# Patient Record
Sex: FEMALE | Race: WHITE | NOT HISPANIC OR LATINO | Employment: FULL TIME | URBAN - METROPOLITAN AREA
[De-identification: names, ages, dates, MRNs, and addresses within clinical notes are randomized per-mention and may not be internally consistent; named-entity substitution may affect disease eponyms.]

---

## 2017-03-27 ENCOUNTER — HOSPITAL ENCOUNTER (OUTPATIENT)
Dept: RADIOLOGY | Facility: HOSPITAL | Age: 47
Discharge: HOME | End: 2017-03-27
Attending: SURGERY
Payer: COMMERCIAL

## 2017-03-27 DIAGNOSIS — R16.0 LIVER MASS: ICD-10-CM

## 2017-03-27 PROCEDURE — 76700 US EXAM ABDOM COMPLETE: CPT | Mod: 26,,, | Performed by: RADIOLOGY

## 2017-03-27 PROCEDURE — 76700 US EXAM ABDOM COMPLETE: CPT | Mod: TC

## 2017-04-04 RX ORDER — METFORMIN HYDROCHLORIDE 500 MG/1
500 TABLET ORAL 2 TIMES DAILY WITH MEALS
Qty: 30 TABLET | Refills: 1 | OUTPATIENT
Start: 2017-04-04

## 2017-04-04 RX ORDER — SIMVASTATIN 40 MG/1
40 TABLET, FILM COATED ORAL NIGHTLY
Qty: 30 TABLET | Refills: 1 | OUTPATIENT
Start: 2017-04-04

## 2017-04-04 RX ORDER — LOSARTAN POTASSIUM 50 MG/1
50 TABLET ORAL 2 TIMES DAILY
Qty: 30 TABLET | Refills: 1 | OUTPATIENT
Start: 2017-04-04

## 2017-04-04 NOTE — TELEPHONE ENCOUNTER
She's never been seen here, she'll have to get her previous provider to fill until she sees Dr Walker.  I cannot fill medicatons

## 2017-04-04 NOTE — TELEPHONE ENCOUNTER
Spoke with patient and she states that her insurance will only allow her to see Dr. Walker. Will not cover any other provider in the office     Appointment rescheduled to 5-1-17    Please advise

## 2017-04-04 NOTE — TELEPHONE ENCOUNTER
Spoke with Paulette -with [employer], she confirmed patient can see Dr. Guajardo. Appointment has been reschedule with MD on 04/12/17. Patient was informed.     Reference # with MsKeyonna Caldwell with [employer] [id number]    Phone # [phone]

## 2017-04-04 NOTE — TELEPHONE ENCOUNTER
----- Message from Lacie Fontaine sent at 4/4/2017 11:20 AM CDT -----  Contact: Self  Refill : losartan (COZAAR) 50 MG tablet               metformin (GLUCOPHAGE) 500 MG tablet              simvastatin (ZOCOR) 40 MG tablet         Pharmacy : [address] [place] [address] [place] [phone]    #Pt ins does not let her see anyone else but  but pt will run out of meds by the time of her apt. Please call [phone]

## 2017-04-12 ENCOUNTER — TELEPHONE (OUTPATIENT)
Dept: FAMILY MEDICINE | Facility: CLINIC | Age: 47
End: 2017-04-12

## 2017-04-12 ENCOUNTER — OFFICE VISIT (OUTPATIENT)
Dept: FAMILY MEDICINE | Facility: CLINIC | Age: 47
End: 2017-04-12
Payer: COMMERCIAL

## 2017-04-12 ENCOUNTER — LAB VISIT (OUTPATIENT)
Dept: LAB | Facility: HOSPITAL | Age: 47
End: 2017-04-12
Attending: INTERNAL MEDICINE
Payer: COMMERCIAL

## 2017-04-12 ENCOUNTER — PATIENT MESSAGE (OUTPATIENT)
Dept: FAMILY MEDICINE | Facility: CLINIC | Age: 47
End: 2017-04-12

## 2017-04-12 VITALS
HEIGHT: 63 IN | OXYGEN SATURATION: 98 % | SYSTOLIC BLOOD PRESSURE: 152 MMHG | DIASTOLIC BLOOD PRESSURE: 86 MMHG | RESPIRATION RATE: 17 BRPM | HEART RATE: 80 BPM | WEIGHT: 291 LBS | TEMPERATURE: 98 F | BODY MASS INDEX: 51.56 KG/M2

## 2017-04-12 DIAGNOSIS — E11.9 TYPE 2 DIABETES MELLITUS WITHOUT COMPLICATION, WITHOUT LONG-TERM CURRENT USE OF INSULIN: Primary | ICD-10-CM

## 2017-04-12 DIAGNOSIS — I10 HYPERTENSION, ESSENTIAL: ICD-10-CM

## 2017-04-12 DIAGNOSIS — I10 HYPERTENSION, ESSENTIAL: Primary | ICD-10-CM

## 2017-04-12 DIAGNOSIS — E11.9 TYPE 2 DIABETES MELLITUS WITHOUT COMPLICATION, WITHOUT LONG-TERM CURRENT USE OF INSULIN: ICD-10-CM

## 2017-04-12 DIAGNOSIS — K75.81 NASH (NONALCOHOLIC STEATOHEPATITIS): ICD-10-CM

## 2017-04-12 DIAGNOSIS — Z12.39 SCREENING FOR BREAST CANCER: ICD-10-CM

## 2017-04-12 DIAGNOSIS — E66.01 MORBID OBESITY DUE TO EXCESS CALORIES: ICD-10-CM

## 2017-04-12 DIAGNOSIS — D13.4 HEPATIC ADENOMA: ICD-10-CM

## 2017-04-12 LAB
25(OH)D3+25(OH)D2 SERPL-MCNC: 27 NG/ML
ALBUMIN SERPL BCP-MCNC: 3.6 G/DL
ALP SERPL-CCNC: 100 U/L
ALT SERPL W/O P-5'-P-CCNC: 34 U/L
ANION GAP SERPL CALC-SCNC: 8 MMOL/L
AST SERPL-CCNC: 50 U/L
BASOPHILS # BLD AUTO: 0.03 K/UL
BASOPHILS NFR BLD: 0.3 %
BILIRUB SERPL-MCNC: 0.5 MG/DL
BUN SERPL-MCNC: 7 MG/DL
CALCIUM SERPL-MCNC: 10.3 MG/DL
CHLORIDE SERPL-SCNC: 103 MMOL/L
CHOLEST/HDLC SERPL: 3.7 {RATIO}
CO2 SERPL-SCNC: 29 MMOL/L
CREAT SERPL-MCNC: 0.7 MG/DL
DIFFERENTIAL METHOD: ABNORMAL
EOSINOPHIL # BLD AUTO: 0.3 K/UL
EOSINOPHIL NFR BLD: 2.7 %
ERYTHROCYTE [DISTWIDTH] IN BLOOD BY AUTOMATED COUNT: 14.3 %
EST. GFR  (AFRICAN AMERICAN): >60 ML/MIN/1.73 M^2
EST. GFR  (NON AFRICAN AMERICAN): >60 ML/MIN/1.73 M^2
GLUCOSE SERPL-MCNC: 110 MG/DL
HCT VFR BLD AUTO: 37.7 %
HDL/CHOLESTEROL RATIO: 26.9 %
HDLC SERPL-MCNC: 186 MG/DL
HDLC SERPL-MCNC: 50 MG/DL
HGB BLD-MCNC: 11.9 G/DL
LDLC SERPL CALC-MCNC: 111.2 MG/DL
LYMPHOCYTES # BLD AUTO: 2.7 K/UL
LYMPHOCYTES NFR BLD: 28.9 %
MCH RBC QN AUTO: 26.7 PG
MCHC RBC AUTO-ENTMCNC: 31.6 %
MCV RBC AUTO: 85 FL
MONOCYTES # BLD AUTO: 0.7 K/UL
MONOCYTES NFR BLD: 7.6 %
NEUTROPHILS # BLD AUTO: 5.7 K/UL
NEUTROPHILS NFR BLD: 60.5 %
NONHDLC SERPL-MCNC: 136 MG/DL
PLATELET # BLD AUTO: 234 K/UL
PLATELET BLD QL SMEAR: ABNORMAL
PMV BLD AUTO: 13.1 FL
POTASSIUM SERPL-SCNC: 4.6 MMOL/L
PROT SERPL-MCNC: 8.5 G/DL
RBC # BLD AUTO: 4.45 M/UL
SODIUM SERPL-SCNC: 140 MMOL/L
TRIGL SERPL-MCNC: 124 MG/DL
TSH SERPL DL<=0.005 MIU/L-ACNC: 1.26 UIU/ML
WBC # BLD AUTO: 9.49 K/UL

## 2017-04-12 PROCEDURE — 1160F RVW MEDS BY RX/DR IN RCRD: CPT | Mod: S$GLB,,, | Performed by: INTERNAL MEDICINE

## 2017-04-12 PROCEDURE — 3077F SYST BP >= 140 MM HG: CPT | Mod: S$GLB,,, | Performed by: INTERNAL MEDICINE

## 2017-04-12 PROCEDURE — 3046F HEMOGLOBIN A1C LEVEL >9.0%: CPT | Mod: S$GLB,,, | Performed by: INTERNAL MEDICINE

## 2017-04-12 PROCEDURE — 83036 HEMOGLOBIN GLYCOSYLATED A1C: CPT

## 2017-04-12 PROCEDURE — 80061 LIPID PANEL: CPT

## 2017-04-12 PROCEDURE — 80053 COMPREHEN METABOLIC PANEL: CPT

## 2017-04-12 PROCEDURE — 3079F DIAST BP 80-89 MM HG: CPT | Mod: S$GLB,,, | Performed by: INTERNAL MEDICINE

## 2017-04-12 PROCEDURE — 82306 VITAMIN D 25 HYDROXY: CPT

## 2017-04-12 PROCEDURE — 99999 PR PBB SHADOW E&M-EST. PATIENT-LVL IV: CPT | Mod: PBBFAC,,, | Performed by: INTERNAL MEDICINE

## 2017-04-12 PROCEDURE — 84443 ASSAY THYROID STIM HORMONE: CPT

## 2017-04-12 PROCEDURE — 99204 OFFICE O/P NEW MOD 45 MIN: CPT | Mod: S$GLB,,, | Performed by: INTERNAL MEDICINE

## 2017-04-12 PROCEDURE — 4010F ACE/ARB THERAPY RXD/TAKEN: CPT | Mod: S$GLB,,, | Performed by: INTERNAL MEDICINE

## 2017-04-12 PROCEDURE — 36415 COLL VENOUS BLD VENIPUNCTURE: CPT

## 2017-04-12 PROCEDURE — 85025 COMPLETE CBC W/AUTO DIFF WBC: CPT

## 2017-04-12 RX ORDER — METFORMIN HYDROCHLORIDE 500 MG/1
1000 TABLET ORAL 2 TIMES DAILY WITH MEALS
Qty: 120 TABLET | Refills: 3 | Status: SHIPPED | OUTPATIENT
Start: 2017-04-12 | End: 2017-08-03 | Stop reason: SDUPTHER

## 2017-04-12 RX ORDER — HYDROCHLOROTHIAZIDE 25 MG/1
25 TABLET ORAL DAILY
Qty: 30 TABLET | Refills: 5 | Status: SHIPPED | OUTPATIENT
Start: 2017-04-12 | End: 2017-10-06 | Stop reason: SDUPTHER

## 2017-04-12 RX ORDER — FLUTICASONE PROPIONATE 50 MCG
1 SPRAY, SUSPENSION (ML) NASAL DAILY
COMMUNITY
End: 2017-11-10 | Stop reason: SDUPTHER

## 2017-04-12 NOTE — PROGRESS NOTES
Subjective:       Patient ID: Micaela Hernandez is a 46 y.o. female.    Chief Complaint: Establish Care    HPI Comments: Est pcp    HPI: 47 y/o w/ HTN, DM, h/o liver adenoma (while taking OCP) ARORA and morbid obesity presents to establish pcp. Taking metformin 500mg twice daily for last year not check blood glucose regularly at home denies hypoglycemic symptoms. No Parathesia or tingling of feet. No history of CVA/CAD/PAD. Using ellipitical 2 x/week without exertional symptoms. She has recently started a meal prep program.     PSurghx; liver adenoma resection jan 2016 incisional hernia repair nov 2016, cholecystectomy, fibrioid ablation.    SocHx: non smoker no etoh, no illicits. Lives with 20 year old daughter works as     Review of Systems   Constitutional: Negative for activity change, appetite change, fatigue, fever and unexpected weight change.   HENT: Negative for ear pain, hearing loss, rhinorrhea, sore throat and trouble swallowing.    Eyes: Negative for discharge and visual disturbance.   Respiratory: Negative for chest tightness, shortness of breath and wheezing.    Cardiovascular: Negative for chest pain, palpitations and leg swelling.   Gastrointestinal: Negative for abdominal pain, blood in stool, constipation, diarrhea and vomiting.   Endocrine: Negative for cold intolerance, heat intolerance, polydipsia and polyuria.   Genitourinary: Negative for difficulty urinating, dysuria, hematuria and menstrual problem.   Musculoskeletal: Negative for arthralgias, joint swelling and neck stiffness.   Skin: Negative for rash.   Neurological: Negative for dizziness, syncope, weakness and headaches.   Psychiatric/Behavioral: Negative for confusion, dysphoric mood and suicidal ideas.       Objective:     Vitals:    04/12/17 0804 04/12/17 0827   BP: (!) 150/88 (!) 152/86   BP Location: Left arm    Patient Position: Sitting    BP Method: Manual    Pulse: 84 80   Resp: 17    Temp: 97.8 °F (36.6 °C)    TempSrc:  "Oral    SpO2: 98%    Weight: 132 kg (291 lb 0.1 oz)    Height: 5' 3" (1.6 m)           Physical Exam   Constitutional: She is oriented to person, place, and time. She appears well-developed and well-nourished.   HENT:   Head: Normocephalic and atraumatic.   Eyes: Conjunctivae are normal. Pupils are equal, round, and reactive to light.   Neck: Normal range of motion.   Cardiovascular: Normal rate and regular rhythm.  Exam reveals no gallop and no friction rub.    No murmur heard.  Trace pedal edema bilaterally   Pulmonary/Chest: Effort normal and breath sounds normal. She has no wheezes. She has no rales.   Abdominal: Soft. Bowel sounds are normal. There is no tenderness. There is no rebound and no guarding.   Musculoskeletal: Normal range of motion. She exhibits no edema or tenderness.   Neurological: She is alert and oriented to person, place, and time. No cranial nerve deficit.   Protective Sensation (w/ 10 gram monofilament):  Right: Intact  Left: Intact    Visual Inspection:  Normal -  Bilateral    Pedal Pulses:   Right: Present  Left: Present    Posterior tibialis:   Right:Present  Left: Present     Skin: Skin is warm and dry.   Psychiatric: She has a normal mood and affect.       Assessment:       1. Type 2 diabetes mellitus without complication, without long-term current use of insulin    2. Hypertension, essential    3. ARORA (nonalcoholic steatohepatitis)    4. Screening for breast cancer    5. Hepatic adenoma    6. Morbid obesity due to excess calories        Plan:    1. Increase metformin to 1000mg bid titrate weekly (start 1000mg qam and 500mg qpm x one week). To take with fiber supplement.  Needs dfe, optometry referral sent. Repeat a1c today check tsh for secondary causes of obesity    2. Above goal add hctz follow up in two weeks, stressed limiting salt intake, check renal function and electrolytes    3. On statin needs weight loss    4. Mammogram    5. Stable repeat imaging Sep 2017    6. The patient " is asked to make an attempt to improve diet and exercise patterns to aid in medical management of this problem.  Consider med fitness referral at follow up    2 week follow up for bp monitoring

## 2017-04-12 NOTE — TELEPHONE ENCOUNTER
----- Message from Ida Acharya sent at 4/12/2017  9:22 AM CDT -----  Contact: Lawanda VARELA   Instructions are not clear on Metformin .   [phone]

## 2017-04-13 LAB
ESTIMATED AVG GLUCOSE: 146 MG/DL
HBA1C MFR BLD HPLC: 6.7 %

## 2017-04-13 RX ORDER — SIMVASTATIN 40 MG/1
40 TABLET, FILM COATED ORAL NIGHTLY
Qty: 30 TABLET | Refills: 5 | Status: SHIPPED | OUTPATIENT
Start: 2017-04-13 | End: 2017-10-13 | Stop reason: SDUPTHER

## 2017-04-13 RX ORDER — LOSARTAN POTASSIUM 50 MG/1
50 TABLET ORAL 2 TIMES DAILY
Qty: 60 TABLET | Refills: 5 | Status: SHIPPED | OUTPATIENT
Start: 2017-04-13 | End: 2017-10-06 | Stop reason: SDUPTHER

## 2017-05-01 ENCOUNTER — HOSPITAL ENCOUNTER (OUTPATIENT)
Dept: RADIOLOGY | Facility: HOSPITAL | Age: 47
Discharge: HOME | End: 2017-05-01
Attending: INTERNAL MEDICINE
Payer: COMMERCIAL

## 2017-05-01 DIAGNOSIS — Z12.31 ENCOUNTER FOR SCREENING MAMMOGRAM FOR BREAST CANCER: ICD-10-CM

## 2017-05-01 DIAGNOSIS — Z12.39 SCREENING FOR BREAST CANCER: ICD-10-CM

## 2017-05-01 PROCEDURE — 77067 SCR MAMMO BI INCL CAD: CPT | Mod: 26,,, | Performed by: RADIOLOGY

## 2017-05-01 PROCEDURE — 77067 SCR MAMMO BI INCL CAD: CPT | Mod: TC

## 2017-05-16 ENCOUNTER — OFFICE VISIT (OUTPATIENT)
Dept: OPTOMETRY | Facility: CLINIC | Age: 47
End: 2017-05-16
Payer: COMMERCIAL

## 2017-05-16 DIAGNOSIS — H52.4 MYOPIA WITH PRESBYOPIA, BILATERAL: ICD-10-CM

## 2017-05-16 DIAGNOSIS — E11.9 DIABETES MELLITUS TYPE 2 WITHOUT RETINOPATHY: Primary | ICD-10-CM

## 2017-05-16 DIAGNOSIS — H52.13 MYOPIA WITH PRESBYOPIA, BILATERAL: ICD-10-CM

## 2017-05-16 PROCEDURE — 92015 DETERMINE REFRACTIVE STATE: CPT | Mod: S$GLB,,, | Performed by: OPTOMETRIST

## 2017-05-16 PROCEDURE — 92004 COMPRE OPH EXAM NEW PT 1/>: CPT | Mod: S$GLB,,, | Performed by: OPTOMETRIST

## 2017-05-16 PROCEDURE — 99999 PR PBB SHADOW E&M-EST. PATIENT-LVL II: CPT | Mod: PBBFAC,,, | Performed by: OPTOMETRIST

## 2017-05-16 NOTE — MR AVS SNAPSHOT
[facility]  [address] [facility]  [place] [address]  Phone: [phone]  Fax: [phone]                  Micaela Hernandez   2017 2:00 PM   Office Visit    Description:  Female : 1970   Provider:  Geovany Suarez OD   Department:  [facility]           Reason for Visit     Diabetic Eye Exam           Diagnoses this Visit        Comments    Diabetes mellitus type 2 without retinopathy    -  Primary     Myopia with presbyopia, bilateral                To Do List           Future Appointments        Provider Department Dept Phone    2017 8:00 AM Rojelio Guajardo MD [facility] [phone]      Goals (5 Years of Data)     None      Follow-Up and Disposition     Return in about 1 year (around 2018).      [facility]     [facility] On Call Nurse Care Line -  Assistance  Unless otherwise directed by your provider, please contact [facility], our nurse care line that is available for  assistance.     Registered nurses in the [facility] Center provide: appointment scheduling, clinical advisement, health education, and other advisory services.  Call: [phone] (toll free)               Medications           Message regarding Medications     Verify the changes and/or additions to your medication regime listed below are the same as discussed with your clinician today.  If any of these changes or additions are incorrect, please notify your healthcare provider.             Verify that the below list of medications is an accurate representation of the medications you are currently taking.  If none reported, the list may be blank. If incorrect, please contact your healthcare provider. Carry this list with you in case of emergency.           Current Medications     fluticasone (FLONASE) 50 mcg/actuation nasal spray 1 spray by Each Nare route once daily.    hydrochlorothiazide (HYDRODIURIL) 25 MG tablet Take 1 tablet (25 mg total) by mouth once  daily.    ibuprofen (ADVIL,MOTRIN) 200 MG tablet Take 200 mg by mouth every 6 (six) hours as needed for Pain.    loratadine (CLARITIN) 10 mg tablet Take 10 mg by mouth once daily.    losartan (COZAAR) 50 MG tablet Take 1 tablet (50 mg total) by mouth 2 (two) times daily.    metformin (GLUCOPHAGE) 500 MG tablet Take 2 tablets (1,000 mg total) by mouth 2 (two) times daily with meals. Week one: two tabs po qam one tab po qpm then two tabs po bid    simvastatin (ZOCOR) 40 MG tablet Take 1 tablet (40 mg total) by mouth every evening.           Clinical Reference Information           Allergies as of 5/16/2017     No Known Allergies      Immunizations Administered on Date of Encounter - 5/16/2017     None      Language Assistance Services     ATTENTION: Language assistance services are available, free of charge. Please call [phone].      ATENCIÓN: Si habla mary jo, tiene a lopez disposición servicios gratuitos de asistencia lingüística. Llame al [phone].     CHÚ Ý: N?u b?n nói Ti?ng Vi?t, có các d?ch v? h? tr? ngôn ng? mi?n phí dành cho b?n. G?i s? [phone].         [facility] complies with applicable Federal civil rights laws and does not discriminate on the basis of race, color, national origin, age, disability, or sex.

## 2017-05-16 NOTE — LETTER
May 16, 2017      Rojelio Guajardo MD  [address]  [place] [address]           [facility]  [address]  [place] [address]  Phone: [phone]  Fax: [phone]          Patient: Micaela Hernandez   MR Number: [id number]   YOB: 1970   Date of Visit: 5/16/2017       Dear Dr. Rojelio Guajardo:    Thank you for referring Micaela Hernandez to me for evaluation. Attached you will find relevant portions of my assessment and plan of care.    If you have questions, please do not hesitate to call me. I look forward to following Micaela Hernandez along with you.    Sincerely,    Geovany Suarez, OD    Enclosure  CC:  No Recipients    If you would like to receive this communication electronically, please contact [email or web address] or [phone] to request more information on [employer] access.    For providers and/or their staff who would like to refer a patient to [facility], please contact us through our one-stop-shop provider referral line, [facility] , at [phone].    If you feel you have received this communication in error or would no longer like to receive these types of communications, please e-mail [email or web address]

## 2017-05-16 NOTE — PROGRESS NOTES
HPI     Pt is here for diabetic eye exam. Blurry vision distance/near  Stopped CLs several years ago due to comfort.  Declines fitting    BS- 05/16/2017 136    Hemoglobin A1C       Date                     Value               Ref Range             Status                04/12/2017               6.7 (H)             4.5 - 6.2 %           Final                (-)Flashes (-)Floaters  (-)Itch, (-)tear, (-)burn, (-)Dryness. (-) OTC Drops   (-)Photophobia  (-)Glare (-)diplopia (-) headaches             Last edited by Geovany Suarez, OD on 5/16/2017  2:10 PM.         Assessment /Plan     For exam results, see Encounter Report.    Diabetes mellitus type 2 without retinopathy  -No retinopathy noted today.  Continued control with primary care physician and annual comprehensive eye exam.    Myopia with presbyopia, bilateral  Eyeglass Final Rx     Eyeglass Final Rx      Sphere Cylinder Add   Right -3.75 Sphere +1.75   Left -3.50 Sphere +1.75       Type:  PAL    Expiration Date:  5/17/2018                  RTC 1 yr

## 2017-05-23 ENCOUNTER — OFFICE VISIT (OUTPATIENT)
Dept: FAMILY MEDICINE | Facility: CLINIC | Age: 47
End: 2017-05-23
Payer: COMMERCIAL

## 2017-05-23 VITALS
RESPIRATION RATE: 17 BRPM | HEART RATE: 90 BPM | BODY MASS INDEX: 51.91 KG/M2 | HEIGHT: 63 IN | OXYGEN SATURATION: 97 % | WEIGHT: 293 LBS | SYSTOLIC BLOOD PRESSURE: 140 MMHG | DIASTOLIC BLOOD PRESSURE: 82 MMHG | TEMPERATURE: 98 F

## 2017-05-23 DIAGNOSIS — E11.9 TYPE 2 DIABETES MELLITUS WITHOUT COMPLICATION, WITHOUT LONG-TERM CURRENT USE OF INSULIN: ICD-10-CM

## 2017-05-23 DIAGNOSIS — E66.01 MORBID OBESITY DUE TO EXCESS CALORIES: Primary | ICD-10-CM

## 2017-05-23 DIAGNOSIS — I10 HYPERTENSION, ESSENTIAL: ICD-10-CM

## 2017-05-23 PROCEDURE — 3079F DIAST BP 80-89 MM HG: CPT | Mod: S$GLB,,, | Performed by: INTERNAL MEDICINE

## 2017-05-23 PROCEDURE — 3077F SYST BP >= 140 MM HG: CPT | Mod: S$GLB,,, | Performed by: INTERNAL MEDICINE

## 2017-05-23 PROCEDURE — 1160F RVW MEDS BY RX/DR IN RCRD: CPT | Mod: S$GLB,,, | Performed by: INTERNAL MEDICINE

## 2017-05-23 PROCEDURE — 99214 OFFICE O/P EST MOD 30 MIN: CPT | Mod: S$GLB,,, | Performed by: INTERNAL MEDICINE

## 2017-05-23 PROCEDURE — 99999 PR PBB SHADOW E&M-EST. PATIENT-LVL III: CPT | Mod: PBBFAC,,, | Performed by: INTERNAL MEDICINE

## 2017-05-23 PROCEDURE — 3044F HG A1C LEVEL LT 7.0%: CPT | Mod: S$GLB,,, | Performed by: INTERNAL MEDICINE

## 2017-05-23 PROCEDURE — 4010F ACE/ARB THERAPY RXD/TAKEN: CPT | Mod: S$GLB,,, | Performed by: INTERNAL MEDICINE

## 2017-05-23 NOTE — PROGRESS NOTES
Subjective:       Patient ID: Micaela eHrnandez is a 47 y.o. female.    Chief Complaint: Medication Management and Follow-up    F/u chronic conditions    HPI: 48 y/o w/ morbid obesity DM, and HTN presents for follow up. Last visit metformin was increased to 1000mg twice per day and HCTZ was added for poorly controlled blood pressure. She denies any adverse effects with these medications changes. Her primary concern today is lack of weight loss. seh has been using a meal service for pre made meals five days per week for last three months. She  Does snack using protein bars (unknown calories per bar) in the afternoon. On weekends she prepares own meals. She has cut out most fast food and regular soft drinks (still drinking 3-4 diet soft drinks per week). Not exercising regularly (has elipitical at home but not using consistently, gets right side pains with walking).       Review of Systems   Constitutional: Negative for activity change, appetite change, fatigue, fever and unexpected weight change.   HENT: Negative for ear pain, rhinorrhea and sore throat.    Eyes: Negative for discharge and visual disturbance.   Respiratory: Negative for chest tightness, shortness of breath and wheezing.    Cardiovascular: Negative for chest pain, palpitations and leg swelling.   Gastrointestinal: Negative for abdominal pain, constipation and diarrhea.   Endocrine: Negative for cold intolerance and heat intolerance.   Genitourinary: Negative for dysuria and hematuria.   Musculoskeletal: Negative for joint swelling and neck stiffness.   Skin: Negative for rash.   Neurological: Negative for dizziness, syncope, weakness and headaches.   Psychiatric/Behavioral: Negative for suicidal ideas.       Objective:     Vitals:    05/23/17 0746   BP: (!) 140/82   BP Location: Left arm   Patient Position: Sitting   BP Method: Manual   Pulse: 90   Resp: 17   Temp: 98.1 °F (36.7 °C)   TempSrc: Oral   SpO2: 97%   Weight: 133.8 kg (294 lb 15.6 oz)  "  Height: 5' 3" (1.6 m)          Physical Exam   Constitutional: She is oriented to person, place, and time. She appears well-developed and well-nourished.   HENT:   Head: Normocephalic and atraumatic.   Eyes: Conjunctivae are normal. Pupils are equal, round, and reactive to light.   Neck: Normal range of motion.   Cardiovascular: Normal rate and regular rhythm.  Exam reveals no gallop and no friction rub.    No murmur heard.  Pulmonary/Chest: Effort normal and breath sounds normal. She has no wheezes. She has no rales.   Abdominal: Soft. Bowel sounds are normal. There is no tenderness. There is no rebound and no guarding.   Musculoskeletal: Normal range of motion. She exhibits no edema or tenderness.   Neurological: She is alert and oriented to person, place, and time. No cranial nerve deficit.   Protective Sensation (w/ 10 gram monofilament):  Right: Intact  Left: Intact    Visual Inspection:  Normal -  Bilateral    Pedal Pulses:   Right: Present  Left: Present    Posterior tibialis:   Right:Present  Left: Present     Skin: Skin is warm and dry.   Psychiatric: She has a normal mood and affect.       Assessment:       1. Morbid obesity due to excess calories    2. Hypertension, essential    3. Type 2 diabetes mellitus without complication, without long-term current use of insulin        Plan:    1. Majority of today's visit was spent on councilling on dietary choices focusing on portion size and attention to calorie counts on labels of food, referal to medical fitness program for further dietary teaching and assistance on building a HOME exercise program. She is reluctant to take more/new medication. At follow up can discuss possibility of adding a topiramate to assist with weight loss    2. Just above goal will delay additional medication until follow up and monitor with dietary changes    3. Reviewed last a1c encourage continued compliance with metformin continue dietary changes. Foot exam done today completed " DFE recently

## 2017-06-06 ENCOUNTER — OFFICE VISIT (OUTPATIENT)
Dept: OPTOMETRY | Facility: CLINIC | Age: 47
End: 2017-06-06
Payer: COMMERCIAL

## 2017-06-06 DIAGNOSIS — H52.13 MYOPIA WITH PRESBYOPIA, BILATERAL: Primary | ICD-10-CM

## 2017-06-06 DIAGNOSIS — H52.4 MYOPIA WITH PRESBYOPIA, BILATERAL: Primary | ICD-10-CM

## 2017-06-06 PROCEDURE — 99499 UNLISTED E&M SERVICE: CPT | Mod: S$GLB,,, | Performed by: OPTOMETRIST

## 2017-06-06 NOTE — PROGRESS NOTES
HPI     Rx check.  Right eye is blurry at distance and computer in new sRx. Near   Ok  Purchased at [employer] and was told had to return to prescribing   doctor     Last edited by Geovany Suarez, OD on 6/6/2017  7:50 AM. (History)            Assessment /Plan     For exam results, see Encounter Report.    Myopia with presbyopia, bilateral  Eyeglass Final Rx     Eyeglass Final Rx       Sphere Cylinder Dist VA Add    Right -3.50 Sphere 20/20-- +1.75    Left -3.50 Sphere 20/20-- +1.75    Type:  PAL    Expiration Date:  6/7/2018    Doctor's remake  Please re-measure OC OU              Dr's remake    RTC PRN

## 2017-07-18 DIAGNOSIS — E66.01 MORBID OBESITY DUE TO EXCESS CALORIES: ICD-10-CM

## 2017-07-18 DIAGNOSIS — E11.9 TYPE 2 DIABETES MELLITUS WITHOUT COMPLICATION, WITHOUT LONG-TERM CURRENT USE OF INSULIN: Primary | ICD-10-CM

## 2017-07-18 NOTE — PROGRESS NOTES
Subjective:       Patient ID: Micaela Hernandez is a 47 y.o. female.    Chief Complaint: No chief complaint on file.    HPI  Review of Systems    Objective:   There were no vitals filed for this visit.       Physical Exam    Assessment:       1. Type 2 diabetes mellitus without complication, without long-term current use of insulin    2. Morbid obesity due to excess calories        Plan:       Contacted by medical fitness nutritionist request referal for further nutrional education, referal entered

## 2017-08-03 DIAGNOSIS — E11.9 TYPE 2 DIABETES MELLITUS WITHOUT COMPLICATION, WITHOUT LONG-TERM CURRENT USE OF INSULIN: ICD-10-CM

## 2017-08-03 RX ORDER — METFORMIN HYDROCHLORIDE 500 MG/1
TABLET ORAL
Qty: 120 TABLET | Refills: 0 | Status: SHIPPED | OUTPATIENT
Start: 2017-08-03 | End: 2017-09-11

## 2017-08-10 ENCOUNTER — NUTRITION (OUTPATIENT)
Dept: NUTRITION | Facility: CLINIC | Age: 47
End: 2017-08-10
Payer: COMMERCIAL

## 2017-08-10 DIAGNOSIS — E11.9 TYPE 2 DIABETES MELLITUS WITHOUT COMPLICATION, WITHOUT LONG-TERM CURRENT USE OF INSULIN: Primary | ICD-10-CM

## 2017-08-10 PROCEDURE — 97802 MEDICAL NUTRITION INDIV IN: CPT | Mod: S$GLB,,, | Performed by: DIETITIAN, REGISTERED

## 2017-08-10 NOTE — PROGRESS NOTES
Nutrition Assessment for Medical Nutrition Therapy    Referring professional: Dr. Guajardo    Reason for MNT visit: Pt in for education and nutrition counseling regarding blood sugar control and obesity.     Past Medical History:  Active Ambulatory Problems     Diagnosis Date Noted    Axillary mass 10/23/2013    Liver mass, right lobe 02/18/2016    Hernia, incisional 11/29/2016    Type 2 diabetes mellitus without complication, without long-term current use of insulin 04/12/2017    Hypertension, essential 04/12/2017    ARORA (nonalcoholic steatohepatitis) 04/12/2017     Resolved Ambulatory Problems     Diagnosis Date Noted    No Resolved Ambulatory Problems     Past Medical History:   Diagnosis Date    Diabetes mellitus     Hyperlipidemia     Hypertension        Pertinent Labs:   Hemoglobin A1C   Date Value Ref Range Status   04/12/2017 6.7 (H) 4.5 - 6.2 % Final     Comment:     According to ADA guidelines, hemoglobin A1C <7.0% represents  optimal control in non-pregnant diabetic patients.  Different  metrics may apply to specific populations.   Standards of Medical Care in Diabetes - 2016.  For the purpose of screening for the presence of diabetes:  <5.7%     Consistent with the absence of diabetes  5.7-6.4%  Consistent with increasing risk for diabetes   (prediabetes)  >or=6.5%  Consistent with diabetes  Currently no consensus exists for use of hemoglobin A1C  for diagnosis of diabetes for children.         Medications:   Current Outpatient Prescriptions:     fluticasone (FLONASE) 50 mcg/actuation nasal spray, 1 spray by Each Nare route once daily., Disp: , Rfl:     hydrochlorothiazide (HYDRODIURIL) 25 MG tablet, Take 1 tablet (25 mg total) by mouth once daily., Disp: 30 tablet, Rfl: 5    ibuprofen (ADVIL,MOTRIN) 200 MG tablet, Take 200 mg by mouth every 6 (six) hours as needed for Pain., Disp: , Rfl:     loratadine (CLARITIN) 10 mg tablet, Take 10 mg by mouth once daily., Disp: , Rfl:     losartan  "(COZAAR) 50 MG tablet, Take 1 tablet (50 mg total) by mouth 2 (two) times daily., Disp: 60 tablet, Rfl: 5    metformin (GLUCOPHAGE) 500 MG tablet, TAKE TWO TABLETS BY MOUTH IN THE MORNING ONE IN THE EVENING FOR 7 DAYS, THEN TAKE TWO TABLETS BY  MOUTH TWICE DAILY WITH MEALS THEREAFTER, Disp: 120 tablet, Rfl: 0    simvastatin (ZOCOR) 40 MG tablet, Take 1 tablet (40 mg total) by mouth every evening., Disp: 30 tablet, Rfl: 5    Wt: 294 lbs     BMI: 52    Estimated energy requirements: 2000 calories/ day     Diagnosed about year ago. No education class.   Check BS - used to do it 2 times per day, once per week. Evening reading before dinner , morning is highest about 130.     Nutrition History     Has tried low carb diets, and last well for a while  Was doing [employer] meal delivered- but only 7067-3824 calories. Lost weight in first two weeks, about 4-5 pounds each, but was hungry and cheated very little. After that, cheated less, appetite got better, but no more weight was lost.     Food allergies  intolerances:  N/A    Dining out: lately more, not planning ahead, tired.    Alcohol:  Very rarely alcohol - couple of drinks per year     Beverages:   Coffee     Coke Zero (2 per day, one with lunch; one with dinner) was drinking regular coke   Tea   Sometimes for lunch- sweetened with equal or splenda     Water       Meal preparation/shopping: self; [employer], [employer]    Typical day recall:  Up @ 6ish  7:15am  Breakfast in am: turkey sausage + egg "patties" + milk (2%)   Work: 8am with coffee (splenda or equal) + powdered creamer  Lunch: 12noon  leftovers   Off work at 5  Dinner: 6 or 6:30    spaghetti + turkey meatballs (ragu), lower carb pasta (el dente Carb Nada- made 8-10 servings out of the bag's recommendation of only 5)   salsbury steaks (frozen) + cauliflower tots    On weekends, try to cook bigger meals to have leftovers:   unstuffed cabbage rolls with ground meat with diced " tomatoes    Sometimes sweets: ice cream (crave dairy at night), glass of milk    Bed: 10-11pm     Supplements:  Fiber pills- methylcellulose    Recommendations/Interventions:  1. Aim for 7-9 hours sleep  2. Exercise 60 minutes most days, starting small and building up as able, discussed benefits as well as need to check blood sugar pre/post.   3. Eat breakfast within 1-2 hours of waking up  4. Try not to skip any meals or snacks, not going more than 3-4 hours without eating.   5. At each meal and snack, try to include a source of fiber + lean protein + healthy fat.   6. Consider limiting carbohydrates 15-20 grams per meal and snack with the exception of dinner time.     Supplement Recommendations:  These supplements can help to reduce blood sugar and insulin levels, but check with your diabetes practitioner before beginning any supplement regimen.     Before beginning, you should have already made improvements towards your nutrition plan and these are to be introduced one at a time each week, while continuing to check and record your blood sugar, at least two times per day:    Psyllium Husk Powder: 15 grams (divided doses before meals)     Written Materials Provided  These resources are intended to assist the patient in making it easier to choose recommended options when eating out to identify better-for-you brands at the grocery store:     Meal Planning Guide with recommendations discussed along with portion sizes and a customized meal plan:  Breakfast  7:15     2 egg muffins (+ veggies)   Vitalicious (or homemade) Egg n Cheese sandwich   Homemade Egg Mcmuffin + spinach + tomato + 100 calorie guacamole cup   Any snack option   Fairlife 2% milk + coffee    snacks    9:30am  + 3:30pm   Beanitos (at [employer]) + 2 laughing cow cheese wedges   String cheese + 100 calorie pack of nuts    Sargento Smart Balance Breaks    fruit + 100 calorie pack of nuts (2 tablespoons)    fruit + light cheese   fruit + hard  boiled egg or deviled egg   ¼ cup nuts or 2- 100 calorie packs   Glass of Fairlife 2% milk   Fairlife milk or unsweetened almond  milk + 1 scoop low sugar whey protein powder    [place] Protein Bar   Detour SMART protein bar   Power crunch protein bar (in pharmacy area at [facility])    Single serve pack of popcorn    Flavored Greek Yogurt  o Chobani Simply 100 or Dannon Oikos Triple Zero  o Can sprinkle on Kashi Go Lean and/or few berries    Lunch / dinner  12 & 6-6:30pm, respectively  ½ plate full of non-starchy veggies + ¼ portion (4-6 ounces LEAN meat) + ¼ plate portion whole grain carb or starchy veggie (1/2 cup - optional)       Grilled chicken Caesar salad   Spaghetti and meatsauce or zucchini noodles / spaghetti squash   Pulled Pork    Zucchini Lasagna   Slow Cooker Chicken Tacos   Chili    Stir Weeks   Fajitas   Beef & Cabbage    Shrimp Zoodle Scampie   Chicken + Zoodles -   kind of like this: https://www.Arigo/pin/947497976410521674/   or this https://www.Arigo/pin/236269085804157312/   Salsbury Steak  + cauliflower mashed potatoes + veggie      Evening snack   Halo Top   Fairlife milk      Eat Fit adonay card as a reminder to download the adonay to ones smart phone which provides: current Eat Fit partners, approved menu options, food labels for carb counting, & recipes.    Fast Food Lite Guide   Eat Fit Grocery Product list    RD contact information      Comprehension: good     Motivation to change: moderate      Follow-up: 4 -6 weeks     Counseling time: 2 Hours

## 2017-09-10 DIAGNOSIS — E11.9 TYPE 2 DIABETES MELLITUS WITHOUT COMPLICATION, WITHOUT LONG-TERM CURRENT USE OF INSULIN: ICD-10-CM

## 2017-09-11 RX ORDER — METFORMIN HYDROCHLORIDE 500 MG/1
TABLET ORAL
Qty: 120 TABLET | Refills: 0 | Status: SHIPPED | OUTPATIENT
Start: 2017-09-11 | End: 2017-10-27 | Stop reason: SDUPTHER

## 2017-10-06 DIAGNOSIS — I10 HYPERTENSION, ESSENTIAL: ICD-10-CM

## 2017-10-06 DIAGNOSIS — E11.9 TYPE 2 DIABETES MELLITUS WITHOUT COMPLICATION, WITHOUT LONG-TERM CURRENT USE OF INSULIN: ICD-10-CM

## 2017-10-06 RX ORDER — HYDROCHLOROTHIAZIDE 25 MG/1
TABLET ORAL
Qty: 30 TABLET | Refills: 5 | Status: SHIPPED | OUTPATIENT
Start: 2017-10-06 | End: 2017-11-10 | Stop reason: ALTCHOICE

## 2017-10-06 RX ORDER — LOSARTAN POTASSIUM 50 MG/1
TABLET ORAL
Qty: 60 TABLET | Refills: 5 | Status: SHIPPED | OUTPATIENT
Start: 2017-10-06 | End: 2017-11-10 | Stop reason: SDUPTHER

## 2017-10-13 DIAGNOSIS — E11.9 TYPE 2 DIABETES MELLITUS WITHOUT COMPLICATION, WITHOUT LONG-TERM CURRENT USE OF INSULIN: ICD-10-CM

## 2017-10-16 RX ORDER — SIMVASTATIN 40 MG/1
TABLET, FILM COATED ORAL
Qty: 30 TABLET | Refills: 5 | Status: SHIPPED | OUTPATIENT
Start: 2017-10-16 | End: 2017-11-10 | Stop reason: SDUPTHER

## 2017-10-27 DIAGNOSIS — E11.9 TYPE 2 DIABETES MELLITUS WITHOUT COMPLICATION, WITHOUT LONG-TERM CURRENT USE OF INSULIN: ICD-10-CM

## 2017-10-27 RX ORDER — METFORMIN HYDROCHLORIDE 500 MG/1
TABLET ORAL
Qty: 120 TABLET | Refills: 0 | Status: SHIPPED | OUTPATIENT
Start: 2017-10-27 | End: 2017-11-10 | Stop reason: SDUPTHER

## 2017-10-27 NOTE — TELEPHONE ENCOUNTER
----- Message from Ida Acharya sent at 10/27/2017  2:06 PM CDT -----  Contact: SELF  Refill request for Metformin. She is scheduled for f/u on -- 11-3-17       [phone]    LL

## 2017-11-10 ENCOUNTER — OFFICE VISIT (OUTPATIENT)
Dept: FAMILY MEDICINE | Facility: CLINIC | Age: 47
End: 2017-11-10
Payer: COMMERCIAL

## 2017-11-10 ENCOUNTER — LAB VISIT (OUTPATIENT)
Dept: LAB | Facility: HOSPITAL | Age: 47
End: 2017-11-10
Attending: INTERNAL MEDICINE
Payer: COMMERCIAL

## 2017-11-10 ENCOUNTER — TELEPHONE (OUTPATIENT)
Dept: FAMILY MEDICINE | Facility: CLINIC | Age: 47
End: 2017-11-10

## 2017-11-10 VITALS
TEMPERATURE: 98 F | WEIGHT: 293 LBS | BODY MASS INDEX: 51.91 KG/M2 | HEART RATE: 90 BPM | HEIGHT: 63 IN | OXYGEN SATURATION: 98 % | DIASTOLIC BLOOD PRESSURE: 88 MMHG | SYSTOLIC BLOOD PRESSURE: 138 MMHG | RESPIRATION RATE: 17 BRPM

## 2017-11-10 DIAGNOSIS — E11.9 TYPE 2 DIABETES MELLITUS WITHOUT COMPLICATION, WITHOUT LONG-TERM CURRENT USE OF INSULIN: ICD-10-CM

## 2017-11-10 DIAGNOSIS — E66.01 MORBID OBESITY: ICD-10-CM

## 2017-11-10 DIAGNOSIS — I10 HYPERTENSION, ESSENTIAL: ICD-10-CM

## 2017-11-10 DIAGNOSIS — J30.89 CHRONIC NON-SEASONAL ALLERGIC RHINITIS, UNSPECIFIED TRIGGER: ICD-10-CM

## 2017-11-10 DIAGNOSIS — E11.9 TYPE 2 DIABETES MELLITUS WITHOUT COMPLICATION, WITHOUT LONG-TERM CURRENT USE OF INSULIN: Primary | ICD-10-CM

## 2017-11-10 LAB
ALBUMIN SERPL BCP-MCNC: 3.5 G/DL
ALP SERPL-CCNC: 111 U/L
ALT SERPL W/O P-5'-P-CCNC: 39 U/L
ANION GAP SERPL CALC-SCNC: 12 MMOL/L
ANISOCYTOSIS BLD QL SMEAR: SLIGHT
AST SERPL-CCNC: 58 U/L
BASOPHILS NFR BLD: 1 %
BILIRUB SERPL-MCNC: 0.4 MG/DL
BUN SERPL-MCNC: 13 MG/DL
CALCIUM SERPL-MCNC: 9.8 MG/DL
CHLORIDE SERPL-SCNC: 101 MMOL/L
CO2 SERPL-SCNC: 26 MMOL/L
CREAT SERPL-MCNC: 0.8 MG/DL
DIFFERENTIAL METHOD: ABNORMAL
EOSINOPHIL NFR BLD: 1 %
ERYTHROCYTE [DISTWIDTH] IN BLOOD BY AUTOMATED COUNT: 14.5 %
EST. GFR  (AFRICAN AMERICAN): >60 ML/MIN/1.73 M^2
EST. GFR  (NON AFRICAN AMERICAN): >60 ML/MIN/1.73 M^2
ESTIMATED AVG GLUCOSE: 137 MG/DL
GLUCOSE SERPL-MCNC: 115 MG/DL
HBA1C MFR BLD HPLC: 6.4 %
HCT VFR BLD AUTO: 38.8 %
HGB BLD-MCNC: 12.3 G/DL
LYMPHOCYTES NFR BLD: 26 %
MCH RBC QN AUTO: 27.3 PG
MCHC RBC AUTO-ENTMCNC: 31.7 G/DL
MCV RBC AUTO: 86 FL
MONOCYTES NFR BLD: 7 %
NEUTROPHILS NFR BLD: 65 %
PLATELET # BLD AUTO: 245 K/UL
PMV BLD AUTO: 13.1 FL
POLYCHROMASIA BLD QL SMEAR: ABNORMAL
POTASSIUM SERPL-SCNC: 4 MMOL/L
PROT SERPL-MCNC: 8.7 G/DL
RBC # BLD AUTO: 4.51 M/UL
SODIUM SERPL-SCNC: 139 MMOL/L
WBC # BLD AUTO: 10.01 K/UL

## 2017-11-10 PROCEDURE — 99214 OFFICE O/P EST MOD 30 MIN: CPT | Mod: S$GLB,,, | Performed by: INTERNAL MEDICINE

## 2017-11-10 PROCEDURE — 80053 COMPREHEN METABOLIC PANEL: CPT

## 2017-11-10 PROCEDURE — 36415 COLL VENOUS BLD VENIPUNCTURE: CPT | Mod: PN

## 2017-11-10 PROCEDURE — 83036 HEMOGLOBIN GLYCOSYLATED A1C: CPT

## 2017-11-10 PROCEDURE — 99999 PR PBB SHADOW E&M-EST. PATIENT-LVL III: CPT | Mod: PBBFAC,,, | Performed by: INTERNAL MEDICINE

## 2017-11-10 PROCEDURE — 85025 COMPLETE CBC W/AUTO DIFF WBC: CPT

## 2017-11-10 RX ORDER — LOSARTAN POTASSIUM 50 MG/1
50 TABLET ORAL 2 TIMES DAILY
Qty: 180 TABLET | Refills: 3 | Status: SHIPPED | OUTPATIENT
Start: 2017-11-10 | End: 2018-10-31 | Stop reason: SDUPTHER

## 2017-11-10 RX ORDER — METFORMIN HYDROCHLORIDE 1000 MG/1
1000 TABLET ORAL 2 TIMES DAILY WITH MEALS
Qty: 180 TABLET | Refills: 3 | Status: SHIPPED | OUTPATIENT
Start: 2017-11-10 | End: 2018-10-31 | Stop reason: SDUPTHER

## 2017-11-10 RX ORDER — TOPIRAMATE 50 MG/1
TABLET, FILM COATED ORAL
Qty: 60 TABLET | Refills: 1 | Status: SHIPPED | OUTPATIENT
Start: 2017-11-10 | End: 2018-03-19

## 2017-11-10 RX ORDER — FEXOFENADINE HCL 60 MG
60 TABLET ORAL DAILY
COMMUNITY
End: 2018-11-27

## 2017-11-10 RX ORDER — FLUTICASONE PROPIONATE 50 MCG
1 SPRAY, SUSPENSION (ML) NASAL 2 TIMES DAILY
Qty: 16 G | Refills: 2 | Status: SHIPPED | OUTPATIENT
Start: 2017-11-10 | End: 2018-11-27

## 2017-11-10 RX ORDER — SIMVASTATIN 40 MG/1
40 TABLET, FILM COATED ORAL NIGHTLY
Qty: 90 TABLET | Refills: 3 | Status: SHIPPED | OUTPATIENT
Start: 2017-11-10 | End: 2018-10-31 | Stop reason: SDUPTHER

## 2017-11-10 NOTE — TELEPHONE ENCOUNTER
----- Message from Ida Acharya sent at 11/10/2017 10:25 AM CST -----  Contact: [facility]   Calling to clarify directions for Metformin .    [phone]   LL

## 2017-11-10 NOTE — PROGRESS NOTES
"Subjective:       Patient ID: Micaela Hernandez is a 47 y.o. female.    Chief Complaint: Diabetes (follow-up) and Medication Refill    F/u chronic conditions, sinuses    HPI: 46 y/o w/ DM morbid obesity ARORA presents for scheduled follow up. Since last visit she had meeting with nutritionist did not find this very helpful she is back to following low carb diet but not using pre-made meals. She self discontinued HCTZ approximately one month ago because she felt it was giving her a cough. Cough is still present worse when lying down associated rhinorrhea not using any nose sprays or antihistamine. No fevers/ no wheezing      Review of Systems   Constitutional: Negative for activity change, appetite change, fatigue, fever and unexpected weight change.   HENT: Negative for ear pain, rhinorrhea and sore throat.    Eyes: Negative for discharge and visual disturbance.   Respiratory: Negative for chest tightness, shortness of breath and wheezing.    Cardiovascular: Negative for chest pain, palpitations and leg swelling.   Gastrointestinal: Negative for abdominal pain, constipation and diarrhea.   Endocrine: Negative for cold intolerance and heat intolerance.   Genitourinary: Negative for dysuria and hematuria.   Musculoskeletal: Negative for joint swelling and neck stiffness.   Skin: Negative for rash.   Neurological: Negative for dizziness, syncope, weakness and headaches.   Psychiatric/Behavioral: Negative for suicidal ideas.       Objective:     Vitals:    11/10/17 0749   BP: 138/88   BP Location: Left arm   Patient Position: Sitting   BP Method: Large (Manual)   Pulse: 90   Resp: 17   Temp: 97.8 °F (36.6 °C)   TempSrc: Oral   SpO2: 98%   Weight: 135.5 kg (298 lb 11.6 oz)   Height: 5' 3" (1.6 m)          Physical Exam   Constitutional: She is oriented to person, place, and time. She appears well-developed and well-nourished.   HENT:   Head: Normocephalic and atraumatic.   Right Ear: Tympanic membrane normal.   Left Ear: " Tympanic membrane normal.   Nose: Mucosal edema and rhinorrhea present.   Mouth/Throat: Uvula is midline and mucous membranes are normal. Posterior oropharyngeal erythema present. No oropharyngeal exudate.   Eyes: Conjunctivae are normal. Pupils are equal, round, and reactive to light.   Neck: Normal range of motion.   Cardiovascular: Normal rate and regular rhythm.  Exam reveals no gallop and no friction rub.    No murmur heard.  Pulmonary/Chest: Effort normal and breath sounds normal. She has no wheezes. She has no rales.   Abdominal: Soft. Bowel sounds are normal. There is no tenderness. There is no rebound and no guarding.   Ventral hernia easily reducable   Musculoskeletal: Normal range of motion. She exhibits no edema or tenderness.   Neurological: She is alert and oriented to person, place, and time. No cranial nerve deficit.   Skin: Skin is warm and dry.   Psychiatric: She has a normal mood and affect.       Assessment:       1. Type 2 diabetes mellitus without complication, without long-term current use of insulin    2. Hypertension, essential    3. Morbid obesity    4. Chronic non-seasonal allergic rhinitis, unspecified trigger    5. Screening for cervical cancer        Plan:    1. Repeat a1c continue metformin bid stressed importance of avoidance of concentrated sweets and fast foods.    2. At goal continue arb repeat electrolytes and renal functiont elliot    3. Discussed consideration of bariatric surgery she believes this is excluded by her insurance. We discussed trial of medications to help with weight loss. Her last LFT's were in normal range start topiramate 50mg daily titrate to bid after two weeks discussed cognitive slowing or sedation to stop if these develops will see back in four weeks to monitor    4. flonase bid

## 2017-12-08 ENCOUNTER — OFFICE VISIT (OUTPATIENT)
Dept: FAMILY MEDICINE | Facility: CLINIC | Age: 47
End: 2017-12-08
Payer: COMMERCIAL

## 2017-12-08 VITALS
DIASTOLIC BLOOD PRESSURE: 84 MMHG | OXYGEN SATURATION: 98 % | TEMPERATURE: 98 F | HEIGHT: 63 IN | HEART RATE: 92 BPM | RESPIRATION RATE: 17 BRPM | WEIGHT: 293 LBS | BODY MASS INDEX: 51.91 KG/M2 | SYSTOLIC BLOOD PRESSURE: 130 MMHG

## 2017-12-08 DIAGNOSIS — Z12.4 SCREENING FOR CERVICAL CANCER: ICD-10-CM

## 2017-12-08 DIAGNOSIS — E78.5 HYPERLIPIDEMIA, UNSPECIFIED HYPERLIPIDEMIA TYPE: ICD-10-CM

## 2017-12-08 DIAGNOSIS — K75.81 NASH (NONALCOHOLIC STEATOHEPATITIS): Primary | ICD-10-CM

## 2017-12-08 DIAGNOSIS — E66.01 MORBID OBESITY: ICD-10-CM

## 2017-12-08 PROCEDURE — 99999 PR PBB SHADOW E&M-EST. PATIENT-LVL III: CPT | Mod: PBBFAC,,, | Performed by: INTERNAL MEDICINE

## 2017-12-08 PROCEDURE — 99214 OFFICE O/P EST MOD 30 MIN: CPT | Mod: S$GLB,,, | Performed by: INTERNAL MEDICINE

## 2017-12-08 NOTE — PROGRESS NOTES
"Subjective:       Patient ID: Micaela Hernandez is a 47 y.o. female.    Chief Complaint: Diabetes and Follow-up    F/u weight    HPI: 48 y/o with metabolic syndrome morbid obesity presents for scheduled follow up. Last visit started topiramate to assist with weight loss. Taking 50mg bid does feel like it has helped with appetite supression. Not doing any kind of regular exercise weight unchanged. Cough has improved with nasal steroid no LE swelling      Review of Systems   Constitutional: Negative for activity change, appetite change, fatigue, fever and unexpected weight change.   HENT: Negative for ear pain, rhinorrhea and sore throat.    Eyes: Negative for discharge and visual disturbance.   Respiratory: Negative for chest tightness, shortness of breath and wheezing.    Cardiovascular: Negative for chest pain, palpitations and leg swelling.   Gastrointestinal: Negative for abdominal pain, constipation and diarrhea.   Endocrine: Negative for cold intolerance and heat intolerance.   Genitourinary: Negative for dysuria and hematuria.   Musculoskeletal: Negative for joint swelling and neck stiffness.   Skin: Negative for rash.   Neurological: Negative for dizziness, syncope, weakness and headaches.   Psychiatric/Behavioral: Negative for suicidal ideas.       Objective:     Vitals:    12/08/17 0801   BP: 130/84   BP Location: Left arm   Patient Position: Sitting   BP Method: Large (Manual)   Pulse: 92   Resp: 17   Temp: 98 °F (36.7 °C)   TempSrc: Oral   SpO2: 98%   Weight: 136 kg (299 lb 13.2 oz)   Height: 5' 3" (1.6 m)          Physical Exam   Constitutional: She is oriented to person, place, and time. She appears well-developed and well-nourished.   HENT:   Head: Normocephalic and atraumatic.   Eyes: Conjunctivae are normal. Pupils are equal, round, and reactive to light.   Neck: Normal range of motion.   Cardiovascular: Normal rate and regular rhythm.  Exam reveals no gallop and no friction rub.    No murmur " heard.  No LE edema   Pulmonary/Chest: Effort normal and breath sounds normal. She has no wheezes. She has no rales.   Abdominal: Soft. Bowel sounds are normal. There is no tenderness. There is no rebound and no guarding.   Ventral hernia no tenderness   Musculoskeletal: Normal range of motion. She exhibits no edema or tenderness.   Neurological: She is alert and oriented to person, place, and time. No cranial nerve deficit.   Skin: Skin is warm and dry.   Psychiatric: She has a normal mood and affect.       Assessment:       1. ARORA (nonalcoholic steatohepatitis)    2. Hyperlipidemia, unspecified hyperlipidemia type    3. Screening for cervical cancer    4. Morbid obesity        Plan:    1/2. On statin LFT's stable repeat in two months now on topiramate    3. referal to GYN for screening pap    4. Discussed graded exercise program will target 30minutes three times per week

## 2017-12-11 ENCOUNTER — TELEPHONE (OUTPATIENT)
Dept: FAMILY MEDICINE | Facility: CLINIC | Age: 47
End: 2017-12-11

## 2017-12-12 NOTE — TELEPHONE ENCOUNTER
I spoke with the patient regarding a referral to Gyn, she refuse to schedule stating that she will contact the clinic at a later date.

## 2017-12-29 ENCOUNTER — OFFICE VISIT (OUTPATIENT)
Dept: OBSTETRICS AND GYNECOLOGY | Facility: CLINIC | Age: 47
End: 2017-12-29
Payer: COMMERCIAL

## 2017-12-29 VITALS
DIASTOLIC BLOOD PRESSURE: 71 MMHG | BODY MASS INDEX: 51.91 KG/M2 | WEIGHT: 293 LBS | HEIGHT: 63 IN | SYSTOLIC BLOOD PRESSURE: 138 MMHG

## 2017-12-29 DIAGNOSIS — Z01.419 ENCOUNTER FOR ANNUAL ROUTINE GYNECOLOGICAL EXAMINATION: Primary | ICD-10-CM

## 2017-12-29 PROCEDURE — 88175 CYTOPATH C/V AUTO FLUID REDO: CPT

## 2017-12-29 PROCEDURE — 99386 PREV VISIT NEW AGE 40-64: CPT | Mod: S$GLB,,, | Performed by: OBSTETRICS & GYNECOLOGY

## 2017-12-29 PROCEDURE — 99999 PR PBB SHADOW E&M-EST. PATIENT-LVL III: CPT | Mod: PBBFAC,,, | Performed by: OBSTETRICS & GYNECOLOGY

## 2017-12-29 NOTE — PROGRESS NOTES
Subjective:       Patient ID: Micaela Hernandez is a 47 y.o. female.    Chief Complaint:  Annual Exam      History of Present Illness  HPI  Micaela Hernandez is a 47 y.o. female  here for annual exam.    She describes her periods as regular, light flow, lasting 3-4 days. She had endometrial ablation in 2016. It has made her cycles much lighter, but still present. 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 She can never have hormones.   denies break through bleeding.   denies vaginal itching or irritation.  denies vaginal discharge.  She is not currently sexually active.   History of abnormal pap: No  Last Pap: approximate date 2013 and was normal  Last MMG: normal--routine follow-up in 12 months -- BIRADS 2 2017  Last Colonoscopy:  not indicated       GYN & OB History  Patient's last menstrual period was 2017 (exact date).   Date of Last Pap: No result found    OB History    Para Term  AB Living   1 1           SAB TAB Ectopic Multiple Live Births                  # Outcome Date GA Lbr Melvin/2nd Weight Sex Delivery Anes PTL Lv   1 Para                   Review of Systems  Review of Systems   Constitutional: Negative for chills, fatigue and fever.   Respiratory: Negative for shortness of breath.    Cardiovascular: Negative for chest pain.   Gastrointestinal: Positive for abdominal pain, constipation and diarrhea. Negative for nausea and vomiting.   Genitourinary: Negative for dysuria, frequency, menorrhagia, menstrual problem, pelvic pain, urgency, vaginal bleeding, vaginal discharge, vaginal pain and vaginal odor.   Breast: Negative for breast mass, breast pain, nipple discharge and skin changes          Objective:    Physical Exam:   Constitutional: She is oriented to person, place, and time. She appears well-developed and well-nourished. No distress.    HENT:   Head: Normocephalic and atraumatic.     Neck: Normal range of motion. No thyromegaly present.      Pulmonary/Chest: Effort normal and breath sounds normal. No respiratory distress. Right breast exhibits no inverted nipple, no mass, no nipple discharge, no skin change, no tenderness, presence, no bleeding and no swelling. Left breast exhibits no inverted nipple, no mass, no nipple discharge, no skin change, no tenderness, presence, no bleeding and no swelling. Breasts are symmetrical.        Abdominal: Soft. Normal appearance. She exhibits no distension. There is no hepatosplenomegaly. There is no tenderness. There is no rigidity, no rebound and no guarding. A hernia is present.     Genitourinary: There is no rash, tenderness, lesion or injury on the right labia. There is no rash, tenderness, lesion or injury on the left labia. Uterus is not deviated, not enlarged, not fixed, not tender, not hosting fibroids and not experiencing uterine prolapse. Cervix is normal. No absent adnexa (present). Right adnexum displays no mass, no tenderness and no fullness. Left adnexum displays no mass, no tenderness and no fullness. No erythema, tenderness or bleeding in the vagina. No signs of injury around the vagina. No vaginal discharge found. Cervix exhibits no motion tenderness, no discharge and no friability.   Genitourinary Comments: Urethral meatus normal        Uterus Size: 8 cm      Lymphadenopathy:     She has no cervical adenopathy.     She has no axillary adenopathy.    Neurological: She is alert and oriented to person, place, and time.     Psychiatric: She has a normal mood and affect.          Assessment:        1. Encounter for annual routine gynecological examination              Plan:      1. Encounter for annual routine gynecological examination  - Pap done today. Discussed ASCCP guidelines for screening every 3 years unless abnormal screening.   - MMG up to date > due 5/2018  - Colonoscopy not indicated.    - Liquid-based pap smear, screening       Return in about 1 year (around 12/29/2018) for Annual exam.

## 2017-12-29 NOTE — LETTER
December 29, 2017      Rojelio Guajardo MD  [address] [facility] [address]           [facility] - OB/ GYN  [address] [facility] [address]  [facility] [address]  Phone: [phone]          Patient: Micaela Hernandez   MR Number: [id number]   YOB: 1970   Date of Visit: 12/29/2017       Dear Dr. Rojelio Guajardo:    Thank you for referring Micaela Hernandez to me for evaluation. Attached you will find relevant portions of my assessment and plan of care.    If you have questions, please do not hesitate to call me. I look forward to following Micaela Hernandez along with you.    Sincerely,    Keke José MD    Enclosure  CC:  No Recipients    If you would like to receive this communication electronically, please contact [email or web address] or [phone] to request more information on [employer] access.    For providers and/or their staff who would like to refer a patient to [facility], please contact us through our one-stop-shop provider referral line, [facility], at [phone].    If you feel you have received this communication in error or would no longer like to receive these types of communications, please e-mail [email or web address]

## 2018-03-19 ENCOUNTER — OFFICE VISIT (OUTPATIENT)
Dept: FAMILY MEDICINE | Facility: CLINIC | Age: 48
End: 2018-03-19
Payer: COMMERCIAL

## 2018-03-19 ENCOUNTER — LAB VISIT (OUTPATIENT)
Dept: LAB | Facility: HOSPITAL | Age: 48
End: 2018-03-19
Attending: INTERNAL MEDICINE
Payer: COMMERCIAL

## 2018-03-19 VITALS
DIASTOLIC BLOOD PRESSURE: 84 MMHG | HEART RATE: 72 BPM | HEIGHT: 63 IN | OXYGEN SATURATION: 98 % | BODY MASS INDEX: 51.91 KG/M2 | WEIGHT: 293 LBS | SYSTOLIC BLOOD PRESSURE: 134 MMHG | TEMPERATURE: 98 F | RESPIRATION RATE: 17 BRPM

## 2018-03-19 DIAGNOSIS — E11.9 TYPE 2 DIABETES MELLITUS WITHOUT COMPLICATION, WITHOUT LONG-TERM CURRENT USE OF INSULIN: Primary | ICD-10-CM

## 2018-03-19 DIAGNOSIS — K75.81 NASH (NONALCOHOLIC STEATOHEPATITIS): ICD-10-CM

## 2018-03-19 DIAGNOSIS — K43.9 VENTRAL HERNIA WITHOUT OBSTRUCTION OR GANGRENE: ICD-10-CM

## 2018-03-19 DIAGNOSIS — K59.04 CHRONIC IDIOPATHIC CONSTIPATION: ICD-10-CM

## 2018-03-19 DIAGNOSIS — E11.9 TYPE 2 DIABETES MELLITUS WITHOUT COMPLICATION, WITHOUT LONG-TERM CURRENT USE OF INSULIN: ICD-10-CM

## 2018-03-19 DIAGNOSIS — E78.5 HYPERLIPIDEMIA, UNSPECIFIED HYPERLIPIDEMIA TYPE: ICD-10-CM

## 2018-03-19 DIAGNOSIS — E66.01 MORBID OBESITY: ICD-10-CM

## 2018-03-19 LAB
ALBUMIN SERPL BCP-MCNC: 3.3 G/DL
ALP SERPL-CCNC: 107 U/L
ALT SERPL W/O P-5'-P-CCNC: 29 U/L
ANION GAP SERPL CALC-SCNC: 9 MMOL/L
AST SERPL-CCNC: 28 U/L
BASOPHILS # BLD AUTO: 0.03 K/UL
BASOPHILS NFR BLD: 0.3 %
BILIRUB SERPL-MCNC: 0.3 MG/DL
BILIRUB SERPL-MCNC: NEGATIVE MG/DL
BLOOD URINE, POC: ABNORMAL
BUN SERPL-MCNC: 9 MG/DL
CALCIUM SERPL-MCNC: 10 MG/DL
CHLORIDE SERPL-SCNC: 102 MMOL/L
CHOLEST SERPL-MCNC: 174 MG/DL
CHOLEST/HDLC SERPL: 3.6 {RATIO}
CO2 SERPL-SCNC: 27 MMOL/L
COLOR, POC UA: YELLOW
CREAT SERPL-MCNC: 0.8 MG/DL
DIFFERENTIAL METHOD: ABNORMAL
EOSINOPHIL # BLD AUTO: 0.2 K/UL
EOSINOPHIL NFR BLD: 2.1 %
ERYTHROCYTE [DISTWIDTH] IN BLOOD BY AUTOMATED COUNT: 13.8 %
EST. GFR  (AFRICAN AMERICAN): >60 ML/MIN/1.73 M^2
EST. GFR  (NON AFRICAN AMERICAN): >60 ML/MIN/1.73 M^2
ESTIMATED AVG GLUCOSE: 134 MG/DL
GLUCOSE SERPL-MCNC: 165 MG/DL
GLUCOSE UR QL STRIP: NORMAL
HBA1C MFR BLD HPLC: 6.3 %
HCT VFR BLD AUTO: 37.4 %
HDLC SERPL-MCNC: 48 MG/DL
HDLC SERPL: 27.6 %
HGB BLD-MCNC: 11.7 G/DL
KETONES UR QL STRIP: NEGATIVE
LDLC SERPL CALC-MCNC: 99.8 MG/DL
LEUKOCYTE ESTERASE URINE, POC: ABNORMAL
LYMPHOCYTES # BLD AUTO: 2.5 K/UL
LYMPHOCYTES NFR BLD: 25 %
MCH RBC QN AUTO: 27.4 PG
MCHC RBC AUTO-ENTMCNC: 31.3 G/DL
MCV RBC AUTO: 88 FL
MONOCYTES # BLD AUTO: 0.7 K/UL
MONOCYTES NFR BLD: 6.9 %
NEUTROPHILS # BLD AUTO: 6.6 K/UL
NEUTROPHILS NFR BLD: 65.7 %
NITRITE, POC UA: NEGATIVE
NONHDLC SERPL-MCNC: 126 MG/DL
PH, POC UA: 5
PLATELET # BLD AUTO: 230 K/UL
PMV BLD AUTO: 12.6 FL
POTASSIUM SERPL-SCNC: 4.2 MMOL/L
PROT SERPL-MCNC: 7.8 G/DL
PROTEIN, POC: NEGATIVE
RBC # BLD AUTO: 4.27 M/UL
SODIUM SERPL-SCNC: 138 MMOL/L
SPECIFIC GRAVITY, POC UA: 1.02
TRIGL SERPL-MCNC: 131 MG/DL
UROBILINOGEN, POC UA: NEGATIVE
WBC # BLD AUTO: 10.04 K/UL

## 2018-03-19 PROCEDURE — 83036 HEMOGLOBIN GLYCOSYLATED A1C: CPT

## 2018-03-19 PROCEDURE — 85025 COMPLETE CBC W/AUTO DIFF WBC: CPT

## 2018-03-19 PROCEDURE — 3044F HG A1C LEVEL LT 7.0%: CPT | Mod: CPTII,S$GLB,, | Performed by: INTERNAL MEDICINE

## 2018-03-19 PROCEDURE — 99999 PR PBB SHADOW E&M-EST. PATIENT-LVL IV: CPT | Mod: PBBFAC,,, | Performed by: INTERNAL MEDICINE

## 2018-03-19 PROCEDURE — 3075F SYST BP GE 130 - 139MM HG: CPT | Mod: CPTII,S$GLB,, | Performed by: INTERNAL MEDICINE

## 2018-03-19 PROCEDURE — 3079F DIAST BP 80-89 MM HG: CPT | Mod: CPTII,S$GLB,, | Performed by: INTERNAL MEDICINE

## 2018-03-19 PROCEDURE — 81002 URINALYSIS NONAUTO W/O SCOPE: CPT | Mod: S$GLB,,, | Performed by: INTERNAL MEDICINE

## 2018-03-19 PROCEDURE — 80053 COMPREHEN METABOLIC PANEL: CPT

## 2018-03-19 PROCEDURE — 36415 COLL VENOUS BLD VENIPUNCTURE: CPT | Mod: PN

## 2018-03-19 PROCEDURE — 99214 OFFICE O/P EST MOD 30 MIN: CPT | Mod: 25,S$GLB,, | Performed by: INTERNAL MEDICINE

## 2018-03-19 PROCEDURE — 80061 LIPID PANEL: CPT

## 2018-03-19 RX ORDER — POLYETHYLENE GLYCOL 3350 17 G/17G
17 POWDER, FOR SOLUTION ORAL DAILY
Qty: 238 G | Refills: 3 | Status: SHIPPED | OUTPATIENT
Start: 2018-03-19 | End: 2020-12-29

## 2018-03-19 NOTE — PROGRESS NOTES
"Subjective:       Patient ID: Micaela Hernandez is a 47 y.o. female.    Chief Complaint: Hernia    F/u weight    HPI: 46 y/o with metabolic syndrome here for follow up. Reports five days of lower abdominal pain. Pain constant no chagne with PO intake. She does have chronic firm small stools. Once daily bowel movements no melena or BRBPR. She has used OTC laxatives in past but these often cause diarrhea the next day. No dysuria or hematuria. LMP two weeks ago. No nausea/vomitting. Has not started any type of physical exercise. She has resumed with meal services and calculates daily calorie count approximately 1200kcal/day      Review of Systems   Constitutional: Negative for activity change, appetite change, fatigue, fever and unexpected weight change.   HENT: Negative for ear pain, rhinorrhea and sore throat.    Eyes: Negative for discharge and visual disturbance.   Respiratory: Negative for chest tightness, shortness of breath and wheezing.    Cardiovascular: Negative for chest pain, palpitations and leg swelling.   Gastrointestinal: Positive for abdominal pain. Negative for constipation and diarrhea.   Endocrine: Negative for cold intolerance and heat intolerance.   Genitourinary: Negative for dysuria and hematuria.   Musculoskeletal: Negative for joint swelling and neck stiffness.   Skin: Negative for rash.   Neurological: Negative for dizziness, syncope, weakness and headaches.   Psychiatric/Behavioral: Negative for suicidal ideas.       Objective:     Vitals:    03/19/18 0807 03/19/18 1245   BP: (!) 140/86 134/84   BP Location: Right arm    Patient Position: Sitting    BP Method: Large (Manual)    Pulse: 82 72   Resp: 17    Temp: 98.2 °F (36.8 °C)    TempSrc: Oral    SpO2: 98%    Weight: (!) 137.8 kg (303 lb 12.7 oz)    Height: 5' 3" (1.6 m)           Physical Exam   Constitutional: She is oriented to person, place, and time. She appears well-developed and well-nourished.   HENT:   Head: Normocephalic and " atraumatic.   Eyes: Conjunctivae are normal. No scleral icterus.   Neck: Normal range of motion.   Cardiovascular: Normal rate and regular rhythm.  Exam reveals no gallop and no friction rub.    No murmur heard.  Pulmonary/Chest: Effort normal and breath sounds normal. She has no wheezes. She has no rales.   Abdominal: Soft. Bowel sounds are normal. There is no tenderness. There is no rebound and no guarding.   Obese, she has large ventral hernia reduces no overlying skin changes the lower abdomen without ulceration or edema   Musculoskeletal: Normal range of motion. She exhibits no edema or tenderness.   Neurological: She is alert and oriented to person, place, and time. No cranial nerve deficit.   Skin: Skin is warm and dry.   Psychiatric: She has a normal mood and affect.       Assessment:       1. Type 2 diabetes mellitus without complication, without long-term current use of insulin    2. ARORA (nonalcoholic steatohepatitis)    3. Morbid obesity    4. Ventral hernia without obstruction or gangrene    5. Chronic idiopathic constipation        Plan:    1. On metformin repeat a1c and renal function today    2. Repeat lfts continue metformin and statin therapy    3. Unable to tolerate even low dose topiramate referal tobariatric medicine (Dr. Harrell or Jf) for further assistance    4/5. No evidence of obstruction trial daily miralax to improved stool volume stress importance of good oral hydration

## 2018-03-26 ENCOUNTER — OFFICE VISIT (OUTPATIENT)
Dept: FAMILY MEDICINE | Facility: CLINIC | Age: 48
End: 2018-03-26
Payer: COMMERCIAL

## 2018-03-26 VITALS
TEMPERATURE: 99 F | OXYGEN SATURATION: 97 % | WEIGHT: 293 LBS | SYSTOLIC BLOOD PRESSURE: 120 MMHG | HEIGHT: 63 IN | BODY MASS INDEX: 51.91 KG/M2 | DIASTOLIC BLOOD PRESSURE: 70 MMHG | HEART RATE: 92 BPM

## 2018-03-26 DIAGNOSIS — E66.01 MORBID OBESITY WITH BMI OF 50.0-59.9, ADULT: Primary | ICD-10-CM

## 2018-03-26 DIAGNOSIS — E11.9 TYPE 2 DIABETES MELLITUS WITHOUT COMPLICATION, WITHOUT LONG-TERM CURRENT USE OF INSULIN: ICD-10-CM

## 2018-03-26 DIAGNOSIS — I10 HYPERTENSION, ESSENTIAL: ICD-10-CM

## 2018-03-26 DIAGNOSIS — K75.81 NASH (NONALCOHOLIC STEATOHEPATITIS): ICD-10-CM

## 2018-03-26 PROCEDURE — 99215 OFFICE O/P EST HI 40 MIN: CPT | Mod: S$GLB,,, | Performed by: FAMILY MEDICINE

## 2018-03-26 PROCEDURE — 99999 PR PBB SHADOW E&M-EST. PATIENT-LVL III: CPT | Mod: PBBFAC,,, | Performed by: FAMILY MEDICINE

## 2018-03-26 PROCEDURE — 3074F SYST BP LT 130 MM HG: CPT | Mod: CPTII,S$GLB,, | Performed by: FAMILY MEDICINE

## 2018-03-26 PROCEDURE — 3078F DIAST BP <80 MM HG: CPT | Mod: CPTII,S$GLB,, | Performed by: FAMILY MEDICINE

## 2018-03-26 PROCEDURE — 3044F HG A1C LEVEL LT 7.0%: CPT | Mod: CPTII,S$GLB,, | Performed by: FAMILY MEDICINE

## 2018-03-26 RX ORDER — PHENTERMINE HYDROCHLORIDE 37.5 MG/1
37.5 TABLET ORAL
Qty: 30 TABLET | Refills: 0 | Status: SHIPPED | OUTPATIENT
Start: 2018-03-26 | End: 2018-04-20

## 2018-03-26 NOTE — PATIENT INSTRUCTIONS
Exercise for a Healthier Heart  You may wonder how you can improve the health of your heart. If youre thinking about exercise, youre on the right track. You dont need to become an athlete, but you do need a certain amount of brisk exercise to help strengthen your heart. If you have been diagnosed with a heart condition, your doctor may recommend exercise to help stabilize your condition. To help make exercise a habit, choose safe, fun activities.     Exercise with a friend. When activity is fun, you're more likely to stick with it.     Be sure to check with your healthcare provider before starting an exercise program.   Why exercise?  Exercising regularly offers many healthy rewards. It can help you do all of the following:  · Improve your blood cholesterol level to help prevent further heart trouble  · Lower your blood pressure to help prevent a stroke or heart attack  · Control diabetes, or reduce your risk of getting this disease  · Improve your heart and lung function  · Reach and maintain a healthy weight  · Make your muscles stronger and more limber so you can stay active  · Prevent falls and fractures by slowing the loss of bone mass (osteoporosis)  · Manage stress better  · Reduce your blood pressure  · Improve your sense of self and your body image  Exercise tips  Ease into your routine. Set small goals. Then build on them.  Exercise on most days. Aim for a total of 150 or more minutes of moderate to  vigorous intensity activity each week. Consider 40 minutes, 3 to 4 times a week. For best results, activity should last for 40 minutes on average. It is OK to work up to the 40 minute period over time. Examples of moderate-intensity activity is walking 1 mile in 15 minutes or 30 to 45 minutes of yard work.  Step up your daily activity level. Along with your exercise program, try being more active throughout the day. Walk instead of drive. Do more household tasks or yard work.  Choose one or more  activities you enjoy. Walking is one of the easiest things you can do. You can also try swimming, riding a bike, dancing, or taking an exercise class.  Stop exercising and call your doctor if you:  · Have chest pain or feel dizzy or lightheaded  · Feel burning, tightness, pressure, or heaviness in your chest, neck, shoulders, back, or arms  · Have unusual shortness of breath  · Have increased joint or muscle pain  · Have palpitations or an irregular heartbeat   Date Last Reviewed: 5/1/2016  © 6911-8149 [employer]. [address] [place] [address]. All rights reserved. This information is not intended as a substitute for professional medical care. Always follow your healthcare professional's instructions.

## 2018-03-26 NOTE — PROGRESS NOTES
Routine Office Visit    Patient Name: Micaela Hernandez    : 1970  MRN: [id number]    Subjective:  Micaela is a 47 y.o. female who presents today for     1. Weight management - pt has been struggling with weight since her 20s. She states she had lost weight, become pregnant and has steadily gained weight since that time. Previous diet includes ordering meal plans. She is currently on sensible portions meal plan. She was evaluated by nutritionist and was advised that she may not be eating enough calories to lose weight, so she changed her meal plan to double protein and she gained weight. She does not exercise regularly because it causes her pain and nausea in her liver. She states she has hx of fatty liver disease requiring surgery and since then she has nausea with walking / exercise. She is able to do elliptical without pain and nausea, but not for very long.   Breakfast - sensible portion meal (5 days per week) - this morning included waffle and pro; she also drinks 2% milk daily  Lunch - sensible portion meal   Snacks - peanuts, sometimes ice cream   Dinner - sensible portion meal  EKG - in chart - NSR     Review of Systems   Constitutional: Negative for chills and fever.   HENT: Negative for congestion.    Eyes: Negative for blurred vision.   Respiratory: Negative for cough.    Cardiovascular: Negative for chest pain.   Gastrointestinal: Negative for abdominal pain, constipation, diarrhea, heartburn, nausea and vomiting.   Genitourinary: Negative for dysuria.   Musculoskeletal: Negative for myalgias.   Skin: Negative for itching and rash.   Neurological: Negative for dizziness and headaches.   Psychiatric/Behavioral: Negative for depression.       Active Problem List  Patient Active Problem List   Diagnosis    Axillary mass    Liver mass, right lobe    Hernia, incisional    Type 2 diabetes mellitus without complication, without long-term current use of insulin    Hypertension, essential    ARORA  (nonalcoholic steatohepatitis)    Morbid obesity with BMI of 50.0-59.9, adult       Past Surgical History  Past Surgical History:   Procedure Laterality Date    BREAST SURGERY      right-biopsy     SECTION      CHOLECYSTECTOMY      ENDOMETRIAL ABLATION      HERNIA REPAIR      incisional     KNEE SURGERY      LIVER LOBECTOMY Right     LYMPH NODE DISSECTION      right axillary       Family History  Family History   Problem Relation Age of Onset    Hypertension Mother     Asthma Mother     Diabetes Father     Hypertension Father     Breast cancer Neg Hx     Colon cancer Neg Hx     Ovarian cancer Neg Hx        Social History  Social History     Social History    Marital status:      Spouse name: N/A    Number of children: N/A    Years of education: N/A     Occupational History    Not on file.     Social History Main Topics    Smoking status: Never Smoker    Smokeless tobacco: Never Used    Alcohol use Yes    Drug use: No    Sexual activity: Not Currently     Other Topics Concern    Not on file     Social History Narrative    No narrative on file       Medications and Allergies  Reviewed and updated.   Current Outpatient Prescriptions   Medication Sig    fexofenadine (ALLEGRA) 60 MG tablet Take 60 mg by mouth once daily.    fluticasone (FLONASE) 50 mcg/actuation nasal spray 1 spray by Each Nare route 2 (two) times daily.    FLUVIRIN 5419-5350, PF, 45 mcg (15 mcg x 3)/0.5 mL Syrg ADM 0.5ML IM UTD    ibuprofen (ADVIL,MOTRIN) 200 MG tablet Take 200 mg by mouth every 6 (six) hours as needed for Pain.    loratadine (CLARITIN) 10 mg tablet Take 10 mg by mouth once daily.    losartan (COZAAR) 50 MG tablet Take 1 tablet (50 mg total) by mouth 2 (two) times daily.    metFORMIN (GLUCOPHAGE) 1000 MG tablet Take 1 tablet (1,000 mg total) by mouth 2 (two) times daily with meals. TWO TABLETS BY  MOUTH TWICE DAILY WITH MEALS    polyethylene glycol (GLYCOLAX) 17 gram/dose powder Take 17  "g by mouth once daily.    simvastatin (ZOCOR) 40 MG tablet Take 1 tablet (40 mg total) by mouth every evening.    phentermine (ADIPEX-P) 37.5 mg tablet Take 1 tablet (37.5 mg total) by mouth before breakfast.     No current facility-administered medications for this visit.        Physical Exam  /70 (BP Location: Right arm, Patient Position: Sitting, BP Method: Large (Manual))   Pulse 92   Temp 98.5 °F (36.9 °C) (Oral)   Ht 5' 3" (1.6 m)   Wt 135.4 kg (298 lb 8.1 oz)   LMP 03/12/2018   SpO2 97%   BMI 52.88 kg/m²   Physical Exam   Constitutional: She is oriented to person, place, and time. She appears well-developed and well-nourished.   HENT:   Head: Normocephalic and atraumatic.   Eyes: Conjunctivae and EOM are normal. Pupils are equal, round, and reactive to light.   Neck: Normal range of motion. Neck supple.   Cardiovascular: Normal rate, regular rhythm and normal heart sounds.  Exam reveals no gallop and no friction rub.    No murmur heard.  Pulmonary/Chest: Breath sounds normal. No respiratory distress.   Abdominal: Soft. Bowel sounds are normal. She exhibits no distension. There is no tenderness.   Musculoskeletal: Normal range of motion.   Lymphadenopathy:     She has no cervical adenopathy.   Neurological: She is alert and oriented to person, place, and time.   Skin: Skin is warm.   Psychiatric: She has a normal mood and affect.         Assessment/Plan:  Micaela Hernandez is a 47 y.o. female who presents today for :    Problem List Items Addressed This Visit        Cardiac/Vascular    Hypertension, essential  The current medical regimen is effective;  continue present plan and medications.         Endocrine    Morbid obesity with BMI of 50.0-59.9, adult - Primary    Relevant Medications    phentermine (ADIPEX-P) 37.5 mg tablet  - Patient warned of common side effects of phentermine including anxiety, insomnia, palpitations and increased blood pressure. It was also explained that it is for " short-term usage along with diet and exercise, and that stopping the medication without making lifestyle changes will result in regain of weight. Patient states understanding.  - Recommend to increase exercise tolerance. Recommend to increase slowly   - Recommend to continue portion control; advised to decrease snacks, milk   - Recommend to monitor hunger.   - Eat slowly   - consider changing to long term diet medication - discussed cost and effect of liver.       Type 2 diabetes mellitus without complication, without long-term current use of insulin  a1c 6.3  The current medical regimen is effective;  continue present plan and medications.         GI    ARORA (nonalcoholic steatohepatitis)  Noted in chart             Greater than 45 minutes was spent with this patient with greater than 50% spent with face-to-face counseling  Follow-up in about 4 weeks (around 4/23/2018).

## 2018-03-26 NOTE — LETTER
March 26, 2018      Rojelio Guajardo MD  [address]  [place] [address]           [facility]  [address]  [place] [address]  Phone: [phone]  Fax: [phone]          Patient: Micaela Hernandez   MR Number: [id number]   YOB: 1970   Date of Visit: 3/26/2018       Dear Dr. Rojelio Guajardo:    Thank you for referring Micaela Hernandez to me for evaluation. Attached you will find relevant portions of my assessment and plan of care.    If you have questions, please do not hesitate to call me. I look forward to following Micaela Hernandez along with you.    Sincerely,    Zayda Harrell MD    Enclosure  CC:  No Recipients    If you would like to receive this communication electronically, please contact [email or web address] or [phone] to request more information on [employer] Link access.    For providers and/or their staff who would like to refer a patient to [facility], please contact us through our one-stop-shop provider referral line, [facility], at [phone].    If you feel you have received this communication in error or would no longer like to receive these types of communications, please e-mail [email or web address]

## 2018-04-20 ENCOUNTER — OFFICE VISIT (OUTPATIENT)
Dept: FAMILY MEDICINE | Facility: CLINIC | Age: 48
End: 2018-04-20
Payer: COMMERCIAL

## 2018-04-20 VITALS
SYSTOLIC BLOOD PRESSURE: 120 MMHG | TEMPERATURE: 98 F | BODY MASS INDEX: 51.68 KG/M2 | HEART RATE: 87 BPM | WEIGHT: 291.69 LBS | OXYGEN SATURATION: 97 % | DIASTOLIC BLOOD PRESSURE: 80 MMHG | HEIGHT: 63 IN

## 2018-04-20 DIAGNOSIS — K75.81 NASH (NONALCOHOLIC STEATOHEPATITIS): ICD-10-CM

## 2018-04-20 DIAGNOSIS — E66.01 MORBID OBESITY WITH BMI OF 50.0-59.9, ADULT: Primary | ICD-10-CM

## 2018-04-20 DIAGNOSIS — E11.9 TYPE 2 DIABETES MELLITUS WITHOUT COMPLICATION, WITHOUT LONG-TERM CURRENT USE OF INSULIN: ICD-10-CM

## 2018-04-20 PROCEDURE — 3044F HG A1C LEVEL LT 7.0%: CPT | Mod: CPTII,S$GLB,, | Performed by: FAMILY MEDICINE

## 2018-04-20 PROCEDURE — 99214 OFFICE O/P EST MOD 30 MIN: CPT | Mod: S$GLB,,, | Performed by: FAMILY MEDICINE

## 2018-04-20 PROCEDURE — 3079F DIAST BP 80-89 MM HG: CPT | Mod: CPTII,S$GLB,, | Performed by: FAMILY MEDICINE

## 2018-04-20 PROCEDURE — 3074F SYST BP LT 130 MM HG: CPT | Mod: CPTII,S$GLB,, | Performed by: FAMILY MEDICINE

## 2018-04-20 PROCEDURE — 99999 PR PBB SHADOW E&M-EST. PATIENT-LVL IV: CPT | Mod: PBBFAC,,, | Performed by: FAMILY MEDICINE

## 2018-04-20 RX ORDER — TOPIRAMATE 25 MG/1
25 TABLET ORAL 2 TIMES DAILY
Qty: 60 TABLET | Refills: 0 | Status: SHIPPED | OUTPATIENT
Start: 2018-04-20 | End: 2018-05-24 | Stop reason: SDUPTHER

## 2018-04-20 RX ORDER — PHENTERMINE HYDROCHLORIDE 15 MG/1
15 CAPSULE ORAL EVERY MORNING
Qty: 30 CAPSULE | Refills: 0 | Status: SHIPPED | OUTPATIENT
Start: 2018-04-20 | End: 2018-05-20

## 2018-04-20 NOTE — PATIENT INSTRUCTIONS
Weight Management: Take It Off and Keep It Off    Its easy to be motivated when you first start. The key is to stay motivated all along the way and to have realistic and achievable goals. There are things you can do to keep yourself on the path to success.  Dont focus on daily weight gains and losses. Instead, weigh yourself no more than once a week at the same time of day. Weighing yourself each day will probably only frustrate you.    Stay motivated  Here are suggestions to keep you motivated:   · Remind yourself of your goals. Post them near the refrigerator or desk where you work.  · Make daily entries in your diary or journal about your activity and eating. A visual reminder of success, like a gold star, can help keep you going.  · Every week, take time to look back on how much youve accomplished, and the changes you may have made.  · Try taking a nutrition class. It can help you learn new shopping, cooking, and eating skills, and also meet new people. You might try a low-fat cooking or yoga class.  · Dont be hard on yourself or give up if you slip. Be patient. Learn from your mistakes and adjust your plan if you need to. Then get right back to it.  · Be realistic about your goals. Talk with a dietitian or your healthcare provider about what goals are reasonable for you.   Believe that you can do it  How you think about yourself is just as important as what you do. If you dont think you can succeed, chances are you wont. Believe that you can stick to your plan and meet your goals:  · If you dont meet a goal, dont use it as an excuse to give up on your whole plan. Adjust your goal and try again.  · Try to understand your own attitude about food.  Are you subject to emotional eating?  · Learn how to accept compliments. Even if you get embarrassed, just say thank you.  · Make a list of the things that others like about you and that you like about yourself. Add something new from time to time. Keep this  list to look at when you need a lift.  Resources  · The [employer]s [employer] on Fitness, Sports & Nutritionwww.fitness.gov  · Academy of Nutrition and Dieteticswww.eatright.org  · Healthfinderwww.healthfinder.gov  Date Last Reviewed: 2/4/2016  © 8570-5067 [employer] [place]. [address] [place] [address], [place] [address]. All rights reserved. This information is not intended as a substitute for professional medical care. Always follow your healthcare professional's instructions.

## 2018-04-20 NOTE — PROGRESS NOTES
Routine Office Visit    Patient Name: Micaela Hernandez    : 1970  MRN: [id number]    Subjective:  Micaela is a 47 y.o. female who presents today for     1. Weight management - pt has lost 7 pounds since last visit. She states she often gets stuck at this weight. She has continued to eat her portion meals she orders 5 days a week. On weekends, she does not cook and orders food. Foods like pizza or burgers. She also has a family friend that likes to cook for her and often cooks too much. She states the medication has helped her with the snacking. She has been exercising on the [employer] ~20minutes per day. She is also going to the gym and doing weights. She has noticed increase hip and low back pain. She has occasional knee pain. Her previous doctor had prescribed her topamax for weight loss. She did not feel that it helped at the time.     Review of Systems   Constitutional: Negative for chills and fever.   HENT: Negative for congestion.    Eyes: Negative for blurred vision.   Respiratory: Negative for cough.    Cardiovascular: Negative for chest pain.   Gastrointestinal: Negative for abdominal pain, constipation, diarrhea, heartburn, nausea and vomiting.   Genitourinary: Negative for dysuria.   Musculoskeletal: Negative for myalgias.   Skin: Negative for itching and rash.   Neurological: Negative for dizziness and headaches.   Psychiatric/Behavioral: Negative for depression.       Active Problem List  Patient Active Problem List   Diagnosis    Axillary mass    Liver mass, right lobe    Hernia, incisional    Type 2 diabetes mellitus without complication, without long-term current use of insulin    Hypertension, essential    ARORA (nonalcoholic steatohepatitis)    Morbid obesity with BMI of 50.0-59.9, adult       Past Surgical History  Past Surgical History:   Procedure Laterality Date    BREAST SURGERY      right-biopsy     SECTION      CHOLECYSTECTOMY      ENDOMETRIAL ABLATION      HERNIA  REPAIR      incisional     KNEE SURGERY      LIVER LOBECTOMY Right     LYMPH NODE DISSECTION      right axillary       Family History  Family History   Problem Relation Age of Onset    Hypertension Mother     Asthma Mother     Diabetes Father     Hypertension Father     Breast cancer Neg Hx     Colon cancer Neg Hx     Ovarian cancer Neg Hx        Social History  Social History     Social History    Marital status:      Spouse name: N/A    Number of children: N/A    Years of education: N/A     Occupational History    Not on file.     Social History Main Topics    Smoking status: Never Smoker    Smokeless tobacco: Never Used    Alcohol use Yes    Drug use: No    Sexual activity: Not Currently     Other Topics Concern    Not on file     Social History Narrative    No narrative on file       Medications and Allergies  Reviewed and updated.   Current Outpatient Prescriptions   Medication Sig    fexofenadine (ALLEGRA) 60 MG tablet Take 60 mg by mouth once daily.    fluticasone (FLONASE) 50 mcg/actuation nasal spray 1 spray by Each Nare route 2 (two) times daily.    ibuprofen (ADVIL,MOTRIN) 200 MG tablet Take 200 mg by mouth every 6 (six) hours as needed for Pain.    loratadine (CLARITIN) 10 mg tablet Take 10 mg by mouth once daily.    losartan (COZAAR) 50 MG tablet Take 1 tablet (50 mg total) by mouth 2 (two) times daily.    metFORMIN (GLUCOPHAGE) 1000 MG tablet Take 1 tablet (1,000 mg total) by mouth 2 (two) times daily with meals. TWO TABLETS BY  MOUTH TWICE DAILY WITH MEALS    polyethylene glycol (GLYCOLAX) 17 gram/dose powder Take 17 g by mouth once daily.    simvastatin (ZOCOR) 40 MG tablet Take 1 tablet (40 mg total) by mouth every evening.    phentermine 15 MG capsule Take 1 capsule (15 mg total) by mouth every morning.    topiramate (TOPAMAX) 25 MG tablet Take 1 tablet (25 mg total) by mouth 2 (two) times daily.     No current facility-administered medications for this visit.  "       Physical Exam  /80 (BP Location: Right arm, Patient Position: Sitting, BP Method: Medium (Manual))   Pulse 87   Temp 98 °F (36.7 °C) (Oral)   Ht 5' 3" (1.6 m)   Wt 132.3 kg (291 lb 10.7 oz)   LMP 04/12/2018   SpO2 97%   BMI 51.67 kg/m²   Physical Exam   Constitutional: She is oriented to person, place, and time. She appears well-developed and well-nourished.   HENT:   Head: Normocephalic and atraumatic.   Eyes: Conjunctivae and EOM are normal. Pupils are equal, round, and reactive to light.   Neck: Normal range of motion. Neck supple.   Cardiovascular: Normal rate, regular rhythm and normal heart sounds.  Exam reveals no gallop and no friction rub.    No murmur heard.  Pulmonary/Chest: Breath sounds normal. No respiratory distress.   Abdominal: Soft. Bowel sounds are normal. She exhibits no distension. There is no tenderness.   Musculoskeletal: Normal range of motion.   Lymphadenopathy:     She has no cervical adenopathy.   Neurological: She is alert and oriented to person, place, and time.   Skin: Skin is warm.   Psychiatric: She has a normal mood and affect.         Assessment/Plan:  iMcaela Hernandez is a 47 y.o. female who presents today for :    Problem List Items Addressed This Visit        Endocrine    Morbid obesity with BMI of 50.0-59.9, adult - Primary    Relevant Medications    phentermine 15 MG capsule    topiramate (TOPAMAX) 25 MG tablet  The current medical regimen is effective;  continue present plan and medications.      Type 2 diabetes mellitus without complication, without long-term current use of insulin  The current medical regimen is effective;  continue present plan and medications.         GI    ARORA (nonalcoholic steatohepatitis)  Noted in chart              Follow-up in about 4 weeks (around 5/18/2018), or if symptoms worsen or fail to improve.    "

## 2018-05-24 ENCOUNTER — CLINICAL SUPPORT (OUTPATIENT)
Dept: OPHTHALMOLOGY | Facility: CLINIC | Age: 48
End: 2018-05-24
Attending: FAMILY MEDICINE
Payer: COMMERCIAL

## 2018-05-24 ENCOUNTER — OFFICE VISIT (OUTPATIENT)
Dept: FAMILY MEDICINE | Facility: CLINIC | Age: 48
End: 2018-05-24
Payer: COMMERCIAL

## 2018-05-24 VITALS
TEMPERATURE: 99 F | SYSTOLIC BLOOD PRESSURE: 128 MMHG | OXYGEN SATURATION: 95 % | HEIGHT: 63 IN | WEIGHT: 285.5 LBS | BODY MASS INDEX: 50.59 KG/M2 | HEART RATE: 109 BPM | DIASTOLIC BLOOD PRESSURE: 80 MMHG

## 2018-05-24 DIAGNOSIS — E11.9 TYPE 2 DIABETES MELLITUS WITHOUT COMPLICATION, WITHOUT LONG-TERM CURRENT USE OF INSULIN: ICD-10-CM

## 2018-05-24 DIAGNOSIS — K75.81 NASH (NONALCOHOLIC STEATOHEPATITIS): ICD-10-CM

## 2018-05-24 DIAGNOSIS — Z12.31 ENCOUNTER FOR SCREENING MAMMOGRAM FOR BREAST CANCER: ICD-10-CM

## 2018-05-24 DIAGNOSIS — I10 HYPERTENSION, ESSENTIAL: ICD-10-CM

## 2018-05-24 DIAGNOSIS — E11.9 TYPE 2 DIABETES MELLITUS WITHOUT COMPLICATION, WITHOUT LONG-TERM CURRENT USE OF INSULIN: Primary | ICD-10-CM

## 2018-05-24 DIAGNOSIS — E66.01 MORBID OBESITY WITH BMI OF 50.0-59.9, ADULT: ICD-10-CM

## 2018-05-24 DIAGNOSIS — Z23 NEED FOR PROPHYLACTIC VACCINATION AGAINST STREPTOCOCCUS PNEUMONIAE (PNEUMOCOCCUS): ICD-10-CM

## 2018-05-24 LAB
LEFT EYE DM RETINOPATHY: NEGATIVE
RIGHT EYE DM RETINOPATHY: NEGATIVE

## 2018-05-24 PROCEDURE — 3008F BODY MASS INDEX DOCD: CPT | Mod: CPTII,S$GLB,, | Performed by: FAMILY MEDICINE

## 2018-05-24 PROCEDURE — 92250 FUNDUS PHOTOGRAPHY W/I&R: CPT | Mod: S$GLB,,, | Performed by: OPHTHALMOLOGY

## 2018-05-24 PROCEDURE — 99999 PR PBB SHADOW E&M-EST. PATIENT-LVL IV: CPT | Mod: PBBFAC,,, | Performed by: FAMILY MEDICINE

## 2018-05-24 PROCEDURE — 90732 PPSV23 VACC 2 YRS+ SUBQ/IM: CPT | Mod: S$GLB,,, | Performed by: FAMILY MEDICINE

## 2018-05-24 PROCEDURE — 99215 OFFICE O/P EST HI 40 MIN: CPT | Mod: 25,S$GLB,, | Performed by: FAMILY MEDICINE

## 2018-05-24 PROCEDURE — 3074F SYST BP LT 130 MM HG: CPT | Mod: CPTII,S$GLB,, | Performed by: FAMILY MEDICINE

## 2018-05-24 PROCEDURE — 3079F DIAST BP 80-89 MM HG: CPT | Mod: CPTII,S$GLB,, | Performed by: FAMILY MEDICINE

## 2018-05-24 PROCEDURE — 99999 PR PBB SHADOW E&M-EST. PATIENT-LVL III: CPT | Mod: PBBFAC,,,

## 2018-05-24 PROCEDURE — 3044F HG A1C LEVEL LT 7.0%: CPT | Mod: CPTII,S$GLB,, | Performed by: FAMILY MEDICINE

## 2018-05-24 PROCEDURE — 90471 IMMUNIZATION ADMIN: CPT | Mod: S$GLB,,, | Performed by: FAMILY MEDICINE

## 2018-05-24 RX ORDER — PHENTERMINE HYDROCHLORIDE 15 MG/1
15 CAPSULE ORAL EVERY MORNING
COMMUNITY
End: 2018-05-24 | Stop reason: SDUPTHER

## 2018-05-24 RX ORDER — PHENTERMINE HYDROCHLORIDE 15 MG/1
15 CAPSULE ORAL EVERY MORNING
Qty: 30 CAPSULE | Refills: 2 | Status: SHIPPED | OUTPATIENT
Start: 2018-05-24 | End: 2018-11-27

## 2018-05-24 RX ORDER — TOPIRAMATE 25 MG/1
25 TABLET ORAL 2 TIMES DAILY
Qty: 60 TABLET | Refills: 2 | Status: SHIPPED | OUTPATIENT
Start: 2018-05-24 | End: 2018-11-27

## 2018-05-24 NOTE — PROGRESS NOTES
HPI     Micaela Hernandez here for diabetic eye exam with non-dilated fundus   photos.    Last eye exam: 5/16/2017 w/ Dr. Suarez.  Small pupils: yes  Pt cooperative: yes      Last edited by Shanti Fontaine on 5/24/2018  8:42 AM. (History)            Assessment /Plan     For exam results, see Encounter Report.    Type 2 diabetes mellitus without complication, without long-term current use of insulin  -     Diabetic Eye Screening Photo      Please see Dr. Vaughn's progress notes for interpretation.

## 2018-05-24 NOTE — PROGRESS NOTES
Routine Office Visit    Patient Name: Micaela Hernandez    : 1970  MRN: [id number]    Subjective:  Micaela is a 48 y.o. female who presents today for     1.  Weight management - pt has lost 6 pounds since last visit, ~ 12 pounds since starting with me. She reports some side effects from topamax including tingling of her toes. She states side effects are improving. She has continued to eat her portion meals she orders 5 days a week. On weekends, she does not cook and orders food. She had one day this past month where she had fast foods. She does try to make healthier choices when eating fast foods, but states it is sometimes difficult on the go.   She has been exercising on the [employer] ~20minutes per day or walking 5 days per week. She is also going to the gym and doing weights. She has noticed improvement in knee pain. She has been discussing training with a  as well.     Review of Systems   Constitutional: Negative for chills and fever.   HENT: Negative for congestion.    Eyes: Negative for blurred vision.   Respiratory: Negative for cough.    Cardiovascular: Negative for chest pain.   Gastrointestinal: Negative for abdominal pain, constipation, diarrhea, heartburn, nausea and vomiting.   Genitourinary: Negative for dysuria.   Musculoskeletal: Negative for myalgias.   Skin: Negative for itching and rash.   Neurological: Negative for dizziness and headaches.   Psychiatric/Behavioral: Negative for depression.       Active Problem List  Patient Active Problem List   Diagnosis    Axillary mass    Liver mass, right lobe    Hernia, incisional    Type 2 diabetes mellitus without complication, without long-term current use of insulin    Hypertension, essential    ARORA (nonalcoholic steatohepatitis)    Morbid obesity with BMI of 50.0-59.9, adult       Past Surgical History  Past Surgical History:   Procedure Laterality Date    BREAST SURGERY      right-biopsy     SECTION      CHOLECYSTECTOMY       ENDOMETRIAL ABLATION      HERNIA REPAIR      incisional     KNEE SURGERY      LIVER LOBECTOMY Right     LYMPH NODE DISSECTION      right axillary       Family History  Family History   Problem Relation Age of Onset    Hypertension Mother     Asthma Mother     Diabetes Father     Hypertension Father     Cancer Sister         type unknown    No Known Problems Brother     No Known Problems Maternal Aunt     No Known Problems Maternal Uncle     No Known Problems Paternal Aunt     No Known Problems Paternal Uncle     No Known Problems Maternal Grandmother     No Known Problems Maternal Grandfather     No Known Problems Paternal Grandmother     No Known Problems Paternal Grandfather     Breast cancer Neg Hx     Colon cancer Neg Hx     Ovarian cancer Neg Hx     Amblyopia Neg Hx     Blindness Neg Hx     Cataracts Neg Hx     Glaucoma Neg Hx     Macular degeneration Neg Hx     Retinal detachment Neg Hx     Strabismus Neg Hx     Stroke Neg Hx     Thyroid disease Neg Hx        Social History  Social History     Social History    Marital status:      Spouse name: N/A    Number of children: N/A    Years of education: N/A     Occupational History    Not on file.     Social History Main Topics    Smoking status: Never Smoker    Smokeless tobacco: Never Used    Alcohol use Yes    Drug use: No    Sexual activity: Not Currently     Other Topics Concern    Not on file     Social History Narrative    No narrative on file       Medications and Allergies  Reviewed and updated.   Current Outpatient Prescriptions   Medication Sig    fexofenadine (ALLEGRA) 60 MG tablet Take 60 mg by mouth once daily.    fluticasone (FLONASE) 50 mcg/actuation nasal spray 1 spray by Each Nare route 2 (two) times daily.    ibuprofen (ADVIL,MOTRIN) 200 MG tablet Take 200 mg by mouth every 6 (six) hours as needed for Pain.    loratadine (CLARITIN) 10 mg tablet Take 10 mg by mouth once daily.    losartan  "(COZAAR) 50 MG tablet Take 1 tablet (50 mg total) by mouth 2 (two) times daily.    metFORMIN (GLUCOPHAGE) 1000 MG tablet Take 1 tablet (1,000 mg total) by mouth 2 (two) times daily with meals. TWO TABLETS BY  MOUTH TWICE DAILY WITH MEALS    phentermine 15 MG capsule Take 1 capsule (15 mg total) by mouth every morning.    polyethylene glycol (GLYCOLAX) 17 gram/dose powder Take 17 g by mouth once daily.    simvastatin (ZOCOR) 40 MG tablet Take 1 tablet (40 mg total) by mouth every evening.    topiramate (TOPAMAX) 25 MG tablet Take 1 tablet (25 mg total) by mouth 2 (two) times daily.     No current facility-administered medications for this visit.        Physical Exam  /80 (BP Location: Right arm, Patient Position: Sitting, BP Method: Large (Manual))   Pulse 109   Temp 98.5 °F (36.9 °C) (Oral)   Ht 5' 3" (1.6 m)   Wt 129.5 kg (285 lb 7.9 oz)   LMP 05/10/2018   SpO2 95%   BMI 50.57 kg/m²   Physical Exam   Constitutional: She is oriented to person, place, and time. She appears well-developed and well-nourished.   HENT:   Head: Normocephalic and atraumatic.   Eyes: Conjunctivae and EOM are normal. Pupils are equal, round, and reactive to light.   Neck: Normal range of motion. Neck supple.   Cardiovascular: Normal rate, regular rhythm and normal heart sounds.  Exam reveals no gallop and no friction rub.    No murmur heard.  Pulmonary/Chest: Breath sounds normal. No respiratory distress.   Abdominal: Soft. Bowel sounds are normal. She exhibits no distension. There is no tenderness.   Musculoskeletal: Normal range of motion.   Lymphadenopathy:     She has no cervical adenopathy.   Neurological: She is alert and oriented to person, place, and time.   Skin: Skin is warm.   Psychiatric: She has a normal mood and affect.     Protective Sensation (w/ 10 gram monofilament):  Right: Intact  Left: Intact    Visual Inspection:  Normal -  Bilateral    Pedal Pulses:   Right: Present  Left: Present    Posterior " tibialis:   Right:Present  Left: Present        Assessment/Plan:  Micaela Hernandez is a 48 y.o. female who presents today for :    Problem List Items Addressed This Visit        Cardiac/Vascular    Hypertension, essential  The current medical regimen is effective;  continue present plan and medications.         Endocrine    Morbid obesity with BMI of 50.0-59.9, adult    Relevant Medications    topiramate (TOPAMAX) 25 MG tablet    phentermine 15 MG capsule  Pt with good weight loss while on current medication regimen  Encourage patient to classify foods as red light, yellow light or green light foods.   Recommend to decrease processed foods   Encourage patient to try cooking a few meals (patient does not like to cook)  Discussed importance of resistance training with cardio training   Continue strengthening exercises to improve knee pain   Continue current regimen x 3 months; encourage 1-2# weight loss / week      Type 2 diabetes mellitus without complication, without long-term current use of insulin - Primary    Relevant Orders    Diabetic Eye Screening Photo       GI    ARORA (nonalcoholic steatohepatitis)  Noted in chart        Other Visit Diagnoses     Encounter for screening mammogram for breast cancer        Relevant Orders    Mammo Digital Screening Bilat with CAD    Need for prophylactic vaccination against Streptococcus pneumoniae (pneumococcus)        Relevant Orders    (In Office Administered) Pneumococcal Polysaccharide Vaccine (23 Valent) (SQ/IM) (Completed)          Greater than 45 minutes was spent with this patient with greater than 50% spent with face-to-face counseling  Reviewed health maintenance.     Follow-up in about 3 months (around 8/24/2018), or if symptoms worsen or fail to improve.

## 2018-05-24 NOTE — PATIENT INSTRUCTIONS
Losing Weight for Heart Health  Excess weight is a major risk factor for heart disease. Losing weight has many benefits including lowering your blood pressure, improving your cholesterol level, and decreasing your risk for diseases such as diabetes and heart disease. It may help keep your arteries open so that your heart can get the oxygen-rich blood it needs. All in all, losing weight makes you healthier.     Exercise with a friend. When activity is fun, you're more likely to stick with it.   Calories and weight loss  · Calories are the fuel your body burns for energy. You get the calories you need from the food you eat. For healthy weight loss, women should eat at least 1,200 calories a day, men at least 1,500.  · When you eat more calories than you need, your body stores the extra calories as fat. One pound of fat equals 3,500 calories.  · To lose weight, try to reduce your total calorie intake by 500 calories. To do this, eat 250 calories less each day. Add activity to burn the other 250 calories. Walking 2.5 miles burns about 250 calories. Other more intense activities can burn more calories in the time you spend doing them, such as swimming and running. It is important to understand that reducing calorie intake is much more effective at weight loss than is exercise.  · Eat a variety of healthy foods to get the nutrients you need.  Tips for losing weight  · Drink 8 to 10 glasses of water a day.  · Dont skip meals. Instead, eat smaller portions.  · Eat your meals earlier in the day.  · Cut out sugary drinks such as soda and fruit juices.  · Make your later meals lighter than your earlier meals.   Brisk activity is best  Brisk activity gets your heart pumping faster and it makes it healthier. Its also a great way to burn calories. In fact, your body may keep burning calories for hours after you stop a brisk activity:  · Begin by walking 10 minutes most days.  · Add more time and speed to your walk. Build up  as you feel able.  · Aim for 3 to 4 sessions of aerobic exercise a week. Each session should last about 40 minutes and include moderate to vigorous physical activity.  · The most important part of the activity is that you break a sweat. This indicates your heart is working hard enough to burn fat.  Date Last Reviewed: 6/1/2016  © 8866-2043 [employer]. [address] [place] [id number]. All rights reserved. This information is not intended as a substitute for professional medical care. Always follow your healthcare professional's instructions.

## 2018-05-28 ENCOUNTER — HOSPITAL ENCOUNTER (OUTPATIENT)
Dept: RADIOLOGY | Facility: HOSPITAL | Age: 48
Discharge: HOME | End: 2018-05-28
Attending: FAMILY MEDICINE
Payer: COMMERCIAL

## 2018-05-28 VITALS — HEIGHT: 63 IN | BODY MASS INDEX: 50.5 KG/M2 | WEIGHT: 285 LBS

## 2018-05-28 DIAGNOSIS — Z12.31 ENCOUNTER FOR SCREENING MAMMOGRAM FOR BREAST CANCER: ICD-10-CM

## 2018-05-28 PROCEDURE — 77063 BREAST TOMOSYNTHESIS BI: CPT | Mod: 26,,, | Performed by: RADIOLOGY

## 2018-05-28 PROCEDURE — 77067 SCR MAMMO BI INCL CAD: CPT | Mod: 26,,, | Performed by: RADIOLOGY

## 2018-05-28 PROCEDURE — 77067 SCR MAMMO BI INCL CAD: CPT | Mod: TC

## 2018-10-10 ENCOUNTER — CLINICAL SUPPORT (OUTPATIENT)
Dept: URGENT CARE | Facility: CLINIC | Age: 48
End: 2018-10-10
Payer: COMMERCIAL

## 2018-10-10 DIAGNOSIS — Z23 FLU VACCINE NEED: Primary | ICD-10-CM

## 2018-10-10 PROCEDURE — 90471 IMMUNIZATION ADMIN: CPT | Mod: S$GLB,,, | Performed by: EMERGENCY MEDICINE

## 2018-10-10 PROCEDURE — 90686 IIV4 VACC NO PRSV 0.5 ML IM: CPT | Mod: S$GLB,,, | Performed by: EMERGENCY MEDICINE

## 2018-10-17 ENCOUNTER — PATIENT MESSAGE (OUTPATIENT)
Dept: FAMILY MEDICINE | Facility: CLINIC | Age: 48
End: 2018-10-17

## 2018-10-19 DIAGNOSIS — E11.9 TYPE 2 DIABETES MELLITUS WITHOUT COMPLICATION: ICD-10-CM

## 2018-10-31 DIAGNOSIS — I10 HYPERTENSION, ESSENTIAL: ICD-10-CM

## 2018-10-31 DIAGNOSIS — E11.9 TYPE 2 DIABETES MELLITUS WITHOUT COMPLICATION, WITHOUT LONG-TERM CURRENT USE OF INSULIN: ICD-10-CM

## 2018-10-31 RX ORDER — LOSARTAN POTASSIUM 50 MG/1
TABLET ORAL
Qty: 180 TABLET | Refills: 3 | Status: SHIPPED | OUTPATIENT
Start: 2018-10-31 | End: 2019-03-19 | Stop reason: RX

## 2018-10-31 RX ORDER — SIMVASTATIN 40 MG/1
TABLET, FILM COATED ORAL
Qty: 90 TABLET | Refills: 3 | Status: SHIPPED | OUTPATIENT
Start: 2018-10-31 | End: 2019-11-06 | Stop reason: SDUPTHER

## 2018-10-31 RX ORDER — METFORMIN HYDROCHLORIDE 1000 MG/1
TABLET ORAL
Qty: 180 TABLET | Refills: 3 | Status: SHIPPED | OUTPATIENT
Start: 2018-10-31 | End: 2018-11-27 | Stop reason: SDUPTHER

## 2018-11-21 ENCOUNTER — PATIENT OUTREACH (OUTPATIENT)
Dept: ADMINISTRATIVE | Facility: HOSPITAL | Age: 48
End: 2018-11-21

## 2018-11-23 ENCOUNTER — LAB VISIT (OUTPATIENT)
Dept: LAB | Facility: HOSPITAL | Age: 48
End: 2018-11-23
Attending: INTERNAL MEDICINE
Payer: COMMERCIAL

## 2018-11-23 DIAGNOSIS — E11.9 TYPE 2 DIABETES MELLITUS WITHOUT COMPLICATION: ICD-10-CM

## 2018-11-23 LAB
ESTIMATED AVG GLUCOSE: 123 MG/DL
HBA1C MFR BLD HPLC: 5.9 %

## 2018-11-23 PROCEDURE — 36415 COLL VENOUS BLD VENIPUNCTURE: CPT | Mod: PO

## 2018-11-23 PROCEDURE — 83036 HEMOGLOBIN GLYCOSYLATED A1C: CPT

## 2018-11-27 ENCOUNTER — OFFICE VISIT (OUTPATIENT)
Dept: FAMILY MEDICINE | Facility: CLINIC | Age: 48
End: 2018-11-27
Payer: COMMERCIAL

## 2018-11-27 VITALS
BODY MASS INDEX: 48.75 KG/M2 | SYSTOLIC BLOOD PRESSURE: 130 MMHG | TEMPERATURE: 98 F | OXYGEN SATURATION: 98 % | HEIGHT: 63 IN | DIASTOLIC BLOOD PRESSURE: 84 MMHG | WEIGHT: 275.13 LBS | HEART RATE: 84 BPM

## 2018-11-27 DIAGNOSIS — E11.9 TYPE 2 DIABETES MELLITUS WITHOUT COMPLICATION, WITHOUT LONG-TERM CURRENT USE OF INSULIN: ICD-10-CM

## 2018-11-27 PROCEDURE — 99214 OFFICE O/P EST MOD 30 MIN: CPT | Mod: S$GLB,,, | Performed by: FAMILY MEDICINE

## 2018-11-27 PROCEDURE — 3075F SYST BP GE 130 - 139MM HG: CPT | Mod: CPTII,S$GLB,, | Performed by: FAMILY MEDICINE

## 2018-11-27 PROCEDURE — 3079F DIAST BP 80-89 MM HG: CPT | Mod: CPTII,S$GLB,, | Performed by: FAMILY MEDICINE

## 2018-11-27 PROCEDURE — 99999 PR PBB SHADOW E&M-EST. PATIENT-LVL III: CPT | Mod: PBBFAC,,, | Performed by: FAMILY MEDICINE

## 2018-11-27 PROCEDURE — 3044F HG A1C LEVEL LT 7.0%: CPT | Mod: CPTII,S$GLB,, | Performed by: FAMILY MEDICINE

## 2018-11-27 PROCEDURE — 3008F BODY MASS INDEX DOCD: CPT | Mod: CPTII,S$GLB,, | Performed by: FAMILY MEDICINE

## 2018-11-27 RX ORDER — METFORMIN HYDROCHLORIDE 1000 MG/1
1000 TABLET ORAL
Qty: 90 TABLET | Refills: 0
Start: 2018-11-27 | End: 2020-12-29 | Stop reason: SDUPTHER

## 2018-11-27 NOTE — PROGRESS NOTES
Assessment & Plan  Problem List Items Addressed This Visit        Unprioritized    Type 2 diabetes mellitus without complication, without long-term current use of insulin    Current Assessment & Plan     Decrease to once a day.           Relevant Medications    metFORMIN (GLUCOPHAGE) 1000 MG tablet            Health Maintenance reviewed.    Follow-up: No Follow-up on file.    ______________________________________________________________________    Chief Complaint  Chief Complaint   Patient presents with    [facility]       HPI  Micaela Hernandez is a 48 y.o. female with multiple medical diagnoses as listed in the medical history and problem list that presents for sciatic pain.  Pt is new to me.     She reports pain that goes into her hip.  This has occurred on both sides.  She describes a numbness in her hip area. She does have hypersensitive sensation.  She also has a tightness in the lower back. She has used advil and aleve.  This did improve her pain.  She reports pain can occur with exercise.  She would discontinue with exercise due to the pain.  She does not stretch prior to exercising.  No history of injuries to her back.  She has used ice on the area.        PAST MEDICAL HISTORY:  Past Medical History:   Diagnosis Date    Diabetes mellitus     Hyperlipidemia     Hypertension        PAST SURGICAL HISTORY:  Past Surgical History:   Procedure Laterality Date    BREAST BIOPSY Right     lymph nodes removed     SECTION      CHOLECYSTECTOMY      ENDOMETRIAL ABLATION      HERNIA REPAIR      incisional     KNEE SURGERY      LIVER LOBECTOMY Right     LYMPH NODE DISSECTION      right axillary    REPAIR-HERNIA-LAPAROSCOPIC-INCISIONAL  2016    Performed by [facility] at [facility] OR       SOCIAL HISTORY:  Social History     Socioeconomic History    Marital status:      Spouse name: Not on file    Number of children: Not on file    Years of education: Not on file    Highest  education level: Not on file   Social Needs    Financial resource strain: Not on file    Food insecurity - worry: Not on file    Food insecurity - inability: Not on file    Transportation needs - medical: Not on file    Transportation needs - non-medical: Not on file   Occupational History    Not on file   Tobacco Use    Smoking status: Never Smoker    Smokeless tobacco: Never Used   Substance and Sexual Activity    Alcohol use: Yes    Drug use: No    Sexual activity: Not Currently   Other Topics Concern    Not on file   Social History Narrative    Not on file       FAMILY HISTORY:  Family History   Problem Relation Age of Onset    Hypertension Mother     Asthma Mother     Diabetes Father     Hypertension Father     Cancer Sister         type unknown    No Known Problems Brother     No Known Problems Maternal Aunt     No Known Problems Maternal Uncle     No Known Problems Paternal Aunt     No Known Problems Paternal Uncle     No Known Problems Maternal Grandmother     No Known Problems Maternal Grandfather     No Known Problems Paternal Grandmother     No Known Problems Paternal Grandfather     Breast cancer Neg Hx     Colon cancer Neg Hx     Ovarian cancer Neg Hx     Amblyopia Neg Hx     Blindness Neg Hx     Cataracts Neg Hx     Glaucoma Neg Hx     Macular degeneration Neg Hx     Retinal detachment Neg Hx     Strabismus Neg Hx     Stroke Neg Hx     Thyroid disease Neg Hx        ALLERGIES AND MEDICATIONS: updated and reviewed.  Review of patient's allergies indicates:  No Known Allergies  Current Outpatient Medications   Medication Sig Dispense Refill    ibuprofen (ADVIL,MOTRIN) 200 MG tablet Take 200 mg by mouth every 6 (six) hours as needed for Pain.      loratadine (CLARITIN) 10 mg tablet Take 10 mg by mouth once daily.      losartan (COZAAR) 50 MG tablet TAKE ONE TABLET BY MOUTH TWICE DAILY 180 tablet 3    metFORMIN (GLUCOPHAGE) 1000 MG tablet Take 1 tablet (1,000 mg  "total) by mouth daily with breakfast. 90 tablet 0    simvastatin (ZOCOR) 40 MG tablet TAKE ONE TABLET BY MOUTH ONCE DAILY IN THE EVENING 90 tablet 3    polyethylene glycol (GLYCOLAX) 17 gram/dose powder Take 17 g by mouth once daily. 238 g 3     No current facility-administered medications for this visit.          ROS  Review of Systems   Constitutional: Negative for activity change, appetite change, fatigue, fever and unexpected weight change.   HENT: Negative.  Negative for ear discharge, ear pain, rhinorrhea and sore throat.    Eyes: Negative.    Respiratory: Negative for apnea, cough, chest tightness, shortness of breath and wheezing.    Cardiovascular: Negative for chest pain, palpitations and leg swelling.   Gastrointestinal: Negative for abdominal distention, abdominal pain, constipation, diarrhea and vomiting.   Endocrine: Negative for cold intolerance, heat intolerance, polydipsia and polyuria.   Genitourinary: Negative for decreased urine volume, menstrual problem, urgency, vaginal bleeding, vaginal discharge and vaginal pain.   Musculoskeletal: Positive for arthralgias.        Numbness in the hip      Skin: Negative for rash.   Neurological: Negative for dizziness and headaches.   Hematological: Does not bruise/bleed easily.   Psychiatric/Behavioral: Negative for agitation, sleep disturbance and suicidal ideas.           Physical Exam  Vitals:    11/27/18 0753   BP: 130/84   BP Location: Left arm   Patient Position: Sitting   BP Method: Large (Manual)   Pulse: 84   Temp: 98.2 °F (36.8 °C)   TempSrc: Oral   SpO2: 98%   Weight: 124.8 kg (275 lb 2.2 oz)   Height: 5' 3" (1.6 m)    Body mass index is 48.74 kg/m².  Weight: 124.8 kg (275 lb 2.2 oz)   Height: 5' 3" (160 cm)   Physical Exam   Constitutional: She is oriented to person, place, and time. She appears well-developed and well-nourished.   HENT:   Head: Normocephalic.   Right Ear: External ear normal.   Left Ear: External ear normal.   Nose: Nose " normal.   Mouth/Throat: Oropharynx is clear and moist.   Eyes: Conjunctivae and EOM are normal. Pupils are equal, round, and reactive to light.   Cardiovascular: Normal rate, regular rhythm and normal heart sounds.   Pulmonary/Chest: Effort normal and breath sounds normal.   Neurological: She is alert and oriented to person, place, and time.   Skin: Skin is warm and dry.   Vitals reviewed.        Health Maintenance       Date Due Completion Date    Pap Smear 12/29/2018 12/29/2017    Lipid Panel 03/19/2019 3/19/2018    Hemoglobin A1c 05/23/2019 11/23/2018    Foot Exam 05/24/2019 5/24/2018    Override on 5/24/2018: Done    Override on 5/23/2017: Done    Eye Exam 05/24/2019 5/24/2018    Override on 5/24/2018: Done    Mammogram 05/28/2019 5/28/2018    Low Dose Statin 10/31/2019 10/31/2018    TETANUS VACCINE 02/03/2026 2/3/2016    Pneumococcal PPSV23 (Medium Risk) (2) 04/26/2035 5/24/2018

## 2019-01-02 ENCOUNTER — OFFICE VISIT (OUTPATIENT)
Dept: FAMILY MEDICINE | Facility: CLINIC | Age: 49
End: 2019-01-02
Payer: COMMERCIAL

## 2019-01-02 VITALS
TEMPERATURE: 98 F | OXYGEN SATURATION: 97 % | SYSTOLIC BLOOD PRESSURE: 110 MMHG | BODY MASS INDEX: 49.34 KG/M2 | WEIGHT: 278.44 LBS | HEART RATE: 82 BPM | HEIGHT: 63 IN | DIASTOLIC BLOOD PRESSURE: 74 MMHG

## 2019-01-02 DIAGNOSIS — E66.01 MORBID OBESITY WITH BMI OF 50.0-59.9, ADULT: ICD-10-CM

## 2019-01-02 DIAGNOSIS — I10 HYPERTENSION, ESSENTIAL: Primary | ICD-10-CM

## 2019-01-02 DIAGNOSIS — E11.9 TYPE 2 DIABETES MELLITUS WITHOUT COMPLICATION, WITHOUT LONG-TERM CURRENT USE OF INSULIN: ICD-10-CM

## 2019-01-02 PROCEDURE — 99999 PR PBB SHADOW E&M-EST. PATIENT-LVL III: ICD-10-PCS | Mod: PBBFAC,,, | Performed by: FAMILY MEDICINE

## 2019-01-02 PROCEDURE — 3074F PR MOST RECENT SYSTOLIC BLOOD PRESSURE < 130 MM HG: ICD-10-PCS | Mod: CPTII,S$GLB,, | Performed by: FAMILY MEDICINE

## 2019-01-02 PROCEDURE — 3078F PR MOST RECENT DIASTOLIC BLOOD PRESSURE < 80 MM HG: ICD-10-PCS | Mod: CPTII,S$GLB,, | Performed by: FAMILY MEDICINE

## 2019-01-02 PROCEDURE — 3008F BODY MASS INDEX DOCD: CPT | Mod: CPTII,S$GLB,, | Performed by: FAMILY MEDICINE

## 2019-01-02 PROCEDURE — 99999 PR PBB SHADOW E&M-EST. PATIENT-LVL III: CPT | Mod: PBBFAC,,, | Performed by: FAMILY MEDICINE

## 2019-01-02 PROCEDURE — 3078F DIAST BP <80 MM HG: CPT | Mod: CPTII,S$GLB,, | Performed by: FAMILY MEDICINE

## 2019-01-02 PROCEDURE — 99214 PR OFFICE/OUTPT VISIT, EST, LEVL IV, 30-39 MIN: ICD-10-PCS | Mod: S$GLB,,, | Performed by: FAMILY MEDICINE

## 2019-01-02 PROCEDURE — 3044F PR MOST RECENT HEMOGLOBIN A1C LEVEL <7.0%: ICD-10-PCS | Mod: CPTII,S$GLB,, | Performed by: FAMILY MEDICINE

## 2019-01-02 PROCEDURE — 99214 OFFICE O/P EST MOD 30 MIN: CPT | Mod: S$GLB,,, | Performed by: FAMILY MEDICINE

## 2019-01-02 PROCEDURE — 3008F PR BODY MASS INDEX (BMI) DOCUMENTED: ICD-10-PCS | Mod: CPTII,S$GLB,, | Performed by: FAMILY MEDICINE

## 2019-01-02 PROCEDURE — 3074F SYST BP LT 130 MM HG: CPT | Mod: CPTII,S$GLB,, | Performed by: FAMILY MEDICINE

## 2019-01-02 PROCEDURE — 3044F HG A1C LEVEL LT 7.0%: CPT | Mod: CPTII,S$GLB,, | Performed by: FAMILY MEDICINE

## 2019-01-02 RX ORDER — DIETHYLPROPION HYDROCHLORIDE 25 MG/1
1 TABLET ORAL DAILY
Qty: 30 EACH | Refills: 0 | Status: SHIPPED | OUTPATIENT
Start: 2019-01-02 | End: 2019-12-11

## 2019-01-02 NOTE — PROGRESS NOTES
Assessment & Plan  Problem List Items Addressed This Visit        Cardiac/Vascular    Hypertension, essential - Primary    Current Assessment & Plan     Monitor blood pressure while on above medication.             Endocrine    Type 2 diabetes mellitus without complication, without long-term current use of insulin    Current Assessment & Plan     Decrease metformin to 500mg.   Continued weight loss will improve control.  I am concerned about hypoglycemia in this patient.           Morbid obesity with BMI of 50.0-59.9, adult    Current Assessment & Plan      I have discussed the common side effects of this medication with the patient and answered all of the questions they had at the time of this visit regarding this medication.           Relevant Medications    diethylpropion 25 mg Tab       If patient does well with current dosage of medication.  Then will likely have her follow up in 3 months.  Focus on diet changes.  Focus on portion control.  Patient likely needs to increase her portion of protein intake.  She is on a very low calorie diet.  This may be working against her with her weight loss.      Health Maintenance reviewed.    Follow-up: Follow-up in about 3 months (around 4/2/2019) for Weight management.    ______________________________________________________________________    Chief Complaint  Chief Complaint   Patient presents with    Weight Check       HPI  Micaela Hernandez is a 48 y.o. female with multiple medical diagnoses as listed in the medical history and problem list that presents for weight check.  Pt is known to me with last appointment 11/27/2018.    Patient denies any new symptoms including chest pain, SOB, blurry vision, N/V, diarrhea.  She has note had a major change in her diet.  She has been      She has a history of adipex and topiramate.  She has been successful in the past with this medication.      Patient follows sensible meals.  She takes in approximately 1000 calories a day.   She exercises on top of this.  I instructed the patient that her calorie intake is drastically too low to also accommodate for the energy she exerts when exercising.  I discussed with the patient that most of my patients that do of very low-calorie diet do not exercise as her body cannot meet the caloric demand due to the exercise.  This causes weight gain for her.      PAST MEDICAL HISTORY:  Past Medical History:   Diagnosis Date    Diabetes mellitus     Hyperlipidemia     Hypertension        PAST SURGICAL HISTORY:  Past Surgical History:   Procedure Laterality Date    BREAST BIOPSY Right     lymph nodes removed     SECTION      CHOLECYSTECTOMY      ENDOMETRIAL ABLATION      HERNIA REPAIR      incisional     KNEE SURGERY      LIVER LOBECTOMY Right     LYMPH NODE DISSECTION      right axillary    REPAIR-HERNIA-LAPAROSCOPIC-INCISIONAL  2016    Performed by Judah Hair MD at [facility] OR       SOCIAL HISTORY:  Social History     Socioeconomic History    Marital status:      Spouse name: Not on file    Number of children: Not on file    Years of education: Not on file    Highest education level: Not on file   Social Needs    Financial resource strain: Not on file    Food insecurity - worry: Not on file    Food insecurity - inability: Not on file    Transportation needs - medical: Not on file    Transportation needs - non-medical: Not on file   Occupational History    Not on file   Tobacco Use    Smoking status: Never Smoker    Smokeless tobacco: Never Used   Substance and Sexual Activity    Alcohol use: Yes    Drug use: No    Sexual activity: Not Currently   Other Topics Concern    Not on file   Social History Narrative    Not on file       FAMILY HISTORY:  Family History   Problem Relation Age of Onset    Hypertension Mother     Asthma Mother     Diabetes Father     Hypertension Father     Cancer Sister         type unknown    No Known Problems Brother     No  Known Problems Maternal Aunt     No Known Problems Maternal Uncle     No Known Problems Paternal Aunt     No Known Problems Paternal Uncle     No Known Problems Maternal Grandmother     No Known Problems Maternal Grandfather     No Known Problems Paternal Grandmother     No Known Problems Paternal Grandfather     Breast cancer Neg Hx     Colon cancer Neg Hx     Ovarian cancer Neg Hx     Amblyopia Neg Hx     Blindness Neg Hx     Cataracts Neg Hx     Glaucoma Neg Hx     Macular degeneration Neg Hx     Retinal detachment Neg Hx     Strabismus Neg Hx     Stroke Neg Hx     Thyroid disease Neg Hx        ALLERGIES AND MEDICATIONS: updated and reviewed.  Review of patient's allergies indicates:  No Known Allergies  Current Outpatient Medications   Medication Sig Dispense Refill    ibuprofen (ADVIL,MOTRIN) 200 MG tablet Take 200 mg by mouth every 6 (six) hours as needed for Pain.      loratadine (CLARITIN) 10 mg tablet Take 10 mg by mouth once daily.      losartan (COZAAR) 50 MG tablet TAKE ONE TABLET BY MOUTH TWICE DAILY 180 tablet 3    metFORMIN (GLUCOPHAGE) 1000 MG tablet Take 1 tablet (1,000 mg total) by mouth daily with breakfast. 90 tablet 0    simvastatin (ZOCOR) 40 MG tablet TAKE ONE TABLET BY MOUTH ONCE DAILY IN THE EVENING 90 tablet 3    diethylpropion 25 mg Tab Take 1 tablet by mouth once daily. 30 each 0    polyethylene glycol (GLYCOLAX) 17 gram/dose powder Take 17 g by mouth once daily. 238 g 3     No current facility-administered medications for this visit.          ROS  Review of Systems   Constitutional: Negative for activity change, appetite change, fatigue, fever and unexpected weight change.   HENT: Negative.  Negative for ear discharge, ear pain, rhinorrhea and sore throat.    Eyes: Negative.    Respiratory: Negative for apnea, cough, chest tightness, shortness of breath and wheezing.    Cardiovascular: Negative for chest pain, palpitations and leg swelling.   Gastrointestinal:  "Negative for abdominal distention, abdominal pain, constipation, diarrhea and vomiting.   Endocrine: Negative for cold intolerance, heat intolerance, polydipsia and polyuria.   Genitourinary: Negative for decreased urine volume and urgency.   Musculoskeletal: Negative.    Skin: Negative for rash.   Neurological: Negative for dizziness and headaches.   Hematological: Does not bruise/bleed easily.   Psychiatric/Behavioral: Negative for agitation, sleep disturbance and suicidal ideas.         Physical Exam  Vitals:    01/02/19 0725   BP: 110/74   BP Location: Left arm   Patient Position: Sitting   BP Method: Large (Manual)   Pulse: 82   Temp: 98 °F (36.7 °C)   TempSrc: Oral   SpO2: 97%   Weight: 126.3 kg (278 lb 7.1 oz)   Height: 5' 3" (1.6 m)    Body mass index is 49.32 kg/m².  Weight: 126.3 kg (278 lb 7.1 oz)   Height: 5' 3" (160 cm)   Physical Exam   Constitutional: She is oriented to person, place, and time. She appears well-developed and well-nourished.   HENT:   Head: Normocephalic.   Right Ear: External ear normal.   Left Ear: External ear normal.   Nose: Nose normal.   Mouth/Throat: Oropharynx is clear and moist.   Eyes: Conjunctivae and EOM are normal. Pupils are equal, round, and reactive to light.   Cardiovascular: Normal rate, regular rhythm and normal heart sounds.   Pulmonary/Chest: Effort normal and breath sounds normal.   Neurological: She is alert and oriented to person, place, and time.   Skin: Skin is warm and dry.   Vitals reviewed.        Health Maintenance       Date Due Completion Date    Pap Smear 12/29/2018 12/29/2017    Lipid Panel 03/19/2019 3/19/2018    Hemoglobin A1c 05/23/2019 11/23/2018    Foot Exam 05/24/2019 5/24/2018    Override on 5/24/2018: Done    Override on 5/23/2017: Done    Eye Exam 05/24/2019 5/24/2018    Override on 5/24/2018: Done    Mammogram 05/28/2019 5/28/2018    Low Dose Statin 11/27/2019 11/27/2018    TETANUS VACCINE 02/03/2026 2/3/2016              Patient note was " created using Zero[employer].  Any errors in syntax or even information may not have been identified and edited on initial review prior to signing this note.

## 2019-01-02 NOTE — ASSESSMENT & PLAN NOTE
Decrease metformin to 500mg.   Continued weight loss will improve control.  I am concerned about hypoglycemia in this patient.

## 2019-01-02 NOTE — ASSESSMENT & PLAN NOTE
I have discussed the common side effects of this medication with the patient and answered all of the questions they had at the time of this visit regarding this medication.

## 2019-01-31 ENCOUNTER — PATIENT MESSAGE (OUTPATIENT)
Dept: FAMILY MEDICINE | Facility: CLINIC | Age: 49
End: 2019-01-31

## 2019-03-13 ENCOUNTER — PATIENT OUTREACH (OUTPATIENT)
Dept: ADMINISTRATIVE | Facility: HOSPITAL | Age: 49
End: 2019-03-13

## 2019-03-19 ENCOUNTER — TELEPHONE (OUTPATIENT)
Dept: FAMILY MEDICINE | Facility: CLINIC | Age: 49
End: 2019-03-19

## 2019-03-19 DIAGNOSIS — E11.9 TYPE 2 DIABETES MELLITUS WITHOUT COMPLICATION, WITHOUT LONG-TERM CURRENT USE OF INSULIN: ICD-10-CM

## 2019-03-19 DIAGNOSIS — I10 HYPERTENSION, ESSENTIAL: ICD-10-CM

## 2019-03-19 RX ORDER — OLMESARTAN MEDOXOMIL 40 MG/1
40 TABLET ORAL DAILY
Qty: 90 TABLET | Refills: 3 | Status: SHIPPED | OUTPATIENT
Start: 2019-03-19 | End: 2019-12-11

## 2019-03-19 NOTE — TELEPHONE ENCOUNTER
Pharmacy request     Need for clarification       Medication: Losartan 50 mg tab recall and  on backorder , please change

## 2019-10-16 ENCOUNTER — TELEPHONE (OUTPATIENT)
Dept: FAMILY MEDICINE | Facility: CLINIC | Age: 49
End: 2019-10-16

## 2019-10-18 DIAGNOSIS — E11.9 TYPE 2 DIABETES MELLITUS WITHOUT COMPLICATION: ICD-10-CM

## 2019-10-24 ENCOUNTER — CLINICAL SUPPORT (OUTPATIENT)
Dept: URGENT CARE | Facility: CLINIC | Age: 49
End: 2019-10-24
Payer: COMMERCIAL

## 2019-10-24 DIAGNOSIS — Z23 NEEDS FLU SHOT: Primary | ICD-10-CM

## 2019-10-24 PROCEDURE — 90686 FLU VACCINE (QUAD) GREATER THAN OR EQUAL TO 3YO PRESERVATIVE FREE IM: ICD-10-PCS | Mod: S$GLB,,, | Performed by: NURSE PRACTITIONER

## 2019-10-24 PROCEDURE — 90686 IIV4 VACC NO PRSV 0.5 ML IM: CPT | Mod: S$GLB,,, | Performed by: NURSE PRACTITIONER

## 2019-10-24 PROCEDURE — 90471 IMMUNIZATION ADMIN: CPT | Mod: S$GLB,,, | Performed by: NURSE PRACTITIONER

## 2019-10-24 PROCEDURE — 90471 FLU VACCINE (QUAD) GREATER THAN OR EQUAL TO 3YO PRESERVATIVE FREE IM: ICD-10-PCS | Mod: S$GLB,,, | Performed by: NURSE PRACTITIONER

## 2019-11-06 DIAGNOSIS — E11.9 TYPE 2 DIABETES MELLITUS WITHOUT COMPLICATION, WITHOUT LONG-TERM CURRENT USE OF INSULIN: ICD-10-CM

## 2019-11-06 RX ORDER — METFORMIN HYDROCHLORIDE 1000 MG/1
TABLET ORAL
Qty: 180 TABLET | Refills: 0 | Status: SHIPPED | OUTPATIENT
Start: 2019-11-06 | End: 2020-03-03

## 2019-11-06 RX ORDER — SIMVASTATIN 40 MG/1
TABLET, FILM COATED ORAL
Qty: 90 TABLET | Refills: 0 | Status: SHIPPED | OUTPATIENT
Start: 2019-11-06 | End: 2020-03-03

## 2019-11-06 NOTE — TELEPHONE ENCOUNTER
Pt. Notified of medication refill, and appt. Needed. Appt. Made for 12/7/19 for labs and 12/11/19 for OV

## 2019-11-15 ENCOUNTER — PATIENT OUTREACH (OUTPATIENT)
Dept: ADMINISTRATIVE | Facility: HOSPITAL | Age: 49
End: 2019-11-15

## 2019-11-15 DIAGNOSIS — E11.9 TYPE 2 DIABETES MELLITUS WITHOUT COMPLICATION, UNSPECIFIED WHETHER LONG TERM INSULIN USE: ICD-10-CM

## 2019-11-15 DIAGNOSIS — Z12.31 ENCOUNTER FOR SCREENING MAMMOGRAM FOR MALIGNANT NEOPLASM OF BREAST: Primary | ICD-10-CM

## 2019-11-15 RX ORDER — NITROFURANTOIN 25; 75 MG/1; MG/1
CAPSULE ORAL
Refills: 0 | COMMUNITY
Start: 2019-11-04 | End: 2020-12-29

## 2019-11-15 RX ORDER — PHENAZOPYRIDINE HYDROCHLORIDE 200 MG/1
TABLET, FILM COATED ORAL
Refills: 0 | COMMUNITY
Start: 2019-11-04 | End: 2020-12-29

## 2019-11-29 ENCOUNTER — HOSPITAL ENCOUNTER (OUTPATIENT)
Dept: RADIOLOGY | Facility: HOSPITAL | Age: 49
Discharge: HOME | End: 2019-11-29
Attending: FAMILY MEDICINE
Payer: COMMERCIAL

## 2019-11-29 VITALS — BODY MASS INDEX: 49.34 KG/M2 | HEIGHT: 63 IN | WEIGHT: 278.44 LBS

## 2019-11-29 DIAGNOSIS — Z12.31 ENCOUNTER FOR SCREENING MAMMOGRAM FOR MALIGNANT NEOPLASM OF BREAST: ICD-10-CM

## 2019-11-29 PROCEDURE — 77067 SCR MAMMO BI INCL CAD: CPT | Mod: TC

## 2019-11-29 PROCEDURE — 77063 BREAST TOMOSYNTHESIS BI: CPT | Mod: 26,,, | Performed by: RADIOLOGY

## 2019-11-29 PROCEDURE — 77067 MAMMO DIGITAL SCREENING BILAT WITH TOMOSYNTHESIS_CAD: ICD-10-PCS | Mod: 26,,, | Performed by: RADIOLOGY

## 2019-11-29 PROCEDURE — 77063 MAMMO DIGITAL SCREENING BILAT WITH TOMOSYNTHESIS_CAD: ICD-10-PCS | Mod: 26,,, | Performed by: RADIOLOGY

## 2019-11-29 PROCEDURE — 77067 SCR MAMMO BI INCL CAD: CPT | Mod: 26,,, | Performed by: RADIOLOGY

## 2019-12-03 ENCOUNTER — TELEPHONE (OUTPATIENT)
Dept: FAMILY MEDICINE | Facility: CLINIC | Age: 49
End: 2019-12-03

## 2019-12-03 NOTE — TELEPHONE ENCOUNTER
----- Message from Nani Rhoades MD sent at 12/3/2019 10:17 AM CST -----  We will discuss your labs at your upcoming appointment.  Please review your results prior to your appointment.

## 2019-12-11 ENCOUNTER — OFFICE VISIT (OUTPATIENT)
Dept: FAMILY MEDICINE | Facility: CLINIC | Age: 49
End: 2019-12-11
Payer: COMMERCIAL

## 2019-12-11 ENCOUNTER — CLINICAL SUPPORT (OUTPATIENT)
Dept: OPHTHALMOLOGY | Facility: CLINIC | Age: 49
End: 2019-12-11
Attending: FAMILY MEDICINE
Payer: COMMERCIAL

## 2019-12-11 VITALS
OXYGEN SATURATION: 97 % | SYSTOLIC BLOOD PRESSURE: 110 MMHG | HEIGHT: 63 IN | BODY MASS INDEX: 51.91 KG/M2 | HEART RATE: 97 BPM | WEIGHT: 293 LBS | TEMPERATURE: 98 F | DIASTOLIC BLOOD PRESSURE: 80 MMHG

## 2019-12-11 DIAGNOSIS — E11.9 TYPE 2 DIABETES MELLITUS WITHOUT COMPLICATION, UNSPECIFIED WHETHER LONG TERM INSULIN USE: ICD-10-CM

## 2019-12-11 DIAGNOSIS — M76.60 ACHILLES TENDINITIS, UNSPECIFIED LATERALITY: ICD-10-CM

## 2019-12-11 DIAGNOSIS — Z00.00 ANNUAL PHYSICAL EXAM: Primary | ICD-10-CM

## 2019-12-11 DIAGNOSIS — E11.9 TYPE 2 DIABETES MELLITUS WITHOUT COMPLICATION, WITHOUT LONG-TERM CURRENT USE OF INSULIN: ICD-10-CM

## 2019-12-11 DIAGNOSIS — I10 HYPERTENSION, ESSENTIAL: ICD-10-CM

## 2019-12-11 PROCEDURE — 3008F BODY MASS INDEX DOCD: CPT | Mod: CPTII,S$GLB,, | Performed by: FAMILY MEDICINE

## 2019-12-11 PROCEDURE — 92250 DIABETIC EYE SCREENING PHOTO: ICD-10-PCS | Mod: 26,S$GLB,, | Performed by: OPHTHALMOLOGY

## 2019-12-11 PROCEDURE — 3079F DIAST BP 80-89 MM HG: CPT | Mod: CPTII,S$GLB,, | Performed by: FAMILY MEDICINE

## 2019-12-11 PROCEDURE — 99999 PR PBB SHADOW E&M-EST. PATIENT-LVL III: ICD-10-PCS | Mod: PBBFAC,,, | Performed by: FAMILY MEDICINE

## 2019-12-11 PROCEDURE — 3008F PR BODY MASS INDEX (BMI) DOCUMENTED: ICD-10-PCS | Mod: CPTII,S$GLB,, | Performed by: FAMILY MEDICINE

## 2019-12-11 PROCEDURE — 3044F PR MOST RECENT HEMOGLOBIN A1C LEVEL <7.0%: ICD-10-PCS | Mod: CPTII,S$GLB,, | Performed by: FAMILY MEDICINE

## 2019-12-11 PROCEDURE — 99396 PR PREVENTIVE VISIT,EST,40-64: ICD-10-PCS | Mod: 25,S$GLB,, | Performed by: FAMILY MEDICINE

## 2019-12-11 PROCEDURE — 3074F PR MOST RECENT SYSTOLIC BLOOD PRESSURE < 130 MM HG: ICD-10-PCS | Mod: CPTII,S$GLB,, | Performed by: FAMILY MEDICINE

## 2019-12-11 PROCEDURE — 2022F DILAT RTA XM EVC RTNOPTHY: CPT | Mod: S$GLB,,, | Performed by: OPHTHALMOLOGY

## 2019-12-11 PROCEDURE — 2022F DIABETIC EYE SCREENING PHOTO: ICD-10-PCS | Mod: S$GLB,,, | Performed by: OPHTHALMOLOGY

## 2019-12-11 PROCEDURE — 99396 PREV VISIT EST AGE 40-64: CPT | Mod: 25,S$GLB,, | Performed by: FAMILY MEDICINE

## 2019-12-11 PROCEDURE — 3074F SYST BP LT 130 MM HG: CPT | Mod: CPTII,S$GLB,, | Performed by: FAMILY MEDICINE

## 2019-12-11 PROCEDURE — 3044F HG A1C LEVEL LT 7.0%: CPT | Mod: CPTII,S$GLB,, | Performed by: FAMILY MEDICINE

## 2019-12-11 PROCEDURE — 92250 FUNDUS PHOTOGRAPHY W/I&R: CPT | Mod: 26,S$GLB,, | Performed by: OPHTHALMOLOGY

## 2019-12-11 PROCEDURE — 3079F PR MOST RECENT DIASTOLIC BLOOD PRESSURE 80-89 MM HG: ICD-10-PCS | Mod: CPTII,S$GLB,, | Performed by: FAMILY MEDICINE

## 2019-12-11 PROCEDURE — 99999 PR PBB SHADOW E&M-EST. PATIENT-LVL III: CPT | Mod: PBBFAC,,, | Performed by: FAMILY MEDICINE

## 2019-12-11 RX ORDER — IBUPROFEN 800 MG/1
800 TABLET ORAL 3 TIMES DAILY
Qty: 30 TABLET | Refills: 0 | Status: SHIPPED | OUTPATIENT
Start: 2019-12-11 | End: 2020-12-29

## 2019-12-11 RX ORDER — LOSARTAN POTASSIUM 50 MG/1
50 TABLET ORAL DAILY
Qty: 90 TABLET | Refills: 3 | Status: SHIPPED | OUTPATIENT
Start: 2019-12-11 | End: 2019-12-12

## 2019-12-11 NOTE — PROGRESS NOTES
HPI     50 y/o here for screening for diabetic retinopathy with non-dilated   fundus photos  Per Dr. Rhoades.     Last edited by Celeste Acharya on 12/11/2019 11:10 AM. (History)            Assessment /Plan     For exam results, see Encounter Report.    Type 2 diabetes mellitus without complication, unspecified whether long term insulin use  -     Diabetic Eye Screening Photo      Please see Dr. Vaughn progress note for interpretation.

## 2019-12-11 NOTE — PROGRESS NOTES
Assessment & Plan  Problem List Items Addressed This Visit        Cardiac/Vascular    Hypertension, essential    Relevant Medications    losartan (COZAAR) 50 MG tablet       Endocrine    Type 2 diabetes mellitus without complication, without long-term current use of insulin      Other Visit Diagnoses     Annual physical exam    -  Primary    Achilles tendinitis, unspecified laterality        Relevant Medications    ibuprofen (ADVIL,MOTRIN) 800 MG tablet      I addressed all major concerns as it related to health maintenance.  All were ordered and scheduled based on the patients wishes.  Any additional health maintenance will be readdressed at the next physical if declined or deferred by the patient.        Health Maintenance reviewed.    Follow-up: No follow-ups on file.    ______________________________________________________________________    Chief Complaint  Chief Complaint   Patient presents with    Annual Exam       HPI  Micaela Hernandez is a 49 y.o. female with multiple medical diagnoses as listed in the medical history and problem list that presents for annual exam.  Pt is known to me with last appointment 2019.    Patient denies any new symptoms including chest pain, SOB, blurry vision, N/V, diarrhea.    Ankle pain:  Patient reports that she has had increased ankle pain. She has noted that this will occur whenever she is walking.  Pain can be worse in the morning.  Patient states it will usually worsened with the 1st few steps in the morning.     PAST MEDICAL HISTORY:  Past Medical History:   Diagnosis Date    Diabetes mellitus     Hyperlipidemia     Hypertension        PAST SURGICAL HISTORY:  Past Surgical History:   Procedure Laterality Date    BREAST BIOPSY Right     lymph nodes removed     SECTION      CHOLECYSTECTOMY      ENDOMETRIAL ABLATION      HERNIA REPAIR      incisional     KNEE SURGERY      LIVER LOBECTOMY Right     LYMPH NODE DISSECTION      right axillary        SOCIAL HISTORY:  Social History     Socioeconomic History    Marital status:      Spouse name: Not on file    Number of children: Not on file    Years of education: Not on file    Highest education level: Not on file   Occupational History    Not on file   Social Needs    Financial resource strain: Not on file    Food insecurity:     Worry: Not on file     Inability: Not on file    Transportation needs:     Medical: Not on file     Non-medical: Not on file   Tobacco Use    Smoking status: Never Smoker    Smokeless tobacco: Never Used   Substance and Sexual Activity    Alcohol use: Yes    Drug use: No    Sexual activity: Not Currently   Lifestyle    Physical activity:     Days per week: Not on file     Minutes per session: Not on file    Stress: Not on file   Relationships    Social connections:     Talks on phone: Not on file     Gets together: Not on file     Attends Jain service: Not on file     Active member of club or organization: Not on file     Attends meetings of clubs or organizations: Not on file     Relationship status: Not on file   Other Topics Concern    Not on file   Social History Narrative    Not on file       FAMILY HISTORY:  Family History   Problem Relation Age of Onset    Hypertension Mother     Asthma Mother     Diabetes Father     Hypertension Father     Cancer Sister         type unknown    No Known Problems Brother     No Known Problems Maternal Aunt     No Known Problems Maternal Uncle     No Known Problems Paternal Aunt     No Known Problems Paternal Uncle     No Known Problems Maternal Grandmother     No Known Problems Maternal Grandfather     No Known Problems Paternal Grandmother     No Known Problems Paternal Grandfather     Breast cancer Neg Hx     Colon cancer Neg Hx     Ovarian cancer Neg Hx     Amblyopia Neg Hx     Blindness Neg Hx     Cataracts Neg Hx     Glaucoma Neg Hx     Macular degeneration Neg Hx     Retinal  detachment Neg Hx     Strabismus Neg Hx     Stroke Neg Hx     Thyroid disease Neg Hx        ALLERGIES AND MEDICATIONS: updated and reviewed.  Review of patient's allergies indicates:  No Known Allergies  Current Outpatient Medications   Medication Sig Dispense Refill    ibuprofen (ADVIL,MOTRIN) 200 MG tablet Take 200 mg by mouth every 6 (six) hours as needed for Pain.      loratadine (CLARITIN) 10 mg tablet Take 10 mg by mouth once daily.      metFORMIN (GLUCOPHAGE) 1000 MG tablet Take 1 tablet (1,000 mg total) by mouth daily with breakfast. 90 tablet 0    metFORMIN (GLUCOPHAGE) 1000 MG tablet TAKE 1 TABLET BY MOUTH TWICE DAILY WITH MEALS 180 tablet 0    nitrofurantoin, macrocrystal-monohydrate, (MACROBID) 100 MG capsule TAKE 1 CAPSULE BY MOUTH TWICE DAILY FOR 5 DAYS  0    phenazopyridine (PYRIDIUM) 200 MG tablet TAKE 1 TABLET BY MOUTH THREE TIMES DAILY AS NEEDED AFTER A MEAL FOR 2 DAYS  0    polyethylene glycol (GLYCOLAX) 17 gram/dose powder Take 17 g by mouth once daily. 238 g 3    simvastatin (ZOCOR) 40 MG tablet TAKE 1 TABLET BY MOUTH ONCE DAILY IN THE EVENING 90 tablet 0    ibuprofen (ADVIL,MOTRIN) 800 MG tablet Take 1 tablet (800 mg total) by mouth 3 (three) times daily. 30 tablet 0    losartan (COZAAR) 50 MG tablet Take 1 tablet (50 mg total) by mouth once daily. 90 tablet 3     No current facility-administered medications for this visit.          ROS  Review of Systems   Constitutional: Negative for activity change, appetite change, fatigue, fever and unexpected weight change.   HENT: Negative.  Negative for ear discharge, ear pain, rhinorrhea and sore throat.    Eyes: Negative.    Respiratory: Negative for apnea, cough, chest tightness, shortness of breath and wheezing.    Cardiovascular: Negative for chest pain, palpitations and leg swelling.   Gastrointestinal: Negative for abdominal distention, abdominal pain, constipation, diarrhea and vomiting.   Endocrine: Negative for cold intolerance,  "heat intolerance, polydipsia and polyuria.   Genitourinary: Negative for decreased urine volume and urgency.   Musculoskeletal: Negative.    Skin: Negative for rash.   Neurological: Negative for dizziness and headaches.   Hematological: Does not bruise/bleed easily.   Psychiatric/Behavioral: Negative for agitation, sleep disturbance and suicidal ideas.           Physical Exam  Vitals:    12/11/19 0709   BP: 110/80   BP Location: Left arm   Patient Position: Sitting   BP Method: Large (Manual)   Pulse: 97   Temp: 98.1 °F (36.7 °C)   TempSrc: Oral   SpO2: 97%   Weight: 134.4 kg (296 lb 4.8 oz)   Height: 5' 3" (1.6 m)    Body mass index is 52.49 kg/m².  Weight: 134.4 kg (296 lb 4.8 oz)   Height: 5' 3" (160 cm)   Physical Exam   Constitutional: She is oriented to person, place, and time. She appears well-developed and well-nourished.   HENT:   Head: Normocephalic and atraumatic.   Right Ear: External ear normal.   Left Ear: External ear normal.   Nose: Nose normal.   Mouth/Throat: Oropharynx is clear and moist.   Eyes: Pupils are equal, round, and reactive to light. Conjunctivae and EOM are normal.   Cardiovascular: Normal rate, regular rhythm and normal heart sounds.   Pulmonary/Chest: Effort normal and breath sounds normal.   Neurological: She is alert and oriented to person, place, and time.   Skin: Skin is warm and dry.   Vitals reviewed.        Health Maintenance       Date Due Completion Date    Foot Exam 05/24/2019 5/24/2018    Override on 5/23/2017: Done    Eye Exam 05/24/2019 5/24/2018    Hemoglobin A1c 05/29/2020 11/29/2019    Low Dose Statin 11/06/2020 11/6/2019    Mammogram 11/29/2020 11/29/2019    Lipid Panel 11/29/2020 11/29/2019    Pap Smear with HPV Cotest 12/29/2020 12/29/2017    TETANUS VACCINE 02/03/2026 2/3/2016              Patient note was created using Qzzr.  Any errors in syntax or even information may not have been identified and edited on initial review prior to signing this note.  "

## 2019-12-12 ENCOUNTER — PATIENT MESSAGE (OUTPATIENT)
Dept: FAMILY MEDICINE | Facility: CLINIC | Age: 49
End: 2019-12-12

## 2019-12-12 DIAGNOSIS — I10 HYPERTENSION, ESSENTIAL: Primary | ICD-10-CM

## 2019-12-12 RX ORDER — LOSARTAN POTASSIUM 50 MG/1
50 TABLET ORAL 2 TIMES DAILY
Qty: 180 TABLET | Refills: 3 | Status: SHIPPED | OUTPATIENT
Start: 2019-12-12 | End: 2020-12-29 | Stop reason: SDUPTHER

## 2019-12-14 LAB
LEFT EYE DM RETINOPATHY: NEGATIVE
RIGHT EYE DM RETINOPATHY: NEGATIVE

## 2019-12-23 ENCOUNTER — TELEPHONE (OUTPATIENT)
Dept: OPHTHALMOLOGY | Facility: CLINIC | Age: 49
End: 2019-12-23

## 2020-01-06 DIAGNOSIS — E11.9 TYPE 2 DIABETES MELLITUS WITHOUT COMPLICATION, WITHOUT LONG-TERM CURRENT USE OF INSULIN: ICD-10-CM

## 2020-03-03 DIAGNOSIS — E11.9 TYPE 2 DIABETES MELLITUS WITHOUT COMPLICATION, WITHOUT LONG-TERM CURRENT USE OF INSULIN: ICD-10-CM

## 2020-03-03 RX ORDER — SIMVASTATIN 40 MG/1
TABLET, FILM COATED ORAL
Qty: 90 TABLET | Refills: 1 | Status: SHIPPED | OUTPATIENT
Start: 2020-03-03 | End: 2020-07-29

## 2020-03-03 RX ORDER — METFORMIN HYDROCHLORIDE 1000 MG/1
TABLET ORAL
Qty: 180 TABLET | Refills: 1 | Status: SHIPPED | OUTPATIENT
Start: 2020-03-03 | End: 2020-07-29

## 2020-05-15 DIAGNOSIS — Z12.11 COLON CANCER SCREENING: ICD-10-CM

## 2020-06-12 DIAGNOSIS — E11.9 TYPE 2 DIABETES MELLITUS WITHOUT COMPLICATION: ICD-10-CM

## 2020-08-14 DIAGNOSIS — Z11.59 NEED FOR HEPATITIS C SCREENING TEST: ICD-10-CM

## 2020-10-05 ENCOUNTER — PATIENT MESSAGE (OUTPATIENT)
Dept: ADMINISTRATIVE | Facility: HOSPITAL | Age: 50
End: 2020-10-05

## 2020-11-05 ENCOUNTER — TELEPHONE (OUTPATIENT)
Dept: FAMILY MEDICINE | Facility: CLINIC | Age: 50
End: 2020-11-05

## 2020-11-05 DIAGNOSIS — Z00.00 ANNUAL PHYSICAL EXAM: Primary | ICD-10-CM

## 2020-11-05 DIAGNOSIS — E11.9 TYPE 2 DIABETES MELLITUS WITHOUT COMPLICATION, WITHOUT LONG-TERM CURRENT USE OF INSULIN: ICD-10-CM

## 2020-11-05 DIAGNOSIS — I10 HYPERTENSION, ESSENTIAL: ICD-10-CM

## 2020-11-05 DIAGNOSIS — Z11.59 NEED FOR HEPATITIS C SCREENING TEST: ICD-10-CM

## 2020-11-05 NOTE — TELEPHONE ENCOUNTER
----- Message from Marcisherif Carcamo sent at 11/4/2020  4:52 PM CST -----  Contact: Self [phone]  Type: Lab    Caller is requesting to schedule their Lab appointment prior to annual appointment.    Order is not listed in EPIC.  Please enter order and contact patient to schedule.    Name of Caller: Self     Preferred Date and Time of Labs: week before appt    Date of [facility] Appointment :12/29/2020    Where would they like the lab performed? [facility]    Would the patient rather a call back or a response via [facility]? Call back    Best Call Back Number: [phone]

## 2020-12-09 DIAGNOSIS — Z12.31 OTHER SCREENING MAMMOGRAM: ICD-10-CM

## 2020-12-16 ENCOUNTER — PATIENT OUTREACH (OUTPATIENT)
Dept: ADMINISTRATIVE | Facility: HOSPITAL | Age: 50
End: 2020-12-16

## 2020-12-16 DIAGNOSIS — E11.9 TYPE 2 DIABETES MELLITUS WITHOUT COMPLICATION, WITHOUT LONG-TERM CURRENT USE OF INSULIN: Primary | ICD-10-CM

## 2020-12-16 RX ORDER — INFLUENZA A VIRUS A/NEBRASKA/14/2019 (H1N1) ANTIGEN (MDCK CELL DERIVED, PROPIOLACTONE INACTIVATED), INFLUENZA A VIRUS A/DELAWARE/39/2019 (H3N2) ANTIGEN (MDCK CELL DERIVED, PROPIOLACTONE INACTIVATED), INFLUENZA B VIRUS B/SINGAPORE/INFTT-16-0610/2016 ANTIGEN (MDCK CELL DERIVED, PROPIOLACTONE INACTIVATED), INFLUENZA B VIRUS B/DARWIN/7/2019 ANTIGEN (MDCK CELL DERIVED, PROPIOLACTONE INACTIVATED) 15; 15; 15; 15 UG/.5ML; UG/.5ML; UG/.5ML; UG/.5ML
INJECTION, SUSPENSION INTRAMUSCULAR
COMMUNITY
Start: 2020-10-12 | End: 2023-02-02 | Stop reason: ALTCHOICE

## 2020-12-16 NOTE — PROGRESS NOTES
Overdue Mammogram - appointment already scheduled.    Overdue Diabetic Eye Exam - Left message for patient to call our office.

## 2020-12-18 ENCOUNTER — LAB VISIT (OUTPATIENT)
Dept: LAB | Facility: HOSPITAL | Age: 50
End: 2020-12-18
Attending: FAMILY MEDICINE
Payer: COMMERCIAL

## 2020-12-18 DIAGNOSIS — I10 HYPERTENSION, ESSENTIAL: ICD-10-CM

## 2020-12-18 DIAGNOSIS — Z00.00 ANNUAL PHYSICAL EXAM: ICD-10-CM

## 2020-12-18 DIAGNOSIS — Z11.59 NEED FOR HEPATITIS C SCREENING TEST: ICD-10-CM

## 2020-12-18 DIAGNOSIS — E11.9 TYPE 2 DIABETES MELLITUS WITHOUT COMPLICATION, WITHOUT LONG-TERM CURRENT USE OF INSULIN: ICD-10-CM

## 2020-12-18 LAB
ALBUMIN SERPL BCP-MCNC: 3.5 G/DL (ref 3.5–5.2)
ALP SERPL-CCNC: 99 U/L (ref 55–135)
ALT SERPL W/O P-5'-P-CCNC: 46 U/L (ref 10–44)
ANION GAP SERPL CALC-SCNC: 10 MMOL/L (ref 8–16)
AST SERPL-CCNC: 61 U/L (ref 10–40)
BASOPHILS # BLD AUTO: 0.08 K/UL (ref 0–0.2)
BASOPHILS NFR BLD: 0.8 % (ref 0–1.9)
BILIRUB SERPL-MCNC: 0.3 MG/DL (ref 0.1–1)
BUN SERPL-MCNC: 9 MG/DL (ref 6–20)
CALCIUM SERPL-MCNC: 9.4 MG/DL (ref 8.7–10.5)
CHLORIDE SERPL-SCNC: 103 MMOL/L (ref 95–110)
CHOLEST SERPL-MCNC: 171 MG/DL (ref 120–199)
CHOLEST/HDLC SERPL: 3.1 {RATIO} (ref 2–5)
CO2 SERPL-SCNC: 26 MMOL/L (ref 23–29)
CREAT SERPL-MCNC: 0.7 MG/DL (ref 0.5–1.4)
DIFFERENTIAL METHOD: ABNORMAL
EOSINOPHIL # BLD AUTO: 0.3 K/UL (ref 0–0.5)
EOSINOPHIL NFR BLD: 3.1 % (ref 0–8)
ERYTHROCYTE [DISTWIDTH] IN BLOOD BY AUTOMATED COUNT: 14.5 % (ref 11.5–14.5)
EST. GFR  (AFRICAN AMERICAN): >60 ML/MIN/1.73 M^2
EST. GFR  (NON AFRICAN AMERICAN): >60 ML/MIN/1.73 M^2
ESTIMATED AVG GLUCOSE: 148 MG/DL (ref 68–131)
GLUCOSE SERPL-MCNC: 152 MG/DL (ref 70–110)
HBA1C MFR BLD HPLC: 6.8 % (ref 4–5.6)
HCT VFR BLD AUTO: 37.3 % (ref 37–48.5)
HDLC SERPL-MCNC: 55 MG/DL (ref 40–75)
HDLC SERPL: 32.2 % (ref 20–50)
HGB BLD-MCNC: 11.3 G/DL (ref 12–16)
IMM GRANULOCYTES # BLD AUTO: 0.04 K/UL (ref 0–0.04)
IMM GRANULOCYTES NFR BLD AUTO: 0.4 % (ref 0–0.5)
LDLC SERPL CALC-MCNC: 88.2 MG/DL (ref 63–159)
LYMPHOCYTES # BLD AUTO: 2.9 K/UL (ref 1–4.8)
LYMPHOCYTES NFR BLD: 28.4 % (ref 18–48)
MCH RBC QN AUTO: 27.3 PG (ref 27–31)
MCHC RBC AUTO-ENTMCNC: 30.3 G/DL (ref 32–36)
MCV RBC AUTO: 90 FL (ref 82–98)
MONOCYTES # BLD AUTO: 0.8 K/UL (ref 0.3–1)
MONOCYTES NFR BLD: 7.4 % (ref 4–15)
NEUTROPHILS # BLD AUTO: 6.2 K/UL (ref 1.8–7.7)
NEUTROPHILS NFR BLD: 59.9 % (ref 38–73)
NONHDLC SERPL-MCNC: 116 MG/DL
NRBC BLD-RTO: 0 /100 WBC
PLATELET # BLD AUTO: 235 K/UL (ref 150–350)
PMV BLD AUTO: 13.4 FL (ref 9.2–12.9)
POTASSIUM SERPL-SCNC: 4.8 MMOL/L (ref 3.5–5.1)
PROT SERPL-MCNC: 7.8 G/DL (ref 6–8.4)
RBC # BLD AUTO: 4.14 M/UL (ref 4–5.4)
SODIUM SERPL-SCNC: 139 MMOL/L (ref 136–145)
TRIGL SERPL-MCNC: 139 MG/DL (ref 30–150)
TSH SERPL DL<=0.005 MIU/L-ACNC: 1.61 UIU/ML (ref 0.4–4)
WBC # BLD AUTO: 10.29 K/UL (ref 3.9–12.7)

## 2020-12-18 PROCEDURE — 80061 LIPID PANEL: CPT

## 2020-12-18 PROCEDURE — 36415 COLL VENOUS BLD VENIPUNCTURE: CPT | Mod: PN

## 2020-12-18 PROCEDURE — 84443 ASSAY THYROID STIM HORMONE: CPT

## 2020-12-18 PROCEDURE — 83036 HEMOGLOBIN GLYCOSYLATED A1C: CPT

## 2020-12-18 PROCEDURE — 80053 COMPREHEN METABOLIC PANEL: CPT

## 2020-12-18 PROCEDURE — 86803 HEPATITIS C AB TEST: CPT

## 2020-12-18 PROCEDURE — 85025 COMPLETE CBC W/AUTO DIFF WBC: CPT

## 2020-12-21 LAB — HCV AB SERPL QL IA: NEGATIVE

## 2020-12-29 ENCOUNTER — OFFICE VISIT (OUTPATIENT)
Dept: FAMILY MEDICINE | Facility: CLINIC | Age: 50
End: 2020-12-29
Payer: COMMERCIAL

## 2020-12-29 VITALS
TEMPERATURE: 99 F | WEIGHT: 293 LBS | BODY MASS INDEX: 51.91 KG/M2 | SYSTOLIC BLOOD PRESSURE: 140 MMHG | DIASTOLIC BLOOD PRESSURE: 77 MMHG | HEIGHT: 63 IN | OXYGEN SATURATION: 97 % | HEART RATE: 97 BPM

## 2020-12-29 DIAGNOSIS — E11.9 TYPE 2 DIABETES MELLITUS WITHOUT COMPLICATION, WITHOUT LONG-TERM CURRENT USE OF INSULIN: ICD-10-CM

## 2020-12-29 DIAGNOSIS — I10 HYPERTENSION, ESSENTIAL: ICD-10-CM

## 2020-12-29 DIAGNOSIS — Z00.00 ANNUAL PHYSICAL EXAM: Primary | ICD-10-CM

## 2020-12-29 DIAGNOSIS — Z12.11 COLON CANCER SCREENING: ICD-10-CM

## 2020-12-29 PROCEDURE — 99396 PR PREVENTIVE VISIT,EST,40-64: ICD-10-PCS | Mod: 25,S$GLB,, | Performed by: FAMILY MEDICINE

## 2020-12-29 PROCEDURE — 3077F SYST BP >= 140 MM HG: CPT | Mod: CPTII,S$GLB,, | Performed by: FAMILY MEDICINE

## 2020-12-29 PROCEDURE — 1126F AMNT PAIN NOTED NONE PRSNT: CPT | Mod: S$GLB,,, | Performed by: FAMILY MEDICINE

## 2020-12-29 PROCEDURE — 99999 PR PBB SHADOW E&M-EST. PATIENT-LVL III: ICD-10-PCS | Mod: PBBFAC,,, | Performed by: FAMILY MEDICINE

## 2020-12-29 PROCEDURE — 90471 IMMUNIZATION ADMIN: CPT | Mod: S$GLB,,, | Performed by: FAMILY MEDICINE

## 2020-12-29 PROCEDURE — 3008F BODY MASS INDEX DOCD: CPT | Mod: CPTII,S$GLB,, | Performed by: FAMILY MEDICINE

## 2020-12-29 PROCEDURE — 3008F PR BODY MASS INDEX (BMI) DOCUMENTED: ICD-10-PCS | Mod: CPTII,S$GLB,, | Performed by: FAMILY MEDICINE

## 2020-12-29 PROCEDURE — 3044F PR MOST RECENT HEMOGLOBIN A1C LEVEL <7.0%: ICD-10-PCS | Mod: CPTII,S$GLB,, | Performed by: FAMILY MEDICINE

## 2020-12-29 PROCEDURE — 3077F PR MOST RECENT SYSTOLIC BLOOD PRESSURE >= 140 MM HG: ICD-10-PCS | Mod: CPTII,S$GLB,, | Performed by: FAMILY MEDICINE

## 2020-12-29 PROCEDURE — 3044F HG A1C LEVEL LT 7.0%: CPT | Mod: CPTII,S$GLB,, | Performed by: FAMILY MEDICINE

## 2020-12-29 PROCEDURE — 90750 HZV VACC RECOMBINANT IM: CPT | Mod: S$GLB,,, | Performed by: FAMILY MEDICINE

## 2020-12-29 PROCEDURE — 90471 ZOSTER RECOMBINANT VACCINE: ICD-10-PCS | Mod: S$GLB,,, | Performed by: FAMILY MEDICINE

## 2020-12-29 PROCEDURE — 90750 ZOSTER RECOMBINANT VACCINE: ICD-10-PCS | Mod: S$GLB,,, | Performed by: FAMILY MEDICINE

## 2020-12-29 PROCEDURE — 99999 PR PBB SHADOW E&M-EST. PATIENT-LVL III: CPT | Mod: PBBFAC,,, | Performed by: FAMILY MEDICINE

## 2020-12-29 PROCEDURE — 1126F PR PAIN SEVERITY QUANTIFIED, NO PAIN PRESENT: ICD-10-PCS | Mod: S$GLB,,, | Performed by: FAMILY MEDICINE

## 2020-12-29 PROCEDURE — 99396 PREV VISIT EST AGE 40-64: CPT | Mod: 25,S$GLB,, | Performed by: FAMILY MEDICINE

## 2020-12-29 PROCEDURE — 3078F PR MOST RECENT DIASTOLIC BLOOD PRESSURE < 80 MM HG: ICD-10-PCS | Mod: CPTII,S$GLB,, | Performed by: FAMILY MEDICINE

## 2020-12-29 PROCEDURE — 3078F DIAST BP <80 MM HG: CPT | Mod: CPTII,S$GLB,, | Performed by: FAMILY MEDICINE

## 2020-12-29 RX ORDER — METFORMIN HYDROCHLORIDE 1000 MG/1
1000 TABLET ORAL 2 TIMES DAILY WITH MEALS
Qty: 180 TABLET | Refills: 3 | Status: SHIPPED | OUTPATIENT
Start: 2020-12-29 | End: 2022-01-21 | Stop reason: SDUPTHER

## 2020-12-29 RX ORDER — SIMVASTATIN 40 MG/1
40 TABLET, FILM COATED ORAL NIGHTLY
Qty: 90 TABLET | Refills: 3 | Status: SHIPPED | OUTPATIENT
Start: 2020-12-29 | End: 2022-01-21 | Stop reason: SDUPTHER

## 2020-12-29 RX ORDER — LOSARTAN POTASSIUM 50 MG/1
50 TABLET ORAL 2 TIMES DAILY
Qty: 180 TABLET | Refills: 3 | Status: SHIPPED | OUTPATIENT
Start: 2020-12-29 | End: 2022-01-24 | Stop reason: SDUPTHER

## 2020-12-29 NOTE — PROGRESS NOTES
Assessment & Plan  Problem List Items Addressed This Visit        Cardiac/Vascular    Hypertension, essential    Relevant Medications    losartan (COZAAR) 50 MG tablet       Endocrine    Type 2 diabetes mellitus without complication, without long-term current use of insulin    Relevant Medications    metFORMIN (GLUCOPHAGE) 1000 MG tablet    simvastatin (ZOCOR) 40 MG tablet      Other Visit Diagnoses     Annual physical exam    -  Primary    Colon cancer screening        Relevant Orders    Cologuard Screening (Multitarget Stool DNA)        I addressed all major concerns as it related to health maintenance.  All were ordered and scheduled based on the patients wishes.  Any additional health maintenance will be readdressed at the next physical if declined or deferred by the patient.      Health Maintenance reviewed.    Follow-up: No follow-ups on file.    ______________________________________________________________________    Chief Complaint  Chief Complaint   Patient presents with    Annual Exam       HPI  Micaela Hernandez is a 50 y.o. female with multiple medical diagnoses as listed in the medical history and problem list that presents for annual exam.  Pt is known to me with last appointment 2019.    Patient denies any new symptoms including chest pain, SOB, blurry vision, N/V, diarrhea.        PAST MEDICAL HISTORY:  Past Medical History:   Diagnosis Date    Diabetes mellitus     Hyperlipidemia     Hypertension        PAST SURGICAL HISTORY:  Past Surgical History:   Procedure Laterality Date    BREAST BIOPSY Right     lymph nodes removed     SECTION      CHOLECYSTECTOMY      ENDOMETRIAL ABLATION      HERNIA REPAIR      incisional     KNEE SURGERY      LIVER LOBECTOMY Right     LYMPH NODE DISSECTION      right axillary       SOCIAL HISTORY:  Social History     Socioeconomic History    Marital status:      Spouse name: Not on file    Number of children: Not on file    Years of  education: Not on file    Highest education level: Not on file   Occupational History    Not on file   Social Needs    Financial resource strain: Not on file    Food insecurity     Worry: Not on file     Inability: Not on file    Transportation needs     Medical: Not on file     Non-medical: Not on file   Tobacco Use    Smoking status: Never Smoker    Smokeless tobacco: Never Used   Substance and Sexual Activity    Alcohol use: Yes    Drug use: No    Sexual activity: Not Currently   Lifestyle    Physical activity     Days per week: Not on file     Minutes per session: Not on file    Stress: Not on file   Relationships    Social connections     Talks on phone: Not on file     Gets together: Not on file     Attends Buddhism service: Not on file     Active member of club or organization: Not on file     Attends meetings of clubs or organizations: Not on file     Relationship status: Not on file   Other Topics Concern    Not on file   Social History Narrative    Not on file       FAMILY HISTORY:  Family History   Problem Relation Age of Onset    Hypertension Mother     Asthma Mother     Diabetes Father     Hypertension Father     Cancer Sister         type unknown    No Known Problems Brother     No Known Problems Maternal Aunt     No Known Problems Maternal Uncle     No Known Problems Paternal Aunt     No Known Problems Paternal Uncle     No Known Problems Maternal Grandmother     No Known Problems Maternal Grandfather     No Known Problems Paternal Grandmother     No Known Problems Paternal Grandfather     Breast cancer Neg Hx     Colon cancer Neg Hx     Ovarian cancer Neg Hx     Amblyopia Neg Hx     Blindness Neg Hx     Cataracts Neg Hx     Glaucoma Neg Hx     Macular degeneration Neg Hx     Retinal detachment Neg Hx     Strabismus Neg Hx     Stroke Neg Hx     Thyroid disease Neg Hx        ALLERGIES AND MEDICATIONS: updated and reviewed.  Review of patient's allergies  "indicates:  No Known Allergies  Current Outpatient Medications   Medication Sig Dispense Refill    FLUCELVAX QUAD 7246-7888, PF, 60 mcg (15 mcg x 4)/0.5 mL Syrg PHARMACY ADMINISTERED      loratadine (CLARITIN) 10 mg tablet Take 10 mg by mouth once daily.      losartan (COZAAR) 50 MG tablet Take 1 tablet (50 mg total) by mouth 2 (two) times daily. 180 tablet 3    metFORMIN (GLUCOPHAGE) 1000 MG tablet Take 1 tablet (1,000 mg total) by mouth 2 (two) times daily with meals. 180 tablet 3    simvastatin (ZOCOR) 40 MG tablet Take 1 tablet (40 mg total) by mouth every evening. 90 tablet 3     No current facility-administered medications for this visit.          ROS  Review of Systems   Constitutional: Negative for activity change, appetite change, fatigue, fever and unexpected weight change.   HENT: Negative.  Negative for ear discharge, ear pain, rhinorrhea and sore throat.    Eyes: Negative.    Respiratory: Positive for wheezing (whistle). Negative for apnea, cough, chest tightness and shortness of breath.    Cardiovascular: Negative for chest pain, palpitations and leg swelling.   Gastrointestinal: Negative for abdominal distention, abdominal pain, constipation, diarrhea and vomiting.   Endocrine: Negative for cold intolerance, heat intolerance, polydipsia and polyuria.   Genitourinary: Negative for decreased urine volume and urgency.   Musculoskeletal: Negative.    Skin: Negative for rash.   Neurological: Negative for dizziness and headaches.   Hematological: Does not bruise/bleed easily.   Psychiatric/Behavioral: Negative for agitation, sleep disturbance and suicidal ideas.           Physical Exam  Vitals:    12/29/20 1128   BP: (!) 140/77   BP Location: Left arm   Patient Position: Sitting   BP Method: Large (Manual)   Pulse: 97   Temp: 98.6 °F (37 °C)   TempSrc: Oral   SpO2: 97%   Weight: (!) 139.5 kg (307 lb 8.7 oz)   Height: 5' 3" (1.6 m)    Body mass index is 54.48 kg/m².  Weight: (!) 139.5 kg (307 lb 8.7 oz) " "  Height: 5' 3" (160 cm)   Physical Exam  Vitals signs reviewed.   Constitutional:       Appearance: She is well-developed.   HENT:      Head: Normocephalic and atraumatic.      Right Ear: External ear normal.      Left Ear: External ear normal.      Nose: Nose normal.   Eyes:      Conjunctiva/sclera: Conjunctivae normal.      Pupils: Pupils are equal, round, and reactive to light.   Cardiovascular:      Rate and Rhythm: Normal rate and regular rhythm.      Heart sounds: Normal heart sounds.   Pulmonary:      Effort: Pulmonary effort is normal.      Breath sounds: Normal breath sounds.   Skin:     General: Skin is warm and dry.   Neurological:      Mental Status: She is alert and oriented to person, place, and time.         Protective Sensation (w/ 10 gram monofilament):  Right: Intact  Left: Intact    Visual Inspection:  Normal -  Bilateral    Pedal Pulses:   Right: Present  Left: Present    Posterior tibialis:   Right:Present  Left: Present      Health Maintenance       Date Due Completion Date    HIV Screening 04/26/1985 ---    Foot Exam 05/24/2019 5/24/2018    Override on 5/23/2017: Done    Shingles Vaccine (1 of 2) 04/26/2020 ---    Colorectal Cancer Screening 04/26/2020 ---    Influenza Vaccine (1) 08/01/2020 10/24/2019    Mammogram 11/29/2020 11/29/2019    Eye Exam 12/14/2020 12/14/2019    Cervical Cancer Screening 12/29/2020 12/29/2017    Hemoglobin A1c 06/18/2021 12/18/2020    Lipid Panel 12/18/2021 12/18/2020    Low Dose Statin 12/29/2021 12/29/2020    TETANUS VACCINE 02/03/2026 2/3/2016              Patient note was created using [employer].  Any errors in syntax or even information may not have been identified and edited on initial review prior to signing this note.    "

## 2020-12-31 ENCOUNTER — HOSPITAL ENCOUNTER (OUTPATIENT)
Dept: RADIOLOGY | Facility: HOSPITAL | Age: 50
Discharge: HOME | End: 2020-12-31
Attending: FAMILY MEDICINE
Payer: COMMERCIAL

## 2020-12-31 VITALS — BODY MASS INDEX: 51.91 KG/M2 | WEIGHT: 293 LBS | HEIGHT: 63 IN

## 2020-12-31 DIAGNOSIS — Z12.31 OTHER SCREENING MAMMOGRAM: ICD-10-CM

## 2020-12-31 PROCEDURE — 77067 MAMMO DIGITAL SCREENING BILAT WITH TOMO: ICD-10-PCS | Mod: 26,,, | Performed by: RADIOLOGY

## 2020-12-31 PROCEDURE — 77063 BREAST TOMOSYNTHESIS BI: CPT | Mod: 26,,, | Performed by: RADIOLOGY

## 2020-12-31 PROCEDURE — 77067 SCR MAMMO BI INCL CAD: CPT | Mod: TC

## 2020-12-31 PROCEDURE — 77063 MAMMO DIGITAL SCREENING BILAT WITH TOMO: ICD-10-PCS | Mod: 26,,, | Performed by: RADIOLOGY

## 2020-12-31 PROCEDURE — 77067 SCR MAMMO BI INCL CAD: CPT | Mod: 26,,, | Performed by: RADIOLOGY

## 2021-01-11 ENCOUNTER — HOSPITAL ENCOUNTER (OUTPATIENT)
Dept: RADIOLOGY | Facility: HOSPITAL | Age: 51
Discharge: HOME | End: 2021-01-11
Attending: FAMILY MEDICINE
Payer: COMMERCIAL

## 2021-01-11 DIAGNOSIS — R92.8 ABNORMAL MAMMOGRAM: ICD-10-CM

## 2021-01-11 PROCEDURE — 77062 MAMMO DIGITAL DIAGNOSTIC BILAT WITH TOMO: ICD-10-PCS | Mod: 26,,, | Performed by: RADIOLOGY

## 2021-01-11 PROCEDURE — 77066 DX MAMMO INCL CAD BI: CPT | Mod: 26,,, | Performed by: RADIOLOGY

## 2021-01-11 PROCEDURE — 77066 DX MAMMO INCL CAD BI: CPT | Mod: TC

## 2021-01-11 PROCEDURE — 77062 BREAST TOMOSYNTHESIS BI: CPT | Mod: 26,,, | Performed by: RADIOLOGY

## 2021-01-11 PROCEDURE — 77066 MAMMO DIGITAL DIAGNOSTIC BILAT WITH TOMO: ICD-10-PCS | Mod: 26,,, | Performed by: RADIOLOGY

## 2021-01-12 LAB — HEMOCCULT STL QL IA: NEGATIVE

## 2021-01-20 ENCOUNTER — PATIENT OUTREACH (OUTPATIENT)
Dept: ADMINISTRATIVE | Facility: HOSPITAL | Age: 51
End: 2021-01-20

## 2021-02-03 ENCOUNTER — PATIENT MESSAGE (OUTPATIENT)
Dept: OBSTETRICS AND GYNECOLOGY | Facility: CLINIC | Age: 51
End: 2021-02-03

## 2021-02-03 DIAGNOSIS — E11.9 TYPE 2 DIABETES MELLITUS WITHOUT COMPLICATION, UNSPECIFIED WHETHER LONG TERM INSULIN USE: ICD-10-CM

## 2021-02-10 DIAGNOSIS — E11.9 TYPE 2 DIABETES MELLITUS WITHOUT COMPLICATION, UNSPECIFIED WHETHER LONG TERM INSULIN USE: ICD-10-CM

## 2021-02-17 DIAGNOSIS — E11.9 TYPE 2 DIABETES MELLITUS WITHOUT COMPLICATION, UNSPECIFIED WHETHER LONG TERM INSULIN USE: ICD-10-CM

## 2021-02-24 DIAGNOSIS — E11.9 TYPE 2 DIABETES MELLITUS WITHOUT COMPLICATION, UNSPECIFIED WHETHER LONG TERM INSULIN USE: ICD-10-CM

## 2021-03-01 ENCOUNTER — CLINICAL SUPPORT (OUTPATIENT)
Dept: FAMILY MEDICINE | Facility: CLINIC | Age: 51
End: 2021-03-01
Payer: COMMERCIAL

## 2021-03-01 DIAGNOSIS — Z23 NEED FOR ZOSTER VACCINATION: Primary | ICD-10-CM

## 2021-03-01 PROCEDURE — 90471 IMMUNIZATION ADMIN: CPT | Mod: S$GLB,,, | Performed by: FAMILY MEDICINE

## 2021-03-01 PROCEDURE — 90471 ZOSTER RECOMBINANT VACCINE: ICD-10-PCS | Mod: S$GLB,,, | Performed by: FAMILY MEDICINE

## 2021-03-01 PROCEDURE — 99499 NO LOS: ICD-10-PCS | Mod: S$GLB,,, | Performed by: FAMILY MEDICINE

## 2021-03-01 PROCEDURE — 90750 HZV VACC RECOMBINANT IM: CPT | Mod: S$GLB,,, | Performed by: FAMILY MEDICINE

## 2021-03-01 PROCEDURE — 90750 ZOSTER RECOMBINANT VACCINE: ICD-10-PCS | Mod: S$GLB,,, | Performed by: FAMILY MEDICINE

## 2021-03-01 PROCEDURE — 99499 UNLISTED E&M SERVICE: CPT | Mod: S$GLB,,, | Performed by: FAMILY MEDICINE

## 2021-03-30 ENCOUNTER — PATIENT OUTREACH (OUTPATIENT)
Dept: ADMINISTRATIVE | Facility: HOSPITAL | Age: 51
End: 2021-03-30

## 2021-04-07 DIAGNOSIS — E11.9 TYPE 2 DIABETES MELLITUS WITHOUT COMPLICATION, UNSPECIFIED WHETHER LONG TERM INSULIN USE: ICD-10-CM

## 2021-04-09 ENCOUNTER — APPOINTMENT (OUTPATIENT)
Dept: RADIOLOGY | Facility: HOSPITAL | Age: 51
End: 2021-04-09
Attending: INTERNAL MEDICINE
Payer: COMMERCIAL

## 2021-04-09 DIAGNOSIS — M54.50 CHRONIC RIGHT-SIDED LOW BACK PAIN WITHOUT SCIATICA: ICD-10-CM

## 2021-04-09 DIAGNOSIS — G89.29 CHRONIC RIGHT-SIDED LOW BACK PAIN WITHOUT SCIATICA: ICD-10-CM

## 2021-04-09 PROBLEM — I10 HYPERTENSION, ESSENTIAL: Status: RESOLVED | Noted: 2017-04-12 | Resolved: 2021-04-09

## 2021-04-09 PROBLEM — M17.0 PRIMARY OSTEOARTHRITIS OF BOTH KNEES: Status: ACTIVE | Noted: 2021-04-09

## 2021-04-09 PROBLEM — M25.551 CHRONIC PAIN OF RIGHT HIP: Status: ACTIVE | Noted: 2021-04-09

## 2021-04-09 PROBLEM — M51.369 DEGENERATIVE LUMBAR DISC: Status: ACTIVE | Noted: 2021-04-09

## 2021-04-09 PROBLEM — M25.561 CHRONIC PAIN OF BOTH KNEES: Status: ACTIVE | Noted: 2021-04-09

## 2021-04-09 PROBLEM — M51.36 DEGENERATIVE LUMBAR DISC: Status: ACTIVE | Noted: 2021-04-09

## 2021-04-09 PROBLEM — M25.562 CHRONIC PAIN OF BOTH KNEES: Status: ACTIVE | Noted: 2021-04-09

## 2021-04-09 PROCEDURE — 72100 XR LUMBAR SPINE AP AND LATERAL: ICD-10-PCS | Mod: 26,,, | Performed by: RADIOLOGY

## 2021-04-09 PROCEDURE — 72100 X-RAY EXAM L-S SPINE 2/3 VWS: CPT | Mod: 26,,, | Performed by: RADIOLOGY

## 2021-04-09 PROCEDURE — 72100 X-RAY EXAM L-S SPINE 2/3 VWS: CPT | Mod: TC,FY,PN

## 2021-07-08 DIAGNOSIS — E11.9 TYPE 2 DIABETES MELLITUS WITHOUT COMPLICATION: ICD-10-CM

## 2021-08-04 ENCOUNTER — PATIENT MESSAGE (OUTPATIENT)
Dept: ADMINISTRATIVE | Facility: HOSPITAL | Age: 51
End: 2021-08-04

## 2021-11-03 ENCOUNTER — PATIENT MESSAGE (OUTPATIENT)
Dept: ADMINISTRATIVE | Facility: HOSPITAL | Age: 51
End: 2021-11-03
Payer: COMMERCIAL

## 2021-12-30 ENCOUNTER — LAB VISIT (OUTPATIENT)
Dept: LAB | Facility: HOSPITAL | Age: 51
End: 2021-12-30
Attending: INTERNAL MEDICINE
Payer: COMMERCIAL

## 2021-12-30 DIAGNOSIS — Z00.00 ANNUAL PHYSICAL EXAM: ICD-10-CM

## 2021-12-30 DIAGNOSIS — E11.21 TYPE 2 DIABETES MELLITUS WITH DIABETIC NEPHROPATHY, WITHOUT LONG-TERM CURRENT USE OF INSULIN: Primary | ICD-10-CM

## 2021-12-30 DIAGNOSIS — E11.9 TYPE 2 DIABETES MELLITUS WITHOUT COMPLICATION, WITHOUT LONG-TERM CURRENT USE OF INSULIN: ICD-10-CM

## 2021-12-30 LAB
ALBUMIN SERPL BCP-MCNC: 3.4 G/DL (ref 3.5–5.2)
ALBUMIN/CREAT UR: 68.5 UG/MG (ref 0–30)
ALP SERPL-CCNC: 107 U/L (ref 55–135)
ALT SERPL W/O P-5'-P-CCNC: 44 U/L (ref 10–44)
ANION GAP SERPL CALC-SCNC: 9 MMOL/L (ref 8–16)
AST SERPL-CCNC: 53 U/L (ref 10–40)
BASOPHILS # BLD AUTO: 0.05 K/UL (ref 0–0.2)
BASOPHILS NFR BLD: 0.5 % (ref 0–1.9)
BILIRUB SERPL-MCNC: 0.4 MG/DL (ref 0.1–1)
BUN SERPL-MCNC: 9 MG/DL (ref 6–20)
CALCIUM SERPL-MCNC: 9 MG/DL (ref 8.7–10.5)
CHLORIDE SERPL-SCNC: 102 MMOL/L (ref 95–110)
CHOLEST SERPL-MCNC: 180 MG/DL (ref 120–199)
CHOLEST/HDLC SERPL: 3.4 {RATIO} (ref 2–5)
CO2 SERPL-SCNC: 27 MMOL/L (ref 23–29)
CREAT SERPL-MCNC: 0.7 MG/DL (ref 0.5–1.4)
CREAT UR-MCNC: 178 MG/DL (ref 15–325)
DIFFERENTIAL METHOD: ABNORMAL
EOSINOPHIL # BLD AUTO: 0.2 K/UL (ref 0–0.5)
EOSINOPHIL NFR BLD: 2 % (ref 0–8)
ERYTHROCYTE [DISTWIDTH] IN BLOOD BY AUTOMATED COUNT: 14.3 % (ref 11.5–14.5)
EST. GFR  (AFRICAN AMERICAN): >60 ML/MIN/1.73 M^2
EST. GFR  (NON AFRICAN AMERICAN): >60 ML/MIN/1.73 M^2
ESTIMATED AVG GLUCOSE: 174 MG/DL (ref 68–131)
GLUCOSE SERPL-MCNC: 196 MG/DL (ref 70–110)
HBA1C MFR BLD: 7.7 % (ref 4–5.6)
HCT VFR BLD AUTO: 37.8 % (ref 37–48.5)
HDLC SERPL-MCNC: 53 MG/DL (ref 40–75)
HDLC SERPL: 29.4 % (ref 20–50)
HGB BLD-MCNC: 11.7 G/DL (ref 12–16)
IMM GRANULOCYTES # BLD AUTO: 0.04 K/UL (ref 0–0.04)
IMM GRANULOCYTES NFR BLD AUTO: 0.4 % (ref 0–0.5)
LDLC SERPL CALC-MCNC: 99.4 MG/DL (ref 63–159)
LYMPHOCYTES # BLD AUTO: 2.5 K/UL (ref 1–4.8)
LYMPHOCYTES NFR BLD: 26.9 % (ref 18–48)
MCH RBC QN AUTO: 28 PG (ref 27–31)
MCHC RBC AUTO-ENTMCNC: 31 G/DL (ref 32–36)
MCV RBC AUTO: 90 FL (ref 82–98)
MICROALBUMIN UR DL<=1MG/L-MCNC: 122 UG/ML
MONOCYTES # BLD AUTO: 0.6 K/UL (ref 0.3–1)
MONOCYTES NFR BLD: 6.8 % (ref 4–15)
NEUTROPHILS # BLD AUTO: 5.9 K/UL (ref 1.8–7.7)
NEUTROPHILS NFR BLD: 63.4 % (ref 38–73)
NONHDLC SERPL-MCNC: 127 MG/DL
NRBC BLD-RTO: 0 /100 WBC
PLATELET # BLD AUTO: 208 K/UL (ref 150–450)
PMV BLD AUTO: 13.8 FL (ref 9.2–12.9)
POTASSIUM SERPL-SCNC: 4.7 MMOL/L (ref 3.5–5.1)
PROT SERPL-MCNC: 7.3 G/DL (ref 6–8.4)
RBC # BLD AUTO: 4.18 M/UL (ref 4–5.4)
SODIUM SERPL-SCNC: 138 MMOL/L (ref 136–145)
TRIGL SERPL-MCNC: 138 MG/DL (ref 30–150)
TSH SERPL DL<=0.005 MIU/L-ACNC: 2.12 UIU/ML (ref 0.4–4)
WBC # BLD AUTO: 9.29 K/UL (ref 3.9–12.7)

## 2021-12-30 PROCEDURE — 36415 COLL VENOUS BLD VENIPUNCTURE: CPT | Performed by: INTERNAL MEDICINE

## 2021-12-30 PROCEDURE — 80061 LIPID PANEL: CPT | Performed by: INTERNAL MEDICINE

## 2021-12-30 PROCEDURE — 85025 COMPLETE CBC W/AUTO DIFF WBC: CPT | Performed by: INTERNAL MEDICINE

## 2021-12-30 PROCEDURE — 80053 COMPREHEN METABOLIC PANEL: CPT | Performed by: INTERNAL MEDICINE

## 2021-12-30 PROCEDURE — 82043 UR ALBUMIN QUANTITATIVE: CPT | Performed by: INTERNAL MEDICINE

## 2021-12-30 PROCEDURE — 84443 ASSAY THYROID STIM HORMONE: CPT | Performed by: INTERNAL MEDICINE

## 2021-12-30 PROCEDURE — 83036 HEMOGLOBIN GLYCOSYLATED A1C: CPT | Performed by: INTERNAL MEDICINE

## 2021-12-30 RX ORDER — GLIMEPIRIDE 2 MG/1
2 TABLET ORAL
Qty: 90 TABLET | Refills: 0 | Status: SHIPPED | OUTPATIENT
Start: 2021-12-30 | End: 2022-04-21

## 2022-01-06 DIAGNOSIS — R06.02 SOB (SHORTNESS OF BREATH): Primary | ICD-10-CM

## 2022-01-06 NOTE — PROGRESS NOTES
Please inform pt she needs COVID19 testing 72 hours prior to PFTs. She should call to arrange for scheduling once the PFTs has been scheduled.

## 2022-01-13 ENCOUNTER — HOSPITAL ENCOUNTER (OUTPATIENT)
Dept: RESPIRATORY THERAPY | Facility: HOSPITAL | Age: 52
Discharge: HOME | End: 2022-01-13
Attending: INTERNAL MEDICINE
Payer: COMMERCIAL

## 2022-01-13 ENCOUNTER — HOSPITAL ENCOUNTER (OUTPATIENT)
Dept: RADIOLOGY | Facility: HOSPITAL | Age: 52
Discharge: HOME | End: 2022-01-13
Attending: INTERNAL MEDICINE
Payer: COMMERCIAL

## 2022-01-13 VITALS — BODY MASS INDEX: 53.92 KG/M2 | WEIGHT: 293 LBS | HEIGHT: 62 IN

## 2022-01-13 VITALS — OXYGEN SATURATION: 98 %

## 2022-01-13 DIAGNOSIS — Z12.31 ENCOUNTER FOR SCREENING MAMMOGRAM FOR BREAST CANCER: ICD-10-CM

## 2022-01-13 DIAGNOSIS — R06.02 SOB (SHORTNESS OF BREATH) ON EXERTION: ICD-10-CM

## 2022-01-13 DIAGNOSIS — R06.2 WHEEZING: ICD-10-CM

## 2022-01-13 LAB
BRPFT: NORMAL
DLCO ADJ PRE: 23.92 ML/(MIN*MMHG)
DLCO SINGLE BREATH LLN: 16.97
DLCO SINGLE BREATH PRE REF: 99.4 %
DLCO SINGLE BREATH REF: 22.7
DLCOC SBVA LLN: 3.31
DLCOC SBVA PRE REF: 124.3 %
DLCOC SBVA REF: 4.97
DLCOC SINGLE BREATH LLN: 16.97
DLCOC SINGLE BREATH PRE REF: 105.4 %
DLCOC SINGLE BREATH REF: 22.7
DLCOVA LLN: 3.31
DLCOVA PRE REF: 117.3 %
DLCOVA PRE: 5.82 ML/(MIN*MMHG*L)
DLCOVA REF: 4.97
DLVAADJ PRE: 6.17 ML/(MIN*MMHG*L)
ERVN2 LLN: -16449.09
ERVN2 PRE REF: 98.9 %
ERVN2 PRE: 0.9 L
ERVN2 REF: 0.91
FEF 25 75 LLN: 1.4
FEF 25 75 PRE REF: 98.1 %
FEF 25 75 REF: 2.52
FEV1 FVC LLN: 69
FEV1 FVC PRE REF: 99.2 %
FEV1 FVC REF: 81
FEV1 LLN: 1.96
FEV1 PRE REF: 89.3 %
FEV1 REF: 2.53
FRCN2 LLN: 1.75
FRCN2 PRE REF: 77.5 %
FRCN2 REF: 2.57
FVC LLN: 2.45
FVC PRE REF: 89.7 %
FVC REF: 3.15
IVC PRE: 2.59 L
IVC SINGLE BREATH LLN: 2.45
IVC SINGLE BREATH PRE REF: 82.2 %
IVC SINGLE BREATH REF: 3.15
PEF LLN: 4.77
PEF PRE REF: 67.8 %
PEF REF: 6.37
PRE DLCO: 22.57 ML/(MIN*MMHG)
PRE FEF 25 75: 2.48 L/S
PRE FET 100: 8.07 SEC
PRE FEV1 FVC: 79.96 %
PRE FEV1: 2.26 L
PRE FRC N2: 1.99 L
PRE FVC: 2.83 L
PRE PEF: 4.32 L/S
RVN2 LLN: 1.08
RVN2 PRE REF: 65.8 %
RVN2 PRE: 1.09 L
RVN2 REF: 1.66
RVN2TLCN2 LLN: 26.71
RVN2TLCN2 PRE REF: 75.1 %
RVN2TLCN2 PRE: 27.25 %
RVN2TLCN2 REF: 36.3
TLCN2 LLN: 3.58
TLCN2 PRE REF: 87.5 %
TLCN2 PRE: 4 L
TLCN2 REF: 4.57
VA PRE: 3.87 L
VA SINGLE BREATH LLN: 4.42
VA SINGLE BREATH PRE REF: 87.6 %
VA SINGLE BREATH REF: 4.42
VCMAXN2 LLN: 2.45
VCMAXN2 PRE REF: 92.4 %
VCMAXN2 PRE: 2.91 L
VCMAXN2 REF: 3.15

## 2022-01-13 PROCEDURE — 94727 GAS DIL/WSHOT DETER LNG VOL: CPT | Mod: 26,,, | Performed by: INTERNAL MEDICINE

## 2022-01-13 PROCEDURE — 77063 BREAST TOMOSYNTHESIS BI: CPT | Mod: TC

## 2022-01-13 PROCEDURE — 77067 SCR MAMMO BI INCL CAD: CPT | Mod: 26,,, | Performed by: RADIOLOGY

## 2022-01-13 PROCEDURE — 94729 DIFFUSING CAPACITY: CPT

## 2022-01-13 PROCEDURE — 94729 DIFFUSING CAPACITY: CPT | Mod: 26,,, | Performed by: INTERNAL MEDICINE

## 2022-01-13 PROCEDURE — 94010 BREATHING CAPACITY TEST: CPT

## 2022-01-13 PROCEDURE — 94761 N-INVAS EAR/PLS OXIMETRY MLT: CPT

## 2022-01-13 PROCEDURE — 77063 MAMMO DIGITAL SCREENING BILAT WITH TOMO: ICD-10-PCS | Mod: 26,,, | Performed by: RADIOLOGY

## 2022-01-13 PROCEDURE — 94010 BREATHING CAPACITY TEST: ICD-10-PCS | Mod: 26,,, | Performed by: INTERNAL MEDICINE

## 2022-01-13 PROCEDURE — 94729 PR C02/MEMBANE DIFFUSE CAPACITY: ICD-10-PCS | Mod: 26,,, | Performed by: INTERNAL MEDICINE

## 2022-01-13 PROCEDURE — 94727 GAS DIL/WSHOT DETER LNG VOL: CPT

## 2022-01-13 PROCEDURE — 77063 BREAST TOMOSYNTHESIS BI: CPT | Mod: 26,,, | Performed by: RADIOLOGY

## 2022-01-13 PROCEDURE — 94010 BREATHING CAPACITY TEST: CPT | Mod: 26,,, | Performed by: INTERNAL MEDICINE

## 2022-01-13 PROCEDURE — 94727 PR PULM FUNCTION TEST BY GAS: ICD-10-PCS | Mod: 26,,, | Performed by: INTERNAL MEDICINE

## 2022-01-13 PROCEDURE — 77067 MAMMO DIGITAL SCREENING BILAT WITH TOMO: ICD-10-PCS | Mod: 26,,, | Performed by: RADIOLOGY

## 2022-01-14 DIAGNOSIS — R06.2 WHEEZING: Primary | ICD-10-CM

## 2022-01-14 RX ORDER — MONTELUKAST SODIUM 10 MG/1
10 TABLET ORAL NIGHTLY
Qty: 30 TABLET | Refills: 0 | Status: SHIPPED | OUTPATIENT
Start: 2022-01-14 | End: 2022-02-13

## 2022-01-19 ENCOUNTER — PATIENT OUTREACH (OUTPATIENT)
Dept: ADMINISTRATIVE | Facility: OTHER | Age: 52
End: 2022-01-19
Payer: COMMERCIAL

## 2022-01-21 ENCOUNTER — OFFICE VISIT (OUTPATIENT)
Dept: ALLERGY | Facility: CLINIC | Age: 52
End: 2022-01-21
Payer: COMMERCIAL

## 2022-01-21 VITALS — HEIGHT: 62 IN | WEIGHT: 293 LBS | BODY MASS INDEX: 53.92 KG/M2 | OXYGEN SATURATION: 98 % | HEART RATE: 144 BPM

## 2022-01-21 DIAGNOSIS — R21 RASH: ICD-10-CM

## 2022-01-21 DIAGNOSIS — E11.9 TYPE 2 DIABETES MELLITUS WITHOUT COMPLICATION, WITHOUT LONG-TERM CURRENT USE OF INSULIN: ICD-10-CM

## 2022-01-21 DIAGNOSIS — R06.83 SNORING: ICD-10-CM

## 2022-01-21 DIAGNOSIS — I10 HYPERTENSION, ESSENTIAL: ICD-10-CM

## 2022-01-21 DIAGNOSIS — J31.0 CHRONIC RHINITIS: ICD-10-CM

## 2022-01-21 DIAGNOSIS — R06.2 WHEEZING: Primary | ICD-10-CM

## 2022-01-21 DIAGNOSIS — R09.89 THROAT TIGHTNESS: ICD-10-CM

## 2022-01-21 PROCEDURE — 1159F PR MEDICATION LIST DOCUMENTED IN MEDICAL RECORD: ICD-10-PCS | Mod: CPTII,S$GLB,, | Performed by: STUDENT IN AN ORGANIZED HEALTH CARE EDUCATION/TRAINING PROGRAM

## 2022-01-21 PROCEDURE — 99204 PR OFFICE/OUTPT VISIT, NEW, LEVL IV, 45-59 MIN: ICD-10-PCS | Mod: S$GLB,,, | Performed by: STUDENT IN AN ORGANIZED HEALTH CARE EDUCATION/TRAINING PROGRAM

## 2022-01-21 PROCEDURE — 3008F BODY MASS INDEX DOCD: CPT | Mod: CPTII,S$GLB,, | Performed by: STUDENT IN AN ORGANIZED HEALTH CARE EDUCATION/TRAINING PROGRAM

## 2022-01-21 PROCEDURE — 99999 PR PBB SHADOW E&M-EST. PATIENT-LVL V: CPT | Mod: PBBFAC,,, | Performed by: STUDENT IN AN ORGANIZED HEALTH CARE EDUCATION/TRAINING PROGRAM

## 2022-01-21 PROCEDURE — 1159F MED LIST DOCD IN RCRD: CPT | Mod: CPTII,S$GLB,, | Performed by: STUDENT IN AN ORGANIZED HEALTH CARE EDUCATION/TRAINING PROGRAM

## 2022-01-21 PROCEDURE — 3008F PR BODY MASS INDEX (BMI) DOCUMENTED: ICD-10-PCS | Mod: CPTII,S$GLB,, | Performed by: STUDENT IN AN ORGANIZED HEALTH CARE EDUCATION/TRAINING PROGRAM

## 2022-01-21 PROCEDURE — 99999 PR PBB SHADOW E&M-EST. PATIENT-LVL V: ICD-10-PCS | Mod: PBBFAC,,, | Performed by: STUDENT IN AN ORGANIZED HEALTH CARE EDUCATION/TRAINING PROGRAM

## 2022-01-21 PROCEDURE — 99204 OFFICE O/P NEW MOD 45 MIN: CPT | Mod: S$GLB,,, | Performed by: STUDENT IN AN ORGANIZED HEALTH CARE EDUCATION/TRAINING PROGRAM

## 2022-01-21 RX ORDER — METFORMIN HYDROCHLORIDE 1000 MG/1
1000 TABLET ORAL 2 TIMES DAILY WITH MEALS
Qty: 180 TABLET | Refills: 0 | Status: SHIPPED | OUTPATIENT
Start: 2022-01-21 | End: 2022-06-03 | Stop reason: SDUPTHER

## 2022-01-21 RX ORDER — SIMVASTATIN 40 MG/1
40 TABLET, FILM COATED ORAL NIGHTLY
Qty: 90 TABLET | Refills: 3 | Status: SHIPPED | OUTPATIENT
Start: 2022-01-21 | End: 2023-02-02 | Stop reason: SDUPTHER

## 2022-01-21 RX ORDER — SIMVASTATIN 40 MG/1
40 TABLET, FILM COATED ORAL NIGHTLY
Qty: 90 TABLET | Refills: 3 | OUTPATIENT
Start: 2022-01-21

## 2022-01-21 RX ORDER — LOSARTAN POTASSIUM 50 MG/1
50 TABLET ORAL 2 TIMES DAILY
Qty: 180 TABLET | Refills: 3 | OUTPATIENT
Start: 2022-01-21 | End: 2023-01-21

## 2022-01-21 RX ORDER — FLUTICASONE FUROATE AND VILANTEROL TRIFENATATE 100; 25 UG/1; UG/1
1 POWDER RESPIRATORY (INHALATION) DAILY
Qty: 60 EACH | Refills: 1 | Status: SHIPPED | OUTPATIENT
Start: 2022-01-21 | End: 2023-02-02

## 2022-01-21 NOTE — PATIENT INSTRUCTIONS
For the rattling and wheezing coming from your throat and your shortness of breath:  I have prescribed you an inhaler (breo) to be taken 1 puff daily.  The albuterol is to be used as needed.  I have ordered x-rays as discussed.  I have referred you to ENT and pulmonology/sleep medicine as discussed.    For your nose/eye symptoms:  I have ordered allergy lab tests to look for environmental allergens.    For your elevated heart rate:  Please continue to monitor your HR remains in the mid 100's this weekend, please go to an urgent care or emergency department to get an EKG. Otherwise, you can follow up and discuss this with your primary car.    For your facial rash:  I have referred you to dermatology.

## 2022-01-21 NOTE — PROGRESS NOTES
ALLERGY & IMMUNOLOGY CLINIC - INITIAL CONSULTATION      HISTORY OF PRESENT ILLNESS     Patient ID: Micaela Hernandez is a 51 y.o. female    CC: wheezing, throat rattling    HPI: Micaela Hernandez is a 51 y.o. female with a history of DM2 and chronic rhinitis presenting for shortness of breath, wheezing, and rattling that she describes as coming from her throat.  She was referred by Bailey Fiore MD.    She says she started having wheezing and shortness of breath with exertion around 11/2021, but now it seems like its getting worse. She says for the past 1.5 weeks, she is now getting a crackling sound/feeling coming from the throat when she lays down. As soon as she sits up, the rattling and wheezing stops. She says the wheezing also feels like its coming from the throat. This has been affecting her sleep. Her niece who lives with her tells her she snores. But the patient sleeps alone, so she is unsure about apnea. The rattling and wheeze are on the exhale, not the inhale. When she exerts herself, she gets wheezing and shortness of breath without the rattling, but she still feels like it is originating in her throat/neck as opposed to her lungs.    She was prescribed albuterol at the end of 12/2021. She does not think the albuterol has helped much. She was also given singulair a week ago, which she has found to help the watering and itchy eyes, but not the breathing. She takes a second gen antihistamine most days, but it doesn't seem to help. She has flonase 2 SEN daily, but it has not helped with the breathing. She has tried propping herself up and sleeping at an angle, but it doesn't seem to help to much.    The patient denies heartburn/reflux symptoms.  The patient endorses lower extremity edema.    She says sometimes she gets palpitations. She feels like her HR goes up with any type of exertion. She says her HR normally runs in the 80's.    She says she also has chronic rhinitis. She says she has had a dry cough  for a year. She says she gets sneezing, post nasal drip, itchy watery eyes.      Vaccines:   Immunization History   Administered Date(s) Administered    COVID-19, MRNA, LN-S, PF (Pfizer) (Purple Cap) 2021    COVID-19, vector-nr, rS-Ad26, PF (EyesBot) 2021    Influenza 2015, 2017    Influenza - Quadrivalent 10/11/2016    Influenza - Quadrivalent - PF *Preferred* (6 months and older) 10/10/2018, 10/24/2019, 10/21/2021    Pneumococcal Polysaccharide - 23 Valent 2018    Tdap 2016    Zoster Recombinant 2020, 2021        REVIEW OF SYSTEMS     CONST: no F/C/NS, no unexplained weight changes  EYES: + watery discharge, + pruritus  NOSE: + post nasal drip, + sneezing  PULM: + SOB, + wheezing, + cough  GI: no dysphagia, no heartburn, no pain, no N/V/D  DERM: + rash     MEDICAL HISTORY     MedHx:   Patient Active Problem List   Diagnosis    Axillary mass    Liver mass, right lobe    Hernia, incisional    Type 2 diabetes mellitus without complication, without long-term current use of insulin    ARORA (nonalcoholic steatohepatitis)    Morbid obesity with BMI of 50.0-59.9, adult    Type 2 diabetes mellitus with diabetic nephropathy, without long-term current use of insulin    Hyperlipidemia    Hypertension    Chronic right-sided low back pain without sciatica    Chronic pain of right hip    Chronic pain of both knees    Degenerative lumbar disc    Primary osteoarthritis of both knees       SurgHx:   Past Surgical History:   Procedure Laterality Date    BREAST SURGERY       SECTION      CHOLECYSTECTOMY      ENDOMETRIAL ABLATION      HERNIA REPAIR      incisional     KNEE SURGERY      LIVER LOBECTOMY Right     LYMPH NODE DISSECTION      right axillary       Medications:   Current Outpatient Medications on File Prior to Visit   Medication Sig Dispense Refill    albuterol (VENTOLIN HFA) 90 mcg/actuation inhaler Inhale 2 puffs into the lungs every 6  (six) hours as needed for Wheezing or Shortness of Breath. Rescue 18 g 0    FLUCELVAX QUAD 5946-2349, PF, 60 mcg (15 mcg x 4)/0.5 mL Syrg PHARMACY ADMINISTERED      glimepiride (AMARYL) 2 MG tablet Take 1 tablet (2 mg total) by mouth before breakfast. 90 tablet 0    hydrocortisone 2.5 % cream Apply topically 2 (two) times daily. 28 g 5    loratadine (CLARITIN) 10 mg tablet Take 10 mg by mouth once daily.      montelukast (SINGULAIR) 10 mg tablet Take 1 tablet (10 mg total) by mouth every evening. 30 tablet 0    losartan (COZAAR) 50 MG tablet Take 1 tablet (50 mg total) by mouth 2 (two) times daily. 180 tablet 3    [DISCONTINUED] metFORMIN (GLUCOPHAGE) 1000 MG tablet Take 1 tablet (1,000 mg total) by mouth 2 (two) times daily with meals. 180 tablet 3    [DISCONTINUED] simvastatin (ZOCOR) 40 MG tablet Take 1 tablet (40 mg total) by mouth every evening. 90 tablet 3     No current facility-administered medications on file prior to visit.       H/o Asthma: denies  H/o Rhinitis: endorses a constant drip, but not her main issue    Drug Allergies: Review of patient's allergies indicates:  No Known Allergies     Env/Occ:   Pets: dog, she does not think the dog triggers her symptoms     Occupation:      Infection Hx: No history of frequent infections requiring antibiotics. No history of pneumonia.    SocHx:   Social History     Tobacco Use    Smoking status: Never Smoker    Smokeless tobacco: Never Used   Substance Use Topics    Alcohol use: Yes     Comment: 4-6 drinks per year    Drug use: No       FamHx:   Asthma: mother  Allergic rhinitis: no  Family History   Problem Relation Age of Onset    Hypertension Mother     Asthma Mother     COPD Mother     Hyperlipidemia Mother     Diabetes type II Mother     Diabetes Father     Hypertension Father     Hyperlipidemia Father     Cancer Sister         type unknown, but CA in mouth    No Known Problems Brother     No Known Problems Maternal Aunt  "    No Known Problems Maternal Uncle     No Known Problems Paternal Aunt     No Known Problems Paternal Uncle     No Known Problems Maternal Grandmother     No Known Problems Maternal Grandfather     No Known Problems Paternal Grandmother     No Known Problems Paternal Grandfather     Breast cancer Neg Hx     Colon cancer Neg Hx     Ovarian cancer Neg Hx     Amblyopia Neg Hx     Blindness Neg Hx     Cataracts Neg Hx     Glaucoma Neg Hx     Macular degeneration Neg Hx     Retinal detachment Neg Hx     Strabismus Neg Hx     Stroke Neg Hx     Thyroid disease Neg Hx         PHYSICAL EXAM     VS: Pulse (!) 144   Ht 5' 2" (1.575 m)   Wt (!) 150.5 kg (331 lb 12.7 oz)   SpO2 98%   BMI 60.69 kg/m²   GENERAL: Alert, NAD, well-appearing, cooperative, obese  EYES: EOMI, no conjunctival injection, no discharge, no infraorbital shiners  EARS: external auditory canals normal B/L, TM normal B/L  NOSE: NT 2-3 + B/L, no stringing mucous, no polyps visualized  ORAL: MMM, no ulcers, no thrush, unable to visualize posterior oropharynx  NECK: no thyromegaly, no LAD  LUNGS: CTAB, no w/r/c, no increased WOB  HEART: RRR, normal S1/S2, no m/g/r  ABDOMEN: BS present, soft, non-tender, non-distended  EXTREMITIES: No LE edema  DERM: + scaly rash on forehead at hair line, behind ears, and on external ear  NEURO: normal speech, normal gait, no facial asymmetry     LABORATORY STUDIES     Component      Latest Ref Rng & Units 12/28/2021   WBC      3.90 - 12.70 K/uL 9.29   RBC      4.00 - 5.40 M/uL 4.18   Hemoglobin      12.0 - 16.0 g/dL 11.7 (L)   Hematocrit      37.0 - 48.5 % 37.8   MCV      82 - 98 fL 90   MCH      27.0 - 31.0 pg 28.0   MCHC      32.0 - 36.0 g/dL 31.0 (L)   RDW      11.5 - 14.5 % 14.3   Platelets      150 - 450 K/uL 208   MPV      9.2 - 12.9 fL 13.8 (H)   Immature Granulocytes      0.0 - 0.5 % 0.4   Gran # (ANC)      1.8 - 7.7 K/uL 5.9   Immature Grans (Abs)      0.00 - 0.04 K/uL 0.04   Lymph #      1.0 - " 4.8 K/uL 2.5   Mono #      0.3 - 1.0 K/uL 0.6   Eos #      0.0 - 0.5 K/uL 0.2   Baso #      0.00 - 0.20 K/uL 0.05   nRBC      0 /100 WBC 0   Gran %      38.0 - 73.0 % 63.4   Lymph %      18.0 - 48.0 % 26.9   Mono %      4.0 - 15.0 % 6.8   Eosinophil %      0.0 - 8.0 % 2.0   Basophil %      0.0 - 1.9 % 0.5   Differential Method       Automated   Sodium      136 - 145 mmol/L 138   Potassium      3.5 - 5.1 mmol/L 4.7   Chloride      95 - 110 mmol/L 102   CO2      23 - 29 mmol/L 27   Glucose      70 - 110 mg/dL 196 (H)   BUN      6 - 20 mg/dL 9   Creatinine      0.5 - 1.4 mg/dL 0.7   Calcium      8.7 - 10.5 mg/dL 9.0   PROTEIN TOTAL      6.0 - 8.4 g/dL 7.3   Albumin      3.5 - 5.2 g/dL 3.4 (L)   BILIRUBIN TOTAL      0.1 - 1.0 mg/dL 0.4   Alkaline Phosphatase      55 - 135 U/L 107   AST      10 - 40 U/L 53 (H)   ALT      10 - 44 U/L 44   Anion Gap      8 - 16 mmol/L 9   eGFR if African American      >60 mL/min/1.73 m:2 >60   eGFR if non African American      >60 mL/min/1.73 m:2 >60   TSH      0.400 - 4.000 uIU/mL 2.120        ALLERGEN TESTING     Immunocaps: ordered aeroallergen panel     PULMONARY FUNCTION     PFTs:   1/13/22  FVC:         89.7%ile  FEV1:         89.3%ile  FEV1/FVC: 80%  FEF 25-75: 98.1%ile  DLCO:        99.4%ile  Interpretation: Spirometry is normal. Lung volume determination is normal. DLCO is normal.       IMAGING & OTHER DIAGNOSTICS     X-rays ordered     CHART REVIEW     Reviewed pcp note, PFT's, labs.     ASSESSMENT & PLAN     Micaela Hernandez is a 51 y.o. female with      # Wheezing and rattling patient describes as coming from her throat: She says she gets expiratory wheezing and rattling from her throat/neck when she lies flat, resolves when she sits up (started 1.5 weeks ago). This is affecting her sleep. She gets wheezing and shortness of breath with minimal exertion that she also feels is coming from her throat (started 11/2021). Unsure of etiology. Patient has audio clip from when she was  laying flat, and 2 separate sounds (high pitched wheeze and a rattling sound) can be heard with her exhalations. The fact that spirometry is normal and patient feels/hears symptoms coming from neck/throat as opposed to chest/lungs lowers suspicion for asthma, but will trial ICS/LABA. Also, patient has been using albuterol scheduled instead of as rescue.  -start breo 100-25 mcg 1 puff daily  -counseled patient to use albuterol as needed instead of scheduled.  -referring to ENT for further evaluation and possible scope  -referring to pulm/sleep medicine as patient endorses snoring and her symptoms are affecting her sleep  -ordered CXR of chest and neck    # Chronic rhinitis: sneezing, post nasal drip, ocular symptoms. She was recently started on singulair for respiratory symptoms, but instead has found it helpful for her ocular symptoms  -continue montelukast  -continue flonase  -continue daily antihistamine  -immunocaps for aeroallergens ordered    # Elevated heart rate: Patient heart rate 163 then 144 on recheck today. She says she gets tachycardic with any sort of light exertion, but she does have a smart watch and her HR usually runs in the 80's. She has a BP check coming up with her PCP on Monday.  -advised patient to continue to monitor HR, and if it remains elevated, she should go to UC or ED. Otherwise can follow up with her pcp.    # Dermatitis of face: Unsure of etiology, possibly psoriasis, referring to derm    Follow up: 2 weeks    Alli Kohler MD  Allergy/Immunology

## 2022-01-21 NOTE — TELEPHONE ENCOUNTER
Care Due:                  Date            Visit Type   Department     Provider  --------------------------------------------------------------------------------    Last Visit: None Found      None         None Found  Next Visit: None Scheduled  None         None Found                                                            Last  Test          Frequency    Reason                     Performed    Due Date  --------------------------------------------------------------------------------    Office Visit  12 months..  albuterol, glimepiride,    Not Found    Overdue                             metFORMIN, montelukast,                             simvastatin..............    Powered by [employer]. Reference number: [id number].   1/21/2022 6:49:24 AM CST

## 2022-01-21 NOTE — TELEPHONE ENCOUNTER
No new care gaps identified.  Powered by [employer]. Reference number: [id number].   1/21/2022 6:50:11 AM CST

## 2022-01-22 ENCOUNTER — HOSPITAL ENCOUNTER (OUTPATIENT)
Dept: RADIOLOGY | Facility: HOSPITAL | Age: 52
Discharge: HOME | DRG: 309 | End: 2022-01-22
Attending: STUDENT IN AN ORGANIZED HEALTH CARE EDUCATION/TRAINING PROGRAM
Payer: COMMERCIAL

## 2022-01-22 DIAGNOSIS — R06.2 WHEEZING: ICD-10-CM

## 2022-01-22 DIAGNOSIS — R09.89 THROAT TIGHTNESS: ICD-10-CM

## 2022-01-22 PROCEDURE — 70360 XR NECK SOFT TISSUE: ICD-10-PCS | Mod: 26,,, | Performed by: RADIOLOGY

## 2022-01-22 PROCEDURE — 71046 X-RAY EXAM CHEST 2 VIEWS: CPT | Mod: 26,,, | Performed by: RADIOLOGY

## 2022-01-22 PROCEDURE — 70360 X-RAY EXAM OF NECK: CPT | Mod: 26,,, | Performed by: RADIOLOGY

## 2022-01-22 PROCEDURE — 70360 X-RAY EXAM OF NECK: CPT | Mod: TC,FY

## 2022-01-22 PROCEDURE — 71046 X-RAY EXAM CHEST 2 VIEWS: CPT | Mod: TC,FY

## 2022-01-22 PROCEDURE — 71046 XR CHEST PA AND LATERAL: ICD-10-PCS | Mod: 26,,, | Performed by: RADIOLOGY

## 2022-01-24 ENCOUNTER — PATIENT MESSAGE (OUTPATIENT)
Dept: ALLERGY | Facility: CLINIC | Age: 52
End: 2022-01-24
Payer: COMMERCIAL

## 2022-01-24 ENCOUNTER — TELEPHONE (OUTPATIENT)
Dept: ALLERGY | Facility: CLINIC | Age: 52
End: 2022-01-24
Payer: COMMERCIAL

## 2022-01-24 ENCOUNTER — HOSPITAL ENCOUNTER (INPATIENT)
Facility: HOSPITAL | Age: 52
LOS: 2 days | Discharge: HOME | DRG: 309 | End: 2022-01-26
Attending: EMERGENCY MEDICINE | Admitting: INTERNAL MEDICINE
Payer: COMMERCIAL

## 2022-01-24 DIAGNOSIS — I48.92 ATRIAL FLUTTER: ICD-10-CM

## 2022-01-24 DIAGNOSIS — I48.91 ATRIAL FIBRILLATION AND FLUTTER: ICD-10-CM

## 2022-01-24 DIAGNOSIS — R00.0 TACHYCARDIA: ICD-10-CM

## 2022-01-24 DIAGNOSIS — I48.92 ATRIAL FLUTTER WITH RAPID VENTRICULAR RESPONSE: ICD-10-CM

## 2022-01-24 DIAGNOSIS — I48.92 ATRIAL FIBRILLATION AND FLUTTER: ICD-10-CM

## 2022-01-24 DIAGNOSIS — R07.9 CHEST PAIN: ICD-10-CM

## 2022-01-24 DIAGNOSIS — I10 HYPERTENSION, ESSENTIAL: ICD-10-CM

## 2022-01-24 DIAGNOSIS — I48.91 A-FIB: ICD-10-CM

## 2022-01-24 DIAGNOSIS — R00.0 SINUS TACHYCARDIA: ICD-10-CM

## 2022-01-24 LAB
ALBUMIN SERPL BCP-MCNC: 3.6 G/DL (ref 3.5–5.2)
ALP SERPL-CCNC: 104 U/L (ref 55–135)
ALT SERPL W/O P-5'-P-CCNC: 39 U/L (ref 10–44)
ANION GAP SERPL CALC-SCNC: 13 MMOL/L (ref 8–16)
AST SERPL-CCNC: 42 U/L (ref 10–40)
B-HCG UR QL: NEGATIVE
BASOPHILS # BLD AUTO: 0.07 K/UL (ref 0–0.2)
BASOPHILS NFR BLD: 0.6 % (ref 0–1.9)
BILIRUB SERPL-MCNC: 0.4 MG/DL (ref 0.1–1)
BILIRUB UR QL STRIP: NEGATIVE
BNP SERPL-MCNC: 102 PG/ML (ref 0–99)
BUN SERPL-MCNC: 12 MG/DL (ref 6–20)
CALCIUM SERPL-MCNC: 10 MG/DL (ref 8.7–10.5)
CHLORIDE SERPL-SCNC: 105 MMOL/L (ref 95–110)
CLARITY UR: CLEAR
CO2 SERPL-SCNC: 21 MMOL/L (ref 23–29)
COLOR UR: YELLOW
CREAT SERPL-MCNC: 0.8 MG/DL (ref 0.5–1.4)
CRP SERPL-MCNC: 28.7 MG/L (ref 0–8.2)
CTP QC/QA: YES
D DIMER PPP IA.FEU-MCNC: 0.69 MG/L FEU
DIFFERENTIAL METHOD: ABNORMAL
EOSINOPHIL # BLD AUTO: 0.2 K/UL (ref 0–0.5)
EOSINOPHIL NFR BLD: 1.4 % (ref 0–8)
ERYTHROCYTE [DISTWIDTH] IN BLOOD BY AUTOMATED COUNT: 15.5 % (ref 11.5–14.5)
ERYTHROCYTE [SEDIMENTATION RATE] IN BLOOD BY WESTERGREN METHOD: 68 MM/HR (ref 0–20)
EST. GFR  (AFRICAN AMERICAN): >60 ML/MIN/1.73 M^2
EST. GFR  (NON AFRICAN AMERICAN): >60 ML/MIN/1.73 M^2
GLUCOSE SERPL-MCNC: 122 MG/DL (ref 70–110)
GLUCOSE UR QL STRIP: NEGATIVE
HCT VFR BLD AUTO: 36.5 % (ref 37–48.5)
HGB BLD-MCNC: 11.2 G/DL (ref 12–16)
HGB UR QL STRIP: NEGATIVE
IMM GRANULOCYTES # BLD AUTO: 0.04 K/UL (ref 0–0.04)
IMM GRANULOCYTES NFR BLD AUTO: 0.4 % (ref 0–0.5)
KETONES UR QL STRIP: ABNORMAL
LEUKOCYTE ESTERASE UR QL STRIP: NEGATIVE
LYMPHOCYTES # BLD AUTO: 2.5 K/UL (ref 1–4.8)
LYMPHOCYTES NFR BLD: 22.9 % (ref 18–48)
MCH RBC QN AUTO: 28.1 PG (ref 27–31)
MCHC RBC AUTO-ENTMCNC: 30.7 G/DL (ref 32–36)
MCV RBC AUTO: 92 FL (ref 82–98)
MONOCYTES # BLD AUTO: 0.7 K/UL (ref 0.3–1)
MONOCYTES NFR BLD: 6.3 % (ref 4–15)
NEUTROPHILS # BLD AUTO: 7.4 K/UL (ref 1.8–7.7)
NEUTROPHILS NFR BLD: 68.4 % (ref 38–73)
NITRITE UR QL STRIP: NEGATIVE
NRBC BLD-RTO: 0 /100 WBC
PH UR STRIP: 6 [PH] (ref 5–8)
PLATELET # BLD AUTO: 188 K/UL (ref 150–450)
PMV BLD AUTO: 13.7 FL (ref 9.2–12.9)
POC MOLECULAR INFLUENZA A AGN: NEGATIVE
POC MOLECULAR INFLUENZA B AGN: NEGATIVE
POCT GLUCOSE: 120 MG/DL (ref 70–110)
POTASSIUM SERPL-SCNC: 3.9 MMOL/L (ref 3.5–5.1)
PROCALCITONIN SERPL IA-MCNC: 0.06 NG/ML
PROT SERPL-MCNC: 8.1 G/DL (ref 6–8.4)
PROT UR QL STRIP: ABNORMAL
RBC # BLD AUTO: 3.99 M/UL (ref 4–5.4)
SARS-COV-2 RDRP RESP QL NAA+PROBE: NEGATIVE
SARS-COV-2 RDRP RESP QL NAA+PROBE: NEGATIVE
SODIUM SERPL-SCNC: 139 MMOL/L (ref 136–145)
SP GR UR STRIP: >1.03 (ref 1–1.03)
TROPONIN I SERPL DL<=0.01 NG/ML-MCNC: <0.006 NG/ML (ref 0–0.03)
TSH SERPL DL<=0.005 MIU/L-ACNC: 2.61 UIU/ML (ref 0.4–4)
URN SPEC COLLECT METH UR: ABNORMAL
UROBILINOGEN UR STRIP-ACNC: NEGATIVE EU/DL
WBC # BLD AUTO: 10.83 K/UL (ref 3.9–12.7)

## 2022-01-24 PROCEDURE — 84484 ASSAY OF TROPONIN QUANT: CPT | Performed by: EMERGENCY MEDICINE

## 2022-01-24 PROCEDURE — U0002 COVID-19 LAB TEST NON-CDC: HCPCS | Performed by: EMERGENCY MEDICINE

## 2022-01-24 PROCEDURE — 12000002 HC ACUTE/MED SURGE SEMI-PRIVATE ROOM

## 2022-01-24 PROCEDURE — 99291 CRITICAL CARE FIRST HOUR: CPT | Mod: 25

## 2022-01-24 PROCEDURE — 83880 ASSAY OF NATRIURETIC PEPTIDE: CPT | Performed by: EMERGENCY MEDICINE

## 2022-01-24 PROCEDURE — 25000003 PHARM REV CODE 250: Performed by: STUDENT IN AN ORGANIZED HEALTH CARE EDUCATION/TRAINING PROGRAM

## 2022-01-24 PROCEDURE — 82962 GLUCOSE BLOOD TEST: CPT

## 2022-01-24 PROCEDURE — 81025 URINE PREGNANCY TEST: CPT | Performed by: STUDENT IN AN ORGANIZED HEALTH CARE EDUCATION/TRAINING PROGRAM

## 2022-01-24 PROCEDURE — 96365 THER/PROPH/DIAG IV INF INIT: CPT

## 2022-01-24 PROCEDURE — 93005 ELECTROCARDIOGRAM TRACING: CPT

## 2022-01-24 PROCEDURE — 93010 ELECTROCARDIOGRAM REPORT: CPT | Mod: ,,, | Performed by: INTERNAL MEDICINE

## 2022-01-24 PROCEDURE — 84145 PROCALCITONIN (PCT): CPT | Performed by: EMERGENCY MEDICINE

## 2022-01-24 PROCEDURE — 85379 FIBRIN DEGRADATION QUANT: CPT | Performed by: EMERGENCY MEDICINE

## 2022-01-24 PROCEDURE — 85652 RBC SED RATE AUTOMATED: CPT | Performed by: EMERGENCY MEDICINE

## 2022-01-24 PROCEDURE — 25000003 PHARM REV CODE 250: Performed by: EMERGENCY MEDICINE

## 2022-01-24 PROCEDURE — 96366 THER/PROPH/DIAG IV INF ADDON: CPT

## 2022-01-24 PROCEDURE — 96375 TX/PRO/DX INJ NEW DRUG ADDON: CPT

## 2022-01-24 PROCEDURE — 80053 COMPREHEN METABOLIC PANEL: CPT | Performed by: EMERGENCY MEDICINE

## 2022-01-24 PROCEDURE — 63600175 PHARM REV CODE 636 W HCPCS: Performed by: STUDENT IN AN ORGANIZED HEALTH CARE EDUCATION/TRAINING PROGRAM

## 2022-01-24 PROCEDURE — 86140 C-REACTIVE PROTEIN: CPT | Performed by: EMERGENCY MEDICINE

## 2022-01-24 PROCEDURE — 93010 ELECTROCARDIOGRAM REPORT: CPT | Mod: 76,,, | Performed by: INTERNAL MEDICINE

## 2022-01-24 PROCEDURE — 84443 ASSAY THYROID STIM HORMONE: CPT | Performed by: EMERGENCY MEDICINE

## 2022-01-24 PROCEDURE — 96376 TX/PRO/DX INJ SAME DRUG ADON: CPT

## 2022-01-24 PROCEDURE — 87502 INFLUENZA DNA AMP PROBE: CPT

## 2022-01-24 PROCEDURE — 81003 URINALYSIS AUTO W/O SCOPE: CPT | Performed by: STUDENT IN AN ORGANIZED HEALTH CARE EDUCATION/TRAINING PROGRAM

## 2022-01-24 PROCEDURE — 25500020 PHARM REV CODE 255: Performed by: EMERGENCY MEDICINE

## 2022-01-24 PROCEDURE — 63600175 PHARM REV CODE 636 W HCPCS: Performed by: EMERGENCY MEDICINE

## 2022-01-24 PROCEDURE — 93010 EKG 12-LEAD: ICD-10-PCS | Mod: 76,,, | Performed by: INTERNAL MEDICINE

## 2022-01-24 PROCEDURE — 85025 COMPLETE CBC W/AUTO DIFF WBC: CPT | Performed by: EMERGENCY MEDICINE

## 2022-01-24 RX ORDER — NALOXONE HCL 0.4 MG/ML
0.02 VIAL (ML) INJECTION
Status: DISCONTINUED | OUTPATIENT
Start: 2022-01-24 | End: 2022-01-26 | Stop reason: HOSPADM

## 2022-01-24 RX ORDER — CARVEDILOL 3.12 MG/1
3.12 TABLET ORAL 2 TIMES DAILY WITH MEALS
Status: DISCONTINUED | OUTPATIENT
Start: 2022-01-24 | End: 2022-01-25

## 2022-01-24 RX ORDER — ATORVASTATIN CALCIUM 40 MG/1
40 TABLET, FILM COATED ORAL DAILY
Status: DISCONTINUED | OUTPATIENT
Start: 2022-01-25 | End: 2022-01-26 | Stop reason: HOSPADM

## 2022-01-24 RX ORDER — IBUPROFEN 200 MG
16 TABLET ORAL
Status: DISCONTINUED | OUTPATIENT
Start: 2022-01-24 | End: 2022-01-26 | Stop reason: HOSPADM

## 2022-01-24 RX ORDER — CARVEDILOL 3.12 MG/1
3.12 TABLET ORAL 2 TIMES DAILY WITH MEALS
Status: DISCONTINUED | OUTPATIENT
Start: 2022-01-25 | End: 2022-01-24

## 2022-01-24 RX ORDER — DILTIAZEM HYDROCHLORIDE 5 MG/ML
25 INJECTION INTRAVENOUS
Status: COMPLETED | OUTPATIENT
Start: 2022-01-24 | End: 2022-01-24

## 2022-01-24 RX ORDER — SODIUM CHLORIDE 0.9 % (FLUSH) 0.9 %
10 SYRINGE (ML) INJECTION EVERY 6 HOURS PRN
Status: DISCONTINUED | OUTPATIENT
Start: 2022-01-24 | End: 2022-01-26 | Stop reason: HOSPADM

## 2022-01-24 RX ORDER — IBUPROFEN 200 MG
24 TABLET ORAL
Status: DISCONTINUED | OUTPATIENT
Start: 2022-01-24 | End: 2022-01-26 | Stop reason: HOSPADM

## 2022-01-24 RX ORDER — GLUCAGON 1 MG
1 KIT INJECTION
Status: DISCONTINUED | OUTPATIENT
Start: 2022-01-24 | End: 2022-01-26 | Stop reason: HOSPADM

## 2022-01-24 RX ORDER — LOSARTAN POTASSIUM 50 MG/1
50 TABLET ORAL 2 TIMES DAILY
Qty: 180 TABLET | Refills: 3 | Status: SHIPPED | OUTPATIENT
Start: 2022-01-24 | End: 2023-02-02 | Stop reason: SDUPTHER

## 2022-01-24 RX ORDER — ENOXAPARIN SODIUM 100 MG/ML
40 INJECTION SUBCUTANEOUS EVERY 24 HOURS
Status: DISCONTINUED | OUTPATIENT
Start: 2022-01-24 | End: 2022-01-25

## 2022-01-24 RX ORDER — DILTIAZEM HCL 1 MG/ML
0-15 INJECTION, SOLUTION INTRAVENOUS
Status: COMPLETED | OUTPATIENT
Start: 2022-01-24 | End: 2022-01-24

## 2022-01-24 RX ORDER — ACETAMINOPHEN 500 MG
1000 TABLET ORAL EVERY 8 HOURS PRN
Status: DISCONTINUED | OUTPATIENT
Start: 2022-01-24 | End: 2022-01-26 | Stop reason: HOSPADM

## 2022-01-24 RX ORDER — TALC
6 POWDER (GRAM) TOPICAL NIGHTLY
Status: DISCONTINUED | OUTPATIENT
Start: 2022-01-24 | End: 2022-01-26 | Stop reason: HOSPADM

## 2022-01-24 RX ORDER — FUROSEMIDE 10 MG/ML
40 INJECTION INTRAMUSCULAR; INTRAVENOUS
Status: COMPLETED | OUTPATIENT
Start: 2022-01-24 | End: 2022-01-24

## 2022-01-24 RX ORDER — ONDANSETRON 4 MG/1
8 TABLET, ORALLY DISINTEGRATING ORAL EVERY 8 HOURS PRN
Status: DISCONTINUED | OUTPATIENT
Start: 2022-01-24 | End: 2022-01-26 | Stop reason: HOSPADM

## 2022-01-24 RX ORDER — LORAZEPAM 2 MG/ML
0.5 INJECTION INTRAMUSCULAR
Status: COMPLETED | OUTPATIENT
Start: 2022-01-24 | End: 2022-01-24

## 2022-01-24 RX ORDER — DILTIAZEM HYDROCHLORIDE 5 MG/ML
20 INJECTION INTRAVENOUS ONCE
Status: COMPLETED | OUTPATIENT
Start: 2022-01-24 | End: 2022-01-24

## 2022-01-24 RX ORDER — FLUTICASONE FUROATE AND VILANTEROL 100; 25 UG/1; UG/1
1 POWDER RESPIRATORY (INHALATION) DAILY
Status: DISCONTINUED | OUTPATIENT
Start: 2022-01-25 | End: 2022-01-26 | Stop reason: HOSPADM

## 2022-01-24 RX ORDER — POLYETHYLENE GLYCOL 3350 17 G/17G
17 POWDER, FOR SOLUTION ORAL 2 TIMES DAILY PRN
Status: DISCONTINUED | OUTPATIENT
Start: 2022-01-24 | End: 2022-01-26 | Stop reason: HOSPADM

## 2022-01-24 RX ORDER — ACETAMINOPHEN 325 MG/1
650 TABLET ORAL EVERY 8 HOURS PRN
Status: DISCONTINUED | OUTPATIENT
Start: 2022-01-24 | End: 2022-01-26 | Stop reason: HOSPADM

## 2022-01-24 RX ADMIN — AZITHROMYCIN MONOHYDRATE 500 MG: 500 INJECTION, POWDER, LYOPHILIZED, FOR SOLUTION INTRAVENOUS at 11:01

## 2022-01-24 RX ADMIN — FUROSEMIDE 40 MG: 10 INJECTION, SOLUTION INTRAVENOUS at 09:01

## 2022-01-24 RX ADMIN — ENOXAPARIN SODIUM 40 MG: 100 INJECTION SUBCUTANEOUS at 10:01

## 2022-01-24 RX ADMIN — LORAZEPAM 0.5 MG: 2 INJECTION INTRAMUSCULAR; INTRAVENOUS at 06:01

## 2022-01-24 RX ADMIN — IOHEXOL 100 ML: 350 INJECTION, SOLUTION INTRAVENOUS at 06:01

## 2022-01-24 RX ADMIN — DILTIAZEM HYDROCHLORIDE 25 MG: 5 INJECTION INTRAVENOUS at 07:01

## 2022-01-24 RX ADMIN — DILTIAZEM HYDROCHLORIDE 20 MG: 5 INJECTION INTRAVENOUS at 07:01

## 2022-01-24 RX ADMIN — MELATONIN TAB 3 MG 6 MG: 3 TAB at 11:01

## 2022-01-24 RX ADMIN — CEFTRIAXONE 2 G: 2 INJECTION, SOLUTION INTRAVENOUS at 10:01

## 2022-01-24 RX ADMIN — DILTIAZEM HYDROCHLORIDE 2.5 MG/HR: 5 INJECTION INTRAVENOUS at 07:01

## 2022-01-24 NOTE — ED PROVIDER NOTES
"Encounter Date: 2022    SCRIBE #1 NOTE: I, Phil Coronel, feli scribing for, and in the presence of, Bj Waterman MD.       History     Chief Complaint   Patient presents with    Shortness of Breath     Pt to ER with c/o SOB and wheezing x since November. PCP sent pt for Xray and showed + for "fluid on lungs." Pt sent to ER for further evaluation.      51 y.o. female with past medical history of diabetes mellitus and hypertension presenting with 2 month history of shortness of breath with associated wheezing. Patient reports she was seen by allergist on  where she had x-ray done that showed fluid in lungs. Patient had been attempted treatment with Albuterol. Patient denies being seen by cardiologist. She denies fever, leg swelling, chest pain, cough, unexpected weight gain or loss, or further complaints. She denies any known drug allergies.     The history is provided by the patient. No  was used.     Review of patient's allergies indicates:  No Known Allergies  Past Medical History:   Diagnosis Date    Allergy     Degenerative lumbar disc 2021    Diabetes mellitus     Diabetes mellitus, type 2     Hyperlipidemia     Hypertension      Past Surgical History:   Procedure Laterality Date    BREAST SURGERY       SECTION      CHOLECYSTECTOMY      ENDOMETRIAL ABLATION      HERNIA REPAIR      incisional     KNEE SURGERY      LIVER LOBECTOMY Right     LYMPH NODE DISSECTION      right axillary     Family History   Problem Relation Age of Onset    Hypertension Mother     Asthma Mother     COPD Mother     Hyperlipidemia Mother     Diabetes type II Mother     Diabetes Father     Hypertension Father     Hyperlipidemia Father     Cancer Sister         type unknown, but CA in mouth    No Known Problems Brother     No Known Problems Maternal Aunt     No Known Problems Maternal Uncle     No Known Problems Paternal Aunt     No Known Problems Paternal Uncle     " No Known Problems Maternal Grandmother     No Known Problems Maternal Grandfather     No Known Problems Paternal Grandmother     No Known Problems Paternal Grandfather     Breast cancer Neg Hx     Colon cancer Neg Hx     Ovarian cancer Neg Hx     Amblyopia Neg Hx     Blindness Neg Hx     Cataracts Neg Hx     Glaucoma Neg Hx     Macular degeneration Neg Hx     Retinal detachment Neg Hx     Strabismus Neg Hx     Stroke Neg Hx     Thyroid disease Neg Hx      Social History     Tobacco Use    Smoking status: Never Smoker    Smokeless tobacco: Never Used   Substance Use Topics    Alcohol use: Yes     Comment: 4-6 drinks per year    Drug use: No     Review of Systems   Constitutional: Negative.    HENT: Negative.    Eyes: Negative.    Respiratory: Positive for shortness of breath and wheezing.    Cardiovascular: Negative.    Gastrointestinal: Negative.    Genitourinary: Negative.    Musculoskeletal: Negative.    Skin: Negative.    Neurological: Negative.        Physical Exam     Initial Vitals [01/24/22 1546]   BP Pulse Resp Temp SpO2   (!) 161/89 (!) 156 20 98 °F (36.7 °C) 99 %      MAP       --         Physical Exam    Nursing note and vitals reviewed.  Constitutional: She appears well-developed. She is not diaphoretic. No distress.   HENT:   Head: Normocephalic and atraumatic.   Nose: Nose normal.   Eyes: EOM are normal. Pupils are equal, round, and reactive to light.   Neck: Neck supple. No JVD present.   Normal range of motion.  Cardiovascular: Normal heart sounds and intact distal pulses.   Tachycardic   Pulmonary/Chest: Breath sounds normal. No stridor. No respiratory distress. She has no wheezes. She has no rhonchi. She has no rales.   Abdominal: Abdomen is soft. Bowel sounds are normal. She exhibits no distension. There is no abdominal tenderness.   Musculoskeletal:         General: No tenderness or edema. Normal range of motion.      Cervical back: Normal range of motion and neck supple.      Neurological: She is alert and oriented to person, place, and time. She has normal strength.   Skin: Skin is warm and dry. Capillary refill takes less than 2 seconds. No rash noted. No erythema.         ED Course   Critical Care    Date/Time: 1/25/2022 5:00 PM  Performed by: Bj Waterman MD  Authorized by: Bj Waterman MD   Direct patient critical care time: 15 minutes  Additional history critical care time: 5 minutes  Ordering / reviewing critical care time: 5 minutes  Documentation critical care time: 5 minutes  Consulting other physicians critical care time: 5 minutes  Total critical care time (exclusive of procedural time) : 35 minutes  Critical care time was exclusive of separately billable procedures and treating other patients and teaching time.  Critical care was necessary to treat or prevent imminent or life-threatening deterioration of the following conditions: circulatory failure and cardiac failure.  Critical care was time spent personally by me on the following activities: development of treatment plan with patient or surrogate, discussions with consultants, evaluation of patient's response to treatment, examination of patient, obtaining history from patient or surrogate, ordering and performing treatments and interventions, ordering and review of laboratory studies, ordering and review of radiographic studies, re-evaluation of patient's condition and review of old charts.        Labs Reviewed   CBC W/ AUTO DIFFERENTIAL - Abnormal; Notable for the following components:       Result Value    RBC 3.99 (*)     Hemoglobin 11.2 (*)     Hematocrit 36.5 (*)     MCHC 30.7 (*)     RDW 15.5 (*)     MPV 13.7 (*)     All other components within normal limits   COMPREHENSIVE METABOLIC PANEL - Abnormal; Notable for the following components:    CO2 21 (*)     Glucose 122 (*)     AST 42 (*)     All other components within normal limits   B-TYPE NATRIURETIC PEPTIDE - Abnormal; Notable for the  following components:     (*)     All other components within normal limits   D DIMER, QUANTITATIVE - Abnormal; Notable for the following components:    D-Dimer 0.69 (*)     All other components within normal limits   URINALYSIS, REFLEX TO URINE CULTURE - Abnormal; Notable for the following components:    Specific Gravity, UA >1.030 (*)     Protein, UA Trace (*)     Ketones, UA 2+ (*)     All other components within normal limits    Narrative:     Specimen Source->Urine   C-REACTIVE PROTEIN - Abnormal; Notable for the following components:    CRP 28.7 (*)     All other components within normal limits   SEDIMENTATION RATE - Abnormal; Notable for the following components:    Sed Rate 68 (*)     All other components within normal limits   POCT GLUCOSE - Abnormal; Notable for the following components:    POCT Glucose 120 (*)     All other components within normal limits   SARS-COV-2 RDRP GENE - Normal   POCT URINE PREGNANCY - Normal   TROPONIN I   TSH   PROCALCITONIN   PROCALCITONIN   C-REACTIVE PROTEIN   SEDIMENTATION RATE   POCT INFLUENZA A/B MOLECULAR   SARS-COV-2 RDRP GENE    Narrative:     This test utilizes isothermal nucleic acid amplification   technology to detect the SARS-CoV-2 RdRp nucleic acid segment.   The analytical sensitivity (limit of detection) is 125 genome   equivalents/mL.   A POSITIVE result implies infection with the SARS-CoV-2 virus;   the patient is presumed to be contagious.     A NEGATIVE result means that SARS-CoV-2 nucleic acids are not   present above the limit of detection. A NEGATIVE result should be   treated as presumptive. It does not rule out the possibility of   COVID-19 and should not be the sole basis for treatment decisions.   If COVID-19 is strongly suspected based on clinical and exposure   history, re-testing using an alternate molecular assay should be   considered.   This test is only for use under the Food and Drug   Administration s Emergency Use Authorization  (EUA).   Commercial kits are provided by [employer].   Performance characteristics of the EUA have been independently   verified by [facility] Department of   Pathology and Laboratory Medicine.   _________________________________________________________________   The authorized Fact Sheet for Healthcare Providers and the authorized Fact   Sheet for Patients of the ID NOW COVID-19 are available on the FDA   website:     https://www.fda.gov/media/580977/download  https://www.fda.gov/media/424764/download              Imaging Results          CTA Chest Non-Coronary (PE Study) (Final result)  Result time 01/24/22 19:03:24    Final result by Veronica Mcgovern MD (01/24/22 19:03:24)                 Impression:      Limited examination.  No evidence of acute pulmonary embolism in the pulmonary trunk, main pulmonary arteries, in the more proximal segmental branches.    Multiple retropectoral and right axillary lymph nodes.  Correlate with known history.  Recommend possible evaluation for breast, lymphoma, or other neoplastic process, if warranted.    Diffuse ground-glass opacities in the aerated lung fields.  Findings may be related to pulmonary edema.  Pneumonic process may have a similar appearance.  Recommend correlation.    Small bilateral pleural effusions right greater than left with associated compressive atelectasis.    Hepatic steatosis. Mild splenomegaly.      Electronically signed by: Veronica Mcgovern  Date:    01/24/2022  Time:    19:03             Narrative:    EXAMINATION:  CT PULMONARY ANGIOGRAM WITH CONTRAST    CLINICAL HISTORY:  Shortness of breath.  History of breast surgery and right axillary lymph node dissection    TECHNIQUE:  CT of the chest with intravenous contrast for pulmonary artery angiogram was performed. Contiguous axial 1.25 mm images followed by 10 mm reconstructions with multiplanar and MIP reformations of the pulmonary arteries. No 3D post-angiographic imaging was  performed on an independent workstation and reviewed.  One hundred ml of Omnipaque 350 was injected.    COMPARISON:  None.    FINDINGS:  Examination is limited by suboptimal intravenous bolus and motion artifact.  No filling defects are seen in the pulmonary trunk or main pulmonary arteries.  The distal most branches are limited.  There is no aortic aneurysm or aortic dissection.    Diffuse ground-glass opacities are seen in the aerated lung fields.    Multiple retropectoral lymph nodes are present right greater than left.  Two of the largest retropectoral lymph nodes measures 2.0 x 1.5 and 2.1 x 1.1 cm (series 2 axial image 218).  There are right axillary lymph nodes, which have been biopsied in the past.    There is no pleural or pericardial effusion.    The heart size is within normal limits.    In the visualized upper abdomen, there is fatty infiltration of the liver.  There is mild splenomegaly..                               X-Ray Chest AP Portable (Final result)  Result time 01/24/22 16:54:31    Final result by Benjie Antunez MD (01/24/22 16:54:31)                 Impression:      1. Pulmonary findings may reflect edema noting accentuation by habitus.  There is questionable trace right pleural effusion versus atelectasis.  Correlation is needed to exclude viral process.      Electronically signed by: Benjie Antunez MD  Date:    01/24/2022  Time:    16:54             Narrative:    EXAMINATION:  XR CHEST AP PORTABLE    CLINICAL HISTORY:  Chest Pain;    TECHNIQUE:  Single frontal view of the chest was performed.    COMPARISON:  01/22/2022    FINDINGS:  The cardiomediastinal silhouette is prominent, similar to the previous examination noting magnification by technique..  There is obscuration of the right costophrenic angle may reflect small effusion or atelectasis..  The trachea is midline.  The lungs are symmetrically expanded bilaterally with prominent interstitial attenuation bilaterally..  No large focal  consolidation seen.  There is no pneumothorax.  The osseous structures are remarkable for degenerative changes..                                 Medications   azithromycin 500 mg in dextrose 5 % 250 mL IVPB (ready to mix system) (500 mg Intravenous New Bag 1/24/22 2328)   cefTRIAXone (ROCEPHIN) 2 g/50 mL D5W IVPB (0 g Intravenous Stopped 1/24/22 2319)   fluticasone furoate-vilanteroL 100-25 mcg/dose diskus inhaler 1 puff (has no administration in time range)   atorvastatin tablet 40 mg (has no administration in time range)   carvediloL tablet 3.125 mg (3.125 mg Oral Not Given 1/24/22 2145)   sodium chloride 0.9% flush 10 mL (has no administration in time range)   melatonin tablet 6 mg (6 mg Oral Given 1/24/22 2326)   polyethylene glycol packet 17 g (has no administration in time range)   acetaminophen tablet 650 mg (has no administration in time range)   naloxone 0.4 mg/mL injection 0.02 mg (has no administration in time range)   glucose chewable tablet 16 g (has no administration in time range)   glucose chewable tablet 24 g (has no administration in time range)   dextrose 50% injection 12.5 g (has no administration in time range)   dextrose 50% injection 25 g (has no administration in time range)   glucagon (human recombinant) injection 1 mg (has no administration in time range)   enoxaparin injection 40 mg (40 mg Subcutaneous Given 1/24/22 2220)   ondansetron disintegrating tablet 8 mg (has no administration in time range)   acetaminophen tablet 1,000 mg (has no administration in time range)   lorazepam injection 0.5 mg (0.5 mg Intravenous Given 1/24/22 1856)   iohexoL (OMNIPAQUE 350) injection 100 mL (100 mLs Intravenous Given 1/24/22 1808)   diltiaZEM injection 20 mg (20 mg Intravenous Given 1/24/22 1901)   diltiaZEM 125 mg in D5W 125 mL infusion (12.5 mg/hr Intravenous Verify Only 1/24/22 2229)   diltiaZEM injection 25 mg (25 mg Intravenous Given 1/24/22 1949)   furosemide injection 40 mg (40 mg Intravenous  Given 1/24/22 2125)              MDM:    51-year-old female presenting with shortness of breath, dyspnea on exertion, palpitations.  Patient found to be in atrial flutter with a consistent rate of 153 beats per minute.  Patient reporting onset prior to Friday, not a candidate at this time for cardioversion, given diltiazem with bolus twice, continue diltiazem drip.  Additionally patient noted to have some mild elevation in BNP, evidence of pulmonary edema on CT scan, fortunately no evidence of pulmonary embolus.  Additionally patient also given IV Lasix for continued diuresis.  Discussed further with Hospital team will admit for further management.  Discussed diagnosis and further treatment with patient at bedside. All questions answered, patient transferred to floor improved and stable.     Scribe Attestation:   Scribe #1: I performed the above scribed service and the documentation accurately describes the services I performed. I attest to the accuracy of the note.                 Clinical Impression:   Final diagnoses:  [R00.0] Tachycardia  [R07.9] Chest pain  [I48.92] Atrial flutter with rapid ventricular response          ED Disposition Condition    Admit               I, Bj Waterman M.D., personally performed the services described in this documentation. All medical record entries made by the scribe were at my direction and in my presence. I have reviewed the chart and agree that the record reflects my personal performance and is accurate and complete.       Bj Waterman MD  01/25/22 0019

## 2022-01-24 NOTE — TELEPHONE ENCOUNTER
Called patient back. Her heart rate was elevated 150-160 through the weekend, but she did not go to ED as advised. Explained to her that given her chest x-ray findings showing possible pulmonary edema, her now persistent tachycardia, and her shortness of breath, the heart might be the culprit for her symptoms. I advised her to go to the emergency department. Patient was reluctant, but I believe I convinced her to go. After I spoke with the patient, her primary care doctor called me back and told me she agreed with having the patient go to the ED. I called the patient again to let her know that her primary care doctor agreed and to again encourage her to go to ED.    Alli Kohler MD  Allergy/Immunology

## 2022-01-24 NOTE — TELEPHONE ENCOUNTER
Called to check on pt.    Pt stated that she contacted her PCP office this morning and was given an nurse appointment to check her BP.  BP this morning was 146/70, Pulse 120 at visit.    Pt stated that she does not monitor BP at home but has been keeping track of her HR.  HR over the weekend has been between 150-160 all weekend.  Pt stated that she is SOB but denies chest pain, headache.    Pt also wanted to let Dr. Kohler know that she did her Xray on Saturday and would like to know results.

## 2022-01-24 NOTE — ED TRIAGE NOTES
Pt arrived to the ED via personal transport due to SOB and fatigue since November. Pt reports having a HR 150s, recent visits the hospital for increases HR and SOB. Pt last visit was Saturday, Xray showed fluid. BP elevated on arrival to ED room 170s/90s. Sinus Tach on EKG. Pt 97% on RA, reports feeling short-winded when ambulating. Pt denies chest pain, lightheadedness/headache, dizziness, vision changes. Pt alert, calm, and stable, oriented x4. No acute distress noted. Hx of HTN & Diabetes.

## 2022-01-25 PROBLEM — I48.92 ATRIAL FLUTTER: Status: ACTIVE | Noted: 2022-01-25

## 2022-01-25 PROBLEM — R79.89 ELEVATED BRAIN NATRIURETIC PEPTIDE (BNP) LEVEL: Status: ACTIVE | Noted: 2022-01-25

## 2022-01-25 LAB
AORTIC ROOT ANNULUS: 2.73 CM
AORTIC VALVE CUSP SEPERATION: 1.85 CM
ASCENDING AORTA: 2.7 CM
AV INDEX (PROSTH): 0.58
AV MEAN GRADIENT: 5 MMHG
AV PEAK GRADIENT: 9 MMHG
AV VALVE AREA: 1.89 CM2
AV VELOCITY RATIO: 0.6
BSA FOR ECHO PROCEDURE: 2.56 M2
CV ECHO LV RWT: 0.5 CM
DOP CALC AO PEAK VEL: 1.52 M/S
DOP CALC AO VTI: 28.56 CM
DOP CALC LVOT AREA: 3.3 CM2
DOP CALC LVOT DIAMETER: 2.04 CM
DOP CALC LVOT PEAK VEL: 0.91 M/S
DOP CALC LVOT STROKE VOLUME: 54.07 CM3
DOP CALCLVOT PEAK VEL VTI: 16.55 CM
ECHO LV POSTERIOR WALL: 1.19 CM (ref 0.6–1.1)
EJECTION FRACTION: 60 %
FRACTIONAL SHORTENING: 28 % (ref 28–44)
INTERVENTRICULAR SEPTUM: 1.39 CM (ref 0.6–1.1)
IVRT: 87.66 MSEC
LA MAJOR: 5.35 CM
LEFT ATRIUM SIZE: 3.67 CM
LEFT INTERNAL DIMENSION IN SYSTOLE: 3.42 CM (ref 2.1–4)
LEFT VENTRICLE DIASTOLIC VOLUME INDEX: 45.1 ML/M2
LEFT VENTRICLE DIASTOLIC VOLUME: 106.44 ML
LEFT VENTRICLE MASS INDEX: 102 G/M2
LEFT VENTRICLE SYSTOLIC VOLUME INDEX: 20.4 ML/M2
LEFT VENTRICLE SYSTOLIC VOLUME: 48.23 ML
LEFT VENTRICULAR INTERNAL DIMENSION IN DIASTOLE: 4.78 CM (ref 3.5–6)
LEFT VENTRICULAR MASS: 241.44 G
PISA TR MAX VEL: 1.87 M/S
POCT GLUCOSE: 129 MG/DL (ref 70–110)
POCT GLUCOSE: 136 MG/DL (ref 70–110)
POCT GLUCOSE: 141 MG/DL (ref 70–110)
POCT GLUCOSE: 153 MG/DL (ref 70–110)
PV PEAK VELOCITY: 1.07 CM/S
RA PRESSURE: 8 MMHG
TR MAX PG: 14 MMHG
TV REST PULMONARY ARTERY PRESSURE: 22 MMHG

## 2022-01-25 PROCEDURE — 87040 BLOOD CULTURE FOR BACTERIA: CPT | Mod: 59 | Performed by: STUDENT IN AN ORGANIZED HEALTH CARE EDUCATION/TRAINING PROGRAM

## 2022-01-25 PROCEDURE — 25000003 PHARM REV CODE 250: Performed by: INTERNAL MEDICINE

## 2022-01-25 PROCEDURE — 99291 PR CRITICAL CARE, E/M 30-74 MINUTES: ICD-10-PCS | Mod: 25,,, | Performed by: INTERNAL MEDICINE

## 2022-01-25 PROCEDURE — 25000003 PHARM REV CODE 250: Performed by: STUDENT IN AN ORGANIZED HEALTH CARE EDUCATION/TRAINING PROGRAM

## 2022-01-25 PROCEDURE — 93005 ELECTROCARDIOGRAM TRACING: CPT

## 2022-01-25 PROCEDURE — 99291 CRITICAL CARE FIRST HOUR: CPT | Mod: 25,,, | Performed by: INTERNAL MEDICINE

## 2022-01-25 PROCEDURE — 93010 ELECTROCARDIOGRAM REPORT: CPT | Mod: ,,, | Performed by: INTERNAL MEDICINE

## 2022-01-25 PROCEDURE — 93010 EKG 12-LEAD: ICD-10-PCS | Mod: 76,,, | Performed by: INTERNAL MEDICINE

## 2022-01-25 PROCEDURE — 63600175 PHARM REV CODE 636 W HCPCS: Performed by: STUDENT IN AN ORGANIZED HEALTH CARE EDUCATION/TRAINING PROGRAM

## 2022-01-25 PROCEDURE — 25000242 PHARM REV CODE 250 ALT 637 W/ HCPCS: Performed by: STUDENT IN AN ORGANIZED HEALTH CARE EDUCATION/TRAINING PROGRAM

## 2022-01-25 PROCEDURE — 20000000 HC ICU ROOM

## 2022-01-25 PROCEDURE — 63600175 PHARM REV CODE 636 W HCPCS: Performed by: INTERNAL MEDICINE

## 2022-01-25 RX ORDER — ENOXAPARIN SODIUM 150 MG/ML
1 INJECTION SUBCUTANEOUS
Status: DISCONTINUED | OUTPATIENT
Start: 2022-01-25 | End: 2022-01-26

## 2022-01-25 RX ORDER — METOPROLOL TARTRATE 50 MG/1
50 TABLET ORAL 3 TIMES DAILY
Status: DISCONTINUED | OUTPATIENT
Start: 2022-01-25 | End: 2022-01-26 | Stop reason: HOSPADM

## 2022-01-25 RX ORDER — FUROSEMIDE 10 MG/ML
80 INJECTION INTRAMUSCULAR; INTRAVENOUS
Status: DISCONTINUED | OUTPATIENT
Start: 2022-01-25 | End: 2022-01-25

## 2022-01-25 RX ORDER — DILTIAZEM HCL 1 MG/ML
0-15 INJECTION, SOLUTION INTRAVENOUS CONTINUOUS
Status: DISCONTINUED | OUTPATIENT
Start: 2022-01-25 | End: 2022-01-25

## 2022-01-25 RX ORDER — DILTIAZEM HCL 1 MG/ML
0-15 INJECTION, SOLUTION INTRAVENOUS CONTINUOUS
Status: DISCONTINUED | OUTPATIENT
Start: 2022-01-25 | End: 2022-01-26

## 2022-01-25 RX ORDER — INSULIN ASPART 100 [IU]/ML
0-5 INJECTION, SOLUTION INTRAVENOUS; SUBCUTANEOUS EVERY 6 HOURS PRN
Status: DISCONTINUED | OUTPATIENT
Start: 2022-01-25 | End: 2022-01-26 | Stop reason: HOSPADM

## 2022-01-25 RX ORDER — MUPIROCIN 20 MG/G
OINTMENT TOPICAL 2 TIMES DAILY
Status: DISCONTINUED | OUTPATIENT
Start: 2022-01-25 | End: 2022-01-26 | Stop reason: HOSPADM

## 2022-01-25 RX ORDER — FLECAINIDE ACETATE 50 MG/1
100 TABLET ORAL EVERY 12 HOURS
Status: DISCONTINUED | OUTPATIENT
Start: 2022-01-25 | End: 2022-01-26 | Stop reason: HOSPADM

## 2022-01-25 RX ADMIN — ENOXAPARIN SODIUM 150 MG: 150 INJECTION SUBCUTANEOUS at 10:01

## 2022-01-25 RX ADMIN — DILTIAZEM HYDROCHLORIDE 15 MG/HR: 5 INJECTION INTRAVENOUS at 05:01

## 2022-01-25 RX ADMIN — MUPIROCIN: 20 OINTMENT TOPICAL at 08:01

## 2022-01-25 RX ADMIN — ENOXAPARIN SODIUM 150 MG: 150 INJECTION SUBCUTANEOUS at 09:01

## 2022-01-25 RX ADMIN — METOPROLOL TARTRATE 50 MG: 50 TABLET, FILM COATED ORAL at 02:01

## 2022-01-25 RX ADMIN — ACETAMINOPHEN 1000 MG: 500 TABLET ORAL at 10:01

## 2022-01-25 RX ADMIN — FLUTICASONE FUROATE AND VILANTEROL TRIFENATATE 1 PUFF: 100; 25 POWDER RESPIRATORY (INHALATION) at 10:01

## 2022-01-25 RX ADMIN — FLECAINIDE ACETATE 100 MG: 50 TABLET ORAL at 08:01

## 2022-01-25 RX ADMIN — CEFTRIAXONE 2 G: 2 INJECTION, SOLUTION INTRAVENOUS at 08:01

## 2022-01-25 RX ADMIN — MELATONIN TAB 3 MG 6 MG: 3 TAB at 08:01

## 2022-01-25 RX ADMIN — ATORVASTATIN CALCIUM 40 MG: 40 TABLET, FILM COATED ORAL at 08:01

## 2022-01-25 RX ADMIN — DILTIAZEM HYDROCHLORIDE 10 MG/HR: 5 INJECTION INTRAVENOUS at 10:01

## 2022-01-25 RX ADMIN — METOPROLOL TARTRATE 50 MG: 50 TABLET, FILM COATED ORAL at 08:01

## 2022-01-25 RX ADMIN — FUROSEMIDE 80 MG: 10 INJECTION, SOLUTION INTRAVENOUS at 02:01

## 2022-01-25 RX ADMIN — DILTIAZEM HYDROCHLORIDE 15 MG/HR: 5 INJECTION INTRAVENOUS at 08:01

## 2022-01-25 RX ADMIN — FLECAINIDE ACETATE 100 MG: 50 TABLET ORAL at 12:01

## 2022-01-25 NOTE — HPI
Micaela Hernandez is a 51 y.o. female who  has a past medical history of Morbid Obesity, Degenerative lumbar disc, Diabetes mellitus, Diabetes mellitus, type 2, Hyperlipidemia, and Hypertension, presented to the ED with CC of SOB. Patient states that she has been feeling this since November, and has been wheezing intermittently as well. She even went to an allergist last week and had Xray, attempted albuterol.In Afib/Aflutter in the ED. HR sustained in 150's. Has never had an ECHO, never been to cardiologist. Started on dilt gtt and sent to ICU. SED rate 68. Concern for PNA with pleural fluid and infiltrates with LN swellings and new afib/flutter, started on CAP. LN of axilla were biopsied and told they were reactive, not malignant. Normal D-dimer for age. BNP was 102 but BMI 60. Patient denies any fevers, night sweats, n/v/d/c, abd pain, dysuria, hematuria or hematochezia, cough, CP, leg swelling, HA, dizziness, weakness, hallucinations, SI, HI, or self injury at this time.     Still with A-flutter on IV diltiazem  Reports feels bad for several days  EKG A-flutter 2:1 156  Troponin negative    Denies prior Hx A-fib/flutter or cardiac issues  Echo prelin EF > 55%

## 2022-01-25 NOTE — H&P
[facility] Medicine  History & Physical    Patient Name: Micaela Hernandez  MRN: [id number]  Patient Class: IP- Inpatient  Admission Date: 2022  Attending Physician: Rusty Arnold MD   Primary Care Provider: Bailey Fiore MD         Patient information was obtained from patient, past medical records and ER records.     Subjective:     Principal Problem:Atrial flutter    Chief Complaint:   Chief Complaint   Patient presents with    Shortness of Breath             HPI:   Micaela Hernandez is a 51 y.o. female who  has a past medical history of Morbid Obesity, Degenerative lumbar disc, Diabetes mellitus, Diabetes mellitus, type 2, Hyperlipidemia, and Hypertension, presented to the ED with CC of SOB. Patient states that she has been feeling this since November, and has been wheezing intermittently as well. She even went to an allergist last week and had Xray, attempted albuterol.In Afib/Aflutter in the ED. HR sustained in 150's. Has never had an ECHO, never been to cardiologist. Started on dilt gtt and sent to ICU. SED rate 68. Concern for PNA with pleural fluid and infiltrates with LN swellings and new afib/flutter, started on CAP. LN of axilla were biopsied and told they were reactive, not malignant. Normal D-dimer for age. BNP was 102 but BMI 60. Patient denies any fevers, night sweats, n/v/d/c, abd pain, dysuria, hematuria or hematochezia, cough, CP, leg swelling, HA, dizziness, weakness, hallucinations, SI, HI, or self injury at this time.        Past Medical History:   Diagnosis Date    Allergy     Degenerative lumbar disc 2021    Diabetes mellitus     Diabetes mellitus, type 2     Hyperlipidemia     Hypertension        Past Surgical History:   Procedure Laterality Date    BREAST SURGERY       SECTION      CHOLECYSTECTOMY      ENDOMETRIAL ABLATION      HERNIA REPAIR      incisional     KNEE SURGERY      LIVER LOBECTOMY Right     LYMPH NODE DISSECTION       right axillary       Review of patient's allergies indicates:  No Known Allergies    No current facility-administered medications on file prior to encounter.     Current Outpatient Medications on File Prior to Encounter   Medication Sig    albuterol (VENTOLIN HFA) 90 mcg/actuation inhaler Inhale 2 puffs into the lungs every 6 (six) hours as needed for Wheezing or Shortness of Breath. Rescue    glimepiride (AMARYL) 2 MG tablet Take 1 tablet (2 mg total) by mouth before breakfast.    metFORMIN (GLUCOPHAGE) 1000 MG tablet Take 1 tablet (1,000 mg total) by mouth 2 (two) times daily with meals.    montelukast (SINGULAIR) 10 mg tablet Take 1 tablet (10 mg total) by mouth every evening.    simvastatin (ZOCOR) 40 MG tablet Take 1 tablet (40 mg total) by mouth every evening.    carvediloL (COREG) 3.125 MG tablet Take 1 tablet (3.125 mg total) by mouth 2 (two) times daily with meals.    FLUCELVAX QUAD [id number], PF, 60 mcg (15 mcg x 4)/0.5 mL Syrg PHARMACY ADMINISTERED    fluticasone furoate-vilanteroL (BREO ELLIPTA) 100-25 mcg/dose diskus inhaler Inhale 1 puff into the lungs once daily. Controller    hydrocortisone 2.5 % cream Apply topically 2 (two) times daily.    loratadine (CLARITIN) 10 mg tablet Take 10 mg by mouth once daily.     Family History     Problem Relation (Age of Onset)    Asthma Mother    COPD Mother    Cancer Sister    Diabetes Father    Diabetes type II Mother    Hyperlipidemia Mother, Father    Hypertension Mother, Father    No Known Problems Brother, Maternal Aunt, Maternal Uncle, Paternal Aunt, Paternal Uncle, Maternal Grandmother, Maternal Grandfather, Paternal Grandmother, Paternal Grandfather        Tobacco Use    Smoking status: Never Smoker    Smokeless tobacco: Never Used   Substance and Sexual Activity    Alcohol use: Yes     Comment: 4-6 drinks per year    Drug use: No    Sexual activity: Not Currently     Partners: Male     Birth control/protection: None     Review of Systems    Constitutional: Positive for activity change and fatigue. Negative for fever.   HENT: Negative for sore throat and trouble swallowing.    Eyes: Negative for photophobia and visual disturbance.   Respiratory: Positive for shortness of breath and wheezing. Negative for chest tightness.    Cardiovascular: Positive for leg swelling. Negative for chest pain and palpitations.   Gastrointestinal: Negative for abdominal distention, abdominal pain, blood in stool, constipation, diarrhea, nausea and vomiting.   Endocrine: Negative for polydipsia and polyuria.   Genitourinary: Negative for difficulty urinating, dysuria and hematuria.   Musculoskeletal: Negative for neck pain and neck stiffness.   Skin: Negative for pallor and rash.   Allergic/Immunologic: Negative for immunocompromised state.   Neurological: Negative for dizziness, seizures, syncope and facial asymmetry.   Psychiatric/Behavioral: Negative for agitation, behavioral problems and confusion.     Objective:     Vital Signs (Most Recent):  Temp: 98.8 °F (37.1 °C) (01/25/22 0200)  Pulse: 75 (01/25/22 0330)  Resp: (!) 24 (01/25/22 0330)  BP: 123/70 (01/25/22 0330)  SpO2: (!) 94 % (01/25/22 0524) Vital Signs (24h Range):  Temp:  [98 °F (36.7 °C)-98.8 °F (37.1 °C)] 98.8 °F (37.1 °C)  Pulse:  [] 75  Resp:  [16-29] 24  SpO2:  [93 %-99 %] 94 %  BP: (118-167)/() 123/70     Weight: (!) 149.4 kg (329 lb 5.9 oz)  Body mass index is 60.24 kg/m².    Physical Exam  Vitals reviewed.   Constitutional:       General: She is not in acute distress.     Appearance: She is well-developed. She is obese. She is not ill-appearing, toxic-appearing or diaphoretic.   HENT:      Head: Normocephalic and atraumatic.      Nose: Nose normal. No congestion.      Mouth/Throat:      Mouth: Mucous membranes are moist.      Pharynx: No oropharyngeal exudate.   Eyes:      General: No scleral icterus.     Pupils: Pupils are equal, round, and reactive to light.   Neck:      Thyroid: No  thyromegaly.   Cardiovascular:      Rate and Rhythm: Tachycardia present. Rhythm irregular.      Pulses: Normal pulses.      Heart sounds: Murmur heard.   No friction rub. No gallop.    Pulmonary:      Effort: Pulmonary effort is normal. No respiratory distress.      Breath sounds: No stridor. Rales present. No wheezing.   Abdominal:      General: There is no distension.      Palpations: Abdomen is soft. There is no mass.      Tenderness: There is no abdominal tenderness. There is no guarding.   Musculoskeletal:         General: Normal range of motion.      Cervical back: Normal range of motion and neck supple. No rigidity.      Right lower leg: Edema present.      Left lower leg: Edema present.   Skin:     General: Skin is warm and dry.      Capillary Refill: Capillary refill takes less than 2 seconds.      Coloration: Skin is not jaundiced.      Findings: No bruising.   Neurological:      General: No focal deficit present.      Mental Status: She is alert and oriented to person, place, and time.      Cranial Nerves: No cranial nerve deficit.   Psychiatric:         Mood and Affect: Mood normal.         Behavior: Behavior normal.         Thought Content: Thought content normal.           CRANIAL NERVES     CN III, IV, VI   Pupils are equal, round, and reactive to light.           Recent Results (from the past 24 hour(s))   POCT glucose    Collection Time: 01/24/22  3:49 PM   Result Value Ref Range    POCT Glucose 120 (H) 70 - 110 mg/dL   POCT COVID-19 Rapid Screening    Collection Time: 01/24/22  4:16 PM   Result Value Ref Range    POC Rapid COVID Negative Negative     Acceptable Yes    POCT Influenza A/B Molecular    Collection Time: 01/24/22  4:35 PM   Result Value Ref Range    POC Molecular Influenza A Ag Negative Negative, Not Reported    POC Molecular Influenza B Ag Negative Negative, Not Reported     Acceptable Yes    CBC auto differential    Collection Time: 01/24/22  4:38 PM    Result Value Ref Range    WBC 10.83 3.90 - 12.70 K/uL    RBC 3.99 (L) 4.00 - 5.40 M/uL    Hemoglobin 11.2 (L) 12.0 - 16.0 g/dL    Hematocrit 36.5 (L) 37.0 - 48.5 %    MCV 92 82 - 98 fL    MCH 28.1 27.0 - 31.0 pg    MCHC 30.7 (L) 32.0 - 36.0 g/dL    RDW 15.5 (H) 11.5 - 14.5 %    Platelets 188 150 - 450 K/uL    MPV 13.7 (H) 9.2 - 12.9 fL    Immature Granulocytes 0.4 0.0 - 0.5 %    Gran # (ANC) 7.4 1.8 - 7.7 K/uL    Immature Grans (Abs) 0.04 0.00 - 0.04 K/uL    Lymph # 2.5 1.0 - 4.8 K/uL    Mono # 0.7 0.3 - 1.0 K/uL    Eos # 0.2 0.0 - 0.5 K/uL    Baso # 0.07 0.00 - 0.20 K/uL    nRBC 0 0 /100 WBC    Gran % 68.4 38.0 - 73.0 %    Lymph % 22.9 18.0 - 48.0 %    Mono % 6.3 4.0 - 15.0 %    Eosinophil % 1.4 0.0 - 8.0 %    Basophil % 0.6 0.0 - 1.9 %    Differential Method Automated    Comprehensive metabolic panel    Collection Time: 01/24/22  4:38 PM   Result Value Ref Range    Sodium 139 136 - 145 mmol/L    Potassium 3.9 3.5 - 5.1 mmol/L    Chloride 105 95 - 110 mmol/L    CO2 21 (L) 23 - 29 mmol/L    Glucose 122 (H) 70 - 110 mg/dL    BUN 12 6 - 20 mg/dL    Creatinine 0.8 0.5 - 1.4 mg/dL    Calcium 10.0 8.7 - 10.5 mg/dL    Total Protein 8.1 6.0 - 8.4 g/dL    Albumin 3.6 3.5 - 5.2 g/dL    Total Bilirubin 0.4 0.1 - 1.0 mg/dL    Alkaline Phosphatase 104 55 - 135 U/L    AST 42 (H) 10 - 40 U/L    ALT 39 10 - 44 U/L    Anion Gap 13 8 - 16 mmol/L    eGFR if African American >60 >60 mL/min/1.73 m^2    eGFR if non African American >60 >60 mL/min/1.73 m^2   Troponin I #1    Collection Time: 01/24/22  4:38 PM   Result Value Ref Range    Troponin I <0.006 0.000 - 0.026 ng/mL   B-Type natriuretic peptide (BNP)    Collection Time: 01/24/22  4:38 PM   Result Value Ref Range     (H) 0 - 99 pg/mL   D dimer, quantitative    Collection Time: 01/24/22  4:38 PM   Result Value Ref Range    D-Dimer 0.69 (H) <0.50 mg/L FEU   TSH    Collection Time: 01/24/22  4:38 PM   Result Value Ref Range    TSH 2.612 0.400 - 4.000 uIU/mL   C-Reactive  Protein    Collection Time: 01/24/22  4:38 PM   Result Value Ref Range    CRP 28.7 (H) 0.0 - 8.2 mg/L   Procalcitonin    Collection Time: 01/24/22  7:56 PM   Result Value Ref Range    Procalcitonin 0.06 <0.25 ng/mL   Sedimentation rate    Collection Time: 01/24/22  9:25 PM   Result Value Ref Range    Sed Rate 68 (H) 0 - 20 mm/Hr   Urinalysis, Reflex to Urine Culture Urine, Clean Catch    Collection Time: 01/24/22  9:49 PM    Specimen: Urine   Result Value Ref Range    Specimen UA Urine, Clean Catch     Color, UA Yellow Yellow, Straw, Lupe    Appearance, UA Clear Clear    pH, UA 6.0 5.0 - 8.0    Specific Gravity, UA >1.030 (A) 1.005 - 1.030    Protein, UA Trace (A) Negative    Glucose, UA Negative Negative    Ketones, UA 2+ (A) Negative    Bilirubin (UA) Negative Negative    Occult Blood UA Negative Negative    Nitrite, UA Negative Negative    Urobilinogen, UA Negative <2.0 EU/dL    Leukocytes, UA Negative Negative   POCT urine pregnancy    Collection Time: 01/24/22  9:50 PM   Result Value Ref Range    POC Preg Test, Ur Negative Negative     Acceptable Yes    POCT COVID-19 Rapid Screening    Collection Time: 01/24/22 10:14 PM   Result Value Ref Range    POC Rapid COVID Negative Negative     Acceptable Yes    POCT glucose    Collection Time: 01/25/22  2:26 AM   Result Value Ref Range    POCT Glucose 136 (H) 70 - 110 mg/dL       Microbiology Results (last 7 days)     Procedure Component Value Units Date/Time    Blood culture [[id number]]     Order Status: Sent Specimen: Blood     Blood culture [[id number]]     Order Status: Sent Specimen: Blood            Imaging Results          CTA Chest Non-Coronary (PE Study) (Final result)  Result time 01/24/22 19:03:24    Final result by Veronica Mcgovern MD (01/24/22 19:03:24)                 Impression:      Limited examination.  No evidence of acute pulmonary embolism in the pulmonary trunk, main pulmonary arteries, in the more proximal segmental  branches.    Multiple retropectoral and right axillary lymph nodes.  Correlate with known history.  Recommend possible evaluation for breast, lymphoma, or other neoplastic process, if warranted.    Diffuse ground-glass opacities in the aerated lung fields.  Findings may be related to pulmonary edema.  Pneumonic process may have a similar appearance.  Recommend correlation.    Small bilateral pleural effusions right greater than left with associated compressive atelectasis.    Hepatic steatosis. Mild splenomegaly.      Electronically signed by: Veronica Mcgovern  Date:    01/24/2022  Time:    19:03             Narrative:    EXAMINATION:  CT PULMONARY ANGIOGRAM WITH CONTRAST    CLINICAL HISTORY:  Shortness of breath.  History of breast surgery and right axillary lymph node dissection    TECHNIQUE:  CT of the chest with intravenous contrast for pulmonary artery angiogram was performed. Contiguous axial 1.25 mm images followed by 10 mm reconstructions with multiplanar and MIP reformations of the pulmonary arteries. No 3D post-angiographic imaging was performed on an independent workstation and reviewed.  One hundred ml of Omnipaque 350 was injected.    COMPARISON:  None.    FINDINGS:  Examination is limited by suboptimal intravenous bolus and motion artifact.  No filling defects are seen in the pulmonary trunk or main pulmonary arteries.  The distal most branches are limited.  There is no aortic aneurysm or aortic dissection.    Diffuse ground-glass opacities are seen in the aerated lung fields.    Multiple retropectoral lymph nodes are present right greater than left.  Two of the largest retropectoral lymph nodes measures 2.0 x 1.5 and 2.1 x 1.1 cm (series 2 axial image 218).  There are right axillary lymph nodes, which have been biopsied in the past.    There is no pleural or pericardial effusion.    The heart size is within normal limits.    In the visualized upper abdomen, there is fatty infiltration of the liver.   There is mild splenomegaly..                               X-Ray Chest AP Portable (Final result)  Result time 01/24/22 16:54:31    Final result by Benjie Antunez MD (01/24/22 16:54:31)                 Impression:      1. Pulmonary findings may reflect edema noting accentuation by habitus.  There is questionable trace right pleural effusion versus atelectasis.  Correlation is needed to exclude viral process.      Electronically signed by: Benjie Antunez MD  Date:    01/24/2022  Time:    16:54             Narrative:    EXAMINATION:  XR CHEST AP PORTABLE    CLINICAL HISTORY:  Chest Pain;    TECHNIQUE:  Single frontal view of the chest was performed.    COMPARISON:  01/22/2022    FINDINGS:  The cardiomediastinal silhouette is prominent, similar to the previous examination noting magnification by technique..  There is obscuration of the right costophrenic angle may reflect small effusion or atelectasis..  The trachea is midline.  The lungs are symmetrically expanded bilaterally with prominent interstitial attenuation bilaterally..  No large focal consolidation seen.  There is no pneumothorax.  The osseous structures are remarkable for degenerative changes..                                    Assessment/Plan:     * Atrial flutter  Aflutter w HR sustaining in 150's despite dilt gtt  -added metoprolol 50 mg TID, HR came down to   -ECHO  -Cards consult      Elevated brain natriuretic peptide (BNP) level  BNP mildly elevated at 103 but BMI 60  -IV lasix ordered  -ECHO  -Cards  -Stop dilt if HFrEF    Hypertension  Holding antihypertensives, only BB/CCB for aflutter      Hyperlipidemia  Statin      Morbid obesity with BMI of 50.0-59.9, adult  Body mass index is 60.24 kg/m². Morbid obesity complicates all aspects of disease management from diagnostic modalities to treatment. Weight loss encouraged and health benefits explained to patient.     -recommend/refer bariatric surgery at discharge     Type 2 diabetes  mellitus without complication, without long-term current use of insulin  SSI ordered      Axillary mass  LN swellings have been bx, told reactive  Mammo last month was negative      VTE Risk Mitigation (From admission, onward)         Ordered     enoxaparin injection 40 mg  Daily         01/24/22 2145     IP VTE HIGH RISK PATIENT  Once         01/24/22 2145     Place sequential compression device  Until discontinued         01/24/22 2145              Critical care time spent on the evaluation and treatment of severe organ dysfunction, review of pertinent labs and imaging studies, discussions with consulting providers and discussions with patient/family: 35 minutes.     Armin Blandon MD  Department of Hospital Medicine   [facility] - Intensive Care

## 2022-01-25 NOTE — NURSING
Called EICU to give update on patients HR and rhythm as requested. New EKG done, patient is now in A flutter and HR is 74. MD stated he will review new EKG. No new orders received.

## 2022-01-25 NOTE — CONSULTS
[facility] - Intensive Care  Cardiology  Consult Note    Patient Name: Micaela Hernandez  MRN: [id number]  Admission Date: 2022  Hospital Length of Stay: 1 days  Code Status: Full Code   Attending Provider: Rusty Arnold MD   Consulting Provider: Ji Patricio MD  Primary Care Physician: Bailey Fiore MD  Principal Problem:Atrial flutter    Patient information was obtained from patient and ER records.     Consults  Subjective:     Chief Complaint:  A-flutter     HPI:   Micaela Hernandez is a 51 y.o. female who  has a past medical history of Morbid Obesity, Degenerative lumbar disc, Diabetes mellitus, Diabetes mellitus, type 2, Hyperlipidemia, and Hypertension, presented to the ED with CC of SOB. Patient states that she has been feeling this since November, and has been wheezing intermittently as well. She even went to an allergist last week and had Xray, attempted albuterol.In Afib/Aflutter in the ED. HR sustained in 150's. Has never had an ECHO, never been to cardiologist. Started on dilt gtt and sent to ICU. SED rate 68. Concern for PNA with pleural fluid and infiltrates with LN swellings and new afib/flutter, started on CAP. LN of axilla were biopsied and told they were reactive, not malignant. Normal D-dimer for age. BNP was 102 but BMI 60. Patient denies any fevers, night sweats, n/v/d/c, abd pain, dysuria, hematuria or hematochezia, cough, CP, leg swelling, HA, dizziness, weakness, hallucinations, SI, HI, or self injury at this time.     Still with A-flutter on IV diltiazem  Reports feels bad for several days  EKG A-flutter 2:1 156  Troponin negative    Denies prior Hx A-fib/flutter or cardiac issues  Echo prelin EF > 55%      Past Medical History:   Diagnosis Date    Allergy     Degenerative lumbar disc 2021    Diabetes mellitus     Diabetes mellitus, type 2     Hyperlipidemia     Hypertension        Past Surgical History:   Procedure Laterality Date    BREAST SURGERY        SECTION      CHOLECYSTECTOMY      ENDOMETRIAL ABLATION      HERNIA REPAIR      incisional     KNEE SURGERY      LIVER LOBECTOMY Right     LYMPH NODE DISSECTION      right axillary       Review of patient's allergies indicates:  No Known Allergies    No current facility-administered medications on file prior to encounter.     Current Outpatient Medications on File Prior to Encounter   Medication Sig    albuterol (VENTOLIN HFA) 90 mcg/actuation inhaler Inhale 2 puffs into the lungs every 6 (six) hours as needed for Wheezing or Shortness of Breath. Rescue    glimepiride (AMARYL) 2 MG tablet Take 1 tablet (2 mg total) by mouth before breakfast.    metFORMIN (GLUCOPHAGE) 1000 MG tablet Take 1 tablet (1,000 mg total) by mouth 2 (two) times daily with meals.    montelukast (SINGULAIR) 10 mg tablet Take 1 tablet (10 mg total) by mouth every evening.    simvastatin (ZOCOR) 40 MG tablet Take 1 tablet (40 mg total) by mouth every evening.    carvediloL (COREG) 3.125 MG tablet Take 1 tablet (3.125 mg total) by mouth 2 (two) times daily with meals.    FLUCELVAX QUAD 5820-3305, PF, 60 mcg (15 mcg x 4)/0.5 mL Syrg PHARMACY ADMINISTERED    fluticasone furoate-vilanteroL (BREO ELLIPTA) 100-25 mcg/dose diskus inhaler Inhale 1 puff into the lungs once daily. Controller    hydrocortisone 2.5 % cream Apply topically 2 (two) times daily.    loratadine (CLARITIN) 10 mg tablet Take 10 mg by mouth once daily.     Family History     Problem Relation (Age of Onset)    Asthma Mother    COPD Mother    Cancer Sister    Diabetes Father    Diabetes type II Mother    Hyperlipidemia Mother, Father    Hypertension Mother, Father    No Known Problems Brother, Maternal Aunt, Maternal Uncle, Paternal Aunt, Paternal Uncle, Maternal Grandmother, Maternal Grandfather, Paternal Grandmother, Paternal Grandfather        Tobacco Use    Smoking status: Never Smoker    Smokeless tobacco: Never Used   Substance and Sexual Activity    Alcohol  use: Yes     Comment: 4-6 drinks per year    Drug use: No    Sexual activity: Not Currently     Partners: Male     Birth control/protection: None     Review of Systems   Constitutional: Negative for decreased appetite.   HENT: Negative for ear discharge.    Eyes: Negative for blurred vision.   Respiratory: Negative for hemoptysis.    Endocrine: Negative for polyphagia.   Hematologic/Lymphatic: Negative for adenopathy.   Skin: Negative for color change.   Musculoskeletal: Negative for joint swelling.   Genitourinary: Negative for bladder incontinence.   Neurological: Negative for brief paralysis.   Psychiatric/Behavioral: Negative for hallucinations.   Allergic/Immunologic: Negative for hives.     Objective:     Vital Signs (Most Recent):  Temp: 97.8 °F (36.6 °C) (01/25/22 0715)  Pulse: 103 (01/25/22 1030)  Resp: (!) 27 (01/25/22 1030)  BP: 139/73 (01/25/22 1030)  SpO2: (!) 94 % (01/25/22 1030) Vital Signs (24h Range):  Temp:  [97.8 °F (36.6 °C)-98.8 °F (37.1 °C)] 97.8 °F (36.6 °C)  Pulse:  [] 103  Resp:  [16-34] 27  SpO2:  [92 %-99 %] 94 %  BP: (115-167)/() 139/73     Weight: (!) 149.4 kg (329 lb 5.9 oz)  Body mass index is 60.24 kg/m².    SpO2: (!) 94 %  O2 Device (Oxygen Therapy): room air      Intake/Output Summary (Last 24 hours) at 1/25/2022 1054  Last data filed at 1/25/2022 0854  Gross per 24 hour   Intake 610.27 ml   Output 700 ml   Net -89.73 ml       Lines/Drains/Airways     Peripheral Intravenous Line                 Peripheral IV - Single Lumen 01/24/22 1618 20 G Right Antecubital <1 day         Peripheral IV - Single Lumen 01/24/22 2247 22 G Left Hand <1 day                Physical Exam  Constitutional:       Appearance: She is well-developed and well-nourished.   HENT:      Head: Normocephalic and atraumatic.   Eyes:      Conjunctiva/sclera: Conjunctivae normal.      Pupils: Pupils are equal, round, and reactive to light.   Cardiovascular:      Rate and Rhythm: Normal rate. Rhythm  irregular.      Pulses: Intact distal pulses.      Heart sounds: Normal heart sounds.   Pulmonary:      Effort: Pulmonary effort is normal.      Breath sounds: Normal breath sounds.   Abdominal:      General: Bowel sounds are normal.      Palpations: Abdomen is soft.   Musculoskeletal:         General: Normal range of motion.      Cervical back: Normal range of motion and neck supple.   Skin:     General: Skin is warm and dry.   Neurological:      Mental Status: She is alert and oriented to person, place, and time.         Significant Labs: All pertinent lab results from the last 24 hours have been reviewed.    Significant Imaging: Echocardiogram: 2D echo with color flow doppler: No results found for this or any previous visit.    Assessment and Plan:     * Atrial flutter  new Dx. EF > 55%. Rates improved on IV diltiazem but still with A-flutter. Add flecainide. STEVEN/CV in AM. Full dose lovenox. Will need DOAC at discharge    Hypertension  stable    Hyperlipidemia  On statin    Morbid obesity with BMI of 50.0-59.9, adult  counseled    Type 2 diabetes mellitus without complication, without long-term current use of insulin  Per primary    Axillary mass  Per primary        VTE Risk Mitigation (From admission, onward)         Ordered     enoxaparin injection 150 mg  Every 12 hours (non-standard times)         01/25/22 0945     IP VTE HIGH RISK PATIENT  Once         01/24/22 2145     Place sequential compression device  Until discontinued         01/24/22 2145              35 minutes spent with patient in ICU    Thank you for your consult. I will follow-up with patient. Please contact us if you have any additional questions.    Ji Patricio MD  Cardiology   [facility] - [place] - Intensive Care

## 2022-01-25 NOTE — ASSESSMENT & PLAN NOTE
Body mass index is 60.24 kg/m². Morbid obesity complicates all aspects of disease management from diagnostic modalities to treatment. Weight loss encouraged and health benefits explained to patient.     -recommend/refer bariatric surgery at discharge

## 2022-01-25 NOTE — HPI
Micaela Hernandez is a 51 y.o. female who  has a past medical history of Morbid Obesity, Degenerative lumbar disc, Diabetes mellitus, Diabetes mellitus, type 2, Hyperlipidemia, and Hypertension, presented to the ED with CC of SOB. Patient states that she has been feeling this since November, and has been wheezing intermittently as well. She even went to an allergist last week and had Xray, attempted albuterol.In Afib/Aflutter in the ED. HR sustained in 150's. Has never had an ECHO, never been to cardiologist. Started on dilt gtt and sent to ICU. SED rate 68. Concern for PNA with pleural fluid and infiltrates with LN swellings and new afib/flutter, started on CAP. LN of axilla were biopsied and told they were reactive, not malignant. Normal D-dimer for age. BNP was 102 but BMI 60. Patient denies any fevers, night sweats, n/v/d/c, abd pain, dysuria, hematuria or hematochezia, cough, CP, leg swelling, HA, dizziness, weakness, hallucinations, SI, HI, or self injury at this time.

## 2022-01-25 NOTE — EICU
eICU Physician Virtual/Remote Brief Evaluation Note      Message from RN  Patient had been tachycardic earlier but now in atrial flutter with rate 74  Chart reviewed, discussed with RN  Rate control with beta-blocker she was given earlier  EKG at 3:00 a.m. with machine reading of inferior STEMI but this is incorrect..  Has flutter waves superimposed on and of R-wave  No change in treatment needed at present      ALKA Olson MD  [facility] Attending  [phone]    This report has been created through the use of M-Modal dictation software. Typographical and content errors may occur with this process. While efforts are made to detect and correct such errors, in some cases errors will persist. For this reason, wording in this document should be considered in the proper context and not strictly verbatim

## 2022-01-25 NOTE — SUBJECTIVE & OBJECTIVE
Past Medical History:   Diagnosis Date    Allergy     Degenerative lumbar disc 2021    Diabetes mellitus     Diabetes mellitus, type 2     Hyperlipidemia     Hypertension        Past Surgical History:   Procedure Laterality Date    BREAST SURGERY       SECTION      CHOLECYSTECTOMY      ENDOMETRIAL ABLATION      HERNIA REPAIR      incisional     KNEE SURGERY      LIVER LOBECTOMY Right     LYMPH NODE DISSECTION      right axillary       Review of patient's allergies indicates:  No Known Allergies    No current facility-administered medications on file prior to encounter.     Current Outpatient Medications on File Prior to Encounter   Medication Sig    albuterol (VENTOLIN HFA) 90 mcg/actuation inhaler Inhale 2 puffs into the lungs every 6 (six) hours as needed for Wheezing or Shortness of Breath. Rescue    glimepiride (AMARYL) 2 MG tablet Take 1 tablet (2 mg total) by mouth before breakfast.    metFORMIN (GLUCOPHAGE) 1000 MG tablet Take 1 tablet (1,000 mg total) by mouth 2 (two) times daily with meals.    montelukast (SINGULAIR) 10 mg tablet Take 1 tablet (10 mg total) by mouth every evening.    simvastatin (ZOCOR) 40 MG tablet Take 1 tablet (40 mg total) by mouth every evening.    carvediloL (COREG) 3.125 MG tablet Take 1 tablet (3.125 mg total) by mouth 2 (two) times daily with meals.    FLUCELVAX QUAD 1018-3709, PF, 60 mcg (15 mcg x 4)/0.5 mL Syrg PHARMACY ADMINISTERED    fluticasone furoate-vilanteroL (BREO ELLIPTA) 100-25 mcg/dose diskus inhaler Inhale 1 puff into the lungs once daily. Controller    hydrocortisone 2.5 % cream Apply topically 2 (two) times daily.    loratadine (CLARITIN) 10 mg tablet Take 10 mg by mouth once daily.     Family History     Problem Relation (Age of Onset)    Asthma Mother    COPD Mother    Cancer Sister    Diabetes Father    Diabetes type II Mother    Hyperlipidemia Mother, Father    Hypertension Mother, Father    No Known Problems Brother, Maternal  Aunt, Maternal Uncle, Paternal Aunt, Paternal Uncle, Maternal Grandmother, Maternal Grandfather, Paternal Grandmother, Paternal Grandfather        Tobacco Use    Smoking status: Never Smoker    Smokeless tobacco: Never Used   Substance and Sexual Activity    Alcohol use: Yes     Comment: 4-6 drinks per year    Drug use: No    Sexual activity: Not Currently     Partners: Male     Birth control/protection: None     Review of Systems   Constitutional: Positive for activity change and fatigue. Negative for fever.   HENT: Negative for sore throat and trouble swallowing.    Eyes: Negative for photophobia and visual disturbance.   Respiratory: Positive for shortness of breath and wheezing. Negative for chest tightness.    Cardiovascular: Positive for leg swelling. Negative for chest pain and palpitations.   Gastrointestinal: Negative for abdominal distention, abdominal pain, blood in stool, constipation, diarrhea, nausea and vomiting.   Endocrine: Negative for polydipsia and polyuria.   Genitourinary: Negative for difficulty urinating, dysuria and hematuria.   Musculoskeletal: Negative for neck pain and neck stiffness.   Skin: Negative for pallor and rash.   Allergic/Immunologic: Negative for immunocompromised state.   Neurological: Negative for dizziness, seizures, syncope and facial asymmetry.   Psychiatric/Behavioral: Negative for agitation, behavioral problems and confusion.     Objective:     Vital Signs (Most Recent):  Temp: 98.8 °F (37.1 °C) (01/25/22 0200)  Pulse: 75 (01/25/22 0330)  Resp: (!) 24 (01/25/22 0330)  BP: 123/70 (01/25/22 0330)  SpO2: (!) 94 % (01/25/22 0524) Vital Signs (24h Range):  Temp:  [98 °F (36.7 °C)-98.8 °F (37.1 °C)] 98.8 °F (37.1 °C)  Pulse:  [] 75  Resp:  [16-29] 24  SpO2:  [93 %-99 %] 94 %  BP: (118-167)/() 123/70     Weight: (!) 149.4 kg (329 lb 5.9 oz)  Body mass index is 60.24 kg/m².    Physical Exam  Vitals reviewed.   Constitutional:       General: She is not in acute  distress.     Appearance: She is well-developed. She is obese. She is not ill-appearing, toxic-appearing or diaphoretic.   HENT:      Head: Normocephalic and atraumatic.      Nose: Nose normal. No congestion.      Mouth/Throat:      Mouth: Mucous membranes are moist.      Pharynx: No oropharyngeal exudate.   Eyes:      General: No scleral icterus.     Pupils: Pupils are equal, round, and reactive to light.   Neck:      Thyroid: No thyromegaly.   Cardiovascular:      Rate and Rhythm: Tachycardia present. Rhythm irregular.      Pulses: Normal pulses.      Heart sounds: Murmur heard.   No friction rub. No gallop.    Pulmonary:      Effort: Pulmonary effort is normal. No respiratory distress.      Breath sounds: No stridor. Rales present. No wheezing.   Abdominal:      General: There is no distension.      Palpations: Abdomen is soft. There is no mass.      Tenderness: There is no abdominal tenderness. There is no guarding.   Musculoskeletal:         General: Normal range of motion.      Cervical back: Normal range of motion and neck supple. No rigidity.      Right lower leg: Edema present.      Left lower leg: Edema present.   Skin:     General: Skin is warm and dry.      Capillary Refill: Capillary refill takes less than 2 seconds.      Coloration: Skin is not jaundiced.      Findings: No bruising.   Neurological:      General: No focal deficit present.      Mental Status: She is alert and oriented to person, place, and time.      Cranial Nerves: No cranial nerve deficit.   Psychiatric:         Mood and Affect: Mood normal.         Behavior: Behavior normal.         Thought Content: Thought content normal.           CRANIAL NERVES     CN III, IV, VI   Pupils are equal, round, and reactive to light.           Recent Results (from the past 24 hour(s))   POCT glucose    Collection Time: 01/24/22  3:49 PM   Result Value Ref Range    POCT Glucose 120 (H) 70 - 110 mg/dL   POCT COVID-19 Rapid Screening    Collection Time:  01/24/22  4:16 PM   Result Value Ref Range    POC Rapid COVID Negative Negative     Acceptable Yes    POCT Influenza A/B Molecular    Collection Time: 01/24/22  4:35 PM   Result Value Ref Range    POC Molecular Influenza A Ag Negative Negative, Not Reported    POC Molecular Influenza B Ag Negative Negative, Not Reported     Acceptable Yes    CBC auto differential    Collection Time: 01/24/22  4:38 PM   Result Value Ref Range    WBC 10.83 3.90 - 12.70 K/uL    RBC 3.99 (L) 4.00 - 5.40 M/uL    Hemoglobin 11.2 (L) 12.0 - 16.0 g/dL    Hematocrit 36.5 (L) 37.0 - 48.5 %    MCV 92 82 - 98 fL    MCH 28.1 27.0 - 31.0 pg    MCHC 30.7 (L) 32.0 - 36.0 g/dL    RDW 15.5 (H) 11.5 - 14.5 %    Platelets 188 150 - 450 K/uL    MPV 13.7 (H) 9.2 - 12.9 fL    Immature Granulocytes 0.4 0.0 - 0.5 %    Gran # (ANC) 7.4 1.8 - 7.7 K/uL    Immature Grans (Abs) 0.04 0.00 - 0.04 K/uL    Lymph # 2.5 1.0 - 4.8 K/uL    Mono # 0.7 0.3 - 1.0 K/uL    Eos # 0.2 0.0 - 0.5 K/uL    Baso # 0.07 0.00 - 0.20 K/uL    nRBC 0 0 /100 WBC    Gran % 68.4 38.0 - 73.0 %    Lymph % 22.9 18.0 - 48.0 %    Mono % 6.3 4.0 - 15.0 %    Eosinophil % 1.4 0.0 - 8.0 %    Basophil % 0.6 0.0 - 1.9 %    Differential Method Automated    Comprehensive metabolic panel    Collection Time: 01/24/22  4:38 PM   Result Value Ref Range    Sodium 139 136 - 145 mmol/L    Potassium 3.9 3.5 - 5.1 mmol/L    Chloride 105 95 - 110 mmol/L    CO2 21 (L) 23 - 29 mmol/L    Glucose 122 (H) 70 - 110 mg/dL    BUN 12 6 - 20 mg/dL    Creatinine 0.8 0.5 - 1.4 mg/dL    Calcium 10.0 8.7 - 10.5 mg/dL    Total Protein 8.1 6.0 - 8.4 g/dL    Albumin 3.6 3.5 - 5.2 g/dL    Total Bilirubin 0.4 0.1 - 1.0 mg/dL    Alkaline Phosphatase 104 55 - 135 U/L    AST 42 (H) 10 - 40 U/L    ALT 39 10 - 44 U/L    Anion Gap 13 8 - 16 mmol/L    eGFR if African American >60 >60 mL/min/1.73 m^2    eGFR if non African American >60 >60 mL/min/1.73 m^2   Troponin I #1    Collection Time: 01/24/22  4:38 PM    Result Value Ref Range    Troponin I <0.006 0.000 - 0.026 ng/mL   B-Type natriuretic peptide (BNP)    Collection Time: 01/24/22  4:38 PM   Result Value Ref Range     (H) 0 - 99 pg/mL   D dimer, quantitative    Collection Time: 01/24/22  4:38 PM   Result Value Ref Range    D-Dimer 0.69 (H) <0.50 mg/L FEU   TSH    Collection Time: 01/24/22  4:38 PM   Result Value Ref Range    TSH 2.612 0.400 - 4.000 uIU/mL   C-Reactive Protein    Collection Time: 01/24/22  4:38 PM   Result Value Ref Range    CRP 28.7 (H) 0.0 - 8.2 mg/L   Procalcitonin    Collection Time: 01/24/22  7:56 PM   Result Value Ref Range    Procalcitonin 0.06 <0.25 ng/mL   Sedimentation rate    Collection Time: 01/24/22  9:25 PM   Result Value Ref Range    Sed Rate 68 (H) 0 - 20 mm/Hr   Urinalysis, Reflex to Urine Culture Urine, Clean Catch    Collection Time: 01/24/22  9:49 PM    Specimen: Urine   Result Value Ref Range    Specimen UA Urine, Clean Catch     Color, UA Yellow Yellow, Straw, Lupe    Appearance, UA Clear Clear    pH, UA 6.0 5.0 - 8.0    Specific Gravity, UA >1.030 (A) 1.005 - 1.030    Protein, UA Trace (A) Negative    Glucose, UA Negative Negative    Ketones, UA 2+ (A) Negative    Bilirubin (UA) Negative Negative    Occult Blood UA Negative Negative    Nitrite, UA Negative Negative    Urobilinogen, UA Negative <2.0 EU/dL    Leukocytes, UA Negative Negative   POCT urine pregnancy    Collection Time: 01/24/22  9:50 PM   Result Value Ref Range    POC Preg Test, Ur Negative Negative     Acceptable Yes    POCT COVID-19 Rapid Screening    Collection Time: 01/24/22 10:14 PM   Result Value Ref Range    POC Rapid COVID Negative Negative     Acceptable Yes    POCT glucose    Collection Time: 01/25/22  2:26 AM   Result Value Ref Range    POCT Glucose 136 (H) 70 - 110 mg/dL       Microbiology Results (last 7 days)     Procedure Component Value Units Date/Time    Blood culture [[id number]]     Order Status: Sent  Specimen: Blood     Blood culture [[id number]]     Order Status: Sent Specimen: Blood            Imaging Results          CTA Chest Non-Coronary (PE Study) (Final result)  Result time 01/24/22 19:03:24    Final result by Veronica Mcgovern MD (01/24/22 19:03:24)                 Impression:      Limited examination.  No evidence of acute pulmonary embolism in the pulmonary trunk, main pulmonary arteries, in the more proximal segmental branches.    Multiple retropectoral and right axillary lymph nodes.  Correlate with known history.  Recommend possible evaluation for breast, lymphoma, or other neoplastic process, if warranted.    Diffuse ground-glass opacities in the aerated lung fields.  Findings may be related to pulmonary edema.  Pneumonic process may have a similar appearance.  Recommend correlation.    Small bilateral pleural effusions right greater than left with associated compressive atelectasis.    Hepatic steatosis. Mild splenomegaly.      Electronically signed by: Veronica Mcgovern  Date:    01/24/2022  Time:    19:03             Narrative:    EXAMINATION:  CT PULMONARY ANGIOGRAM WITH CONTRAST    CLINICAL HISTORY:  Shortness of breath.  History of breast surgery and right axillary lymph node dissection    TECHNIQUE:  CT of the chest with intravenous contrast for pulmonary artery angiogram was performed. Contiguous axial 1.25 mm images followed by 10 mm reconstructions with multiplanar and MIP reformations of the pulmonary arteries. No 3D post-angiographic imaging was performed on an independent workstation and reviewed.  One hundred ml of Omnipaque 350 was injected.    COMPARISON:  None.    FINDINGS:  Examination is limited by suboptimal intravenous bolus and motion artifact.  No filling defects are seen in the pulmonary trunk or main pulmonary arteries.  The distal most branches are limited.  There is no aortic aneurysm or aortic dissection.    Diffuse ground-glass opacities are seen in the aerated lung  fields.    Multiple retropectoral lymph nodes are present right greater than left.  Two of the largest retropectoral lymph nodes measures 2.0 x 1.5 and 2.1 x 1.1 cm (series 2 axial image 218).  There are right axillary lymph nodes, which have been biopsied in the past.    There is no pleural or pericardial effusion.    The heart size is within normal limits.    In the visualized upper abdomen, there is fatty infiltration of the liver.  There is mild splenomegaly..                               X-Ray Chest AP Portable (Final result)  Result time 01/24/22 16:54:31    Final result by Benjie Antunez MD (01/24/22 16:54:31)                 Impression:      1. Pulmonary findings may reflect edema noting accentuation by habitus.  There is questionable trace right pleural effusion versus atelectasis.  Correlation is needed to exclude viral process.      Electronically signed by: Benjie Antunez MD  Date:    01/24/2022  Time:    16:54             Narrative:    EXAMINATION:  XR CHEST AP PORTABLE    CLINICAL HISTORY:  Chest Pain;    TECHNIQUE:  Single frontal view of the chest was performed.    COMPARISON:  01/22/2022    FINDINGS:  The cardiomediastinal silhouette is prominent, similar to the previous examination noting magnification by technique..  There is obscuration of the right costophrenic angle may reflect small effusion or atelectasis..  The trachea is midline.  The lungs are symmetrically expanded bilaterally with prominent interstitial attenuation bilaterally..  No large focal consolidation seen.  There is no pneumothorax.  The osseous structures are remarkable for degenerative changes..

## 2022-01-25 NOTE — PLAN OF CARE
Patient in the ICU. No complaints of pain, patient has some SOB. 80 of lasix given. Patient is on Cardizem drip at 15. Patient HR was 150's on admission to the ICU. 50 of lopressor given and HR now in the 70's. Patient is currently in a flutter. No signs of distress, all other vitals are stable.

## 2022-01-25 NOTE — ASSESSMENT & PLAN NOTE
new Dx. EF > 55%. Rates improved on IV diltiazem but still with A-flutter. Add flecainide. STEVEN/CV in AM. Full dose lovenox. Will need DOAC at discharge

## 2022-01-25 NOTE — ASSESSMENT & PLAN NOTE
Aflutter w HR sustaining in 150's despite dilt gtt  -added metoprolol 50 mg TID, HR came down to   -ECHO  -Cards consult

## 2022-01-25 NOTE — SUBJECTIVE & OBJECTIVE
Past Medical History:   Diagnosis Date    Allergy     Degenerative lumbar disc 2021    Diabetes mellitus     Diabetes mellitus, type 2     Hyperlipidemia     Hypertension        Past Surgical History:   Procedure Laterality Date    BREAST SURGERY       SECTION      CHOLECYSTECTOMY      ENDOMETRIAL ABLATION      HERNIA REPAIR      incisional     KNEE SURGERY      LIVER LOBECTOMY Right     LYMPH NODE DISSECTION      right axillary       Review of patient's allergies indicates:  No Known Allergies    No current facility-administered medications on file prior to encounter.     Current Outpatient Medications on File Prior to Encounter   Medication Sig    albuterol (VENTOLIN HFA) 90 mcg/actuation inhaler Inhale 2 puffs into the lungs every 6 (six) hours as needed for Wheezing or Shortness of Breath. Rescue    glimepiride (AMARYL) 2 MG tablet Take 1 tablet (2 mg total) by mouth before breakfast.    metFORMIN (GLUCOPHAGE) 1000 MG tablet Take 1 tablet (1,000 mg total) by mouth 2 (two) times daily with meals.    montelukast (SINGULAIR) 10 mg tablet Take 1 tablet (10 mg total) by mouth every evening.    simvastatin (ZOCOR) 40 MG tablet Take 1 tablet (40 mg total) by mouth every evening.    carvediloL (COREG) 3.125 MG tablet Take 1 tablet (3.125 mg total) by mouth 2 (two) times daily with meals.    FLUCELVAX QUAD 0593-5618, PF, 60 mcg (15 mcg x 4)/0.5 mL Syrg PHARMACY ADMINISTERED    fluticasone furoate-vilanteroL (BREO ELLIPTA) 100-25 mcg/dose diskus inhaler Inhale 1 puff into the lungs once daily. Controller    hydrocortisone 2.5 % cream Apply topically 2 (two) times daily.    loratadine (CLARITIN) 10 mg tablet Take 10 mg by mouth once daily.     Family History     Problem Relation (Age of Onset)    Asthma Mother    COPD Mother    Cancer Sister    Diabetes Father    Diabetes type II Mother    Hyperlipidemia Mother, Father    Hypertension Mother, Father    No Known Problems Brother, Maternal  Aunt, Maternal Uncle, Paternal Aunt, Paternal Uncle, Maternal Grandmother, Maternal Grandfather, Paternal Grandmother, Paternal Grandfather        Tobacco Use    Smoking status: Never Smoker    Smokeless tobacco: Never Used   Substance and Sexual Activity    Alcohol use: Yes     Comment: 4-6 drinks per year    Drug use: No    Sexual activity: Not Currently     Partners: Male     Birth control/protection: None     Review of Systems   Constitutional: Negative for decreased appetite.   HENT: Negative for ear discharge.    Eyes: Negative for blurred vision.   Respiratory: Negative for hemoptysis.    Endocrine: Negative for polyphagia.   Hematologic/Lymphatic: Negative for adenopathy.   Skin: Negative for color change.   Musculoskeletal: Negative for joint swelling.   Genitourinary: Negative for bladder incontinence.   Neurological: Negative for brief paralysis.   Psychiatric/Behavioral: Negative for hallucinations.   Allergic/Immunologic: Negative for hives.     Objective:     Vital Signs (Most Recent):  Temp: 97.8 °F (36.6 °C) (01/25/22 0715)  Pulse: 103 (01/25/22 1030)  Resp: (!) 27 (01/25/22 1030)  BP: 139/73 (01/25/22 1030)  SpO2: (!) 94 % (01/25/22 1030) Vital Signs (24h Range):  Temp:  [97.8 °F (36.6 °C)-98.8 °F (37.1 °C)] 97.8 °F (36.6 °C)  Pulse:  [] 103  Resp:  [16-34] 27  SpO2:  [92 %-99 %] 94 %  BP: (115-167)/() 139/73     Weight: (!) 149.4 kg (329 lb 5.9 oz)  Body mass index is 60.24 kg/m².    SpO2: (!) 94 %  O2 Device (Oxygen Therapy): room air      Intake/Output Summary (Last 24 hours) at 1/25/2022 1054  Last data filed at 1/25/2022 0854  Gross per 24 hour   Intake 610.27 ml   Output 700 ml   Net -89.73 ml       Lines/Drains/Airways     Peripheral Intravenous Line                 Peripheral IV - Single Lumen 01/24/22 1618 20 G Right Antecubital <1 day         Peripheral IV - Single Lumen 01/24/22 2247 22 G Left Hand <1 day                Physical Exam  Constitutional:       Appearance: She  is well-developed and well-nourished.   HENT:      Head: Normocephalic and atraumatic.   Eyes:      Conjunctiva/sclera: Conjunctivae normal.      Pupils: Pupils are equal, round, and reactive to light.   Cardiovascular:      Rate and Rhythm: Normal rate. Rhythm irregular.      Pulses: Intact distal pulses.      Heart sounds: Normal heart sounds.   Pulmonary:      Effort: Pulmonary effort is normal.      Breath sounds: Normal breath sounds.   Abdominal:      General: Bowel sounds are normal.      Palpations: Abdomen is soft.   Musculoskeletal:         General: Normal range of motion.      Cervical back: Normal range of motion and neck supple.   Skin:     General: Skin is warm and dry.   Neurological:      Mental Status: She is alert and oriented to person, place, and time.         Significant Labs: All pertinent lab results from the last 24 hours have been reviewed.    Significant Imaging: Echocardiogram: 2D echo with color flow doppler: No results found for this or any previous visit.

## 2022-01-25 NOTE — PLAN OF CARE
51y F w/ flutter. Rate improved on dilt but still in flutter. Cardiology consulted, flecainide added, plan for STEVEN/CV tomorrow.

## 2022-01-25 NOTE — EICU
EICU BRIEF ADMIT NOTE:    HISTORY: Please refer to H/P and ER notes for detail. Shortness of breath has slightly improved since admission    CAMERA ASSESSMENT: Two way audiovisual assessment was done: tachycardic, otherwise comfortable    Telemetry was reviewed. Medical records including notes, labs and imaging were reviewed.    DISCUSSED with bedside nurse.    ASSESSMENT AND PLAN:    # Tachycardia.  Heart rate consistently around 150, may be atrial flutter with 2-1 block.  On Cardizem infusion.  Started on by mouth metoprolol, just given ? Tachycardia triggered by heart failure, Lasix given. Follow telemetry.      # Pulmonary edema.  Lasix given.  Follow urine output creatinine and electrolytes.    # Pneumonia?.  On antibiotics.  Follow cultures.    # Lymphadenopathy, likely pathological.?  Lymphoma/malignancy.  Workup per primary     PRACTICES REVIEW:    STRESS ULCER PROPHYLAXIS: not needed  DVT PROPHYLAXIS:   Lovenox    Thank You for allowing EICU to participate in the care of the patient. Please call as needed      Ceferino Rose MD  [facility]  [phone]

## 2022-01-26 ENCOUNTER — PATIENT MESSAGE (OUTPATIENT)
Dept: ALLERGY | Facility: CLINIC | Age: 52
End: 2022-01-26
Payer: COMMERCIAL

## 2022-01-26 ENCOUNTER — ANESTHESIA (OUTPATIENT)
Dept: CARDIOLOGY | Facility: HOSPITAL | Age: 52
DRG: 309 | End: 2022-01-26
Payer: COMMERCIAL

## 2022-01-26 ENCOUNTER — ANESTHESIA EVENT (OUTPATIENT)
Dept: CARDIOLOGY | Facility: HOSPITAL | Age: 52
DRG: 309 | End: 2022-01-26
Payer: COMMERCIAL

## 2022-01-26 VITALS
BODY MASS INDEX: 53.92 KG/M2 | SYSTOLIC BLOOD PRESSURE: 136 MMHG | RESPIRATION RATE: 34 BRPM | WEIGHT: 293 LBS | DIASTOLIC BLOOD PRESSURE: 92 MMHG | HEART RATE: 100 BPM | OXYGEN SATURATION: 95 % | TEMPERATURE: 98 F | HEIGHT: 62 IN

## 2022-01-26 LAB
ALBUMIN SERPL BCP-MCNC: 3.4 G/DL (ref 3.5–5.2)
ALP SERPL-CCNC: 99 U/L (ref 55–135)
ALT SERPL W/O P-5'-P-CCNC: 33 U/L (ref 10–44)
ANION GAP SERPL CALC-SCNC: 13 MMOL/L (ref 8–16)
AST SERPL-CCNC: 32 U/L (ref 10–40)
BASOPHILS # BLD AUTO: 0.05 K/UL (ref 0–0.2)
BASOPHILS NFR BLD: 0.5 % (ref 0–1.9)
BILIRUB SERPL-MCNC: 0.3 MG/DL (ref 0.1–1)
BSA FOR ECHO PROCEDURE: 2.56 M2
BUN SERPL-MCNC: 16 MG/DL (ref 6–20)
CALCIUM SERPL-MCNC: 9.6 MG/DL (ref 8.7–10.5)
CHLORIDE SERPL-SCNC: 103 MMOL/L (ref 95–110)
CO2 SERPL-SCNC: 22 MMOL/L (ref 23–29)
CREAT SERPL-MCNC: 0.8 MG/DL (ref 0.5–1.4)
DIFFERENTIAL METHOD: ABNORMAL
EJECTION FRACTION: 55 %
EOSINOPHIL # BLD AUTO: 0.2 K/UL (ref 0–0.5)
EOSINOPHIL NFR BLD: 2.3 % (ref 0–8)
ERYTHROCYTE [DISTWIDTH] IN BLOOD BY AUTOMATED COUNT: 15.8 % (ref 11.5–14.5)
EST. GFR  (AFRICAN AMERICAN): >60 ML/MIN/1.73 M^2
EST. GFR  (NON AFRICAN AMERICAN): >60 ML/MIN/1.73 M^2
GLUCOSE SERPL-MCNC: 139 MG/DL (ref 70–110)
HCT VFR BLD AUTO: 36.3 % (ref 37–48.5)
HGB BLD-MCNC: 11 G/DL (ref 12–16)
IMM GRANULOCYTES # BLD AUTO: 0.04 K/UL (ref 0–0.04)
IMM GRANULOCYTES NFR BLD AUTO: 0.4 % (ref 0–0.5)
LYMPHOCYTES # BLD AUTO: 3.1 K/UL (ref 1–4.8)
LYMPHOCYTES NFR BLD: 31.5 % (ref 18–48)
MCH RBC QN AUTO: 28.1 PG (ref 27–31)
MCHC RBC AUTO-ENTMCNC: 30.3 G/DL (ref 32–36)
MCV RBC AUTO: 93 FL (ref 82–98)
MONOCYTES # BLD AUTO: 0.9 K/UL (ref 0.3–1)
MONOCYTES NFR BLD: 8.8 % (ref 4–15)
NEUTROPHILS # BLD AUTO: 5.6 K/UL (ref 1.8–7.7)
NEUTROPHILS NFR BLD: 56.5 % (ref 38–73)
NRBC BLD-RTO: 0 /100 WBC
PLATELET # BLD AUTO: 187 K/UL (ref 150–450)
PMV BLD AUTO: 13.4 FL (ref 9.2–12.9)
POCT GLUCOSE: 132 MG/DL (ref 70–110)
POCT GLUCOSE: 169 MG/DL (ref 70–110)
POTASSIUM SERPL-SCNC: 3.8 MMOL/L (ref 3.5–5.1)
PROT SERPL-MCNC: 7.6 G/DL (ref 6–8.4)
RBC # BLD AUTO: 3.91 M/UL (ref 4–5.4)
SODIUM SERPL-SCNC: 138 MMOL/L (ref 136–145)
WBC # BLD AUTO: 9.92 K/UL (ref 3.9–12.7)

## 2022-01-26 PROCEDURE — 63600175 PHARM REV CODE 636 W HCPCS: Performed by: REGISTERED NURSE

## 2022-01-26 PROCEDURE — 92960 PR CARDIOVERSION, ELECTIVE;EXTERN: ICD-10-PCS | Mod: ,,, | Performed by: INTERNAL MEDICINE

## 2022-01-26 PROCEDURE — D9220A PRA ANESTHESIA: ICD-10-PCS | Mod: CRNA,,, | Performed by: REGISTERED NURSE

## 2022-01-26 PROCEDURE — 80053 COMPREHEN METABOLIC PANEL: CPT | Performed by: STUDENT IN AN ORGANIZED HEALTH CARE EDUCATION/TRAINING PROGRAM

## 2022-01-26 PROCEDURE — 93010 EKG 12-LEAD: ICD-10-PCS | Mod: ,,, | Performed by: INTERNAL MEDICINE

## 2022-01-26 PROCEDURE — 25000003 PHARM REV CODE 250: Performed by: REGISTERED NURSE

## 2022-01-26 PROCEDURE — 37000009 HC ANESTHESIA EA ADD 15 MINS: Performed by: INTERNAL MEDICINE

## 2022-01-26 PROCEDURE — 36415 COLL VENOUS BLD VENIPUNCTURE: CPT | Performed by: STUDENT IN AN ORGANIZED HEALTH CARE EDUCATION/TRAINING PROGRAM

## 2022-01-26 PROCEDURE — 37000008 HC ANESTHESIA 1ST 15 MINUTES: Performed by: INTERNAL MEDICINE

## 2022-01-26 PROCEDURE — 25000003 PHARM REV CODE 250: Performed by: STUDENT IN AN ORGANIZED HEALTH CARE EDUCATION/TRAINING PROGRAM

## 2022-01-26 PROCEDURE — 85025 COMPLETE CBC W/AUTO DIFF WBC: CPT | Performed by: STUDENT IN AN ORGANIZED HEALTH CARE EDUCATION/TRAINING PROGRAM

## 2022-01-26 PROCEDURE — D9220A PRA ANESTHESIA: ICD-10-PCS | Mod: ANES,,, | Performed by: ANESTHESIOLOGY

## 2022-01-26 PROCEDURE — 93010 ELECTROCARDIOGRAM REPORT: CPT | Mod: ,,, | Performed by: INTERNAL MEDICINE

## 2022-01-26 PROCEDURE — 93005 ELECTROCARDIOGRAM TRACING: CPT

## 2022-01-26 PROCEDURE — 94640 AIRWAY INHALATION TREATMENT: CPT

## 2022-01-26 PROCEDURE — 92960 CARDIOVERSION ELECTRIC EXT: CPT | Performed by: INTERNAL MEDICINE

## 2022-01-26 PROCEDURE — D9220A PRA ANESTHESIA: Mod: CRNA,,, | Performed by: REGISTERED NURSE

## 2022-01-26 PROCEDURE — 92960 CARDIOVERSION ELECTRIC EXT: CPT | Mod: ,,, | Performed by: INTERNAL MEDICINE

## 2022-01-26 PROCEDURE — D9220A PRA ANESTHESIA: Mod: ANES,,, | Performed by: ANESTHESIOLOGY

## 2022-01-26 RX ORDER — FLECAINIDE ACETATE 100 MG/1
100 TABLET ORAL EVERY 12 HOURS
Qty: 60 TABLET | Refills: 11 | Status: SHIPPED | OUTPATIENT
Start: 2022-01-26 | End: 2022-03-09

## 2022-01-26 RX ORDER — LIDOCAINE HYDROCHLORIDE 20 MG/ML
INJECTION INTRAVENOUS
Status: DISCONTINUED | OUTPATIENT
Start: 2022-01-26 | End: 2022-01-26

## 2022-01-26 RX ORDER — PROPOFOL 10 MG/ML
VIAL (ML) INTRAVENOUS
Status: DISCONTINUED | OUTPATIENT
Start: 2022-01-26 | End: 2022-01-26

## 2022-01-26 RX ADMIN — PROPOFOL 30 MG: 10 INJECTION, EMULSION INTRAVENOUS at 10:01

## 2022-01-26 RX ADMIN — LIDOCAINE HYDROCHLORIDE 150 MG: 20 INJECTION, SOLUTION INTRAVENOUS at 10:01

## 2022-01-26 RX ADMIN — MUPIROCIN: 20 OINTMENT TOPICAL at 10:01

## 2022-01-26 RX ADMIN — PROPOFOL 70 MG: 10 INJECTION, EMULSION INTRAVENOUS at 10:01

## 2022-01-26 RX ADMIN — FLUTICASONE FUROATE AND VILANTEROL TRIFENATATE 1 PUFF: 100; 25 POWDER RESPIRATORY (INHALATION) at 07:01

## 2022-01-26 RX ADMIN — ATORVASTATIN CALCIUM 40 MG: 40 TABLET, FILM COATED ORAL at 10:01

## 2022-01-26 RX ADMIN — SODIUM CHLORIDE, SODIUM LACTATE, POTASSIUM CHLORIDE, AND CALCIUM CHLORIDE: .6; .31; .03; .02 INJECTION, SOLUTION INTRAVENOUS at 09:01

## 2022-01-26 NOTE — PROGRESS NOTES
[facility] Medicine  Progress Note    Patient Name: Micaela Hernandez  MRN: [id number]  Patient Class: IP- Inpatient   Admission Date: 1/24/2022  Length of Stay: 2 days  Attending Physician: Rusty Arnold MD  Primary Care Provider: Bailey Fiore MD        Subjective:     Principal Problem:Atrial flutter        HPI:    Micaela Hernandez is a 51 y.o. female who  has a past medical history of Morbid Obesity, Degenerative lumbar disc, Diabetes mellitus, Diabetes mellitus, type 2, Hyperlipidemia, and Hypertension, presented to the ED with CC of SOB. Patient states that she has been feeling this since November, and has been wheezing intermittently as well. She even went to an allergist last week and had Xray, attempted albuterol.In Afib/Aflutter in the ED. HR sustained in 150's. Has never had an ECHO, never been to cardiologist. Started on dilt gtt and sent to ICU. SED rate 68. Concern for PNA with pleural fluid and infiltrates with LN swellings and new afib/flutter, started on CAP. LN of axilla were biopsied and told they were reactive, not malignant. Normal D-dimer for age. BNP was 102 but BMI 60. Patient denies any fevers, night sweats, n/v/d/c, abd pain, dysuria, hematuria or hematochezia, cough, CP, leg swelling, HA, dizziness, weakness, hallucinations, SI, HI, or self injury at this time.        Overview/Hospital Course:  No notes on file    Interval History: No events overnight. No new complaints this AM.    Review of Systems   Constitutional: Negative for chills and fever.   Respiratory: Negative for cough and shortness of breath.    Cardiovascular: Negative for chest pain and leg swelling.   Gastrointestinal: Negative for abdominal distention and abdominal pain.     Objective:     Vital Signs (Most Recent):  Temp: 98.6 °F (37 °C) (01/26/22 1007)  Pulse: 90 (01/26/22 1039)  Resp: (!) 26 (01/26/22 1039)  BP: (!) 144/92 (01/26/22 1039)  SpO2: 95 % (01/26/22 1039) Vital Signs (24h  Range):  Temp:  [97.9 °F (36.6 °C)-98.8 °F (37.1 °C)] 98.6 °F (37 °C)  Pulse:  [] 90  Resp:  [16-54] 26  SpO2:  [89 %-100 %] 95 %  BP: (103-157)/() 144/92     Weight: (!) 149.1 kg (328 lb 11.3 oz)  Body mass index is 60.12 kg/m².    Intake/Output Summary (Last 24 hours) at 1/26/2022 1046  Last data filed at 1/26/2022 1011  Gross per 24 hour   Intake 744.79 ml   Output 800 ml   Net -55.21 ml      Physical Exam  Constitutional:       General: She is not in acute distress.     Appearance: She is obese. She is ill-appearing. She is not toxic-appearing or diaphoretic.   Cardiovascular:      Rate and Rhythm: Regular rhythm. Tachycardia present.      Heart sounds: No murmur heard.  No gallop.    Pulmonary:      Effort: Pulmonary effort is normal.      Breath sounds: Normal breath sounds. No wheezing or rales.   Abdominal:      General: Bowel sounds are normal. There is no distension.      Palpations: Abdomen is soft.      Tenderness: There is no abdominal tenderness.         Significant Labs: All pertinent labs within the past 24 hours have been reviewed.    Significant Imaging: I have reviewed all pertinent imaging results/findings within the past 24 hours.      Assessment/Plan:      * Atrial flutter  Going for STEVEN/CV today      Elevated brain natriuretic peptide (BNP) level  S/p IV diuresis    Hypertension  Holding antihypertensives, only BB/CCB for aflutter      Hyperlipidemia  Statin      Morbid obesity with BMI of 50.0-59.9, adult  Body mass index is 60.12 kg/m². Morbid obesity complicates all aspects of disease management from diagnostic modalities to treatment. Weight loss encouraged and health benefits explained to patient.     -recommend/refer bariatric surgery at discharge     Type 2 diabetes mellitus without complication, without long-term current use of insulin  SSI ordered      Axillary mass  LN swellings have been bx, told reactive  Mammo last month was negative        VTE Risk Mitigation (From  admission, onward)         Ordered     apixaban tablet 5 mg  2 times daily         01/26/22 1010     IP VTE HIGH RISK PATIENT  Once         01/24/22 2145     Place sequential compression device  Until discontinued         01/24/22 2145                Discharge Planning   SEBASTIAN:      Code Status: Full Code   Is the patient medically ready for discharge?:     Reason for patient still in hospital (select all that apply): Patient trending condition, Laboratory test, Treatment, Consult recommendations and Pending disposition               Critical care time spent on the evaluation and treatment of severe organ dysfunction, review of pertinent labs and imaging studies, discussions with consulting providers and discussions with patient/family: 45 minutes.      Rusty Arnold MD  Department of Hospital Medicine   [facility] - Intensive Care

## 2022-01-26 NOTE — PROCEDURES
"Micaela Hernandez is a 51 y.o. female patient.    Temp: 97.9 °F (36.6 °C) (01/26/22 0715)  Pulse: (!) 134 (01/26/22 0830)  Resp: (!) 54 (01/26/22 0830)  BP: (!) 141/101 (01/26/22 0830)  SpO2: 95 % (01/26/22 0830)  Weight: (!) 149.1 kg (328 lb 11.3 oz) (01/26/22 0600)  Height: 5' 2" (157.5 cm) (01/25/22 0216)       Procedures     STEVEN/CV   Dr Patricio  Pre-op Dx A-flutter  Post-op Dx same  Specimen none  EBl < 50 cc    1/26/22 STEVEN/CV - EF 55%, mild MR/TR. No thrombus in IDANIA. CV 200J converted A-flutter to NSR    Change lovenox to eliquis 5 bid  Ok for d/c today  OV with me 1 week    1/26/2022  "

## 2022-01-26 NOTE — PLAN OF CARE
[facility] - Intensive Care  Discharge Final Note    Primary Care Provider: Bailey Fiore MD    Expected Discharge Date: 1/26/2022  Patient discharged by RN. SW called patient to ensure she had information on follow up appointment for PCP and cardiology. Patient stated that she will call PCP to reschedule to the location she usually goes to. SW discussed patient responsibilities for MANAGING YOUR HEALTH AT HOME  EDUCATION:  Things You are responsible For To Manage Your Care At Home:  1.    Getting your prescriptions filled   2.    Taking your medications as directed, DO NOT MISS ANY DOSES!  3.    Going to your follow-up doctor appointment. This is important because it  allow the doctor to monitor your progress and determine if  any changes need to made to your treatment plan.  Call [facility] Help at home number for new or repeated problems / symptoms   Call 911 for CP and / or SOB        Final Discharge Note (most recent)       Final Note - 01/26/22 1351          Final Note    Assessment Type Final Discharge Note     Anticipated Discharge Disposition Home or Self Care     What phone number can be called within the next 1-3 days to see how you are doing after discharge? [phone]     Hospital Resources/Appts/Education Provided Appointments scheduled and added to AVS        Post-Acute Status    Post-Acute Authorization Other     Other Status No Post-Acute Service Needs     Discharge Delays None known at this time                     Important Message from Medicare             Contact Info       Ji Patricio MD   Specialty: Cardiology    [address]  [address]  [place] [address]   Phone: [phone]       Next Steps: Go on 2/3/2022    Instructions: Please arrive to scheduled appointment at 11:15am    Bailey Fiore MD   Specialty: Internal Medicine, Wound Care   Relationship: PCP - General    [address] [place] [address]  [address]  [place] [address]   Phone: [phone]       Next Steps: Go on 2/2/2022

## 2022-01-26 NOTE — PLAN OF CARE
Problem: Adult Inpatient Plan of Care  Goal: Plan of Care Review  Outcome: Met  Goal: Patient-Specific Goal (Individualized)  Outcome: Met  Goal: Absence of Hospital-Acquired Illness or Injury  Outcome: Met  Goal: Optimal Comfort and Wellbeing  Outcome: Met  Goal: Readiness for Transition of Care  Outcome: Met     Problem: Bariatric Environmental Safety  Goal: Safety Maintained with Care  Outcome: Met     Problem: Diabetes Comorbidity  Goal: Blood Glucose Level Within Targeted Range  Outcome: Met

## 2022-01-26 NOTE — TRANSFER OF CARE
"Anesthesia Transfer of Care Note    Patient: Micaela Hernandez    Procedure(s) Performed: Procedure(s) (LRB):  ECHOCARDIOGRAM, TRANSESOPHAGEAL (N/A)  Transesophageal echo (STEVEN) intra-procedure log documentation (N/A)    Patient location: Cath Lab (recovered by cath lab staff in OR 2 )    Anesthesia Type: MAC    Transport from OR: Transported from OR on room air with adequate spontaneous ventilation    Post pain: adequate analgesia    Post assessment: no apparent anesthetic complications and tolerated procedure well    Post vital signs: stable    Level of consciousness: awake, alert and oriented    Nausea/Vomiting: no nausea/vomiting    Complications: none    Transfer of care protocol was followed      Last vitals:   Visit Vitals  /72 (BP Location: Left arm, Patient Position: Lying)   Pulse 85   Temp 37 °C (98.6 °F) (Skin)   Resp 16   Ht 5' 2" (1.575 m)   Wt (!) 149.1 kg (328 lb 11.3 oz)   SpO2 100%   Breastfeeding No   BMI 60.12 kg/m²     "

## 2022-01-26 NOTE — DISCHARGE SUMMARY
[facility] Medicine  Discharge Summary      Patient Name: Micaela Hernandez  MRN: [id number]  Patient Class: IP- Inpatient  Admission Date: 1/24/2022  Hospital Length of Stay: 2 days  Discharge Date and Time: 1/26/2022  1:36 PM  Attending Physician: No att. providers found   Discharging Provider: Rusty Arnold MD  Primary Care Provider: Bailey Fiore MD      HPI:     Micaela Hernandez is a 51 y.o. female who  has a past medical history of Morbid Obesity, Degenerative lumbar disc, Diabetes mellitus, Diabetes mellitus, type 2, Hyperlipidemia, and Hypertension, presented to the ED with CC of SOB. Patient states that she has been feeling this since November, and has been wheezing intermittently as well. She even went to an allergist last week and had Xray, attempted albuterol.In Afib/Aflutter in the ED. HR sustained in 150's. Has never had an ECHO, never been to cardiologist. Started on dilt gtt and sent to ICU. SED rate 68. Concern for PNA with pleural fluid and infiltrates with LN swellings and new afib/flutter, started on CAP. LN of axilla were biopsied and told they were reactive, not malignant. Normal D-dimer for age. BNP was 102 but BMI 60. Patient denies any fevers, night sweats, n/v/d/c, abd pain, dysuria, hematuria or hematochezia, cough, CP, leg swelling, HA, dizziness, weakness, hallucinations, SI, HI, or self injury at this time.        Procedure(s) (LRB):  ECHOCARDIOGRAM, TRANSESOPHAGEAL (N/A)  Transesophageal echo (STEVEN) intra-procedure log documentation (N/A)      Hospital Course:   Ms. Hernandez was admitted to the ICU for atrial flutter. Cardiology was consulted, and she was started on anticoagulation, flecainide, and a diltiazem drip w/ improvement in heart rate but remained in atrial flutter. She had shortness of breath which improved with furosemide. TTE showed normal LVEF. The following day she was cardioverted with successful restoration of sinus rhythm. She was discharged on  flecainide and apixaban (CV 2+) to follow up with cardiology in the outpatient setting. Return precautions given, all questions answered prior to discharge, patient in agreement with plan.        Goals of Care Treatment Preferences:  Code Status: Full Code      Consults:   Consults (From admission, onward)        Status Ordering Provider     Inpatient consult to Cardiology  Once        Provider:  Ji Patricio MD    Acknowledged MARILYN ARELLANO new Assessment & Plan notes have been filed under this hospital service since the last note was generated.  Service: Hospital Medicine    Final Active Diagnoses:    Diagnosis Date Noted POA    PRINCIPAL PROBLEM:  Atrial flutter [I48.92] 01/25/2022 Yes    Elevated brain natriuretic peptide (BNP) level [R79.89] 01/25/2022 Yes    Hyperlipidemia [E78.5]  Yes    Hypertension [I10]  Yes    Morbid obesity with BMI of 50.0-59.9, adult [E66.01, Z68.43] 03/26/2018 Not Applicable    Type 2 diabetes mellitus without complication, without long-term current use of insulin [E11.9] 04/12/2017 Yes    Axillary mass [R22.30] 10/23/2013 Yes      Problems Resolved During this Admission:       Discharged Condition: good    Disposition: Home or Self Care    Follow Up:   Follow-up Information     Ji Patricio MD. Go on 2/3/2022.    Specialty: Cardiology  Why: Please arrive to scheduled appointment at 11:15am  Contact information:  [address]  [address]  [facility] [id number] [phone]             Bailey Fiore MD. Go on 2/2/2022.    Specialties: Internal Medicine, Wound Care  Contact information:  [address] [address]  [address]  [place] [address] [phone]                       Patient Instructions:      Notify your health care provider if you experience any of the following:   Order Comments: Shortness of breath, palpitations, elevated heart rate       Significant Diagnostic Studies: as above    Pending Diagnostic Studies:     Procedure Component Value Units Date/Time     EKG 12-lead [[id number]]     Order Status: Sent Lab Status: No result          Medications:  Reconciled Home Medications:      Medication List      START taking these medications    apixaban 5 mg Tab  Commonly known as: ELIQUIS  Take 1 tablet (5 mg total) by mouth 2 (two) times daily.     flecainide 100 MG Tab  Commonly known as: TAMBOCOR  Take 1 tablet (100 mg total) by mouth every 12 (twelve) hours.        CONTINUE taking these medications    albuterol 90 mcg/actuation inhaler  Commonly known as: VENTOLIN HFA  Inhale 2 puffs into the lungs every 6 (six) hours as needed for Wheezing or Shortness of Breath. Rescue     BREO ELLIPTA 100-25 mcg/dose diskus inhaler  Generic drug: fluticasone furoate-vilanteroL  Inhale 1 puff into the lungs once daily. Controller     carvediloL 3.125 MG tablet  Commonly known as: COREG  Take 1 tablet (3.125 mg total) by mouth 2 (two) times daily with meals.     FLUCELVAX QUAD 7472-6688 (PF) 60 mcg (15 mcg x 4)/0.5 mL Syrg  Generic drug: flu vac qs 2020(4 yr up)CD(PF)  PHARMACY ADMINISTERED     glimepiride 2 MG tablet  Commonly known as: AMARYL  Take 1 tablet (2 mg total) by mouth before breakfast.     hydrocortisone 2.5 % cream  Apply topically 2 (two) times daily.     loratadine 10 mg tablet  Commonly known as: CLARITIN  Take 10 mg by mouth once daily.     losartan 50 MG tablet  Commonly known as: COZAAR  Take 1 tablet (50 mg total) by mouth 2 (two) times daily.     metFORMIN 1000 MG tablet  Commonly known as: GLUCOPHAGE  Take 1 tablet (1,000 mg total) by mouth 2 (two) times daily with meals.     montelukast 10 mg tablet  Commonly known as: SINGULAIR  Take 1 tablet (10 mg total) by mouth every evening.     simvastatin 40 MG tablet  Commonly known as: ZOCOR  Take 1 tablet (40 mg total) by mouth every evening.            Indwelling Lines/Drains at time of discharge:   Lines/Drains/Airways     None                 Time spent on the discharge of patient: 45 minutes    Critical care time  spent on the evaluation and treatment of severe organ dysfunction, review of pertinent labs and imaging studies, discussions with consulting providers and discussions with patient/family: 35minutes.     Rusty Arnold MD  Department of Hospital Medicine  [facility] [place] - Intensive Care

## 2022-01-26 NOTE — ASSESSMENT & PLAN NOTE
Body mass index is 60.12 kg/m². Morbid obesity complicates all aspects of disease management from diagnostic modalities to treatment. Weight loss encouraged and health benefits explained to patient.     -recommend/refer bariatric surgery at discharge

## 2022-01-26 NOTE — HOSPITAL COURSE
Ms. Hernandez was admitted to the ICU for atrial flutter. Cardiology was consulted, and she was started on anticoagulation, flecainide, and a diltiazem drip w/ improvement in heart rate but remained in atrial flutter. She had shortness of breath which improved with furosemide. TTE showed normal LVEF. The following day she was cardioverted with successful restoration of sinus rhythm. She was discharged on flecainide and apixaban (CV 2+) to follow up with cardiology in the outpatient setting. Return precautions given, all questions answered prior to discharge, patient in agreement with plan.

## 2022-01-26 NOTE — SUBJECTIVE & OBJECTIVE
Interval History: No events overnight. No new complaints this AM.    Review of Systems   Constitutional: Negative for chills and fever.   Respiratory: Negative for cough and shortness of breath.    Cardiovascular: Negative for chest pain and leg swelling.   Gastrointestinal: Negative for abdominal distention and abdominal pain.     Objective:     Vital Signs (Most Recent):  Temp: 98.6 °F (37 °C) (01/26/22 1007)  Pulse: 90 (01/26/22 1039)  Resp: (!) 26 (01/26/22 1039)  BP: (!) 144/92 (01/26/22 1039)  SpO2: 95 % (01/26/22 1039) Vital Signs (24h Range):  Temp:  [97.9 °F (36.6 °C)-98.8 °F (37.1 °C)] 98.6 °F (37 °C)  Pulse:  [] 90  Resp:  [16-54] 26  SpO2:  [89 %-100 %] 95 %  BP: (103-157)/() 144/92     Weight: (!) 149.1 kg (328 lb 11.3 oz)  Body mass index is 60.12 kg/m².    Intake/Output Summary (Last 24 hours) at 1/26/2022 1046  Last data filed at 1/26/2022 1011  Gross per 24 hour   Intake 744.79 ml   Output 800 ml   Net -55.21 ml      Physical Exam  Constitutional:       General: She is not in acute distress.     Appearance: She is obese. She is ill-appearing. She is not toxic-appearing or diaphoretic.   Cardiovascular:      Rate and Rhythm: Regular rhythm. Tachycardia present.      Heart sounds: No murmur heard.  No gallop.    Pulmonary:      Effort: Pulmonary effort is normal.      Breath sounds: Normal breath sounds. No wheezing or rales.   Abdominal:      General: Bowel sounds are normal. There is no distension.      Palpations: Abdomen is soft.      Tenderness: There is no abdominal tenderness.         Significant Labs: All pertinent labs within the past 24 hours have been reviewed.    Significant Imaging: I have reviewed all pertinent imaging results/findings within the past 24 hours.

## 2022-01-26 NOTE — PLAN OF CARE
Problem: Adult Inpatient Plan of Care  Goal: Plan of Care Review  Outcome: Ongoing, Progressing  Goal: Patient-Specific Goal (Individualized)  Outcome: Ongoing, Progressing  Goal: Absence of Hospital-Acquired Illness or Injury  Outcome: Ongoing, Progressing  Goal: Optimal Comfort and Wellbeing  Outcome: Ongoing, Progressing  Goal: Readiness for Transition of Care  Outcome: Ongoing, Progressing     Problem: Bariatric Environmental Safety  Goal: Safety Maintained with Care  Outcome: Ongoing, Progressing     Problem: Diabetes Comorbidity  Goal: Blood Glucose Level Within Targeted Range  Outcome: Ongoing, Progressing

## 2022-01-26 NOTE — PLAN OF CARE
Patient remains in the ICU on Cardizem drip. HR between 80's and 120's. No complaints of pain or signs of distress observed. Patient NPO for procedure today. All other vitals are stable.

## 2022-01-26 NOTE — ANESTHESIA PREPROCEDURE EVALUATION
[facility]  Anesthesia Pre-Operative Evaluation         Patient Name: Micaela Hernandez  YOB: 1970  MRN: [id number]    SUBJECTIVE:     Pre-operative evaluation for Procedure(s) (LRB):  ECHOCARDIOGRAM, TRANSESOPHAGEAL (N/A)     01/26/2022    Micaela Hernandez is a 51 y.o. female w/ a significant PMHx of rbid Obesity, Degenerative lumbar disc, Diabetes mellitus type 2, Hyperlipidemia, and Hypertension, presented to the ED with CC of SOB.  Afib/Aflutter in the ED. On dilt drip for afib rvr.     EKG A-flutter 2:1 156  Troponin negative    Denies prior Hx A-fib/flutter or cardiac issues  Echo prelin EF > 55%      Patient now presents for the above procedure(s).      LDA: None documented.       Peripheral IV - Single Lumen 01/24/22 1618 20 G Right Antecubital (Active)   Site Assessment Clean;Dry;Intact;No redness;No swelling 01/26/22 0715   Extremity Assessment Distal to IV No abnormal discoloration 01/26/22 0715   Line Status Infusing 01/26/22 0715   Dressing Status Clean;Dry;Intact 01/26/22 0715   Dressing Intervention Integrity maintained 01/26/22 0715   Dressing Change Due 01/28/22 01/26/22 0715   Site Change Due 01/28/22 01/26/22 0715   Reason Not Rotated Not due 01/26/22 0715   Number of days: 1            Peripheral IV - Single Lumen 01/24/22 2247 22 G Left Hand (Active)   Site Assessment Clean;Dry;Intact;No redness;No swelling 01/26/22 0715   Extremity Assessment Distal to IV No abnormal discoloration 01/26/22 0715   Line Status No blood return;Flushed;Saline locked 01/26/22 0715   Dressing Status Clean;Dry;Intact 01/26/22 0715   Dressing Intervention Integrity maintained 01/26/22 0715   Dressing Change Due 01/28/22 01/26/22 0715   Site Change Due 01/28/22 01/26/22 0715   Reason Not Rotated Not due 01/26/22 0715   Number of days: 1       Female External Urinary Catheter 01/25/22 1030 (Active)   Skin no redness;no breakdown 01/26/22 0715   Tolerance no signs/symptoms of  discomfort 01/26/22 0715   Suction Continuous suction at 40 mmHg 01/26/22 0715   Date of last wick change 01/25/22 01/26/22 0715   Time of last wick change 1854 01/26/22 0715   Output (mL) 250 mL 01/26/22 0600   Number of days: 0       Prev airway:     Present Prior to Hospital Arrival?: No; Placement Date: 11/29/16; Placement Time: 0859; Method of Intubation: Direct laryngoscopy; Inserted by: CRNA; Airway Device: Endotracheal Tube; Mask Ventilation: Easy; Intubated: Postinduction; Blade: Pratt #2; Airway Device Size: 7.0; Style: Cuffed; Cuff Inflation: Minimal occlusive pressure; Inflation Amount: 4; Placement Verified By: Auscultation, Capnometry; Grade: Grade III; Complicating Factors: Obesity, Small mouth; Intubation Findings: Positive EtCO2, Bilateral breath sounds, Atraumatic/Condition of teeth unchanged;  Depth of Insertion: 21; Securment: Lips; Complications: None; Breath Sounds: Equal Bilateral; Insertion Attempts: 1; Removal Date: 11/29/16;  Removal Time: 1003    Drips: None documented.   dilTIAZem 10 mg/hr (01/25/22 2253)       Patient Active Problem List   Diagnosis    Axillary mass    Liver mass, right lobe    Hernia, incisional    Type 2 diabetes mellitus without complication, without long-term current use of insulin    ARORA (nonalcoholic steatohepatitis)    Morbid obesity with BMI of 50.0-59.9, adult    Type 2 diabetes mellitus with diabetic nephropathy, without long-term current use of insulin    Hyperlipidemia    Hypertension    Chronic right-sided low back pain without sciatica    Chronic pain of right hip    Chronic pain of both knees    Degenerative lumbar disc    Primary osteoarthritis of both knees    Atrial flutter    Elevated brain natriuretic peptide (BNP) level       Review of patient's allergies indicates:  No Known Allergies    Current Inpatient Medications:   atorvastatin  40 mg Oral Daily    cefTRIAXone (ROCEPHIN) IVPB  2 g Intravenous Q24H    enoxaparin  1 mg/kg  Subcutaneous Q12H    flecainide  100 mg Oral Q12H    fluticasone furoate-vilanteroL  1 puff Inhalation Daily    melatonin  6 mg Oral Nightly    metoprolol tartrate  50 mg Oral TID    mupirocin   Nasal BID       No current facility-administered medications on file prior to encounter.     Current Outpatient Medications on File Prior to Encounter   Medication Sig Dispense Refill    albuterol (VENTOLIN HFA) 90 mcg/actuation inhaler Inhale 2 puffs into the lungs every 6 (six) hours as needed for Wheezing or Shortness of Breath. Rescue 18 g 0    glimepiride (AMARYL) 2 MG tablet Take 1 tablet (2 mg total) by mouth before breakfast. 90 tablet 0    metFORMIN (GLUCOPHAGE) 1000 MG tablet Take 1 tablet (1,000 mg total) by mouth 2 (two) times daily with meals. 180 tablet 0    montelukast (SINGULAIR) 10 mg tablet Take 1 tablet (10 mg total) by mouth every evening. 30 tablet 0    simvastatin (ZOCOR) 40 MG tablet Take 1 tablet (40 mg total) by mouth every evening. 90 tablet 3    carvediloL (COREG) 3.125 MG tablet Take 1 tablet (3.125 mg total) by mouth 2 (two) times daily with meals. 60 tablet 0    FLUCELVAX QUAD [id number], PF, 60 mcg (15 mcg x 4)/0.5 mL Syrg PHARMACY ADMINISTERED      fluticasone furoate-vilanteroL (BREO ELLIPTA) 100-25 mcg/dose diskus inhaler Inhale 1 puff into the lungs once daily. Controller 60 each 1    hydrocortisone 2.5 % cream Apply topically 2 (two) times daily. 28 g 5    loratadine (CLARITIN) 10 mg tablet Take 10 mg by mouth once daily.         Past Surgical History:   Procedure Laterality Date    BREAST SURGERY       SECTION      CHOLECYSTECTOMY      ENDOMETRIAL ABLATION      HERNIA REPAIR      incisional     KNEE SURGERY      LIVER LOBECTOMY Right     LYMPH NODE DISSECTION      right axillary       Social History     Socioeconomic History    Marital status:    Tobacco Use    Smoking status: Never Smoker    Smokeless tobacco: Never Used   Substance and Sexual  Activity    Alcohol use: Yes     Comment: 4-6 drinks per year    Drug use: No    Sexual activity: Not Currently     Partners: Male     Birth control/protection: None       OBJECTIVE:     Vital Signs Range (Last 24H):  Temp:  [36.6 °C (97.9 °F)-37.1 °C (98.8 °F)]   Pulse:  []   Resp:  [18-42]   BP: (103-157)/(60-98)   SpO2:  [89 %-97 %]       Significant Labs:  Lab Results   Component Value Date    WBC 9.92 01/26/2022    HGB 11.0 (L) 01/26/2022    HCT 36.3 (L) 01/26/2022     01/26/2022    CHOL 180 12/28/2021    TRIG 138 12/28/2021    HDL 53 12/28/2021    ALT 33 01/26/2022    AST 32 01/26/2022     01/26/2022    K 3.8 01/26/2022     01/26/2022    CREATININE 0.8 01/26/2022    BUN 16 01/26/2022    CO2 22 (L) 01/26/2022    TSH 2.612 01/24/2022    HGBA1C 7.7 (H) 12/28/2021       Diagnostic Studies: No relevant studies.    EKG:   Results for orders placed or performed during the hospital encounter of 01/24/22   EKG 12-lead    Collection Time: 01/25/22  6:00 AM    Narrative    Test Reason : I48.91,I48.92,    Vent. Rate : 091 BPM     Atrial Rate : 300 BPM     P-R Int : 000 ms          QRS Dur : 088 ms      QT Int : 416 ms       P-R-T Axes : 088 052 075 degrees     QTc Int : 511 ms    Atrial flutter with variable A-V block with premature ventricular or  aberrantly conducted complexes  Prolonged QT  Abnormal ECG  When compared with ECG of 25-JAN-2022 03:00,  No significant change was found  Confirmed by Ji Patricio MD ([id number]) on 1/25/2022 5:44:29 PM    Referred By: [email or web address]   SELF           Confirmed By:Ji Patricio MD       2D ECHO:  TTE:  Results for orders placed or performed during the hospital encounter of 01/24/22   Echo   Result Value Ref Range    BSA 2.56 m2    AORTIC VALVE CUSP SEPERATION 1.85 cm    PV PEAK VELOCITY 1.07 cm/s    LVIDd 4.78 3.5 - 6.0 cm    IVS 1.39 (A) 0.6 - 1.1 cm    Posterior Wall 1.19 (A) 0.6 - 1.1 cm    Ao root annulus 2.73 cm    LVIDs 3.42 2.1 - 4.0 cm    FS 28 28 - 44  %    Ascending aorta 2.70 cm    LV mass 241.44 g    LA size 3.67 cm    Left Ventricle Relative Wall Thickness 0.50 cm    AV mean gradient 5 mmHg    AV valve area 1.89 cm2    AV Velocity Ratio 0.60     AV index (prosthetic) 0.58     IVRT 87.66 msec    LVOT diameter 2.04 cm    LVOT area 3.3 cm2    LVOT peak wade 0.91 m/s    LVOT peak VTI 16.55 cm    Ao peak wade 1.52 m/s    Ao VTI 28.56 cm    LVOT stroke volume 54.07 cm3    AV peak gradient 9 mmHg    TR Max Wade 1.87 m/s    LV Systolic Volume 48.23 mL    LV Systolic Volume Index 20.4 mL/m2    LV Diastolic Volume 106.44 mL    LV Diastolic Volume Index 45.10 mL/m2    LV Mass Index 102 g/m2    Left Atrium Major Axis 5.35 cm    Triscuspid Valve Regurgitation Peak Gradient 14 mmHg    Right Atrial Pressure (from IVC) 8 mmHg    EF 60 %    TV rest pulmonary artery pressure 22 mmHg    Narrative    · The left ventricle is normal in size with concentric hypertrophy and   normal systolic function.  · The estimated ejection fraction is 60%.  · Normal left ventricular diastolic function.  · Normal right ventricular size with normal right ventricular systolic   function.  · Mild left atrial enlargement.  · The estimated PA systolic pressure is 22 mmHg.  · Intermediate central venous pressure (8 mmHg).          STEVEN:  No results found for this or any previous visit.    ASSESSMENT/PLAN:                                                                                                                  01/26/2022  Micaela Hernandez is a 51 y.o., female.    Anesthesia Evaluation     I have reviewed the Nursing Notes.       Review of Systems  Social:  Non-Smoker   Cardiovascular:   Denies Pacemaker. Hypertension Dysrhythmias  hyperlipidemia    Pulmonary:   Denies Pneumonia Denies COPD.  Denies Asthma.  Denies Shortness of breath.  Denies Recent URI.    Renal/:   Chronic Renal Disease    Hepatic/GI:   No Bowel Prep. Denies PUD. Denies GERD. Liver Disease, Hepatitis (ARORA)     Neurological:  Neurology Normal    Endocrine:   Diabetes Denies Hypothyroidism. Denies Hyperthyroidism.        Physical Exam  General:  Morbid Obesity    Airway/Jaw/Neck:  AIRWAY FINDINGS: Normal      Chest/Lungs:  Chest/Lungs Clear    Heart/Vascular:  Heart Findings: Normal       Mental Status:  Mental Status Findings: Normal        Anesthesia Plan  Type of Anesthesia, risks & benefits discussed:  Anesthesia Type:  general    Patient's Preference:   Plan Factors:          Intra-op Monitoring Plan: standard ASA monitors  Intra-op Monitoring Plan Comments:   Post Op Pain Control Plan:   Post Op Pain Control Plan Comments:     Induction:   IV  Beta Blocker:  Patient is on a Beta-Blocker and has received one dose within the past 24 hours (No further documentation required).       Informed Consent: Patient understands risks and agrees with Anesthesia plan.  Questions answered. Anesthesia consent signed with patient.  ASA Score: 3     Day of Surgery Review of History & Physical:  There are no significant changes.  H&P update referred to the provider.         Ready For Surgery From Anesthesia Perspective.

## 2022-01-27 NOTE — ANESTHESIA POSTPROCEDURE EVALUATION
Anesthesia Post Evaluation    Patient: Micaela Hernandez    Procedure(s) Performed: Procedure(s) (LRB):  ECHOCARDIOGRAM, TRANSESOPHAGEAL (N/A)  Transesophageal echo (STEVEN) intra-procedure log documentation (N/A)    Final Anesthesia Type: general      Patient location: recovered in the OR.  Patient participation: Yes- Able to Participate  Level of consciousness: awake and alert  Post-procedure vital signs: reviewed and stable  Pain management: adequate  Airway patency: patent    PONV status at discharge: No PONV  Anesthetic complications: no      Cardiovascular status: blood pressure returned to baseline and hemodynamically stable  Respiratory status: unassisted and spontaneous ventilation  Hydration status: euvolemic  Follow-up not needed.          Vitals Value Taken Time   /92 01/26/22 1231   Temp 36.6 °C (97.8 °F) 01/26/22 1115   Pulse 102 01/26/22 1308   Resp 23 01/26/22 1308   SpO2 93 % 01/26/22 1301   Vitals shown include unvalidated device data.      No case tracking events are documented in the log.      Pain/Robinson Score: Robinson Score: 10 (1/26/2022  9:47 AM)

## 2022-01-28 ENCOUNTER — PATIENT MESSAGE (OUTPATIENT)
Dept: FAMILY MEDICINE | Facility: CLINIC | Age: 52
End: 2022-01-28
Payer: COMMERCIAL

## 2022-01-28 ENCOUNTER — APPOINTMENT (OUTPATIENT)
Dept: RADIOLOGY | Facility: HOSPITAL | Age: 52
End: 2022-01-28
Attending: INTERNAL MEDICINE
Payer: COMMERCIAL

## 2022-01-28 DIAGNOSIS — J90 PLEURAL EFFUSION: Primary | ICD-10-CM

## 2022-01-28 DIAGNOSIS — R06.02 SOB (SHORTNESS OF BREATH): ICD-10-CM

## 2022-01-28 PROBLEM — E11.9 TYPE 2 DIABETES MELLITUS WITHOUT COMPLICATION, WITHOUT LONG-TERM CURRENT USE OF INSULIN: Status: RESOLVED | Noted: 2017-04-12 | Resolved: 2022-01-28

## 2022-01-28 PROCEDURE — 71046 X-RAY EXAM CHEST 2 VIEWS: CPT | Mod: 26,,, | Performed by: RADIOLOGY

## 2022-01-28 PROCEDURE — 71046 X-RAY EXAM CHEST 2 VIEWS: CPT | Mod: TC,FY,PN

## 2022-01-28 PROCEDURE — 71046 XR CHEST PA AND LATERAL: ICD-10-PCS | Mod: 26,,, | Performed by: RADIOLOGY

## 2022-01-29 LAB
BACTERIA BLD CULT: NORMAL
BACTERIA BLD CULT: NORMAL

## 2022-01-29 NOTE — PHYSICIAN QUERY
PT Name: Micaela Hernandez  MR #: [id number]     DOCUMENTATION CLARIFICATION     CDS/: Lillian Ventura               Contact information: [email or web address]  This form is a permanent document in the medical record.     Query Date: January 28, 2022    By submitting this query, we are merely seeking further clarification of documentation.  Please utilize your independent clinical judgment when addressing the question(s) below.    The Medical Record contains the following   Indicators Supporting Clinical Findings Location in Medical Record   x Heart Failure documented Stop dilt if HFrEF H&P   x BNP MIX=212 Lab 1-24   x EF/Echo ·The left ventricle is normal in size with concentric hypertrophy and normal systolic function.  ·The estimated ejection fraction is 60%.  ·Normal left ventricular diastolic function.  ·Normal right ventricular size with normal right ventricular systolic function.  ·Mild left atrial enlargement.  ·The estimated PA systolic pressure is 22 mmHg.  ·Intermediate central venous pressure (8 mmHg     Echo 1-25    Radiology findings     x Subjective/Objective Respiratory Conditions SOB H&P    Recent/Current MI      Heart Transplant, LVAD     x Edema, JVD Pulmonary edema EICU note 1-25    Ascites     x Diuretics/Meds IV Furosemide Mar 1-24 to 1-25    Other Treatment      Other       Heart failure is a clinical diagnosis which includes symptomatic fluid retention, elevated intracardiac pressures, and/or the inability of the heart to deliver adequate blood flow.    Heart Failure with reduced Ejection Fraction (HFrEF) or Systolic Heart Failure (loses ability to contract normally, EF is <40%)    Heart Failure with preserved Ejection Fraction (HFpEF) or Diastolic Heart Failure (stiff ventricles, does not relax properly, EF is >50%)     Heart Failure with Combined Systolic and Diastolic Failure (stiff ventricles, does not relax properly and EF is <50%)    Mid-range or mildly reduced  ejection fraction (HFmrEF) is classified as systolic heart failure.   Common clues to acute exacerbation:  Rapidly progressive symptoms (w/in 2 weeks of presentation), using IV diuretics, using supplemental O2, pulmonary edema on Xray, new or worsening pleural effusion, +JVD or other signs of volume overload, MI w/in 4 weeks, and/or BNP >500  The clinical guidelines noted are only system guidelines, and do not replace the providers clinical judgment.    Provider, please specify the diagnosis associated with the above clinical findings.    [   ]  Acute Diastolic Heart Failure (HFpEF) - new diagnosis   [   ]  Heart Failure Ruled Out..Acute pulmonary edema only   [   ] Heart Failure Ruled Out   [  x ]  Other (please specify): ____Volume overload triggered by tachycardia_______________________________   [  ]  Clinically Undetermined     Please document in your progress notes daily for the duration of treatment until resolved and include in your discharge summary.    References:  American Heart Association editorial staff. (2017, May). Ejection Fraction Heart Failure Measurement. American Heart Association. https://www.heart.org/en/health-topics/heart-failure/diagnosing-heart-failure/ejection-fraction-heart-failure-measurement#:~:text=Ejection%20fraction%20(EF)%20is%20a,pushed%20out%20with%20each%20heartbeat  ALKA Wilson (2020, December 15). Heart failure with preserved ejection fraction: Clinical manifestations and diagnosis. [employer]. https://www.[employer]/contents/heart-failure-with-preserved-ejection-fraction-clinical-manifestations-and-diagnosis.  ICD-10-CM/PCS Coding Clinic Third Quarter ICD-10, Effective with discharges: September 8, 2020 [facility] § Heart failure with mid-range or mildly reduced ejection fraction (2020).  Form No. 45846

## 2022-01-29 NOTE — PHYSICIAN QUERY
5PT Name: Micaela Hernandez  MR #: [id number]    DOCUMENTATION CLARIFICATION     CDS/: Lillian Ventura               Contact information:[email or web address]  This form is a permanent document in the medical record.     Query Date: January 28, 2022    By submitting this query, we are merely seeking further clarification of documentation. Please utilize your independent clinical judgment when addressing the question(s) below.    The Medical Record contains the following:    Indicators Supporting Clinical Findings Location in Medical 1-25 Record   x Atrial Flutter Atrial flutter with 2-1 block.   EICU pulmonary disease note 1-25    EKG Results     x Medication On Cardizem infusion.  Started on by mouth metoprolol EICU pulmonary disease note 1-25    Treatment      Other       The clinical guidelines noted are only a system guideline. It does not replace the provider's clinical judgment.    Provider, please further specify the atrial flutter.    [  x ] Typical (Type I) - Diagnosis based on morphological criteria on EKG when the cavo-tricuspid isthmus region is involved   [   ] Atypical (Type II) - Diagnosis based on morphological criteria on EKG (not meeting typical atrial flutter criteria because the cavo-tricuspid isthmus region is not involved)   [   ] Other cardiac diagnosis (please specify):_______________   [   ] Clinically Undetermined       Please document in your progress notes daily for the duration of treatment until resolved, and include in your discharge summary.    Reference:    NICOLETTE Hirsch MD, FACLEIDA, RS, MAGNOLIA Ruiz MD, [facility], [facility], MARCO ANTONIO Salter MD, [facility], [facility], & MARGARITA Bermeo MD. (2019, May 27). Overview of atrial flutter (JOSELYN Goldsmith MD, Ed.). Retrieved October 22, 2020, from https://www.[employer]/contents/hisgkzfi-pj-kzfyvs-flutter?search=atrial%20flutter&source=search_result&selectedTitle=1~150&usage_type=default&display_rank=1    Form No. [id number]

## 2022-01-29 NOTE — PHYSICIAN QUERY
PT Name: Micaela Hernandez  MR #: [id number]     DOCUMENTATION CLARIFICATION      CDS/: Lillian Ventura               Contact information:[email or web address]    This form is a permanent document in the medical record.     Query Date: January 28, 2022    Dear Provider,  By submitting this query, we are merely seeking further clarification of documentation.  Please utilize your independent clinical judgment when addressing the question(s) below.     The Medical Record contains the following:    Supporting Clinical Findings Location in Medical Record    Pneumonia?. Pulmonary disease eicu note 1-25   IV Rocephin  IV Azithromycin Mar 1-24 to 1-26  Mar 1-24 to 1-25   Impression:     1. Pulmonary findings may reflect edema noting accentuation by habitus.  There is questionable trace right pleural effusion versus atelectasis.  Correlation is needed to exclude viral process. Chest xray 1-24         Please clarify if the Pneumonia diagnosis has been:    [  ] Ruled In   [ x ] Ruled Out   [  ] Other/Clarification of findings (please specify): _______________    [   ] Clinically undetermined     Please document in your progress notes daily for the duration of treatment, until resolved, and include in your discharge summary.    Form No. [id number]

## 2022-01-31 ENCOUNTER — NURSE TRIAGE (OUTPATIENT)
Dept: ADMINISTRATIVE | Facility: CLINIC | Age: 52
End: 2022-01-31
Payer: COMMERCIAL

## 2022-01-31 ENCOUNTER — TELEPHONE (OUTPATIENT)
Dept: SLEEP MEDICINE | Facility: HOSPITAL | Age: 52
End: 2022-01-31
Payer: COMMERCIAL

## 2022-01-31 NOTE — TELEPHONE ENCOUNTER
Spoke with pt: had recent hospital stay with rapid heart rate, was cardioverted and started on new meds. Today pulse rate 110s. And pt feels the flutters. Protocol advice given and pt verbalizes understanding.     Reason for Disposition   Skipped or extra beat(s) and occurs 4 or more times per minute    Additional Information   Negative: Passed out (i.e., lost consciousness, collapsed and was not responding)   Negative: Shock suspected (e.g., cold/pale/clammy skin, too weak to stand, low BP, rapid pulse)   Negative: Difficult to awaken or acting confused (e.g., disoriented, slurred speech)   Negative: Visible sweat on face or sweat dripping down face   Negative: Unable to walk, or can only walk with assistance (e.g., requires support)   Negative: Received SHOCK from implantable cardiac defibrillator and has persisting symptoms (i.e., palpitations, lightheadedness)   Negative: Dizziness, lightheadedness, or weakness and heart beating very rapidly (e.g., > 140 / minute)   Negative: Dizziness, lightheadedness, or weakness and heart beating very slowly (e.g., < 50 / minute)   Negative: Sounds like a life-threatening emergency to the triager   Negative: Difficulty breathing   Negative: Heart beating very rapidly (e.g., > 140 / minute) and present now (Exception: during exercise)   Negative: Heart beating very slowly (e.g., < 50 / minute) (Exception: athlete and heart rate normal for caller)   Negative: Dizziness, lightheadedness, or weakness   Negative: New or worsened shortness of breath with activity (dyspnea on exertion)   Negative: Wearing a 'Holter monitor' or 'cardiac event monitor'   Negative: Received SHOCK from implantable cardiac defibrillator (and now feels well)   Negative: Heart beating very rapidly (e.g., > 140 / minute) and not present now (Exception: during exercise)   Negative: Skipped or extra beat(s) and increases with exercise or exertion   Negative: Patient sounds very sick or  weak to the triager    Protocols used: HEART RATE AND HEARTBEAT [id number]

## 2022-01-31 NOTE — TELEPHONE ENCOUNTER
"Return call to Pt, and inform of Provider response. Pt states, "So,  says don't worry about it?". Informed Pt that  didn't say don't worry about it. She stated to follow up with Cardiology. If you have any issues in between now and your appointment. Then you will need to go to UC or ED for evaluation or treatment. Pt acknowledged understanding.   "

## 2022-02-03 ENCOUNTER — OFFICE VISIT (OUTPATIENT)
Dept: CARDIOLOGY | Facility: CLINIC | Age: 52
End: 2022-02-03
Payer: COMMERCIAL

## 2022-02-03 VITALS
HEIGHT: 63 IN | HEART RATE: 84 BPM | BODY MASS INDEX: 51.91 KG/M2 | WEIGHT: 293 LBS | DIASTOLIC BLOOD PRESSURE: 74 MMHG | SYSTOLIC BLOOD PRESSURE: 135 MMHG

## 2022-02-03 DIAGNOSIS — I10 PRIMARY HYPERTENSION: ICD-10-CM

## 2022-02-03 DIAGNOSIS — E78.5 HYPERLIPIDEMIA, UNSPECIFIED HYPERLIPIDEMIA TYPE: ICD-10-CM

## 2022-02-03 DIAGNOSIS — I48.92 ATRIAL FLUTTER, UNSPECIFIED TYPE: Primary | ICD-10-CM

## 2022-02-03 PROCEDURE — 3075F PR MOST RECENT SYSTOLIC BLOOD PRESS GE 130-139MM HG: ICD-10-PCS | Mod: CPTII,S$GLB,, | Performed by: INTERNAL MEDICINE

## 2022-02-03 PROCEDURE — 1159F PR MEDICATION LIST DOCUMENTED IN MEDICAL RECORD: ICD-10-PCS | Mod: CPTII,S$GLB,, | Performed by: INTERNAL MEDICINE

## 2022-02-03 PROCEDURE — 1159F MED LIST DOCD IN RCRD: CPT | Mod: CPTII,S$GLB,, | Performed by: INTERNAL MEDICINE

## 2022-02-03 PROCEDURE — 99999 PR PBB SHADOW E&M-EST. PATIENT-LVL IV: CPT | Mod: PBBFAC,,, | Performed by: INTERNAL MEDICINE

## 2022-02-03 PROCEDURE — 99214 OFFICE O/P EST MOD 30 MIN: CPT | Mod: S$GLB,,, | Performed by: INTERNAL MEDICINE

## 2022-02-03 PROCEDURE — 3008F PR BODY MASS INDEX (BMI) DOCUMENTED: ICD-10-PCS | Mod: CPTII,S$GLB,, | Performed by: INTERNAL MEDICINE

## 2022-02-03 PROCEDURE — 4010F ACE/ARB THERAPY RXD/TAKEN: CPT | Mod: CPTII,S$GLB,, | Performed by: INTERNAL MEDICINE

## 2022-02-03 PROCEDURE — 4010F PR ACE/ARB THEARPY RXD/TAKEN: ICD-10-PCS | Mod: CPTII,S$GLB,, | Performed by: INTERNAL MEDICINE

## 2022-02-03 PROCEDURE — 99214 PR OFFICE/OUTPT VISIT, EST, LEVL IV, 30-39 MIN: ICD-10-PCS | Mod: S$GLB,,, | Performed by: INTERNAL MEDICINE

## 2022-02-03 PROCEDURE — 3078F PR MOST RECENT DIASTOLIC BLOOD PRESSURE < 80 MM HG: ICD-10-PCS | Mod: CPTII,S$GLB,, | Performed by: INTERNAL MEDICINE

## 2022-02-03 PROCEDURE — 3078F DIAST BP <80 MM HG: CPT | Mod: CPTII,S$GLB,, | Performed by: INTERNAL MEDICINE

## 2022-02-03 PROCEDURE — 1111F DSCHRG MED/CURRENT MED MERGE: CPT | Mod: CPTII,S$GLB,, | Performed by: INTERNAL MEDICINE

## 2022-02-03 PROCEDURE — 3008F BODY MASS INDEX DOCD: CPT | Mod: CPTII,S$GLB,, | Performed by: INTERNAL MEDICINE

## 2022-02-03 PROCEDURE — 1111F PR DISCHARGE MEDS RECONCILED W/ CURRENT OUTPATIENT MED LIST: ICD-10-PCS | Mod: CPTII,S$GLB,, | Performed by: INTERNAL MEDICINE

## 2022-02-03 PROCEDURE — 93000 ELECTROCARDIOGRAM COMPLETE: CPT | Mod: S$GLB,,, | Performed by: INTERNAL MEDICINE

## 2022-02-03 PROCEDURE — 3075F SYST BP GE 130 - 139MM HG: CPT | Mod: CPTII,S$GLB,, | Performed by: INTERNAL MEDICINE

## 2022-02-03 PROCEDURE — 93000 EKG 12-LEAD: ICD-10-PCS | Mod: S$GLB,,, | Performed by: INTERNAL MEDICINE

## 2022-02-03 PROCEDURE — 99999 PR PBB SHADOW E&M-EST. PATIENT-LVL IV: ICD-10-PCS | Mod: PBBFAC,,, | Performed by: INTERNAL MEDICINE

## 2022-02-03 RX ORDER — CARVEDILOL 6.25 MG/1
6.25 TABLET ORAL 2 TIMES DAILY WITH MEALS
Qty: 180 TABLET | Refills: 3 | Status: ON HOLD | OUTPATIENT
Start: 2022-02-03 | End: 2022-03-31 | Stop reason: HOSPADM

## 2022-02-03 NOTE — PROGRESS NOTES
Subjective:    Patient ID:  Micaela Hernandez is a 51 y.o. female who presents for follow-up of No chief complaint on file.      HPI     A-flutter on flecainide and eliquis, HTN, HLD, DM, obesity    Admitted 1/24/22  Micaela Hernandez is a 51 y.o. female who  has a past medical history of Morbid Obesity, Degenerative lumbar disc, Diabetes mellitus, Diabetes mellitus, type 2, Hyperlipidemia, and Hypertension, presented to the ED with CC of SOB. Patient states that she has been feeling this since November, and has been wheezing intermittently as well. She even went to an allergist last week and had Xray, attempted albuterol.In Afib/Aflutter in the ED. HR sustained in 150's. Has never had an ECHO, never been to cardiologist. Started on dilt gtt and sent to ICU. SED rate 68. Concern for PNA with pleural fluid and infiltrates with LN swellings and new afib/flutter, started on CAP. LN of axilla were biopsied and told they were reactive, not malignant. Normal D-dimer for age. BNP was 102 but BMI 60. Patient denies any fevers, night sweats, n/v/d/c, abd pain, dysuria, hematuria or hematochezia, cough, CP, leg swelling, HA, dizziness, weakness, hallucinations, SI, HI, or self injury at this time.    Ms. Hernandez was admitted to the ICU for atrial flutter. Cardiology was consulted, and she was started on anticoagulation, flecainide, and a diltiazem drip w/ improvement in heart rate but remained in atrial flutter. She had shortness of breath which improved with furosemide. TTE showed normal LVEF. The following day she was cardioverted with successful restoration of sinus rhythm. She was discharged on flecainide and apixaban (CV 2+) to follow up with cardiology in the outpatient setting. Return precautions given, all questions answered prior to discharge, patient in agreement with plan.      1/26/22 STEVEN/CV - EF 55%, mild MR/TR. No thrombus in IDANIA. CV 200J converted A-flutter to NSR     Echo 1/25/22  · The left ventricle is normal in  size with concentric hypertrophy and normal systolic function.  · The estimated ejection fraction is 60%.  · Normal left ventricular diastolic function.  · Normal right ventricular size with normal right ventricular systolic function.  · Mild left atrial enlargement.  · The estimated PA systolic pressure is 22 mmHg.  · Intermediate central venous pressure (8 mmHg).     2/3/22 Last weekend had an episode of heart racing which lasted all night and was similar to prior episode of A-flutter. Otherwise denies CP or SOB  EKG NSR QTc 493    Review of Systems   Constitutional: Negative for decreased appetite.   HENT: Negative for ear discharge.    Eyes: Negative for blurred vision.   Respiratory: Negative for hemoptysis.    Endocrine: Negative for polyphagia.   Hematologic/Lymphatic: Negative for adenopathy.   Skin: Negative for color change.   Musculoskeletal: Negative for joint swelling.   Genitourinary: Negative for bladder incontinence.   Neurological: Negative for brief paralysis.   Psychiatric/Behavioral: Negative for hallucinations.   Allergic/Immunologic: Negative for hives.        Objective:    Physical Exam  Constitutional:       Appearance: She is well-developed and well-nourished.   HENT:      Head: Normocephalic and atraumatic.   Eyes:      Conjunctiva/sclera: Conjunctivae normal.      Pupils: Pupils are equal, round, and reactive to light.   Cardiovascular:      Rate and Rhythm: Normal rate.      Pulses: Intact distal pulses.      Heart sounds: Normal heart sounds.   Pulmonary:      Effort: Pulmonary effort is normal.      Breath sounds: Normal breath sounds.   Abdominal:      General: Bowel sounds are normal.      Palpations: Abdomen is soft.   Musculoskeletal:         General: Normal range of motion.      Cervical back: Normal range of motion and neck supple.   Skin:     General: Skin is warm and dry.   Neurological:      Mental Status: She is alert and oriented to person, place, and time.            Assessment:       1. Atrial flutter, unspecified type    2. Hyperlipidemia, unspecified hyperlipidemia type    3. Primary hypertension         Plan:       Suspect breakthrough A-flutter. Increase coreg 6.25 bid. Will hold off on increasing flecainide with prolonged QT  Refer to EP for possible ablation  OV 1 month

## 2022-02-11 ENCOUNTER — OFFICE VISIT (OUTPATIENT)
Dept: PULMONOLOGY | Facility: CLINIC | Age: 52
End: 2022-02-11
Payer: COMMERCIAL

## 2022-02-11 VITALS
OXYGEN SATURATION: 97 % | DIASTOLIC BLOOD PRESSURE: 86 MMHG | HEIGHT: 63 IN | SYSTOLIC BLOOD PRESSURE: 145 MMHG | WEIGHT: 293 LBS | BODY MASS INDEX: 51.91 KG/M2 | HEART RATE: 85 BPM

## 2022-02-11 DIAGNOSIS — G47.33 OSA (OBSTRUCTIVE SLEEP APNEA): Primary | ICD-10-CM

## 2022-02-11 DIAGNOSIS — J90 PLEURAL EFFUSION: ICD-10-CM

## 2022-02-11 DIAGNOSIS — L13.8 LAD (LINEAR IGA DERMATOSIS): ICD-10-CM

## 2022-02-11 DIAGNOSIS — R59.0 LOCALIZED ENLARGED LYMPH NODES: ICD-10-CM

## 2022-02-11 PROCEDURE — 99999 PR PBB SHADOW E&M-EST. PATIENT-LVL V: ICD-10-PCS | Mod: PBBFAC,,, | Performed by: INTERNAL MEDICINE

## 2022-02-11 PROCEDURE — 99999 PR PBB SHADOW E&M-EST. PATIENT-LVL V: CPT | Mod: PBBFAC,,, | Performed by: INTERNAL MEDICINE

## 2022-02-11 PROCEDURE — 4010F ACE/ARB THERAPY RXD/TAKEN: CPT | Mod: CPTII,S$GLB,, | Performed by: INTERNAL MEDICINE

## 2022-02-11 PROCEDURE — 3008F PR BODY MASS INDEX (BMI) DOCUMENTED: ICD-10-PCS | Mod: CPTII,S$GLB,, | Performed by: INTERNAL MEDICINE

## 2022-02-11 PROCEDURE — 3079F PR MOST RECENT DIASTOLIC BLOOD PRESSURE 80-89 MM HG: ICD-10-PCS | Mod: CPTII,S$GLB,, | Performed by: INTERNAL MEDICINE

## 2022-02-11 PROCEDURE — 99204 PR OFFICE/OUTPT VISIT, NEW, LEVL IV, 45-59 MIN: ICD-10-PCS | Mod: S$GLB,,, | Performed by: INTERNAL MEDICINE

## 2022-02-11 PROCEDURE — 3077F SYST BP >= 140 MM HG: CPT | Mod: CPTII,S$GLB,, | Performed by: INTERNAL MEDICINE

## 2022-02-11 PROCEDURE — 1159F MED LIST DOCD IN RCRD: CPT | Mod: CPTII,S$GLB,, | Performed by: INTERNAL MEDICINE

## 2022-02-11 PROCEDURE — 3008F BODY MASS INDEX DOCD: CPT | Mod: CPTII,S$GLB,, | Performed by: INTERNAL MEDICINE

## 2022-02-11 PROCEDURE — 3079F DIAST BP 80-89 MM HG: CPT | Mod: CPTII,S$GLB,, | Performed by: INTERNAL MEDICINE

## 2022-02-11 PROCEDURE — 1159F PR MEDICATION LIST DOCUMENTED IN MEDICAL RECORD: ICD-10-PCS | Mod: CPTII,S$GLB,, | Performed by: INTERNAL MEDICINE

## 2022-02-11 PROCEDURE — 99204 OFFICE O/P NEW MOD 45 MIN: CPT | Mod: S$GLB,,, | Performed by: INTERNAL MEDICINE

## 2022-02-11 PROCEDURE — 3077F PR MOST RECENT SYSTOLIC BLOOD PRESSURE >= 140 MM HG: ICD-10-PCS | Mod: CPTII,S$GLB,, | Performed by: INTERNAL MEDICINE

## 2022-02-11 PROCEDURE — 4010F PR ACE/ARB THEARPY RXD/TAKEN: ICD-10-PCS | Mod: CPTII,S$GLB,, | Performed by: INTERNAL MEDICINE

## 2022-02-11 NOTE — PATIENT INSTRUCTIONS
Your provider has scheduled you a Sleep Study    What you need to know:     This referral will be sent to your insurance for authorization.  Depending on your insurance company, this process may take two weeks to be authorized.     Once your study has been authorized, Some one from the sleep lab will contact you to schedule your sleep study.   o Their number is [phone]. The [facility] Lab is located on 2nd floor of [facility].     We will call you when the sleep study results are ready - if you have not heard from us by 2 weeks from the date of the study, please call [phone].     For information regarding financial obligations, please call [employer] at [phone].    If you study is already scheduled, please call [phone].     Once your study has been completed, it will take up to 14 business days for the results to be sent back to your provider.    If you have any questions you can send a message through your [facility] account, or call the clinic at [phone].    You are advised to abstain from driving should you feel sleepy or drowsy.

## 2022-02-11 NOTE — PROGRESS NOTES
Micaela Hernandez  was seen as a new patient at the request  Bailey Fiore MD for the evaluation of  pleural effusion.    CHIEF COMPLAINT:  Follow-up      HISTORY OF PRESENT ILLNESS: Micaela Hernandez is a 51 y.o. female  has a past medical history of Allergy, Degenerative lumbar disc (4/9/2021), Diabetes mellitus, Diabetes mellitus, type 2, Hyperlipidemia, and Hypertension.  Patient was hospitalized 1/24/22-1/26/22 for SOB x several weeks.  In ED, patient was noted to have Afib/Aflutter. HR sustained in 150's.  Started on dilt gtt and sent to ICU.  Cardiology was consulted, and she was started on anticoagulation, flecainide, and a diltiazem drip w/ improvement in heart rate but remained in atrial flutter. She had shortness of breath which improved with furosemide. TTE showed normal LVEF. S/p successful cardioversion. She was discharged on flecainide and apixaban (CV 2+) to follow up with cardiology in the outpatient setting.  Was not discharged with lasix.  Patient was seen by Dr. Fiore who restarted lasix 20 mg.  Dyspnea has improved with lasix.  Patient was referred to EP for afib on 3/3/22 with Dr. Kirk.      H/o LN of axilla were biopsied and told they were reactive, not malignant.  Recent cxr by pcp noted worsening of right effusion.  Patient was referred to pulmonary for further inputs.      Today, patient denied chest pain.  Still with intermittent flutter.  Lower extremities edema improve with lasix.  No wheezing.  Intermittent non-productive cough.  Intermittent nasal congestion at night.  No orthopnea.  No pnd.      PAST MEDICAL HISTORY:    Active Ambulatory Problems     Diagnosis Date Noted    Axillary mass 10/23/2013    Liver mass, right lobe 02/18/2016    Hernia, incisional 11/29/2016    ARORA (nonalcoholic steatohepatitis) 04/12/2017    Morbid obesity with BMI of 50.0-59.9, adult 03/26/2018    Type 2 diabetes mellitus with diabetic nephropathy, without long-term current use of insulin      Hyperlipidemia     Hypertension     Chronic right-sided low back pain without sciatica 2021    Chronic pain of right hip 2021    Chronic pain of both knees 2021    Degenerative lumbar disc 2021    Primary osteoarthritis of both knees 2021    Atrial flutter 2022    Elevated brain natriuretic peptide (BNP) level 2022    Pleural effusion 2022    NAPOLEON (obstructive sleep apnea) 2022    LAD (linear IgA dermatosis) 2022     Resolved Ambulatory Problems     Diagnosis Date Noted    Type 2 diabetes mellitus without complication, without long-term current use of insulin 2017    Hypertension, essential 2017     Past Medical History:   Diagnosis Date    Allergy     Diabetes mellitus     Diabetes mellitus, type 2                 PAST SURGICAL HISTORY:    Past Surgical History:   Procedure Laterality Date    BREAST SURGERY       SECTION      CHOLECYSTECTOMY      ENDOMETRIAL ABLATION      HERNIA REPAIR      incisional     KNEE SURGERY      LIVER LOBECTOMY Right     LYMPH NODE DISSECTION      right axillary    TRANSESOPHAGEAL ECHOCARDIOGRAPHY N/A 2022    Procedure: ECHOCARDIOGRAM, TRANSESOPHAGEAL;  Surgeon: Ji Patricio MD;  Location: [facility] CATH LAB;  Service: Cardiology;  Laterality: N/A;         FAMILY HISTORY:                Family History   Problem Relation Age of Onset    Hypertension Mother     Asthma Mother     COPD Mother     Hyperlipidemia Mother     Diabetes type II Mother     Diabetes Father     Hypertension Father     Hyperlipidemia Father     Cancer Sister         type unknown, but CA in mouth    No Known Problems Brother     No Known Problems Maternal Aunt     No Known Problems Maternal Uncle     No Known Problems Paternal Aunt     No Known Problems Paternal Uncle     No Known Problems Maternal Grandmother     No Known Problems Maternal Grandfather     No Known Problems Paternal Grandmother      No Known Problems Paternal Grandfather     Breast cancer Neg Hx     Colon cancer Neg Hx     Ovarian cancer Neg Hx     Amblyopia Neg Hx     Blindness Neg Hx     Cataracts Neg Hx     Glaucoma Neg Hx     Macular degeneration Neg Hx     Retinal detachment Neg Hx     Strabismus Neg Hx     Stroke Neg Hx     Thyroid disease Neg Hx        SOCIAL HISTORY:          Tobacco:   Social History     Tobacco Use   Smoking Status Never Smoker   Smokeless Tobacco Never Used     alcohol use:    Social History     Substance and Sexual Activity   Alcohol Use Yes    Comment: 4-6 drinks per year               Occupation:   for law office    ALLERGIES:  Review of patient's allergies indicates:  No Known Allergies    CURRENT MEDICATIONS:    Current Outpatient Medications   Medication Sig Dispense Refill    albuterol (VENTOLIN HFA) 90 mcg/actuation inhaler Inhale 2 puffs into the lungs every 6 (six) hours as needed for Wheezing or Shortness of Breath. Rescue (Patient not taking: Reported on 1/28/2022) 18 g 0    apixaban (ELIQUIS) 5 mg Tab Take 1 tablet (5 mg total) by mouth 2 (two) times daily. 60 tablet 3    carvediloL (COREG) 6.25 MG tablet Take 1 tablet (6.25 mg total) by mouth 2 (two) times daily with meals. 180 tablet 3    flecainide (TAMBOCOR) 100 MG Tab Take 1 tablet (100 mg total) by mouth every 12 (twelve) hours. 60 tablet 11    FLUCELVAX QUAD 9964-4254, PF, 60 mcg (15 mcg x 4)/0.5 mL Syrg PHARMACY ADMINISTERED      fluticasone furoate-vilanteroL (BREO ELLIPTA) 100-25 mcg/dose diskus inhaler Inhale 1 puff into the lungs once daily. Controller 60 each 1    furosemide (LASIX) 20 MG tablet Take 1 tablet (20 mg total) by mouth once daily. 30 tablet 0    glimepiride (AMARYL) 2 MG tablet Take 1 tablet (2 mg total) by mouth before breakfast. 90 tablet 0    hydrocortisone 2.5 % cream Apply topically 2 (two) times daily. 28 g 5    loratadine (CLARITIN) 10 mg tablet Take 10 mg by mouth once daily.       "losartan (COZAAR) 50 MG tablet Take 1 tablet (50 mg total) by mouth 2 (two) times daily. 180 tablet 3    metFORMIN (GLUCOPHAGE) 1000 MG tablet Take 1 tablet (1,000 mg total) by mouth 2 (two) times daily with meals. 180 tablet 0    montelukast (SINGULAIR) 10 mg tablet Take 1 tablet (10 mg total) by mouth every evening. 30 tablet 0    simvastatin (ZOCOR) 40 MG tablet Take 1 tablet (40 mg total) by mouth every evening. 90 tablet 3     No current facility-administered medications for this visit.                  REVIEW OF SYSTEMS:     Pulmonary related symptoms as per HPI.  Gen:  no weight loss, no fever, no night sweat  HEENT:  no visual changes, no sore throat, no hearing loss  CV:  Per hpi  GI:  no melena, no hematochezia, no diarhea, no constipation.  :  no dysuria, no hematuria, no hesistancy, no dribbling  Neuro:  no syncope, no vertigo, no tinitus  Psych:  No homocide or suicide ideation; no depression.  Endocrine:  No heat or cold intolerance.  Sleep:  + snoring; no witnessed apnea.  Tire upon awake.    Otherwise, a balance of systems reviewed is negative.          PHYSICAL EXAM:  Vitals:    02/11/22 0843   BP: (!) 145/86   Pulse: 85   SpO2: 97%   Weight: (!) 144.7 kg (319 lb)   Height: 5' 3" (1.6 m)   PainSc: 0-No pain     Body mass index is 56.51 kg/m².     GENERAL:  well develop; no apparent distress  HEENT:  no nasal congestion; no discharge noted; class 4 modified mallampatti.   NECK:  supple; no palpable masses.  CARDIO: regular rate and rhythm  PULM:  clear to auscultation bilaterally; no intercostals retractions; no accessory muscle usage   ABDOMEN:  soft nontender/nondistended.  +bowel sound  EXTREMITIES no cce  NEURO:  CN II-XII intact.  5/5 motor in all extremities.  sensation grossly intact   to light touch.  PSYCH:  normal affect.  Alert and oriented x 4    LABS  Pulmonary Functions Testing Results(personally reviewed):  none  ABG (personally reviewed):  none  CXR (personally reviewed):  " 1/28/22 blunting of right costophrenic angle.  Worse when compare to 1/20/22 cxr.  CT CHEST(personally reviewed):  1/24/22 small bilateral effusion.  Diffuse ggo.  +retropectoral lad (2:218)    Echo 1/26/22  · The left ventricle is normal in size with normal systolic function.  · The estimated ejection fraction is 55%.  · Normal left ventricular diastolic function.  · Normal right ventricular size with normal right ventricular systolic function.  · Mild left atrial enlargement.  · No thrombus is present in the appendage.  · Mild mitral regurgitation.  · Mild tricuspid regurgitation.  · A 200 J synchronized cardioversion was successfully performed with restoration of normal sinus rhythm.        ASSESSMENT/PLAN  Problem List Items Addressed This Visit     LAD (linear IgA dermatosis)    Overview     right axillary and right post pectoral.  S/p axillary lymph node resection around 2014.  Serial monitoring is recommended.  Will repeat ct in 3 months (5/2022)         NAPOLEON (obstructive sleep apnea) - Primary    Current Assessment & Plan     The patient symptomatically has loud snoring, restless sleep with findings of afib, htn, elevated bmi, high grade mallapatti. This warrants further investigation for possible obstructive sleep apnea.  Patient will be contacted after sleep study is done.            Relevant Orders    Home Sleep Studies    Pleural effusion    Overview     first noted on cta 1/24/22 with bilateral effusion.  Effusion enlarged on most recent cxr 1/28/22.  Suspect a/w volume overload a/w afib/flutter with rvr.  Clinically feeling better since starting lasix.           Current Assessment & Plan     Will send for repeat bnp, bmp and cxr           Relevant Orders    X-Ray Chest PA And Lateral    BNP    Basic Metabolic Panel          Patient will No follow-ups on file. with md/np.    CC: Send copy of this note to Bailey Fiore MD

## 2022-02-11 NOTE — ASSESSMENT & PLAN NOTE
The patient symptomatically has loud snoring, restless sleep with findings of afib, htn, elevated bmi, high grade mallapatti. This warrants further investigation for possible obstructive sleep apnea.  Patient will be contacted after sleep study is done.

## 2022-02-12 ENCOUNTER — HOSPITAL ENCOUNTER (OUTPATIENT)
Dept: RADIOLOGY | Facility: HOSPITAL | Age: 52
Discharge: HOME | End: 2022-02-12
Attending: INTERNAL MEDICINE
Payer: COMMERCIAL

## 2022-02-12 DIAGNOSIS — J90 PLEURAL EFFUSION: ICD-10-CM

## 2022-02-12 PROCEDURE — 71046 X-RAY EXAM CHEST 2 VIEWS: CPT | Mod: TC,FY

## 2022-02-12 PROCEDURE — 71046 X-RAY EXAM CHEST 2 VIEWS: CPT | Mod: 26,,, | Performed by: RADIOLOGY

## 2022-02-12 PROCEDURE — 71046 XR CHEST PA AND LATERAL: ICD-10-PCS | Mod: 26,,, | Performed by: RADIOLOGY

## 2022-02-17 ENCOUNTER — TELEPHONE (OUTPATIENT)
Dept: ELECTROPHYSIOLOGY | Facility: CLINIC | Age: 52
End: 2022-02-17
Payer: COMMERCIAL

## 2022-02-17 DIAGNOSIS — I48.92 ATRIAL FLUTTER, UNSPECIFIED TYPE: Primary | ICD-10-CM

## 2022-02-17 NOTE — TELEPHONE ENCOUNTER
Spoke with pt because she was schedule incorrectly. Pt has no outside records or device. Time and date confirmed

## 2022-02-18 ENCOUNTER — HOSPITAL ENCOUNTER (OUTPATIENT)
Dept: SLEEP MEDICINE | Facility: HOSPITAL | Age: 52
Discharge: HOME | End: 2022-02-18
Attending: INTERNAL MEDICINE
Payer: COMMERCIAL

## 2022-02-18 DIAGNOSIS — G47.33 OSA (OBSTRUCTIVE SLEEP APNEA): ICD-10-CM

## 2022-02-18 PROCEDURE — 95800 SLP STDY UNATTENDED: CPT

## 2022-02-18 PROCEDURE — 95806 PR SLEEP STUDY, UNATTENDED, SIMUL RECORD HR/O2 SAT/RESP FLOW/RESP EFFT: ICD-10-PCS | Mod: 26,,, | Performed by: INTERNAL MEDICINE

## 2022-02-18 PROCEDURE — 95806 SLEEP STUDY UNATT&RESP EFFT: CPT | Mod: 26,,, | Performed by: INTERNAL MEDICINE

## 2022-02-19 NOTE — PROGRESS NOTES
The patient ID was verified. PT was instructed on how to turn the Home Sleep Testing device on and off, how to apply the sensors.  he or she was encouraged to sleep on supine position and must have 6 hours of sleep. The patient was instructed not to get the device wet and return it back to us. All questions were answered prior to patient leaving. This was a curbside patient contact

## 2022-02-19 NOTE — PATIENT INSTRUCTIONS
Your sleep study will be scored and interpreted by one of our physicians who are board certified in sleep medicine.  Within two weeks the results will be sent to the physician who referred you. Your physician should then contact you to go over the results, along with any recommendations. If you do not hear from your physician within two weeks, please call them.       patient was instructed to return the device back tomorrow  8am morning at the same locations he or she registered at today.    pt was instructed to try to sleep more then 6hours on HST device

## 2022-02-22 ENCOUNTER — TELEPHONE (OUTPATIENT)
Dept: PULMONOLOGY | Facility: CLINIC | Age: 52
End: 2022-02-22
Payer: COMMERCIAL

## 2022-02-22 NOTE — TELEPHONE ENCOUNTER
Spoke with patient scheduled an appointment to see provider.    NAVARRO Warner                    ----- Message from Art Reeves MD sent at 2/22/2022  3:23 PM CST -----  Please schedule sleep clinic appointmetnt with md/np in 1-2 weeks to discuss sleep study result.  Please notify me if we are not able to get patient schedule within 2 weeks.

## 2022-02-22 NOTE — PROCEDURES
"Dear Provider,     You have ordered sleep LAB services to perform the sleep study for Micaela Hernandez.  The sleep study that you ordered is complete.      Please find Sleep Study result in "Chart Review" under the "Media tab."      As the ordering provider, you are responsible for reviewing the results and implementing a treatment plan with your patient.    If you need a Sleep Medicine provider to explain the sleep study findings and arrange treatment for the patient, please refer patient for consultation to our Sleep Clinic via [facility] with Ambulatory Consult Sleep.    To do that please place an order for an  "Ambulatory Consult Sleep" - it will go to our clinic work queue for our Medical Assistant to contact the patient for an appointment.     For any questions, please contact our clinic staff at [phone] to talk to clinical staff.   "

## 2022-02-23 DIAGNOSIS — D84.9 IMMUNOSUPPRESSED STATUS: ICD-10-CM

## 2022-03-04 ENCOUNTER — TELEPHONE (OUTPATIENT)
Dept: RESEARCH | Facility: HOSPITAL | Age: 52
End: 2022-03-04
Payer: COMMERCIAL

## 2022-03-08 NOTE — H&P (VIEW-ONLY)
"Subjective:    Patient ID:  Micaela Hernandez is a 51 y.o. female who presents for consultation for atrial flutter.       HPI   I had the pleasure of seeing Micaela Hernandez in consultation for management of her atrial flutter. As you are aware, Ms. Hernandez is a 51 year old  Female with morbid obesity, Degenerative lumbar disc, Diabetes mellitus, Diabetes mellitus, type 2, Hyperlipidemia, and Hypertension. She was in her usual state of health until November 2021 she has felt short of breath. Initially thought it was an allergy/ exacerbation of her asthma. She was managed with beta agonist. She continued to fell unwell and presented to the ED in January with tachycardia. ECG during that encounter was consistent with atrial flutter. This was a new diagnosis for her. She was started on diltiazem and seen by cardiologist  Dr. Patricio who recommended addition of flecainide and eliquis for anticoagualtion. She ultimately underwent STEVEN CV on 1/26/2022 with return to sinus rhythm. She has no prior cardiac history.        Today, Ms. Hernandez reports that she has been generally well. She believes she has been in and out of atrial flutter manifesting as mild dyspnea and on/off palpitations. She is on flecainide which helps restore her rhythm when she feels "out of rhythm". She is compliant with her cardiac medications and hasn't missed any of her anticoagulation. She has been evaluated for sleep apnea and was told she needs a CPAP and is working on obtaining the device.     ECG today shows atrial flutter, CCW CTI dependent at average ventricular rate of 117 bpm.   I reviewed ECG during ED visit in 1/2022 which are consistent with typical counterclockwise CTI flutter.     Review of Systems   Constitutional: Negative for chills and malaise/fatigue.   HENT: Negative.    Eyes: Negative.    Cardiovascular: Positive for palpitations. Negative for chest pain and dyspnea on exertion.   Respiratory: Negative.    Endocrine: Negative.  "   Hematologic/Lymphatic: Negative.    Skin: Negative.    Musculoskeletal: Positive for joint pain.   Gastrointestinal: Negative.    Genitourinary: Negative.    Neurological: Negative.    Psychiatric/Behavioral: Negative.         Objective:    Physical Exam  Constitutional:       Appearance: She is obese. She is not ill-appearing.   HENT:      Head: Normocephalic.   Eyes:      Pupils: Pupils are equal, round, and reactive to light.   Cardiovascular:      Rate and Rhythm: Regular rhythm. Tachycardia present.      Pulses: Normal pulses.   Pulmonary:      Effort: Pulmonary effort is normal.      Breath sounds: Normal breath sounds.   Musculoskeletal:      Cervical back: Normal range of motion.   Skin:     General: Skin is warm.   Neurological:      General: No focal deficit present.      Mental Status: She is alert and oriented to person, place, and time. Mental status is at baseline.            Echo 1/2022  · The left ventricle is normal in size with normal systolic function.  · The estimated ejection fraction is 55%.  · Normal left ventricular diastolic function.  · Normal right ventricular size with normal right ventricular systolic function.  · Mild left atrial enlargement.  · No thrombus is present in the appendage.  · Mild mitral regurgitation.  · Mild tricuspid regurgitation.  · A 200 J synchronized cardioversion was successfully performed with restoration of normal sinus rhythm.              Assessment:       1. Typical atrial flutter    2. Primary hypertension    3. Morbid obesity with BMI of 50.0-59.9, adult    4. Type 2 diabetes mellitus with diabetic nephropathy, without long-term current use of insulin    5. NAPOLEON (obstructive sleep apnea)    6. Atrial flutter, unspecified type         Plan:       In summary, Ms. Hernandez is a 51 year old female with newly diagnosed atrial flutter with RVR , CHADSVASC 3 (female, dm, htn). She has had one hospitalization for symptomatic atrial flutter. She is currently in sinus  rhythm after STEVEN/DCCV and presented today to discussed her management moving forward. We discussed the pathophysiology of atrial flutter and stroke prevention. We also discussed the various ways to manage it including medications and catheter intervention. In the end she ultimately decided to proceed to catheter ablation given recurrence while on flecainide+ coreg. We discussed the procedure in detail including various risks including bleeding, vascular damage, heart attack, stroke or death. She expressed understanding and has consented to planned procedure. We discussed risks factors for atrial fibrillation which includes obesity, alcohol and untreated sleep apnea. All her questions were addressed at the end of this encounter.       Plan:  Will plan for CTI ablation for typical flutter and then monitor for possible Afib following ablation   Continue on flecainide but will increase to 150 mg BID . Continue on coreg as usual . Obtain ECG in 1 week and plans for DCCV if still in flutter  Continue on  eliquis 5 mg bid   Weight loss and sleep evaluation encouraged   Will follow up post CTI ablation

## 2022-03-09 ENCOUNTER — OFFICE VISIT (OUTPATIENT)
Dept: ELECTROPHYSIOLOGY | Facility: CLINIC | Age: 52
End: 2022-03-09
Payer: COMMERCIAL

## 2022-03-09 ENCOUNTER — PATIENT MESSAGE (OUTPATIENT)
Dept: CARDIOLOGY | Facility: HOSPITAL | Age: 52
End: 2022-03-09
Payer: COMMERCIAL

## 2022-03-09 VITALS
DIASTOLIC BLOOD PRESSURE: 78 MMHG | HEIGHT: 63 IN | BODY MASS INDEX: 51.91 KG/M2 | HEART RATE: 108 BPM | SYSTOLIC BLOOD PRESSURE: 142 MMHG | WEIGHT: 293 LBS

## 2022-03-09 DIAGNOSIS — I10 PRIMARY HYPERTENSION: ICD-10-CM

## 2022-03-09 DIAGNOSIS — I48.3 TYPICAL ATRIAL FLUTTER: Primary | ICD-10-CM

## 2022-03-09 DIAGNOSIS — E66.01 MORBID OBESITY WITH BMI OF 50.0-59.9, ADULT: ICD-10-CM

## 2022-03-09 DIAGNOSIS — E11.21 TYPE 2 DIABETES MELLITUS WITH DIABETIC NEPHROPATHY, WITHOUT LONG-TERM CURRENT USE OF INSULIN: ICD-10-CM

## 2022-03-09 DIAGNOSIS — I48.92 ATRIAL FLUTTER, UNSPECIFIED TYPE: ICD-10-CM

## 2022-03-09 DIAGNOSIS — G47.33 OSA (OBSTRUCTIVE SLEEP APNEA): ICD-10-CM

## 2022-03-09 DIAGNOSIS — I49.8 OTHER CARDIAC ARRHYTHMIA: ICD-10-CM

## 2022-03-09 PROCEDURE — 1159F PR MEDICATION LIST DOCUMENTED IN MEDICAL RECORD: ICD-10-PCS | Mod: CPTII,S$GLB,, | Performed by: INTERNAL MEDICINE

## 2022-03-09 PROCEDURE — 99999 PR PBB SHADOW E&M-EST. PATIENT-LVL IV: CPT | Mod: PBBFAC,,, | Performed by: INTERNAL MEDICINE

## 2022-03-09 PROCEDURE — 4010F ACE/ARB THERAPY RXD/TAKEN: CPT | Mod: CPTII,S$GLB,, | Performed by: INTERNAL MEDICINE

## 2022-03-09 PROCEDURE — 99205 PR OFFICE/OUTPT VISIT, NEW, LEVL V, 60-74 MIN: ICD-10-PCS | Mod: S$GLB,,, | Performed by: INTERNAL MEDICINE

## 2022-03-09 PROCEDURE — 1159F MED LIST DOCD IN RCRD: CPT | Mod: CPTII,S$GLB,, | Performed by: INTERNAL MEDICINE

## 2022-03-09 PROCEDURE — 3008F BODY MASS INDEX DOCD: CPT | Mod: CPTII,S$GLB,, | Performed by: INTERNAL MEDICINE

## 2022-03-09 PROCEDURE — 3008F PR BODY MASS INDEX (BMI) DOCUMENTED: ICD-10-PCS | Mod: CPTII,S$GLB,, | Performed by: INTERNAL MEDICINE

## 2022-03-09 PROCEDURE — 4010F PR ACE/ARB THEARPY RXD/TAKEN: ICD-10-PCS | Mod: CPTII,S$GLB,, | Performed by: INTERNAL MEDICINE

## 2022-03-09 PROCEDURE — 3078F PR MOST RECENT DIASTOLIC BLOOD PRESSURE < 80 MM HG: ICD-10-PCS | Mod: CPTII,S$GLB,, | Performed by: INTERNAL MEDICINE

## 2022-03-09 PROCEDURE — 99205 OFFICE O/P NEW HI 60 MIN: CPT | Mod: S$GLB,,, | Performed by: INTERNAL MEDICINE

## 2022-03-09 PROCEDURE — 3078F DIAST BP <80 MM HG: CPT | Mod: CPTII,S$GLB,, | Performed by: INTERNAL MEDICINE

## 2022-03-09 PROCEDURE — 3051F PR MOST RECENT HEMOGLOBIN A1C LEVEL 7.0 - < 8.0%: ICD-10-PCS | Mod: CPTII,S$GLB,, | Performed by: INTERNAL MEDICINE

## 2022-03-09 PROCEDURE — 99999 PR PBB SHADOW E&M-EST. PATIENT-LVL IV: ICD-10-PCS | Mod: PBBFAC,,, | Performed by: INTERNAL MEDICINE

## 2022-03-09 PROCEDURE — 3077F PR MOST RECENT SYSTOLIC BLOOD PRESSURE >= 140 MM HG: ICD-10-PCS | Mod: CPTII,S$GLB,, | Performed by: INTERNAL MEDICINE

## 2022-03-09 PROCEDURE — 93005 RHYTHM STRIP: ICD-10-PCS | Mod: S$GLB,,, | Performed by: INTERNAL MEDICINE

## 2022-03-09 PROCEDURE — 3051F HG A1C>EQUAL 7.0%<8.0%: CPT | Mod: CPTII,S$GLB,, | Performed by: INTERNAL MEDICINE

## 2022-03-09 PROCEDURE — 93005 ELECTROCARDIOGRAM TRACING: CPT | Mod: S$GLB,,, | Performed by: INTERNAL MEDICINE

## 2022-03-09 PROCEDURE — 3077F SYST BP >= 140 MM HG: CPT | Mod: CPTII,S$GLB,, | Performed by: INTERNAL MEDICINE

## 2022-03-09 PROCEDURE — 93010 ELECTROCARDIOGRAM REPORT: CPT | Mod: S$GLB,,, | Performed by: INTERNAL MEDICINE

## 2022-03-09 PROCEDURE — 93010 RHYTHM STRIP: ICD-10-PCS | Mod: S$GLB,,, | Performed by: INTERNAL MEDICINE

## 2022-03-09 RX ORDER — FLECAINIDE ACETATE 150 MG/1
150 TABLET ORAL EVERY 12 HOURS
Qty: 60 TABLET | Refills: 11 | Status: SHIPPED | OUTPATIENT
Start: 2022-03-09 | End: 2022-03-31

## 2022-03-10 ENCOUNTER — TELEPHONE (OUTPATIENT)
Dept: ELECTROPHYSIOLOGY | Facility: CLINIC | Age: 52
End: 2022-03-10
Payer: COMMERCIAL

## 2022-03-10 NOTE — TELEPHONE ENCOUNTER
----- Message from Carolyn Pyle MA sent at 3/10/2022 11:37 AM CST -----  Contact: self  Pt has a procedure on 3/31/22 with . She has a question for  you. Please call [phone].

## 2022-03-10 NOTE — TELEPHONE ENCOUNTER
Spoke with patient. States she is concerned that she may be on her period at the time of her procedure. Advised that is fine, she should wear a pad instead of a tampon (due to the prolonged bed rest). Once she lets the EP staff know, they can use pads. She verbalized understanding and was very appreciative of the call.

## 2022-03-11 ENCOUNTER — TELEPHONE (OUTPATIENT)
Dept: ELECTROPHYSIOLOGY | Facility: CLINIC | Age: 52
End: 2022-03-11
Payer: COMMERCIAL

## 2022-03-11 ENCOUNTER — PATIENT MESSAGE (OUTPATIENT)
Dept: ELECTROPHYSIOLOGY | Facility: CLINIC | Age: 52
End: 2022-03-11
Payer: COMMERCIAL

## 2022-03-11 NOTE — TELEPHONE ENCOUNTER
Spoke with patient and advised that [facility] has updated their Covid testing policy. Beginning 3/14/2022, pre-procedure Covid testing is no longer required on patients who are documented as fully vaccinated.   Pre procedure Covid test cancelled.

## 2022-03-14 ENCOUNTER — PATIENT OUTREACH (OUTPATIENT)
Dept: ADMINISTRATIVE | Facility: OTHER | Age: 52
End: 2022-03-14
Payer: COMMERCIAL

## 2022-03-15 ENCOUNTER — OFFICE VISIT (OUTPATIENT)
Dept: PULMONOLOGY | Facility: CLINIC | Age: 52
End: 2022-03-15
Payer: COMMERCIAL

## 2022-03-15 DIAGNOSIS — R09.81 NASAL CONGESTION: ICD-10-CM

## 2022-03-15 DIAGNOSIS — G47.33 OSA (OBSTRUCTIVE SLEEP APNEA): Primary | ICD-10-CM

## 2022-03-15 DIAGNOSIS — L13.8 LAD (LINEAR IGA DERMATOSIS): ICD-10-CM

## 2022-03-15 DIAGNOSIS — J90 PLEURAL EFFUSION: ICD-10-CM

## 2022-03-15 PROCEDURE — 99214 OFFICE O/P EST MOD 30 MIN: CPT | Mod: 95,,, | Performed by: INTERNAL MEDICINE

## 2022-03-15 PROCEDURE — 4010F ACE/ARB THERAPY RXD/TAKEN: CPT | Mod: CPTII,95,, | Performed by: INTERNAL MEDICINE

## 2022-03-15 PROCEDURE — 4010F PR ACE/ARB THEARPY RXD/TAKEN: ICD-10-PCS | Mod: CPTII,95,, | Performed by: INTERNAL MEDICINE

## 2022-03-15 PROCEDURE — 99214 PR OFFICE/OUTPT VISIT, EST, LEVL IV, 30-39 MIN: ICD-10-PCS | Mod: 95,,, | Performed by: INTERNAL MEDICINE

## 2022-03-15 NOTE — PROGRESS NOTES
The patient location is: home  The chief complaint leading to consultation is: karen    Visit type: audiovisual    Face to Face time with patient: 25 minutes of total time spent on the encounter, which includes face to face time and non-face to face time preparing to see the patient (eg, review of tests), Obtaining and/or reviewing separately obtained history, Documenting clinical information in the electronic or other health record, Independently interpreting results (not separately reported) and communicating results to the patient/family/caregiver, or Care coordination (not separately reported).         Each patient to whom he or she provides medical services by telemedicine is:  (1) informed of the relationship between the physician and patient and the respective role of any other health care provider with respect to management of the patient; and (2) notified that he or she may decline to receive medical services by telemedicine and may withdraw from such care at any time.    Notes:   Micaela Hernandez  was seen as a follow up.    CHIEF COMPLAINT:  No chief complaint on file.      HISTORY OF PRESENT ILLNESS: Micaela Hernandez is a 51 y.o. female  has a past medical history of Allergy, Degenerative lumbar disc (4/9/2021), Diabetes mellitus, Diabetes mellitus, type 2, Hyperlipidemia, and Hypertension.  Patient was hospitalized 1/24/22-1/26/22 for SOB x several weeks.  In ED, patient was noted to have Afib/Aflutter. HR sustained in 150's.  Started on dilt gtt and sent to ICU.  Cardiology was consulted, and she was started on anticoagulation, flecainide, and a diltiazem drip w/ improvement in heart rate but remained in atrial flutter. She had shortness of breath which improved with furosemide. TTE showed normal LVEF. S/p successful cardioversion. She was discharged on flecainide and apixaban (CV 2+) to follow up with cardiology in the outpatient setting.  Was not discharged with lasix.  Patient was seen by Dr. Fiore who  restarted lasix 20 mg.  Dyspnea has improved with lasix.  Patient was referred to EP for afib on 3/3/22 with Dr. Kirk.  cxr on 1/28/22 with worsening right effusion.  Patient was referred to pulmonary for further inputs.  Our first encounter was 2/11/22.      H/o LN of axilla were biopsied and told they were reactive, not malignant.        During our last encounter, dyspnea was improving with restarting of lasix.  Today, patient denied chest pain.  Still with intermittent flutter. No lower extremities edema.  No wheezing.  Intermittent non-productive cough.  Intermittent nasal congestion at night.  No orthopnea.  No pnd.      PAST MEDICAL HISTORY:    Active Ambulatory Problems     Diagnosis Date Noted    Axillary mass 10/23/2013    Liver mass, right lobe 02/18/2016    Hernia, incisional 11/29/2016    ARORA (nonalcoholic steatohepatitis) 04/12/2017    Morbid obesity with BMI of 50.0-59.9, adult 03/26/2018    Type 2 diabetes mellitus with diabetic nephropathy, without long-term current use of insulin     Hyperlipidemia     Hypertension     Chronic right-sided low back pain without sciatica 04/09/2021    Chronic pain of right hip 04/09/2021    Chronic pain of both knees 04/09/2021    Degenerative lumbar disc 04/09/2021    Primary osteoarthritis of both knees 04/09/2021    Atrial flutter 01/25/2022    Elevated brain natriuretic peptide (BNP) level 01/25/2022    Pleural effusion 02/11/2022    NAPOLEON (obstructive sleep apnea) 02/11/2022    LAD (linear IgA dermatosis) 02/11/2022    Nasal congestion 03/15/2022     Resolved Ambulatory Problems     Diagnosis Date Noted    Type 2 diabetes mellitus without complication, without long-term current use of insulin 04/12/2017    Hypertension, essential 04/12/2017     Past Medical History:   Diagnosis Date    Allergy     Diabetes mellitus     Diabetes mellitus, type 2                 PAST SURGICAL HISTORY:    Past Surgical History:   Procedure Laterality Date     BREAST SURGERY       SECTION      CHOLECYSTECTOMY      ENDOMETRIAL ABLATION      HERNIA REPAIR      incisional     KNEE SURGERY      LIVER LOBECTOMY Right     LYMPH NODE DISSECTION      right axillary    TRANSESOPHAGEAL ECHOCARDIOGRAPHY N/A 2022    Procedure: ECHOCARDIOGRAM, TRANSESOPHAGEAL;  Surgeon: Ji Patricio MD;  Location: [facility] CATH LAB;  Service: Cardiology;  Laterality: N/A;         FAMILY HISTORY:                Family History   Problem Relation Age of Onset    Hypertension Mother     Asthma Mother     COPD Mother     Hyperlipidemia Mother     Diabetes type II Mother     Diabetes Father     Hypertension Father     Hyperlipidemia Father     Cancer Sister         type unknown, but CA in mouth    No Known Problems Brother     No Known Problems Maternal Aunt     No Known Problems Maternal Uncle     No Known Problems Paternal Aunt     No Known Problems Paternal Uncle     No Known Problems Maternal Grandmother     No Known Problems Maternal Grandfather     No Known Problems Paternal Grandmother     No Known Problems Paternal Grandfather     Breast cancer Neg Hx     Colon cancer Neg Hx     Ovarian cancer Neg Hx     Amblyopia Neg Hx     Blindness Neg Hx     Cataracts Neg Hx     Glaucoma Neg Hx     Macular degeneration Neg Hx     Retinal detachment Neg Hx     Strabismus Neg Hx     Stroke Neg Hx     Thyroid disease Neg Hx        SOCIAL HISTORY:          Tobacco:   Social History     Tobacco Use   Smoking Status Never Smoker   Smokeless Tobacco Never Used     alcohol use:    Social History     Substance and Sexual Activity   Alcohol Use Yes    Comment: 4-6 drinks per year               Occupation:   for law office    ALLERGIES:  Review of patient's allergies indicates:  No Known Allergies    CURRENT MEDICATIONS:    Current Outpatient Medications   Medication Sig Dispense Refill    albuterol (VENTOLIN HFA) 90 mcg/actuation inhaler Inhale 2 puffs into  the lungs every 6 (six) hours as needed for Wheezing or Shortness of Breath. Rescue 18 g 0    apixaban (ELIQUIS) 5 mg Tab Take 1 tablet (5 mg total) by mouth 2 (two) times daily. 60 tablet 3    carvediloL (COREG) 6.25 MG tablet Take 1 tablet (6.25 mg total) by mouth 2 (two) times daily with meals. 180 tablet 3    flecainide (TAMBOCOR) 150 MG Tab Take 1 tablet (150 mg total) by mouth every 12 (twelve) hours. 60 tablet 11    FLUCELVAX QUAD 7648-0126, PF, 60 mcg (15 mcg x 4)/0.5 mL Syrg PHARMACY ADMINISTERED      fluticasone furoate-vilanteroL (BREO ELLIPTA) 100-25 mcg/dose diskus inhaler Inhale 1 puff into the lungs once daily. Controller (Patient taking differently: Inhale 1 puff into the lungs once daily. Controller prn) 60 each 1    furosemide (LASIX) 20 MG tablet Take 1 tablet (20 mg total) by mouth once daily. 90 tablet 1    glimepiride (AMARYL) 2 MG tablet Take 1 tablet (2 mg total) by mouth before breakfast. 90 tablet 0    hydrocortisone 2.5 % cream Apply topically 2 (two) times daily. 28 g 5    loratadine (CLARITIN) 10 mg tablet Take 10 mg by mouth once daily. prn      losartan (COZAAR) 50 MG tablet Take 1 tablet (50 mg total) by mouth 2 (two) times daily. 180 tablet 3    metFORMIN (GLUCOPHAGE) 1000 MG tablet Take 1 tablet (1,000 mg total) by mouth 2 (two) times daily with meals. 180 tablet 0    simvastatin (ZOCOR) 40 MG tablet Take 1 tablet (40 mg total) by mouth every evening. 90 tablet 3     No current facility-administered medications for this visit.                  REVIEW OF SYSTEMS:     Pulmonary related symptoms as per HPI.  Gen:  no weight loss, no fever, no night sweat  HEENT:  no visual changes, no sore throat, no hearing loss  CV:  Per hpi  GI:  no melena, no hematochezia, no diarhea, no constipation.  :  no dysuria, no hematuria, no hesistancy, no dribbling  Neuro:  no syncope, no vertigo, no tinitus  Psych:  No homocide or suicide ideation; no depression.  Endocrine:  No heat or  cold intolerance.  Sleep:  + snoring; no witnessed apnea.  Tire upon awake.    Otherwise, a balance of systems reviewed is negative.          PHYSICAL EXAM:  There were no vitals filed for this visit.  There is no height or weight on file to calculate BMI.     GENERAL: Normal development, well groomed.  No apparent distress.    HEENT:   extra oculomotor is intact  NECK: N/A.  SKIN: On face and neck: No abrasions, no rashes, no lesions.    RESPIRATORY:   Normal chest expansion and non-labored breathing at rest.  CARDIOVASCULAR: n/a    EXTREMITIES: n/a  NEURO/PSYCH: Oriented to time, place and person. Normal attention span and concentration. Affect is full. Mood is normal.       LABS  Pulmonary Functions Testing Results(personally reviewed):  none  ABG (personally reviewed):  none  CXR (personally reviewed):    1/28/22 blunting of right costophrenic angle.  Worse when compare to 1/20/22 cxr.  2/11/22 resolution of right effusion    CT CHEST(personally reviewed):  1/24/22 small bilateral effusion.  Diffuse ggo.  +retropectoral lad (2:218)    hsat 2/18/22 ahi o f12; rdi of f32    Echo 1/26/22  · The left ventricle is normal in size with normal systolic function.  · The estimated ejection fraction is 55%.  · Normal left ventricular diastolic function.  · Normal right ventricular size with normal right ventricular systolic function.  · Mild left atrial enlargement.  · No thrombus is present in the appendage.  · Mild mitral regurgitation.  · Mild tricuspid regurgitation.  · A 200 J synchronized cardioversion was successfully performed with restoration of normal sinus rhythm.        ASSESSMENT/PLAN  Problem List Items Addressed This Visit     LAD (linear IgA dermatosis)    Overview     -right axillary and right post pectoral.  S/p axillary lymph node resection around 2014.  Serial monitoring is recommended.   -repeat ct in 3 months (5/11/2022)           Nasal congestion    Overview     -sinus irrigation and nasal  steroid.           NAPOLEON (obstructive sleep apnea) - Primary    Overview     - ahi of 12; rdi of 32; hr 124  -result of sleep study d/w patient.  Recommend treatment due afib along with restless sleep. Agree with apap.               Relevant Orders    CPAP FOR HOME USE    Pleural effusion    Overview     first noted on cta 1/24/22 with bilateral effusion.  Effusion enlarged on most recent cxr 1/28/22.  Suspect a/w volume overload a/w afib/flutter with rvr.  Resolved per 2/11/22.  Further intervention not indicated.                    Patient will No follow-ups on file. with md/np.    25 minutes of total time spent on the encounter, which includes face to face time and non-face to face time preparing to see the patient (eg, review of tests), Obtaining and/or reviewing separately obtained history, documenting clinical information in the electronic or other health record, independently interpreting results (not separately reported) and communicating results to the patient/family/caregiver, or Care coordination (not separately reported).

## 2022-03-15 NOTE — PATIENT INSTRUCTIONS
Call [facility] at [phone] if you don't get a call in 14-28 working days.       Step 1:  Wear the CPAP mask at home while awake for one hour each day. Practice breathing through the mask while watching television, reading, or performing another sedentary activity that keeps your mind off your anxiety.     Step 2: Use the CPAP mask  during scheduled one hour naps at home.     Step 3: Use CPAP mask  during the initial 3-4 hours of nocturnal sleep.     Step 4: Use CPAP mask  through the entire night of sleep.     1.  NeilMed Sinus Rinse Regular Kit    Recipe 1/4 teaspoon of salt and 1/4 teaspoon of baking soda in distilled water.  Be sure to tilt head forward as shown in picture.          flonase or nasonex over the counter.  2 sprays per nostril daily. Be sure to tilt head forward when dispensing medication.

## 2022-03-17 ENCOUNTER — TELEPHONE (OUTPATIENT)
Dept: ELECTROPHYSIOLOGY | Facility: CLINIC | Age: 52
End: 2022-03-17
Payer: COMMERCIAL

## 2022-03-17 ENCOUNTER — OFFICE VISIT (OUTPATIENT)
Dept: CARDIOLOGY | Facility: CLINIC | Age: 52
End: 2022-03-17
Payer: COMMERCIAL

## 2022-03-17 VITALS
DIASTOLIC BLOOD PRESSURE: 73 MMHG | RESPIRATION RATE: 16 BRPM | HEART RATE: 76 BPM | SYSTOLIC BLOOD PRESSURE: 167 MMHG | WEIGHT: 293 LBS | BODY MASS INDEX: 51.91 KG/M2 | HEIGHT: 63 IN | OXYGEN SATURATION: 97 %

## 2022-03-17 DIAGNOSIS — E78.5 HYPERLIPIDEMIA, UNSPECIFIED HYPERLIPIDEMIA TYPE: ICD-10-CM

## 2022-03-17 DIAGNOSIS — I10 PRIMARY HYPERTENSION: Primary | ICD-10-CM

## 2022-03-17 DIAGNOSIS — I48.3 TYPICAL ATRIAL FLUTTER: ICD-10-CM

## 2022-03-17 PROCEDURE — 3008F PR BODY MASS INDEX (BMI) DOCUMENTED: ICD-10-PCS | Mod: CPTII,S$GLB,, | Performed by: INTERNAL MEDICINE

## 2022-03-17 PROCEDURE — 3078F PR MOST RECENT DIASTOLIC BLOOD PRESSURE < 80 MM HG: ICD-10-PCS | Mod: CPTII,S$GLB,, | Performed by: INTERNAL MEDICINE

## 2022-03-17 PROCEDURE — 93000 EKG 12-LEAD: ICD-10-PCS | Mod: S$GLB,,, | Performed by: INTERNAL MEDICINE

## 2022-03-17 PROCEDURE — 4010F ACE/ARB THERAPY RXD/TAKEN: CPT | Mod: CPTII,S$GLB,, | Performed by: INTERNAL MEDICINE

## 2022-03-17 PROCEDURE — 1159F MED LIST DOCD IN RCRD: CPT | Mod: CPTII,S$GLB,, | Performed by: INTERNAL MEDICINE

## 2022-03-17 PROCEDURE — 1159F PR MEDICATION LIST DOCUMENTED IN MEDICAL RECORD: ICD-10-PCS | Mod: CPTII,S$GLB,, | Performed by: INTERNAL MEDICINE

## 2022-03-17 PROCEDURE — 99214 OFFICE O/P EST MOD 30 MIN: CPT | Mod: S$GLB,,, | Performed by: INTERNAL MEDICINE

## 2022-03-17 PROCEDURE — 93000 ELECTROCARDIOGRAM COMPLETE: CPT | Mod: S$GLB,,, | Performed by: INTERNAL MEDICINE

## 2022-03-17 PROCEDURE — 3078F DIAST BP <80 MM HG: CPT | Mod: CPTII,S$GLB,, | Performed by: INTERNAL MEDICINE

## 2022-03-17 PROCEDURE — 99999 PR PBB SHADOW E&M-EST. PATIENT-LVL IV: ICD-10-PCS | Mod: PBBFAC,,, | Performed by: INTERNAL MEDICINE

## 2022-03-17 PROCEDURE — 99999 PR PBB SHADOW E&M-EST. PATIENT-LVL IV: CPT | Mod: PBBFAC,,, | Performed by: INTERNAL MEDICINE

## 2022-03-17 PROCEDURE — 4010F PR ACE/ARB THEARPY RXD/TAKEN: ICD-10-PCS | Mod: CPTII,S$GLB,, | Performed by: INTERNAL MEDICINE

## 2022-03-17 PROCEDURE — 3077F SYST BP >= 140 MM HG: CPT | Mod: CPTII,S$GLB,, | Performed by: INTERNAL MEDICINE

## 2022-03-17 PROCEDURE — 3008F BODY MASS INDEX DOCD: CPT | Mod: CPTII,S$GLB,, | Performed by: INTERNAL MEDICINE

## 2022-03-17 PROCEDURE — 99214 PR OFFICE/OUTPT VISIT, EST, LEVL IV, 30-39 MIN: ICD-10-PCS | Mod: S$GLB,,, | Performed by: INTERNAL MEDICINE

## 2022-03-17 PROCEDURE — 3077F PR MOST RECENT SYSTOLIC BLOOD PRESSURE >= 140 MM HG: ICD-10-PCS | Mod: CPTII,S$GLB,, | Performed by: INTERNAL MEDICINE

## 2022-03-17 NOTE — PROGRESS NOTES
Subjective:    Patient ID:  Micaela Hernandez is a 51 y.o. female who presents for follow-up of Follow-up      HPI        A-flutter on flecainide and eliquis, HTN, HLD, DM, obesity     Admitted 1/24/22  Micaela Hernandez is a 51 y.o. female who  has a past medical history of Morbid Obesity, Degenerative lumbar disc, Diabetes mellitus, Diabetes mellitus, type 2, Hyperlipidemia, and Hypertension, presented to the ED with CC of SOB. Patient states that she has been feeling this since November, and has been wheezing intermittently as well. She even went to an allergist last week and had Xray, attempted albuterol.In Afib/Aflutter in the ED. HR sustained in 150's. Has never had an ECHO, never been to cardiologist. Started on dilt gtt and sent to ICU. SED rate 68. Concern for PNA with pleural fluid and infiltrates with LN swellings and new afib/flutter, started on CAP. LN of axilla were biopsied and told they were reactive, not malignant. Normal D-dimer for age. BNP was 102 but BMI 60. Patient denies any fevers, night sweats, n/v/d/c, abd pain, dysuria, hematuria or hematochezia, cough, CP, leg swelling, HA, dizziness, weakness, hallucinations, SI, HI, or self injury at this time.     Ms. Hernandez was admitted to the ICU for atrial flutter. Cardiology was consulted, and she was started on anticoagulation, flecainide, and a diltiazem drip w/ improvement in heart rate but remained in atrial flutter. She had shortness of breath which improved with furosemide. TTE showed normal LVEF. The following day she was cardioverted with successful restoration of sinus rhythm. She was discharged on flecainide and apixaban (CV 2+) to follow up with cardiology in the outpatient setting. Return precautions given, all questions answered prior to discharge, patient in agreement with plan.      1/26/22 STEVEN/CV - EF 55%, mild MR/TR. No thrombus in IDANIA. CV 200J converted A-flutter to NSR     Echo 1/25/22  · The left ventricle is normal in size with  concentric hypertrophy and normal systolic function.  · The estimated ejection fraction is 60%.  · Normal left ventricular diastolic function.  · Normal right ventricular size with normal right ventricular systolic function.  · Mild left atrial enlargement.  · The estimated PA systolic pressure is 22 mmHg.  · Intermediate central venous pressure (8 mmHg).     2/3/22 Last weekend had an episode of heart racing which lasted all night and was similar to prior episode of A-flutter. Otherwise denies CP or SOB  EKG NSR QTc 493  Suspect breakthrough A-flutter. Increase coreg 6.25 bid. Will hold off on increasing flecainide with prolonged QT  Refer to EP for possible ablation  OV 1 month      Saw EP 3/8/22  Pt with recurrent atrial flutter despite Flecainide.   Recommend RFA.   Risks and benefits of RFA discussed, she would like to proceed.   In interim, increase Flecainide to 150 mg BID.   ECG in 1 week. If remains out of rhythm, consider repeat DCCV.        3/17/22 palpitations improved after taking increased dose of flecainide  EKG NSR QTc 515  Denies CP or SOB      Review of Systems   Constitutional: Negative for decreased appetite.   HENT: Negative for ear discharge.    Eyes: Negative for blurred vision.   Respiratory: Negative for hemoptysis.    Endocrine: Negative for polyphagia.   Hematologic/Lymphatic: Negative for adenopathy.   Skin: Negative for color change.   Musculoskeletal: Negative for joint swelling.   Genitourinary: Negative for bladder incontinence.   Neurological: Negative for brief paralysis.   Psychiatric/Behavioral: Negative for hallucinations.   Allergic/Immunologic: Negative for hives.        Objective:    Physical Exam  Constitutional:       Appearance: She is well-developed.   HENT:      Head: Normocephalic and atraumatic.   Eyes:      Conjunctiva/sclera: Conjunctivae normal.      Pupils: Pupils are equal, round, and reactive to light.   Cardiovascular:      Rate and Rhythm: Normal rate.       Pulses: Intact distal pulses.      Heart sounds: Normal heart sounds.   Pulmonary:      Effort: Pulmonary effort is normal.      Breath sounds: Normal breath sounds.   Abdominal:      General: Bowel sounds are normal.      Palpations: Abdomen is soft.   Musculoskeletal:         General: Normal range of motion.      Cervical back: Normal range of motion and neck supple.   Skin:     General: Skin is warm and dry.   Neurological:      Mental Status: She is alert and oriented to person, place, and time.           Assessment:       1. Primary hypertension    2. Typical atrial flutter    3. Hyperlipidemia, unspecified hyperlipidemia type         Plan:       Agree with plans for RFA of A-flutter  Continue Rx for HTN, HLD, A-flutter  OV 3 months

## 2022-03-17 NOTE — TELEPHONE ENCOUNTER
Per Dr Patricio' note:    3/17/22 palpitations improved after taking increased dose of flecainide  EKG NSR QTc 515  Denies CP or SOB    No need for STEVEN/DCCV prior to ablation

## 2022-03-17 NOTE — TELEPHONE ENCOUNTER
----- Message from Margaux Bledsoe RN sent at 3/9/2022  9:27 AM CST -----  Check EKG and see if Dr Patricio needs to do DCCV prior to CTI

## 2022-03-22 ENCOUNTER — PATIENT MESSAGE (OUTPATIENT)
Dept: ELECTROPHYSIOLOGY | Facility: CLINIC | Age: 52
End: 2022-03-22
Payer: COMMERCIAL

## 2022-03-24 ENCOUNTER — LAB VISIT (OUTPATIENT)
Dept: LAB | Facility: HOSPITAL | Age: 52
End: 2022-03-24
Attending: INTERNAL MEDICINE
Payer: COMMERCIAL

## 2022-03-24 DIAGNOSIS — I48.3 TYPICAL ATRIAL FLUTTER: ICD-10-CM

## 2022-03-24 DIAGNOSIS — I49.8 OTHER CARDIAC ARRHYTHMIA: ICD-10-CM

## 2022-03-24 LAB
ANION GAP SERPL CALC-SCNC: 12 MMOL/L (ref 8–16)
APTT BLDCRRT: 29.3 SEC (ref 21–32)
BASOPHILS # BLD AUTO: 0.05 K/UL (ref 0–0.2)
BASOPHILS NFR BLD: 0.5 % (ref 0–1.9)
BUN SERPL-MCNC: 15 MG/DL (ref 6–20)
CALCIUM SERPL-MCNC: 9.8 MG/DL (ref 8.7–10.5)
CHLORIDE SERPL-SCNC: 102 MMOL/L (ref 95–110)
CO2 SERPL-SCNC: 25 MMOL/L (ref 23–29)
CREAT SERPL-MCNC: 0.7 MG/DL (ref 0.5–1.4)
DIFFERENTIAL METHOD: ABNORMAL
EOSINOPHIL # BLD AUTO: 0.2 K/UL (ref 0–0.5)
EOSINOPHIL NFR BLD: 2.4 % (ref 0–8)
ERYTHROCYTE [DISTWIDTH] IN BLOOD BY AUTOMATED COUNT: 14.8 % (ref 11.5–14.5)
EST. GFR  (AFRICAN AMERICAN): >60 ML/MIN/1.73 M^2
EST. GFR  (NON AFRICAN AMERICAN): >60 ML/MIN/1.73 M^2
GLUCOSE SERPL-MCNC: 139 MG/DL (ref 70–110)
HCT VFR BLD AUTO: 36.1 % (ref 37–48.5)
HGB BLD-MCNC: 11.2 G/DL (ref 12–16)
IMM GRANULOCYTES # BLD AUTO: 0.03 K/UL (ref 0–0.04)
IMM GRANULOCYTES NFR BLD AUTO: 0.3 % (ref 0–0.5)
INR PPP: 1 (ref 0.8–1.2)
LYMPHOCYTES # BLD AUTO: 2.6 K/UL (ref 1–4.8)
LYMPHOCYTES NFR BLD: 27 % (ref 18–48)
MCH RBC QN AUTO: 28.4 PG (ref 27–31)
MCHC RBC AUTO-ENTMCNC: 31 G/DL (ref 32–36)
MCV RBC AUTO: 91 FL (ref 82–98)
MONOCYTES # BLD AUTO: 0.6 K/UL (ref 0.3–1)
MONOCYTES NFR BLD: 6 % (ref 4–15)
NEUTROPHILS # BLD AUTO: 6.1 K/UL (ref 1.8–7.7)
NEUTROPHILS NFR BLD: 63.8 % (ref 38–73)
NRBC BLD-RTO: 0 /100 WBC
PLATELET # BLD AUTO: 224 K/UL (ref 150–450)
PMV BLD AUTO: 13.4 FL (ref 9.2–12.9)
POTASSIUM SERPL-SCNC: 4.7 MMOL/L (ref 3.5–5.1)
PROTHROMBIN TIME: 11.2 SEC (ref 9–12.5)
RBC # BLD AUTO: 3.95 M/UL (ref 4–5.4)
SODIUM SERPL-SCNC: 139 MMOL/L (ref 136–145)
WBC # BLD AUTO: 9.61 K/UL (ref 3.9–12.7)

## 2022-03-24 PROCEDURE — 85025 COMPLETE CBC W/AUTO DIFF WBC: CPT | Performed by: INTERNAL MEDICINE

## 2022-03-24 PROCEDURE — 80048 BASIC METABOLIC PNL TOTAL CA: CPT | Performed by: INTERNAL MEDICINE

## 2022-03-24 PROCEDURE — 36415 COLL VENOUS BLD VENIPUNCTURE: CPT | Mod: PN | Performed by: INTERNAL MEDICINE

## 2022-03-24 PROCEDURE — 85730 THROMBOPLASTIN TIME PARTIAL: CPT | Performed by: INTERNAL MEDICINE

## 2022-03-24 PROCEDURE — 85610 PROTHROMBIN TIME: CPT | Performed by: INTERNAL MEDICINE

## 2022-03-30 ENCOUNTER — TELEPHONE (OUTPATIENT)
Dept: ELECTROPHYSIOLOGY | Facility: CLINIC | Age: 52
End: 2022-03-30
Payer: COMMERCIAL

## 2022-03-30 NOTE — TELEPHONE ENCOUNTER
Spoke to patient    CONFIRMED procedure arrival time of 8am tomorrow for AFl CTI RFA with Dr Kirk.    Reiterated instructions including:  -Directions to check in desk  -NPO after midnight night prior to procedure  -High importance of HOLDING Eliquis, Metformin, and Glimepiride in the morning   -Confirmed compliance of Eliquis. She understands to take it as normal this evening and NOT take in it am  -Pre-procedure LABS reviewed, no alerts noted  -COVID test not required. Covid Vax on file.  -Confirmed no fever, cough, or shortness of breath in the past 30 days  -Confirmed no redness, rash, irritation, or yeast infection to groin area.   -Do not wear mascara day of procedure  -Reviewed current visitor policy    Patient verbalizes understanding of above and appreciates call.

## 2022-03-31 ENCOUNTER — ANESTHESIA (OUTPATIENT)
Dept: MEDSURG UNIT | Facility: HOSPITAL | Age: 52
End: 2022-03-31
Payer: COMMERCIAL

## 2022-03-31 ENCOUNTER — ANESTHESIA EVENT (OUTPATIENT)
Dept: MEDSURG UNIT | Facility: HOSPITAL | Age: 52
End: 2022-03-31
Payer: COMMERCIAL

## 2022-03-31 ENCOUNTER — HOSPITAL ENCOUNTER (OUTPATIENT)
Facility: HOSPITAL | Age: 52
Discharge: HOME | End: 2022-03-31
Attending: INTERNAL MEDICINE | Admitting: INTERNAL MEDICINE
Payer: COMMERCIAL

## 2022-03-31 VITALS
WEIGHT: 293 LBS | SYSTOLIC BLOOD PRESSURE: 139 MMHG | OXYGEN SATURATION: 95 % | RESPIRATION RATE: 18 BRPM | HEART RATE: 86 BPM | TEMPERATURE: 98 F | HEIGHT: 63 IN | DIASTOLIC BLOOD PRESSURE: 67 MMHG | BODY MASS INDEX: 51.91 KG/M2

## 2022-03-31 DIAGNOSIS — Z98.890 S/P ABLATION OF ATRIAL FLUTTER: ICD-10-CM

## 2022-03-31 DIAGNOSIS — I48.3 TYPICAL ATRIAL FLUTTER: ICD-10-CM

## 2022-03-31 DIAGNOSIS — Z86.79 S/P ABLATION OF ATRIAL FLUTTER: ICD-10-CM

## 2022-03-31 DIAGNOSIS — I49.9 ARRHYTHMIA: ICD-10-CM

## 2022-03-31 DIAGNOSIS — I49.8 OTHER CARDIAC ARRHYTHMIA: ICD-10-CM

## 2022-03-31 DIAGNOSIS — I48.92 ATRIAL FLUTTER: ICD-10-CM

## 2022-03-31 LAB
B-HCG UR QL: NEGATIVE
CTP QC/QA: YES
POCT GLUCOSE: 137 MG/DL (ref 70–110)
POCT GLUCOSE: 137 MG/DL (ref 70–110)

## 2022-03-31 PROCEDURE — D9220A PRA ANESTHESIA: Mod: ANES,,, | Performed by: ANESTHESIOLOGY

## 2022-03-31 PROCEDURE — 25000003 PHARM REV CODE 250: Performed by: INTERNAL MEDICINE

## 2022-03-31 PROCEDURE — 93653 PR ELECTROPHYS EVAL, COMPREHEN, W/SUPRAVENT TACHYCARD TRMT: ICD-10-PCS | Mod: ,,, | Performed by: INTERNAL MEDICINE

## 2022-03-31 PROCEDURE — 93010 ELECTROCARDIOGRAM REPORT: CPT | Mod: 76,,, | Performed by: INTERNAL MEDICINE

## 2022-03-31 PROCEDURE — 93653 COMPRE EP EVAL TX SVT: CPT | Performed by: INTERNAL MEDICINE

## 2022-03-31 PROCEDURE — 81025 URINE PREGNANCY TEST: CPT | Performed by: INTERNAL MEDICINE

## 2022-03-31 PROCEDURE — 63600175 PHARM REV CODE 636 W HCPCS: Performed by: STUDENT IN AN ORGANIZED HEALTH CARE EDUCATION/TRAINING PROGRAM

## 2022-03-31 PROCEDURE — D9220A PRA ANESTHESIA: ICD-10-PCS | Mod: CRNA,,, | Performed by: STUDENT IN AN ORGANIZED HEALTH CARE EDUCATION/TRAINING PROGRAM

## 2022-03-31 PROCEDURE — D9220A PRA ANESTHESIA: Mod: CRNA,,, | Performed by: STUDENT IN AN ORGANIZED HEALTH CARE EDUCATION/TRAINING PROGRAM

## 2022-03-31 PROCEDURE — 27201423 OPTIME MED/SURG SUP & DEVICES STERILE SUPPLY: Performed by: INTERNAL MEDICINE

## 2022-03-31 PROCEDURE — C1730 CATH, EP, 19 OR FEW ELECT: HCPCS | Performed by: INTERNAL MEDICINE

## 2022-03-31 PROCEDURE — C1894 INTRO/SHEATH, NON-LASER: HCPCS | Performed by: INTERNAL MEDICINE

## 2022-03-31 PROCEDURE — 37000008 HC ANESTHESIA 1ST 15 MINUTES: Performed by: INTERNAL MEDICINE

## 2022-03-31 PROCEDURE — 37000009 HC ANESTHESIA EA ADD 15 MINS: Performed by: INTERNAL MEDICINE

## 2022-03-31 PROCEDURE — D9220A PRA ANESTHESIA: ICD-10-PCS | Mod: ANES,,, | Performed by: ANESTHESIOLOGY

## 2022-03-31 PROCEDURE — 82962 GLUCOSE BLOOD TEST: CPT | Performed by: INTERNAL MEDICINE

## 2022-03-31 PROCEDURE — 25000003 PHARM REV CODE 250: Performed by: STUDENT IN AN ORGANIZED HEALTH CARE EDUCATION/TRAINING PROGRAM

## 2022-03-31 PROCEDURE — 93005 ELECTROCARDIOGRAM TRACING: CPT

## 2022-03-31 PROCEDURE — 93010 ELECTROCARDIOGRAM REPORT: CPT | Mod: ,,, | Performed by: INTERNAL MEDICINE

## 2022-03-31 PROCEDURE — C1733 CATH, EP, OTHR THAN COOL-TIP: HCPCS | Performed by: INTERNAL MEDICINE

## 2022-03-31 PROCEDURE — 93653 COMPRE EP EVAL TX SVT: CPT | Mod: ,,, | Performed by: INTERNAL MEDICINE

## 2022-03-31 PROCEDURE — 63600175 PHARM REV CODE 636 W HCPCS: Performed by: ANESTHESIOLOGY

## 2022-03-31 PROCEDURE — 93010 EKG 12-LEAD: ICD-10-PCS | Mod: 76,,, | Performed by: INTERNAL MEDICINE

## 2022-03-31 RX ORDER — PROPOFOL 10 MG/ML
VIAL (ML) INTRAVENOUS CONTINUOUS PRN
Status: DISCONTINUED | OUTPATIENT
Start: 2022-03-31 | End: 2022-03-31

## 2022-03-31 RX ORDER — HALOPERIDOL 5 MG/ML
0.5 INJECTION INTRAMUSCULAR EVERY 10 MIN PRN
Status: DISCONTINUED | OUTPATIENT
Start: 2022-03-31 | End: 2022-03-31 | Stop reason: HOSPADM

## 2022-03-31 RX ORDER — ACETAMINOPHEN 325 MG/1
650 TABLET ORAL EVERY 4 HOURS PRN
Status: DISCONTINUED | OUTPATIENT
Start: 2022-03-31 | End: 2022-03-31 | Stop reason: HOSPADM

## 2022-03-31 RX ORDER — PROPOFOL 10 MG/ML
VIAL (ML) INTRAVENOUS
Status: DISCONTINUED | OUTPATIENT
Start: 2022-03-31 | End: 2022-03-31

## 2022-03-31 RX ORDER — SODIUM CHLORIDE 0.9 % (FLUSH) 0.9 %
3 SYRINGE (ML) INJECTION
Status: DISCONTINUED | OUTPATIENT
Start: 2022-03-31 | End: 2022-03-31 | Stop reason: HOSPADM

## 2022-03-31 RX ORDER — LIDOCAINE HYDROCHLORIDE 20 MG/ML
INJECTION, SOLUTION INFILTRATION; PERINEURAL
Status: DISCONTINUED | OUTPATIENT
Start: 2022-03-31 | End: 2022-03-31 | Stop reason: HOSPADM

## 2022-03-31 RX ORDER — MIDAZOLAM HYDROCHLORIDE 1 MG/ML
INJECTION INTRAMUSCULAR; INTRAVENOUS
Status: DISCONTINUED | OUTPATIENT
Start: 2022-03-31 | End: 2022-03-31

## 2022-03-31 RX ORDER — LIDOCAINE HYDROCHLORIDE 20 MG/ML
INJECTION INTRAVENOUS
Status: DISCONTINUED | OUTPATIENT
Start: 2022-03-31 | End: 2022-03-31

## 2022-03-31 RX ORDER — ONDANSETRON 2 MG/ML
INJECTION INTRAMUSCULAR; INTRAVENOUS
Status: DISCONTINUED | OUTPATIENT
Start: 2022-03-31 | End: 2022-03-31

## 2022-03-31 RX ORDER — FENTANYL CITRATE 50 UG/ML
INJECTION, SOLUTION INTRAMUSCULAR; INTRAVENOUS
Status: DISCONTINUED | OUTPATIENT
Start: 2022-03-31 | End: 2022-03-31

## 2022-03-31 RX ORDER — CARVEDILOL 6.25 MG/1
6.25 TABLET ORAL 2 TIMES DAILY WITH MEALS
Qty: 60 TABLET | Refills: 11
Start: 2022-03-31 | End: 2022-05-09 | Stop reason: SDUPTHER

## 2022-03-31 RX ORDER — DEXAMETHASONE SODIUM PHOSPHATE 4 MG/ML
INJECTION, SOLUTION INTRA-ARTICULAR; INTRALESIONAL; INTRAMUSCULAR; INTRAVENOUS; SOFT TISSUE
Status: DISCONTINUED | OUTPATIENT
Start: 2022-03-31 | End: 2022-03-31

## 2022-03-31 RX ORDER — DEXMEDETOMIDINE HYDROCHLORIDE 100 UG/ML
INJECTION, SOLUTION INTRAVENOUS
Status: DISCONTINUED | OUTPATIENT
Start: 2022-03-31 | End: 2022-03-31

## 2022-03-31 RX ORDER — HEPARIN SOD,PORCINE/0.9 % NACL 1000/500ML
INTRAVENOUS SOLUTION INTRAVENOUS
Status: DISCONTINUED | OUTPATIENT
Start: 2022-03-31 | End: 2022-03-31 | Stop reason: HOSPADM

## 2022-03-31 RX ORDER — PHENYLEPHRINE HYDROCHLORIDE 10 MG/ML
INJECTION INTRAVENOUS
Status: DISCONTINUED | OUTPATIENT
Start: 2022-03-31 | End: 2022-03-31

## 2022-03-31 RX ORDER — FENTANYL CITRATE 50 UG/ML
25 INJECTION, SOLUTION INTRAMUSCULAR; INTRAVENOUS EVERY 5 MIN PRN
Status: DISCONTINUED | OUTPATIENT
Start: 2022-03-31 | End: 2022-03-31 | Stop reason: HOSPADM

## 2022-03-31 RX ADMIN — SODIUM CHLORIDE 0.3 MCG/KG/MIN: 9 INJECTION, SOLUTION INTRAVENOUS at 11:03

## 2022-03-31 RX ADMIN — MIDAZOLAM HYDROCHLORIDE 2 MG: 1 INJECTION, SOLUTION INTRAMUSCULAR; INTRAVENOUS at 10:03

## 2022-03-31 RX ADMIN — PHENYLEPHRINE HYDROCHLORIDE 200 MCG: 10 INJECTION, SOLUTION INTRAMUSCULAR; INTRAVENOUS; SUBCUTANEOUS at 11:03

## 2022-03-31 RX ADMIN — FENTANYL CITRATE 25 MCG: 50 INJECTION, SOLUTION INTRAMUSCULAR; INTRAVENOUS at 12:03

## 2022-03-31 RX ADMIN — PROPOFOL 150 MCG/KG/MIN: 10 INJECTION, EMULSION INTRAVENOUS at 10:03

## 2022-03-31 RX ADMIN — PROPOFOL 50 MG: 10 INJECTION, EMULSION INTRAVENOUS at 10:03

## 2022-03-31 RX ADMIN — ACETAMINOPHEN 650 MG: 325 TABLET ORAL at 01:03

## 2022-03-31 RX ADMIN — DEXAMETHASONE SODIUM PHOSPHATE 8 MG: 4 INJECTION, SOLUTION INTRAMUSCULAR; INTRAVENOUS at 10:03

## 2022-03-31 RX ADMIN — PHENYLEPHRINE HYDROCHLORIDE 100 MCG: 10 INJECTION, SOLUTION INTRAMUSCULAR; INTRAVENOUS; SUBCUTANEOUS at 11:03

## 2022-03-31 RX ADMIN — FENTANYL CITRATE 50 MCG: 50 INJECTION, SOLUTION INTRAMUSCULAR; INTRAVENOUS at 12:03

## 2022-03-31 RX ADMIN — FENTANYL CITRATE 25 MCG: 50 INJECTION, SOLUTION INTRAMUSCULAR; INTRAVENOUS at 01:03

## 2022-03-31 RX ADMIN — LIDOCAINE HYDROCHLORIDE 60 MG: 20 INJECTION, SOLUTION INTRAVENOUS at 10:03

## 2022-03-31 RX ADMIN — FENTANYL CITRATE 25 MCG: 50 INJECTION, SOLUTION INTRAMUSCULAR; INTRAVENOUS at 10:03

## 2022-03-31 RX ADMIN — ONDANSETRON HYDROCHLORIDE 4 MG: 2 INJECTION INTRAMUSCULAR; INTRAVENOUS at 12:03

## 2022-03-31 RX ADMIN — DEXMEDETOMIDINE HYDROCHLORIDE 12 MCG: 100 INJECTION, SOLUTION, CONCENTRATE INTRAVENOUS at 12:03

## 2022-03-31 RX ADMIN — SODIUM CHLORIDE: 9 INJECTION, SOLUTION INTRAVENOUS at 10:03

## 2022-03-31 RX ADMIN — DEXMEDETOMIDINE HYDROCHLORIDE 12 MCG: 100 INJECTION, SOLUTION, CONCENTRATE INTRAVENOUS at 10:03

## 2022-03-31 RX ADMIN — PHENYLEPHRINE HYDROCHLORIDE 150 MCG: 10 INJECTION, SOLUTION INTRAMUSCULAR; INTRAVENOUS; SUBCUTANEOUS at 11:03

## 2022-03-31 NOTE — ANESTHESIA PREPROCEDURE EVALUATION
03/31/2022  Micaela Hernandez is a 51 y.o., female.      Pre-op Assessment    I have reviewed the Patient Summary Reports.     I have reviewed the Nursing Notes. I have reviewed the NPO Status.   I have reviewed the Medications.     Review of Systems  Anesthesia Hx:  No problems with previous Anesthesia  History of prior surgery of interest to airway management or planning: Denies Family Hx of Anesthesia complications.   Denies Personal Hx of Anesthesia complications.   Hematology/Oncology:  Hematology Normal   Oncology Normal     EENT/Dental:EENT/Dental Normal   Cardiovascular:   Exercise tolerance: good Hypertension ECG has been reviewed.    Pulmonary:   Sleep Apnea    Renal/:   Chronic Renal Disease    Hepatic/GI:   Liver Disease, (ARORA)    Musculoskeletal:   Arthritis     Neurological:  Neurology Normal    Endocrine:   Diabetes, type 2  Morbid Obesity / BMI > 40  Dermatological:  Skin Normal    Psych:  Psychiatric Normal           Physical Exam  General: Well nourished, Cooperative, Alert and Oriented    Airway:  Mallampati: III   Mouth Opening: Small, but > 3cm  TM Distance: Normal  Tongue: Normal  Neck ROM: Normal ROM  Neck: Girth Increased    Dental:  Intact        Anesthesia Plan  Type of Anesthesia, risks & benefits discussed:    Anesthesia Type: Gen Natural Airway, Gen Supraglottic Airway  Intra-op Monitoring Plan: Standard ASA Monitors  Post Op Pain Control Plan: multimodal analgesia and IV/PO Opioids PRN  Induction:  IV  Informed Consent: Informed consent signed with the Patient and all parties understand the risks and agree with anesthesia plan.  All questions answered.   ASA Score: 3  Day of Surgery Review of History & Physical: H&P Update referred to the surgeon/provider.    Ready For Surgery From Anesthesia Perspective.     .

## 2022-03-31 NOTE — PROGRESS NOTES
Report received from ALKA Tripp. Patient s/p AFL RFA. R groin dressing cdi, soft. + pulse. Post procedure protocol discussed with patient. Vss. Patient connected to cont tele monitor. Call light in reach.

## 2022-03-31 NOTE — INTERVAL H&P NOTE
The patient has been examined and the H&P has been reviewed:    I concur with the findings and no changes have occurred since H&P was written.    Procedure risks, benefits and alternative options discussed and understood by patient/family.      Ms. Hernandez is here today for CTI RFA for typical atrial flutter. 100% compliant with Eliquis. Last dose was yesterday evening. Denies angina or CHF symptoms.      There are no hospital problems to display for this patient.

## 2022-03-31 NOTE — HOSPITAL COURSE
Successful CTI RFA for typical atrial flutter. No post-procedure complications. Given activity restriction and precautions. Will stop flecainide and continue Coreg for now for management of her HTN. This can be d/c'd by PCP if needed, and is not needed from EP perspective at this time.

## 2022-03-31 NOTE — PROGRESS NOTES
Patient ambulated in younger with RN. Patient did well with no complaints. R groin dressing cdi, soft. Patient voiding without difficulty.

## 2022-03-31 NOTE — Clinical Note
Manual pressure was applied to the right femoral vein sheath insertion site. yanely and tegaderm applied.

## 2022-03-31 NOTE — DISCHARGE SUMMARY
"[facility] - Short Stay Cardiac Unit  Cardiac Electrophysiology  Discharge Summary      Patient Name: Micaela Hernandez  MRN: [id number]  Admission Date: 3/31/2022  Hospital Length of Stay: 0 days  Discharge Date and Time:  03/31/2022 3:45 PM  Attending Physician: Erich Kirk MD    Discharging Provider: Rojelio Yip MD  Primary Care Physician: Bailey Fiore MD    Hospital Course:  Successful CTI RFA for typical atrial flutter. No post-procedure complications. Given activity restriction and precautions. Will stop flecainide and continue Coreg for now for management of her HTN. This can be d/c'd by PCP if needed, and is not needed from EP perspective at this time.     HPI:   I had the pleasure of seeing Micaela Hernandez in consultation for management of her atrial flutter. As you are aware, Ms. Hernandez is a 51 year old  Female with morbid obesity, Degenerative lumbar disc, Diabetes mellitus, Diabetes mellitus, type 2, Hyperlipidemia, and Hypertension. She was in her usual state of health until November 2021 she has felt short of breath. Initially thought it was an allergy/ exacerbation of her asthma. She was managed with beta agonist. She continued to fell unwell and presented to the ED in January with tachycardia. ECG during that encounter was consistent with atrial flutter. This was a new diagnosis for her. She was started on diltiazem and seen by cardiologist  Dr. Patricio who recommended addition of flecainide and eliquis for anticoagualtion. She ultimately underwent STEVEN CV on 1/26/2022 with return to sinus rhythm. She has no prior cardiac history.          Today, Ms. Hernandez reports that she has been generally well. She believes she has been in and out of atrial flutter manifesting as mild dyspnea and on/off palpitations. She is on flecainide which helps restore her rhythm when she feels "out of rhythm". She is compliant with her cardiac medications and hasn't missed any of her anticoagulation. She has been evaluated " for sleep apnea and was told she needs a CPAP and is working on obtaining the device.      ECG today shows atrial flutter, CCW CTI dependent at average ventricular rate of 117 bpm.   I reviewed ECG during ED visit in 1/2022 which are consistent with typical counterclockwise CTI flutter.     Procedure(s) (LRB):  Ablation, Atrial Flutter, Typical (N/A)     Indwelling Lines/Drains at time of discharge:  Lines/Drains/Airways     None                     Goals of Care Treatment Preferences:  Code Status: Full Code    Significant Diagnostic Studies: Labs: All labs within the past 24 hours have been reviewed    Pending Diagnostic Studies:     Procedure Component Value Units Date/Time    Transesophageal echo (STEVEN) [[id number]]     Order Status: Sent Lab Status: No result           There are no hospital problems to display for this patient.    No new Assessment & Plan notes have been filed under this hospital service since the last note was generated.  Service: Arrhythmia    Discharged Condition: stable     Follow Up:   Follow-up Information     Erich Kirk MD Follow up.    Specialties: Electrophysiology, Cardiology  Contact information:  [address] [place] [address] [phone]                     Medications:  Reconciled Home Medications:      Medication List      CONTINUE taking these medications    albuterol 90 mcg/actuation inhaler  Commonly known as: VENTOLIN HFA  Inhale 2 puffs into the lungs every 6 (six) hours as needed for Wheezing or Shortness of Breath. Rescue     apixaban 5 mg Tab  Commonly known as: ELIQUIS  Take 1 tablet (5 mg total) by mouth 2 (two) times daily.     BREO ELLIPTA 100-25 mcg/dose diskus inhaler  Generic drug: fluticasone furoate-vilanteroL  Inhale 1 puff into the lungs once daily. Controller     carvediloL 6.25 MG tablet  Commonly known as: COREG  Take 1 tablet (6.25 mg total) by mouth 2 (two) times daily with meals.     FLUCELVAX QUAD 2770-1593 (PF) 60 mcg (15 mcg x 4)/0.5 mL  Syrg  Generic drug: flu vac qs 2020(4 yr up)CD(PF)  PHARMACY ADMINISTERED     furosemide 20 MG tablet  Commonly known as: LASIX  Take 1 tablet (20 mg total) by mouth once daily.     glimepiride 2 MG tablet  Commonly known as: AMARYL  Take 1 tablet (2 mg total) by mouth before breakfast.     hydrocortisone 2.5 % cream  Apply topically 2 (two) times daily.     loratadine 10 mg tablet  Commonly known as: CLARITIN  Take 10 mg by mouth once daily. prn     losartan 50 MG tablet  Commonly known as: COZAAR  Take 1 tablet (50 mg total) by mouth 2 (two) times daily.     metFORMIN 1000 MG tablet  Commonly known as: GLUCOPHAGE  Take 1 tablet (1,000 mg total) by mouth 2 (two) times daily with meals.     simvastatin 40 MG tablet  Commonly known as: ZOCOR  Take 1 tablet (40 mg total) by mouth every evening.        STOP taking these medications    flecainide 150 MG Tab  Commonly known as: TAMBOCOR            Time spent on the discharge of patient: 15 minutes    Rojelio Yip MD  Cardiac Electrophysiology  [facility] - Short Stay Cardiac Unit

## 2022-03-31 NOTE — TRANSFER OF CARE
"Anesthesia Transfer of Care Note    Patient: Micaela Hernandez    Procedure(s) Performed: Procedure(s) (LRB):  Ablation, Atrial Flutter, Typical (N/A)    Patient location: PACU    Anesthesia Type: general    Transport from OR: Transported from OR on 6-10 L/min O2 by face mask with adequate spontaneous ventilation    Post pain: adequate analgesia    Post assessment: no apparent anesthetic complications and tolerated procedure well    Post vital signs: stable    Level of consciousness: awake and alert    Nausea/Vomiting: no nausea/vomiting    Complications: none    Transfer of care protocol was followed      Last vitals:   Visit Vitals  /66 (BP Location: Left arm, Patient Position: Lying)   Pulse 80   Temp 36.8 °C (98.2 °F) (Temporal)   Resp 18   Ht 5' 3" (1.6 m)   Wt (!) 145.2 kg (320 lb)   SpO2 100%   Breastfeeding No   BMI 56.69 kg/m²     "

## 2022-03-31 NOTE — PROCEDURES
: Erich Kirk MD  Date of procedure: 03/31/2022    Post-operative Diagnosis: CTI-dependent atrial flutter    Procedure Performed: Radiofrequency ablation of the CTI.     Description of Procedure: The patient was brought to the EP lab in the fasting state. Prepped and draped in sterile fashion. Safety timeout was performed. Sedation administered by anesthesia staff. Ultrasound guided venous access of the right femoral vein was performed x3. HALO, CS, and ablation catheters placed. RFA to the CTI with confirmation of bidirectional block.     EBL: <10 mL    Specimens Removed: None  Complications: no immediate    Eric Yip MD  PGY-VI

## 2022-04-01 ENCOUNTER — TELEPHONE (OUTPATIENT)
Dept: ELECTROPHYSIOLOGY | Facility: CLINIC | Age: 52
End: 2022-04-01
Payer: COMMERCIAL

## 2022-04-06 ENCOUNTER — TELEPHONE (OUTPATIENT)
Dept: ELECTROPHYSIOLOGY | Facility: CLINIC | Age: 52
End: 2022-04-06
Payer: COMMERCIAL

## 2022-04-21 ENCOUNTER — TELEPHONE (OUTPATIENT)
Dept: FAMILY MEDICINE | Facility: CLINIC | Age: 52
End: 2022-04-21
Payer: COMMERCIAL

## 2022-04-21 DIAGNOSIS — E11.21 DIABETIC NEPHROPATHY ASSOCIATED WITH TYPE 2 DIABETES MELLITUS: Primary | ICD-10-CM

## 2022-04-21 DIAGNOSIS — E11.21 TYPE 2 DIABETES MELLITUS WITH DIABETIC NEPHROPATHY, WITHOUT LONG-TERM CURRENT USE OF INSULIN: ICD-10-CM

## 2022-04-21 RX ORDER — GLIMEPIRIDE 2 MG/1
TABLET ORAL
Qty: 90 TABLET | Refills: 0 | Status: SHIPPED | OUTPATIENT
Start: 2022-04-21 | End: 2022-08-08

## 2022-04-21 NOTE — TELEPHONE ENCOUNTER
Care Due:                  Date            Visit Type   Department     Provider  --------------------------------------------------------------------------------    Last Visit: None Found      None         None Found  Next Visit: None Scheduled  None         None Found                                                            Last  Test          Frequency    Reason                     Performed    Due Date  --------------------------------------------------------------------------------    Office Visit  12 months..  albuterol, furosemide,     Not Found    Overdue                             glimepiride, losartan,                             metFORMIN, simvastatin...    HBA1C.......  6 months...  glimepiride, metFORMIN...  12- 06-    Powered by [employer]. Reference number: [id number].   4/21/2022 3:21:57 PM CDT

## 2022-04-22 NOTE — TELEPHONE ENCOUNTER
Refill Authorization Note   Micaela Hernandez  is requesting a refill authorization.  Brief Assessment and Rationale for Refill:  Approve    -Medication-Related Problems Identified:   Requires labs  Requires appointment  Medication Therapy Plan:  Not true ED visit.    Medication Reconciliation Completed: No   Comments:     Provider Staff:     Action is required for this patient.   Please see care gap opportunities below in Care Due Message.     Thanks!  [facility]     Appointments      Date Provider   Last Visit   1/28/2022 Bailey Fiore MD   Next Visit   Visit date not found Bailey Fiore MD     Note composed:9:41 PM 04/21/2022           Note composed:9:41 PM 04/21/2022

## 2022-04-25 ENCOUNTER — CLINICAL SUPPORT (OUTPATIENT)
Dept: FAMILY MEDICINE | Facility: CLINIC | Age: 52
End: 2022-04-25
Attending: INTERNAL MEDICINE
Payer: COMMERCIAL

## 2022-04-25 DIAGNOSIS — E11.21 DIABETIC NEPHROPATHY ASSOCIATED WITH TYPE 2 DIABETES MELLITUS: ICD-10-CM

## 2022-04-25 PROCEDURE — 92228 IMG RTA DETC/MNTR DS PHY/QHP: CPT | Mod: 26,S$GLB,, | Performed by: OPTOMETRIST

## 2022-04-25 PROCEDURE — 92228 PR REMOTE IMAGE RETINA, MONITOR/MANAGE ACTIVE DISEASE: ICD-10-PCS | Mod: TC,S$GLB,, | Performed by: INTERNAL MEDICINE

## 2022-04-25 PROCEDURE — 92228 IMG RTA DETC/MNTR DS PHY/QHP: CPT | Mod: TC,S$GLB,, | Performed by: INTERNAL MEDICINE

## 2022-04-25 PROCEDURE — 92228 DIABETIC EYE SCREENING PHOTO: ICD-10-PCS | Mod: 26,S$GLB,, | Performed by: OPTOMETRIST

## 2022-04-25 NOTE — PROGRESS NOTES
Micaela Hernandez is a 51 y.o. female here for a diabetic eye screening     Patient cooperative?: Yes  Small pupils?: Yes  Last eye exam: 12/14/2019    For exam results, see Encounter Report.

## 2022-05-03 ENCOUNTER — PATIENT MESSAGE (OUTPATIENT)
Dept: PULMONOLOGY | Facility: CLINIC | Age: 52
End: 2022-05-03
Payer: COMMERCIAL

## 2022-05-03 ENCOUNTER — PATIENT MESSAGE (OUTPATIENT)
Dept: ELECTROPHYSIOLOGY | Facility: CLINIC | Age: 52
End: 2022-05-03
Payer: COMMERCIAL

## 2022-05-03 DIAGNOSIS — I48.3 TYPICAL ATRIAL FLUTTER: Primary | ICD-10-CM

## 2022-05-03 DIAGNOSIS — I49.8 OTHER CARDIAC ARRHYTHMIA: ICD-10-CM

## 2022-05-05 ENCOUNTER — CLINICAL SUPPORT (OUTPATIENT)
Dept: CARDIOLOGY | Facility: HOSPITAL | Age: 52
End: 2022-05-05
Attending: INTERNAL MEDICINE
Payer: COMMERCIAL

## 2022-05-05 DIAGNOSIS — I49.8 OTHER CARDIAC ARRHYTHMIA: ICD-10-CM

## 2022-05-05 DIAGNOSIS — I48.3 TYPICAL ATRIAL FLUTTER: ICD-10-CM

## 2022-05-05 PROCEDURE — 93248 EXT ECG>7D<15D REV&INTERPJ: CPT | Mod: ,,, | Performed by: INTERNAL MEDICINE

## 2022-05-05 PROCEDURE — 93248 CV CARDIAC MONITOR - 3-15 DAY ADULT (CUPID ONLY): ICD-10-PCS | Mod: ,,, | Performed by: INTERNAL MEDICINE

## 2022-05-05 PROCEDURE — 93246 EXT ECG>7D<15D RECORDING: CPT

## 2022-05-09 RX ORDER — CARVEDILOL 6.25 MG/1
6.25 TABLET ORAL 2 TIMES DAILY WITH MEALS
Qty: 180 TABLET | Refills: 3
Start: 2022-05-09 | End: 2022-05-11 | Stop reason: SDUPTHER

## 2022-05-09 NOTE — TELEPHONE ENCOUNTER
----- Message from Sasha Higuera sent at 5/9/2022 10:36 AM CDT -----  Type: RX Refill Request    Who Called: self     Have you contacted your pharmacy:  yes     Refill or New Rx: new rx     RX Name and Strength: carvediloL (COREG) 6.25 MG tablet    Preferred Pharmacy with phone number:   [address] [address]  Phone: [phone] Fax: [phone]    Local or Mail Order: local     Would the patient rather a call back or a response via [facility]? Call back     Best Call Back Number: [phone]    Additional Information: Pharmacy is telling patient this script was cancelled, would like to know if she still needs to take this medication. Please call.

## 2022-05-11 ENCOUNTER — TELEPHONE (OUTPATIENT)
Dept: CARDIOLOGY | Facility: CLINIC | Age: 52
End: 2022-05-11
Payer: COMMERCIAL

## 2022-05-11 RX ORDER — CARVEDILOL 6.25 MG/1
6.25 TABLET ORAL 2 TIMES DAILY WITH MEALS
Qty: 180 TABLET | Refills: 3
Start: 2022-05-11 | End: 2022-08-12

## 2022-05-11 NOTE — TELEPHONE ENCOUNTER
I informed pt that Dr. Patricio has refilled her medication 2 days ago. I suggested that she call back and check again with her pharmacy to see if they have received the orders yet.     ----- Message from Hayley Maria sent at 5/10/2022  3:41 PM CDT -----  Contact: Patient [phone]  Type: RX Refill Request    Who Called: Patient     Have you contacted your pharmacy: Yes. Being told by pharmacy that Rx was cancelled by Dr Patricio. Never had that happen before. Need a refill and to speak to nurse in regards to this. Please call.       This is her 2nd time calling regarding this.     Refill or New Rx: Refill     RX Name and Strength: carvediloL (COREG) 6.25 MG tablet    Is this a 30 day or 90 day RX: 90 day    Preferred Pharmacy with phone number: .  [facility] [address]  Phone: [phone] Fax: [phone]    Local or Mail Order: Local    Would the patient rather a call back or a response via [facility]? Call back    Best Call Back Number: [phone]    Additional Information: Please call pt in regards to this.

## 2022-05-11 NOTE — TELEPHONE ENCOUNTER
I informed pt that I will get Dr. Patricio to resend the prescription and confirm it with the pharmacy when he does.     ----- Message from Luanne Thibodeaux sent at 5/11/2022  4:43 PM CDT -----  Pt returning call, she is requesting that you call the pharmacy  [phone]         [address] [address]  Phone: [phone] Fax: [phone]

## 2022-05-11 NOTE — TELEPHONE ENCOUNTER
----- Message from Maryellen Carrillo sent at 5/11/2022  4:24 PM CDT -----  Type:  Pharmacy Calling to Clarify an RX    Name of Caller: Gian    Pharmacy Name: [facility]    Prescription Name: carvediloL (COREG) 6.25 MG tablet    What do they need to clarify? Needs new prescription because original was cancelled by .    Can you be contacted via [facility]? no    Best Call Back Number: [phone]

## 2022-05-12 NOTE — TELEPHONE ENCOUNTER
I spoke with the pharmacist who denied receiving the approval. I did a verbal approval for the prescription that Dr. Patricio refilled.     I called the pt to inform her that the pharmacy can refill her prescriptions.

## 2022-05-22 DIAGNOSIS — E11.9 TYPE 2 DIABETES MELLITUS WITHOUT COMPLICATION, WITHOUT LONG-TERM CURRENT USE OF INSULIN: ICD-10-CM

## 2022-05-23 ENCOUNTER — HOSPITAL ENCOUNTER (OUTPATIENT)
Dept: RADIOLOGY | Facility: HOSPITAL | Age: 52
Discharge: HOME | End: 2022-05-23
Attending: INTERNAL MEDICINE
Payer: COMMERCIAL

## 2022-05-23 DIAGNOSIS — R59.0 LOCALIZED ENLARGED LYMPH NODES: ICD-10-CM

## 2022-05-23 PROCEDURE — 71250 CT CHEST WITHOUT CONTRAST: ICD-10-PCS | Mod: 26,,, | Performed by: RADIOLOGY

## 2022-05-23 PROCEDURE — 71250 CT THORAX DX C-: CPT | Mod: TC

## 2022-05-23 PROCEDURE — 71250 CT THORAX DX C-: CPT | Mod: 26,,, | Performed by: RADIOLOGY

## 2022-05-23 RX ORDER — METFORMIN HYDROCHLORIDE 1000 MG/1
TABLET ORAL
Qty: 180 TABLET | Refills: 0 | OUTPATIENT
Start: 2022-05-23

## 2022-05-23 NOTE — TELEPHONE ENCOUNTER
No new care gaps identified.  [facility] Embedded Care Gaps. Reference number: [id number]. 5/22/2022   8:19:04 PM CDT

## 2022-05-23 NOTE — TELEPHONE ENCOUNTER
Refill Routing Note   Medication(s) are not appropriate for processing by [facility] for the following reason(s):      - Patient has been seen in the ED/Hospital since the last PCP visit    [facility] action(s):  Defer          Medication reconciliation completed: No     Appointments  past 12m or future 3m with PCP    Date Provider   Last Visit   1/28/2022 Bailey Fiore MD   Next Visit   Visit date not found Bailey Fiore MD   ED visits in past 90 days: 0        Note composed:7:11 AM 05/23/2022

## 2022-05-24 NOTE — TELEPHONE ENCOUNTER
----- Message from Gail Gonzales sent at 5/24/2022 11:21 AM CDT -----  Regarding: [facility]  Type: RX Refill Request    Who Called:  [facility]     Have you contacted your pharmacy: yes    Refill or New Rx: refill    RX Name and Strength:apixaban (ELIQUIS) 5 mg Tab    Preferred Pharmacy with phone number:   [facility] [address] [address]  Phone: [phone] Fax: [phone]    Local or Mail Order: local

## 2022-05-24 NOTE — TELEPHONE ENCOUNTER
Last Office Visit Info:   The patient's last visit with Bailey Fiore MD was on 1/28/2022.    The patient's last visit in current department was on Visit date not found.        Last CBC Results:   Lab Results   Component Value Date    WBC 9.61 03/24/2022    HGB 11.2 (L) 03/24/2022    HCT 36.1 (L) 03/24/2022     03/24/2022       Last CMP Results  Lab Results   Component Value Date     03/24/2022    K 4.7 03/24/2022     03/24/2022    CO2 25 03/24/2022    BUN 15 03/24/2022    CREATININE 0.7 03/24/2022    CALCIUM 9.8 03/24/2022    ALBUMIN 3.4 (L) 01/26/2022    AST 32 01/26/2022    ALT 33 01/26/2022       Last Lipids  Lab Results   Component Value Date    CHOL 180 12/28/2021    TRIG 138 12/28/2021    HDL 53 12/28/2021    LDLCALC 99.4 12/28/2021       Last A1C  Lab Results   Component Value Date    HGBA1C 7.7 (H) 12/28/2021       Last TSH  Lab Results   Component Value Date    TSH 2.612 01/24/2022             Current Med Refills  Medication List with Changes/Refills   Current Medications    ALBUTEROL (VENTOLIN HFA) 90 MCG/ACTUATION INHALER    Inhale 2 puffs into the lungs every 6 (six) hours as needed for Wheezing or Shortness of Breath. Rescue       Start Date: 12/28/2021End Date: 12/28/2022    APIXABAN (ELIQUIS) 5 MG TAB    Take 1 tablet (5 mg total) by mouth 2 (two) times daily.       Start Date: 1/26/2022 End Date: --    CARVEDILOL (COREG) 6.25 MG TABLET    Take 1 tablet (6.25 mg total) by mouth 2 (two) times daily with meals.       Start Date: 5/11/2022 End Date: 5/11/2023    FLUCELVAX QUAD 4789-5304, PF, 60 MCG (15 MCG X 4)/0.5 ML SYRG    PHARMACY ADMINISTERED       Start Date: 10/12/2020End Date: --    FLUTICASONE FUROATE-VILANTEROL (BREO ELLIPTA) 100-25 MCG/DOSE DISKUS INHALER    Inhale 1 puff into the lungs once daily. Controller       Start Date: 1/21/2022 End Date: --    FUROSEMIDE (LASIX) 20 MG TABLET    Take 1 tablet (20 mg total) by mouth once daily.       Start Date: 2/23/2022 End Date:  2/23/2023    GLIMEPIRIDE (AMARYL) 2 MG TABLET    TAKE 1 TABLET BY MOUTH BEFORE BREAKFAST       Start Date: 4/21/2022 End Date: --    HYDROCORTISONE 2.5 % CREAM    Apply topically 2 (two) times daily.       Start Date: 4/9/2021  End Date: --    LORATADINE (CLARITIN) 10 MG TABLET    Take 10 mg by mouth once daily. prn       Start Date: --        End Date: --    LOSARTAN (COZAAR) 50 MG TABLET    Take 1 tablet (50 mg total) by mouth 2 (two) times daily.       Start Date: 1/24/2022 End Date: 1/24/2023    METFORMIN (GLUCOPHAGE) 1000 MG TABLET    Take 1 tablet (1,000 mg total) by mouth 2 (two) times daily with meals.       Start Date: 1/21/2022 End Date: --    SIMVASTATIN (ZOCOR) 40 MG TABLET    Take 1 tablet (40 mg total) by mouth every evening.       Start Date: 1/21/2022 End Date: --

## 2022-05-26 ENCOUNTER — TELEPHONE (OUTPATIENT)
Dept: CARDIOLOGY | Facility: HOSPITAL | Age: 52
End: 2022-05-26
Payer: COMMERCIAL

## 2022-05-26 NOTE — TELEPHONE ENCOUNTER
Received an Alert event from [employer] on this pt who had an AF with RVR (rate 198) event, duration 60 secs. Event can be found on Strip #10, pages 20&22.  Pt with hx of A-flutter and is on Eliquis.      Above information verbally given to Device RN (Yuliet).

## 2022-05-30 ENCOUNTER — TELEPHONE (OUTPATIENT)
Dept: ELECTROPHYSIOLOGY | Facility: CLINIC | Age: 52
End: 2022-05-30
Payer: COMMERCIAL

## 2022-05-30 LAB
OHS CV EVENT MONITOR DAY: 13
OHS CV HOLTER HOOKUP DATE: NORMAL
OHS CV HOLTER HOOKUP TIME: NORMAL
OHS CV HOLTER LENGTH DECIMAL HOURS: 320.73
OHS CV HOLTER LENGTH HOURS: 8
OHS CV HOLTER LENGTH MINUTES: 44
OHS CV HOLTER SCAN DATE: NORMAL
OHS CV HOLTER SINUS AVERAGE HR: 87
OHS CV HOLTER SINUS MAX HR: 128
OHS CV HOLTER SINUS MIN HR: 41
OHS CV HOLTER STUDY END DATE: NORMAL
OHS CV HOLTER STUDY END TIME: NORMAL

## 2022-05-30 RX ORDER — FLECAINIDE ACETATE 100 MG/1
100 TABLET ORAL EVERY 12 HOURS
COMMUNITY
End: 2022-07-05

## 2022-05-30 NOTE — TELEPHONE ENCOUNTER
----- Message from Erich Kirk MD sent at 5/30/2022  2:17 PM CDT -----  Having paroxysmal AF.  Resume Flecainide at prior dose (I believe 100 mg BID).  Clinic visit (can be virtual).

## 2022-05-30 NOTE — TELEPHONE ENCOUNTER
Spoke with patient and reviewed results of cardiac monitor, per Dr Kirk's note:    Having paroxysmal AF.  Resume Flecainide at prior dose (I believe 100 mg BID).  Clinic visit (can be virtual).    Her baseline dose of flecainide was 100mg bid (it was increased to 150mg bid for a few days prior to DCCV). She was told that the increased dose affected her QT interval. She will resume the Flecainide 100mg bid. She already has an appt with [facility] on 7/1/2022. States she hasn't had any more afib episodes over the last few days and would like to keep the appt on 7/1/2022.    She will keep a journal and record any episodes (she is well aware when she is in AF). She will call for increasing episodes and we will move up appt if needed.

## 2022-05-31 NOTE — TELEPHONE ENCOUNTER
Provider Staff:     Action required for this patient.    Please note Refusal of medication.            Requested Prescriptions     Refused Prescriptions Disp Refills    metFORMIN (GLUCOPHAGE) 1000 MG tablet [Pharmacy Med Name: metFORMIN HCl 1000 MG Oral Tablet] 180 tablet 0     Sig: TAKE 1 TABLET BY MOUTH TWICE DAILY WITH MEALS     Refused By: CJ WOLFE     Reason for Refusal: Patient needs an appointment      Thanks!  [facility]   Note composed: 05/30/2022 11:03 PM

## 2022-06-03 ENCOUNTER — OFFICE VISIT (OUTPATIENT)
Dept: FAMILY MEDICINE | Facility: CLINIC | Age: 52
End: 2022-06-03
Payer: COMMERCIAL

## 2022-06-03 DIAGNOSIS — E11.9 TYPE 2 DIABETES MELLITUS WITHOUT COMPLICATION, WITHOUT LONG-TERM CURRENT USE OF INSULIN: ICD-10-CM

## 2022-06-03 PROCEDURE — 1160F RVW MEDS BY RX/DR IN RCRD: CPT | Mod: CPTII,95,, | Performed by: NURSE PRACTITIONER

## 2022-06-03 PROCEDURE — 99214 OFFICE O/P EST MOD 30 MIN: CPT | Mod: 95,,, | Performed by: NURSE PRACTITIONER

## 2022-06-03 PROCEDURE — 1159F MED LIST DOCD IN RCRD: CPT | Mod: CPTII,95,, | Performed by: NURSE PRACTITIONER

## 2022-06-03 PROCEDURE — 4010F PR ACE/ARB THEARPY RXD/TAKEN: ICD-10-PCS | Mod: CPTII,95,, | Performed by: NURSE PRACTITIONER

## 2022-06-03 PROCEDURE — 1159F PR MEDICATION LIST DOCUMENTED IN MEDICAL RECORD: ICD-10-PCS | Mod: CPTII,95,, | Performed by: NURSE PRACTITIONER

## 2022-06-03 PROCEDURE — 1160F PR REVIEW ALL MEDS BY PRESCRIBER/CLIN PHARMACIST DOCUMENTED: ICD-10-PCS | Mod: CPTII,95,, | Performed by: NURSE PRACTITIONER

## 2022-06-03 PROCEDURE — 4010F ACE/ARB THERAPY RXD/TAKEN: CPT | Mod: CPTII,95,, | Performed by: NURSE PRACTITIONER

## 2022-06-03 PROCEDURE — 99214 PR OFFICE/OUTPT VISIT, EST, LEVL IV, 30-39 MIN: ICD-10-PCS | Mod: 95,,, | Performed by: NURSE PRACTITIONER

## 2022-06-03 PROCEDURE — 3051F PR MOST RECENT HEMOGLOBIN A1C LEVEL 7.0 - < 8.0%: ICD-10-PCS | Mod: CPTII,95,, | Performed by: NURSE PRACTITIONER

## 2022-06-03 PROCEDURE — 3051F HG A1C>EQUAL 7.0%<8.0%: CPT | Mod: CPTII,95,, | Performed by: NURSE PRACTITIONER

## 2022-06-03 RX ORDER — METFORMIN HYDROCHLORIDE 1000 MG/1
1000 TABLET ORAL 2 TIMES DAILY WITH MEALS
Qty: 180 TABLET | Refills: 0 | Status: SHIPPED | OUTPATIENT
Start: 2022-06-03 | End: 2022-09-05

## 2022-06-03 NOTE — PROGRESS NOTES
The patient location is: at work in [place]   The chief complaint leading to consultation is: medication refill    Visit type: audiovisual    Face to Face time with patient: 10  20 minutes of total time spent on the encounter, which includes face to face time and non-face to face time preparing to see the patient (eg, review of tests), Obtaining and/or reviewing separately obtained history, Documenting clinical information in the electronic or other health record, Independently interpreting results (not separately reported) and communicating results to the patient/family/caregiver, or Care coordination (not separately reported).         Each patient to whom he or she provides medical services by telemedicine is:  (1) informed of the relationship between the physician and patient and the respective role of any other health care provider with respect to management of the patient; and (2) notified that he or she may decline to receive medical services by telemedicine and may withdraw from such care at any time.    Notes:   Subjective:      Patient ID: Micaela Hernandez is a 52 y.o. female.  New to me but seen previously in clinic by a fellow provider. Pt presents virtually for refill of metformin. Denies any acute complaints today.     Diabetes  She has type 2 diabetes mellitus. No MedicAlert identification noted. The initial diagnosis of diabetes was made 6 years ago. Pertinent negatives for hypoglycemia include no confusion, dizziness, headaches, hunger, mood changes, nervousness/anxiousness, pallor, seizures, sleepiness, speech difficulty, sweats or tremors. Pertinent negatives for diabetes include no blurred vision, no chest pain, no fatigue, no foot paresthesias, no foot ulcerations, no polydipsia, no polyphagia, no polyuria, no visual change, no weakness and no weight loss. Pertinent negatives for hypoglycemia complications include no blackouts, no hospitalization, no nocturnal hypoglycemia, no required  assistance and no required glucagon injection. Symptoms are stable. Pertinent negatives for diabetic complications include no autonomic neuropathy, CVA, heart disease, nephropathy, peripheral neuropathy, PVD or retinopathy. Risk factors for coronary artery disease include dyslipidemia, hypertension, obesity and sedentary lifestyle. Current diabetic treatment includes diet and oral agent (dual therapy). She is compliant with treatment most of the time. Her weight is stable. She is following a generally healthy diet. She has not had a previous visit with a dietitian. She rarely participates in exercise. She does not see a podiatrist.Eye exam is current.     Review of Systems   Constitutional: Negative for activity change, appetite change, fatigue, fever, unexpected weight change and weight loss.   Eyes: Negative for blurred vision.   Respiratory: Negative for chest tightness and shortness of breath.    Cardiovascular: Negative for chest pain and palpitations.   Gastrointestinal: Negative for abdominal pain, change in bowel habit, constipation, diarrhea, nausea, vomiting and change in bowel habit.   Endocrine: Negative for polydipsia, polyphagia and polyuria.   Genitourinary: Negative for difficulty urinating, dysuria and menstrual problem.   Musculoskeletal: Negative.    Integumentary:  Negative for pallor.   Neurological: Negative for dizziness, tremors, seizures, speech difficulty, weakness and headaches.   Psychiatric/Behavioral: Negative for confusion. The patient is not nervous/anxious.    All other systems reviewed and are negative.        Objective:   There were no vitals filed for this visit.  Physical Exam  Vitals and nursing note reviewed.   Constitutional:       General: She is not in acute distress.     Appearance: Normal appearance. She is well-developed and well-groomed. She is morbidly obese. She is not ill-appearing.   HENT:      Head: Normocephalic and atraumatic.      Right Ear: External ear normal.       Left Ear: External ear normal.      Nose: Nose normal.      Mouth/Throat:      Lips: Pink.      Mouth: Mucous membranes are moist.   Eyes:      General: Lids are normal. Vision grossly intact. Gaze aligned appropriately. No scleral icterus.        Right eye: No discharge.         Left eye: No discharge.      Conjunctiva/sclera: Conjunctivae normal.   Neck:      Trachea: Phonation normal.   Pulmonary:      Effort: Pulmonary effort is normal. No accessory muscle usage or respiratory distress.   Abdominal:      General: Abdomen is flat. There is no distension.   Musculoskeletal:      Cervical back: Neck supple.   Skin:     Findings: No rash.   Neurological:      General: No focal deficit present.      Mental Status: She is alert and oriented to person, place, and time. Mental status is at baseline.      Motor: No abnormal muscle tone.   Psychiatric:         Attention and Perception: Attention and perception normal.         Mood and Affect: Mood and affect normal.         Speech: Speech normal.         Behavior: Behavior normal. Behavior is cooperative.         Thought Content: Thought content normal.         Cognition and Memory: Cognition and memory normal.         Judgment: Judgment normal.       Assessment and Plan:     1. Type 2 diabetes mellitus without complication, without long-term current use of insulin  Rx changes: none  Education: Reviewed ABCs of diabetes management (respective goals in parentheses):  A1C (<7), blood pressure (<130/80), and cholesterol (LDL <100).  - CBC Auto Differential; Future  - Comprehensive Metabolic Panel; Future  - Hemoglobin A1C; Future  - Lipid Panel; Future  - Microalbumin/Creatinine Ratio, Urine; Future  - TSH; Future  - metFORMIN (GLUCOPHAGE) 1000 MG tablet; Take 1 tablet (1,000 mg total) by mouth 2 (two) times daily with meals.  Dispense: 180 tablet; Refill: 0           VICENTA Maldonado, FNP-C  Family/Internal Medicine  [facility]

## 2022-06-15 ENCOUNTER — TELEPHONE (OUTPATIENT)
Dept: PULMONOLOGY | Facility: CLINIC | Age: 52
End: 2022-06-15
Payer: COMMERCIAL

## 2022-06-15 NOTE — TELEPHONE ENCOUNTER
Patient made an appt.              ----- Message from rAt Reeves MD sent at 6/9/2022  4:22 PM CDT -----  -Persistent retropectoral lad.  Etiology unclear.  H/o lymph node biopsy.  ?need for mamogram  -please schedule routine clinic follow up with me to discuss ct result

## 2022-06-20 ENCOUNTER — OFFICE VISIT (OUTPATIENT)
Dept: CARDIOLOGY | Facility: CLINIC | Age: 52
End: 2022-06-20
Payer: COMMERCIAL

## 2022-06-20 VITALS
OXYGEN SATURATION: 98 % | SYSTOLIC BLOOD PRESSURE: 133 MMHG | WEIGHT: 293 LBS | BODY MASS INDEX: 51.91 KG/M2 | DIASTOLIC BLOOD PRESSURE: 70 MMHG | HEART RATE: 85 BPM | HEIGHT: 63 IN | RESPIRATION RATE: 18 BRPM

## 2022-06-20 DIAGNOSIS — E11.21 TYPE 2 DIABETES MELLITUS WITH DIABETIC NEPHROPATHY, WITHOUT LONG-TERM CURRENT USE OF INSULIN: ICD-10-CM

## 2022-06-20 DIAGNOSIS — I48.3 TYPICAL ATRIAL FLUTTER: Primary | ICD-10-CM

## 2022-06-20 DIAGNOSIS — I10 PRIMARY HYPERTENSION: ICD-10-CM

## 2022-06-20 DIAGNOSIS — E78.5 HYPERLIPIDEMIA, UNSPECIFIED HYPERLIPIDEMIA TYPE: ICD-10-CM

## 2022-06-20 PROCEDURE — 3075F PR MOST RECENT SYSTOLIC BLOOD PRESS GE 130-139MM HG: ICD-10-PCS | Mod: CPTII,S$GLB,, | Performed by: INTERNAL MEDICINE

## 2022-06-20 PROCEDURE — 3051F HG A1C>EQUAL 7.0%<8.0%: CPT | Mod: CPTII,S$GLB,, | Performed by: INTERNAL MEDICINE

## 2022-06-20 PROCEDURE — 1159F PR MEDICATION LIST DOCUMENTED IN MEDICAL RECORD: ICD-10-PCS | Mod: CPTII,S$GLB,, | Performed by: INTERNAL MEDICINE

## 2022-06-20 PROCEDURE — 3078F PR MOST RECENT DIASTOLIC BLOOD PRESSURE < 80 MM HG: ICD-10-PCS | Mod: CPTII,S$GLB,, | Performed by: INTERNAL MEDICINE

## 2022-06-20 PROCEDURE — 4010F ACE/ARB THERAPY RXD/TAKEN: CPT | Mod: CPTII,S$GLB,, | Performed by: INTERNAL MEDICINE

## 2022-06-20 PROCEDURE — 99214 PR OFFICE/OUTPT VISIT, EST, LEVL IV, 30-39 MIN: ICD-10-PCS | Mod: S$GLB,,, | Performed by: INTERNAL MEDICINE

## 2022-06-20 PROCEDURE — 3075F SYST BP GE 130 - 139MM HG: CPT | Mod: CPTII,S$GLB,, | Performed by: INTERNAL MEDICINE

## 2022-06-20 PROCEDURE — 99999 PR PBB SHADOW E&M-EST. PATIENT-LVL IV: ICD-10-PCS | Mod: PBBFAC,,, | Performed by: INTERNAL MEDICINE

## 2022-06-20 PROCEDURE — 99999 PR PBB SHADOW E&M-EST. PATIENT-LVL IV: CPT | Mod: PBBFAC,,, | Performed by: INTERNAL MEDICINE

## 2022-06-20 PROCEDURE — 99214 OFFICE O/P EST MOD 30 MIN: CPT | Mod: S$GLB,,, | Performed by: INTERNAL MEDICINE

## 2022-06-20 PROCEDURE — 4010F PR ACE/ARB THEARPY RXD/TAKEN: ICD-10-PCS | Mod: CPTII,S$GLB,, | Performed by: INTERNAL MEDICINE

## 2022-06-20 PROCEDURE — 1159F MED LIST DOCD IN RCRD: CPT | Mod: CPTII,S$GLB,, | Performed by: INTERNAL MEDICINE

## 2022-06-20 PROCEDURE — 3008F BODY MASS INDEX DOCD: CPT | Mod: CPTII,S$GLB,, | Performed by: INTERNAL MEDICINE

## 2022-06-20 PROCEDURE — 3078F DIAST BP <80 MM HG: CPT | Mod: CPTII,S$GLB,, | Performed by: INTERNAL MEDICINE

## 2022-06-20 PROCEDURE — 3051F PR MOST RECENT HEMOGLOBIN A1C LEVEL 7.0 - < 8.0%: ICD-10-PCS | Mod: CPTII,S$GLB,, | Performed by: INTERNAL MEDICINE

## 2022-06-20 PROCEDURE — 3008F PR BODY MASS INDEX (BMI) DOCUMENTED: ICD-10-PCS | Mod: CPTII,S$GLB,, | Performed by: INTERNAL MEDICINE

## 2022-06-20 NOTE — PROGRESS NOTES
Subjective:    Patient ID:  Micaela Hernandez is a 52 y.o. female who presents for follow-up of Follow-up      HPI     A-flutter/A-fib  on eliquis - A-flutter RFA 3/31/22, HTN, HLD, DM, obesity     Admitted 1/24/22  Micaela Hernandez is a 51 y.o. female who  has a past medical history of Morbid Obesity, Degenerative lumbar disc, Diabetes mellitus, Diabetes mellitus, type 2, Hyperlipidemia, and Hypertension, presented to the ED with CC of SOB. Patient states that she has been feeling this since November, and has been wheezing intermittently as well. She even went to an allergist last week and had Xray, attempted albuterol.In Afib/Aflutter in the ED. HR sustained in 150's. Has never had an ECHO, never been to cardiologist. Started on dilt gtt and sent to ICU. SED rate 68. Concern for PNA with pleural fluid and infiltrates with LN swellings and new afib/flutter, started on CAP. LN of axilla were biopsied and told they were reactive, not malignant. Normal D-dimer for age. BNP was 102 but BMI 60. Patient denies any fevers, night sweats, n/v/d/c, abd pain, dysuria, hematuria or hematochezia, cough, CP, leg swelling, HA, dizziness, weakness, hallucinations, SI, HI, or self injury at this time.     Ms. Hernandez was admitted to the ICU for atrial flutter. Cardiology was consulted, and she was started on anticoagulation, flecainide, and a diltiazem drip w/ improvement in heart rate but remained in atrial flutter. She had shortness of breath which improved with furosemide. TTE showed normal LVEF. The following day she was cardioverted with successful restoration of sinus rhythm. She was discharged on flecainide and apixaban (CV 2+) to follow up with cardiology in the outpatient setting. Return precautions given, all questions answered prior to discharge, patient in agreement with plan.     3/31/22   · Successful ablation of atrial flutter (typical).  · 3D mapping performed with Ensite.    1/26/22 STEVEN/CV - EF 55%, mild MR/TR. No  thrombus in IDANIA. CV 200J converted A-flutter to NSR     Echo 1/25/22  · The left ventricle is normal in size with concentric hypertrophy and normal systolic function.  · The estimated ejection fraction is 60%.  · Normal left ventricular diastolic function.  · Normal right ventricular size with normal right ventricular systolic function.  · Mild left atrial enlargement.  · The estimated PA systolic pressure is 22 mmHg.  · Intermediate central venous pressure (8 mmHg).     2/3/22 Last weekend had an episode of heart racing which lasted all night and was similar to prior episode of A-flutter. Otherwise denies CP or SOB  EKG NSR QTc 493  Suspect breakthrough A-flutter. Increase coreg 6.25 bid. Will hold off on increasing flecainide with prolonged QT  Refer to EP for possible ablation  OV 1 month      Saw EP 3/8/22  Pt with recurrent atrial flutter despite Flecainide.   Recommend RFA.   Risks and benefits of RFA discussed, she would like to proceed.   In interim, increase Flecainide to 150 mg BID.   ECG in 1 week. If remains out of rhythm, consider repeat DCCV.        3/17/22 palpitations improved after taking increased dose of flecainide  EKG NSR QTc 515  Denies CP or SOB  Agree with plans for RFA of A-flutter  Continue Rx for HTN, HLD, A-flutter  OV 3 months    6/20/22 Flecainide was restarted for PAF 5/30/22 - she only took 2 doses - made her feel bad. Still with palpitations but not as severe  EKG NSR - ok    Review of Systems   Constitutional: Negative for decreased appetite.   HENT: Negative for ear discharge.    Eyes: Negative for blurred vision.   Respiratory: Negative for hemoptysis.    Endocrine: Negative for polyphagia.   Hematologic/Lymphatic: Negative for adenopathy.   Skin: Negative for color change.   Musculoskeletal: Negative for joint swelling.   Genitourinary: Negative for bladder incontinence.   Neurological: Negative for brief paralysis.   Psychiatric/Behavioral: Negative for hallucinations.    Allergic/Immunologic: Negative for hives.        Objective:    Physical Exam  Constitutional:       Appearance: She is well-developed.   HENT:      Head: Normocephalic and atraumatic.   Eyes:      Conjunctiva/sclera: Conjunctivae normal.      Pupils: Pupils are equal, round, and reactive to light.   Cardiovascular:      Rate and Rhythm: Normal rate.      Pulses: Intact distal pulses.      Heart sounds: Normal heart sounds.   Pulmonary:      Effort: Pulmonary effort is normal.      Breath sounds: Normal breath sounds.   Abdominal:      General: Bowel sounds are normal.      Palpations: Abdomen is soft.   Musculoskeletal:         General: Normal range of motion.      Cervical back: Normal range of motion and neck supple.   Skin:     General: Skin is warm and dry.   Neurological:      Mental Status: She is alert and oriented to person, place, and time.           Assessment:       1. Typical atrial flutter    2. Hyperlipidemia, unspecified hyperlipidemia type    3. Primary hypertension    4. Type 2 diabetes mellitus with diabetic nephropathy, without long-term current use of insulin         Plan:       Continue Rx for HTN, HLD, A-flutter, PAF  OV 3 months

## 2022-06-27 NOTE — PROGRESS NOTES
"Ms. Hernandez is a patient of Dr. Kirk and was last seen in clinic 3/9/2022.      Subjective:   Patient ID:  Micaela Hernandez is a 52 y.o. female who presents for follow-up of Atrial Fibrillation and Atrial Flutter  .     HPI:    Ms. Hernandez is a 52 y.o. female with obesity, DM, HTN, HLD, AFL (RFA of CTI 3/31/2022), AF here for follow up after ablation.     Background:    Micaela Hernandez has a hx of morbid obesity, Degenerative lumbar disc, Diabetes mellitus, type 2, Hyperlipidemia, and Hypertension. She was in her usual state of health until November 2021 she has felt short of breath. She continued to fell unwell and presented to the ED in January with tachycardia. ECG during that encounter was consistent with atrial flutter. This was a new diagnosis for her. She was started on diltiazem and seen by cardiologist  Dr. Patricio who recommended addition of flecainide and eliquis for anticoagualtion. She ultimately underwent STEVEN CV on 1/26/2022 with return to sinus rhythm. She has no prior cardiac history.      3/9/2022 She believes she has been in and out of atrial flutter manifesting as mild dyspnea and on/off palpitations. She is on flecainide which helps restore her rhythm when she feels "out of rhythm". She is compliant with her cardiac medications and hasn't missed any of her anticoagulation. She has been evaluated for sleep apnea and was told she needs a CPAP and is working on obtaining the device.   ECG today shows atrial flutter, CCW CTI dependent at average ventricular rate of 117 bpm.   ECG during ED visit in 1/2022 which are consistent with typical counterclockwise CTI flutter.     Will plan for CTI ablation for typical flutter and then monitor for possible Afib following ablation   Continue on flecainide but will increase to 150 mg BID . Continue on coreg as usual . Obtain ECG in 1 week and plans for DCCV if still in flutter  Continue on eliquis 5 mg bid   Weight loss and sleep evaluation encouraged   Will " follow up post CTI ablation     Update (07/05/2022):    3/31/2022: Successful ablation of atrial flutter (typical).    Jey 5/5/2022: SR with 14% PAF with RVR  Instructed to resume Flecainide    Today she says she tried to restart the flecainide and felt terrible, LH, dizzy. Says her palpitations have settled down a lot since her monitor.   Tested for NAPOLEON and was given CPAP. She is still waking up tired. Thinking of getting a different mask.     She is currently taking eliquis 5mg BID for stroke prophylaxis and denies significant bleeding episodes. She is currently being treated with carvedilol 6.125mg BID for HR control. Kidney function is stable, with a creatinine of 0.7 on 3/24/2022.    I have personally reviewed the patient's EKG today, which shows ST at 115bpm. RI interval is 130. QRS is 76. QTc is 481.    Relevant Cardiac Test Results:    STEVEN(1/26/2022):  · The left ventricle is normal in size with normal systolic function.  · The estimated ejection fraction is 55%.  · Normal left ventricular diastolic function.  · Normal right ventricular size with normal right ventricular systolic function.  · Mild left atrial enlargement.  · No thrombus is present in the appendage.  · Mild mitral regurgitation.  · Mild tricuspid regurgitation.  · A 200 J synchronized cardioversion was successfully performed with restoration of normal sinus rhythm.    Current Outpatient Medications   Medication Sig    albuterol (VENTOLIN HFA) 90 mcg/actuation inhaler Inhale 2 puffs into the lungs every 6 (six) hours as needed for Wheezing or Shortness of Breath. Rescue (Patient not taking: No sig reported)    apixaban (ELIQUIS) 5 mg Tab Take 1 tablet (5 mg total) by mouth 2 (two) times daily.    carvediloL (COREG) 6.25 MG tablet Take 1 tablet (6.25 mg total) by mouth 2 (two) times daily with meals. (Patient taking differently: Take 3.125 mg by mouth 2 (two) times daily with meals.)    flecainide (TAMBOCOR) 100 MG Tab Take 100 mg by  "mouth every 12 (twelve) hours.    FLUCELVAX QUAD [id number], PF, 60 mcg (15 mcg x 4)/0.5 mL Syrg PHARMACY ADMINISTERED    fluticasone furoate-vilanteroL (BREO ELLIPTA) 100-25 mcg/dose diskus inhaler Inhale 1 puff into the lungs once daily. Controller (Patient not taking: No sig reported)    furosemide (LASIX) 20 MG tablet Take 1 tablet (20 mg total) by mouth once daily.    glimepiride (AMARYL) 2 MG tablet TAKE 1 TABLET BY MOUTH BEFORE BREAKFAST    hydrocortisone 2.5 % cream Apply topically 2 (two) times daily.    loratadine (CLARITIN) 10 mg tablet Take 10 mg by mouth once daily. prn    losartan (COZAAR) 50 MG tablet Take 1 tablet (50 mg total) by mouth 2 (two) times daily.    metFORMIN (GLUCOPHAGE) 1000 MG tablet Take 1 tablet (1,000 mg total) by mouth 2 (two) times daily with meals.    simvastatin (ZOCOR) 40 MG tablet Take 1 tablet (40 mg total) by mouth every evening.     No current facility-administered medications for this visit.     Facility-Administered Medications Ordered in Other Visits   Medication    sodium chloride 0.9% bolus 1,000 mL       Review of Systems   Constitutional: Negative for malaise/fatigue.   Cardiovascular: Positive for irregular heartbeat and palpitations. Negative for chest pain, dyspnea on exertion and leg swelling.   Respiratory: Negative for shortness of breath.    Hematologic/Lymphatic: Negative for bleeding problem.   Skin: Negative for rash.   Musculoskeletal: Negative for myalgias.   Gastrointestinal: Negative for hematemesis, hematochezia and nausea.   Genitourinary: Negative for hematuria.   Neurological: Negative for light-headedness.   Psychiatric/Behavioral: Negative for altered mental status.   Allergic/Immunologic: Negative for persistent infections.       Objective:          /70   Pulse (!) 115   Ht 5' 3" (1.6 m)   Wt (!) 142 kg (313 lb 0.9 oz)   BMI 55.45 kg/m²     Physical Exam  Vitals and nursing note reviewed.   Constitutional:       Appearance: " Normal appearance. She is well-developed.   HENT:      Head: Normocephalic.      Nose: Nose normal.   Eyes:      Pupils: Pupils are equal, round, and reactive to light.   Cardiovascular:      Rate and Rhythm: Regular rhythm. Tachycardia present.   Pulmonary:      Effort: No respiratory distress.      Breath sounds: Normal breath sounds.   Musculoskeletal:         General: Normal range of motion.   Skin:     General: Skin is warm and dry.      Findings: No erythema.   Neurological:      Mental Status: She is alert and oriented to person, place, and time.   Psychiatric:         Speech: Speech normal.         Behavior: Behavior normal.         Lab Results   Component Value Date     03/24/2022    K 4.7 03/24/2022    BUN 15 03/24/2022    CREATININE 0.7 03/24/2022    ALT 33 01/26/2022    AST 32 01/26/2022    HGB 11.2 (L) 03/24/2022    HCT 36.1 (L) 03/24/2022    TSH 2.612 01/24/2022    LDLCALC 99.4 12/28/2021       Recent Labs   Lab 03/24/22  0738   INR 1.0       Assessment:     1. Typical atrial flutter    2. PAF (paroxysmal atrial fibrillation)    3. Primary hypertension    4. Morbid obesity with BMI of 50.0-59.9, adult    5. NAPOLEON (obstructive sleep apnea)    6. S/P ablation of atrial flutter      Plan:     In summary, Ms. Hernandez is a 52 y.o. female with obesity, DM, HTN, HLD, AFL (RFA of CTI 3/31/2022), AF here for follow up after ablation.   She is 3 months s/p RFA of CTI for typical atrial flutter. Paroxysmal AF (14%) identified on follow up monitor, and flecainide 100mg BID was restarted. She did not tolerate the flecainide and is not taking it at this time. She is now taking 6.125mg BID of coreg. She does continue to have palps although not as frequently as before and for much shorter periods. We discussed her options, including alternative AAD vs PVI. Can try multaq or propafenone. QTc too long for sotalol or tikosyn. Will consult with Dr. Kirk. She has started CPAP. CHADSVASc 3 on eliquis.     Continue  eliquis and coreg  Multaq or propafenone, if cannot tolerate, then consider PVI (UPDATE: Will start multaq per Dr. Kirk. Ok to start with hx of liver adenoma/mild ARORA- normal liver enzymes at last check).  RTC as scheduled    *A copy of this note has been sent to Dr. Kirk*    Follow up in about 2 months (around 9/1/2022).    ------------------------------------------------------------------    VICENTA Storm, NP-C  Cardiac Electrophysiology

## 2022-06-30 PROBLEM — I48.0 PAF (PAROXYSMAL ATRIAL FIBRILLATION): Status: ACTIVE | Noted: 2022-06-30

## 2022-06-30 PROBLEM — Z98.890 S/P ABLATION OF ATRIAL FLUTTER: Status: ACTIVE | Noted: 2022-06-30

## 2022-06-30 PROBLEM — Z86.79 S/P ABLATION OF ATRIAL FLUTTER: Status: ACTIVE | Noted: 2022-06-30

## 2022-07-01 ENCOUNTER — HOSPITAL ENCOUNTER (OUTPATIENT)
Dept: CARDIOLOGY | Facility: CLINIC | Age: 52
Discharge: HOME | End: 2022-07-01
Payer: COMMERCIAL

## 2022-07-01 ENCOUNTER — OFFICE VISIT (OUTPATIENT)
Dept: ELECTROPHYSIOLOGY | Facility: CLINIC | Age: 52
End: 2022-07-01
Payer: COMMERCIAL

## 2022-07-01 VITALS
BODY MASS INDEX: 51.91 KG/M2 | WEIGHT: 293 LBS | HEART RATE: 115 BPM | SYSTOLIC BLOOD PRESSURE: 130 MMHG | DIASTOLIC BLOOD PRESSURE: 70 MMHG | HEIGHT: 63 IN

## 2022-07-01 DIAGNOSIS — I48.3 TYPICAL ATRIAL FLUTTER: Primary | ICD-10-CM

## 2022-07-01 DIAGNOSIS — Z86.79 S/P ABLATION OF ATRIAL FLUTTER: ICD-10-CM

## 2022-07-01 DIAGNOSIS — I48.0 PAF (PAROXYSMAL ATRIAL FIBRILLATION): ICD-10-CM

## 2022-07-01 DIAGNOSIS — I48.92 ATRIAL FLUTTER, UNSPECIFIED TYPE: ICD-10-CM

## 2022-07-01 DIAGNOSIS — G47.33 OSA (OBSTRUCTIVE SLEEP APNEA): ICD-10-CM

## 2022-07-01 DIAGNOSIS — E66.01 MORBID OBESITY WITH BMI OF 50.0-59.9, ADULT: ICD-10-CM

## 2022-07-01 DIAGNOSIS — I10 PRIMARY HYPERTENSION: ICD-10-CM

## 2022-07-01 DIAGNOSIS — Z98.890 S/P ABLATION OF ATRIAL FLUTTER: ICD-10-CM

## 2022-07-01 PROCEDURE — 99214 OFFICE O/P EST MOD 30 MIN: CPT | Mod: S$GLB,,, | Performed by: NURSE PRACTITIONER

## 2022-07-01 PROCEDURE — 3075F SYST BP GE 130 - 139MM HG: CPT | Mod: CPTII,S$GLB,, | Performed by: NURSE PRACTITIONER

## 2022-07-01 PROCEDURE — 1159F MED LIST DOCD IN RCRD: CPT | Mod: CPTII,S$GLB,, | Performed by: NURSE PRACTITIONER

## 2022-07-01 PROCEDURE — 4010F ACE/ARB THERAPY RXD/TAKEN: CPT | Mod: CPTII,S$GLB,, | Performed by: NURSE PRACTITIONER

## 2022-07-01 PROCEDURE — 4010F PR ACE/ARB THEARPY RXD/TAKEN: ICD-10-PCS | Mod: CPTII,S$GLB,, | Performed by: NURSE PRACTITIONER

## 2022-07-01 PROCEDURE — 3078F DIAST BP <80 MM HG: CPT | Mod: CPTII,S$GLB,, | Performed by: NURSE PRACTITIONER

## 2022-07-01 PROCEDURE — 99999 PR PBB SHADOW E&M-EST. PATIENT-LVL III: CPT | Mod: PBBFAC,,, | Performed by: NURSE PRACTITIONER

## 2022-07-01 PROCEDURE — 1159F PR MEDICATION LIST DOCUMENTED IN MEDICAL RECORD: ICD-10-PCS | Mod: CPTII,S$GLB,, | Performed by: NURSE PRACTITIONER

## 2022-07-01 PROCEDURE — 1160F RVW MEDS BY RX/DR IN RCRD: CPT | Mod: CPTII,S$GLB,, | Performed by: NURSE PRACTITIONER

## 2022-07-01 PROCEDURE — 93010 RHYTHM STRIP: ICD-10-PCS | Mod: S$GLB,,, | Performed by: INTERNAL MEDICINE

## 2022-07-01 PROCEDURE — 93005 RHYTHM STRIP: ICD-10-PCS | Mod: S$GLB,,, | Performed by: INTERNAL MEDICINE

## 2022-07-01 PROCEDURE — 1160F PR REVIEW ALL MEDS BY PRESCRIBER/CLIN PHARMACIST DOCUMENTED: ICD-10-PCS | Mod: CPTII,S$GLB,, | Performed by: NURSE PRACTITIONER

## 2022-07-01 PROCEDURE — 3008F PR BODY MASS INDEX (BMI) DOCUMENTED: ICD-10-PCS | Mod: CPTII,S$GLB,, | Performed by: NURSE PRACTITIONER

## 2022-07-01 PROCEDURE — 3075F PR MOST RECENT SYSTOLIC BLOOD PRESS GE 130-139MM HG: ICD-10-PCS | Mod: CPTII,S$GLB,, | Performed by: NURSE PRACTITIONER

## 2022-07-01 PROCEDURE — 3008F BODY MASS INDEX DOCD: CPT | Mod: CPTII,S$GLB,, | Performed by: NURSE PRACTITIONER

## 2022-07-01 PROCEDURE — 99214 PR OFFICE/OUTPT VISIT, EST, LEVL IV, 30-39 MIN: ICD-10-PCS | Mod: S$GLB,,, | Performed by: NURSE PRACTITIONER

## 2022-07-01 PROCEDURE — 99999 PR PBB SHADOW E&M-EST. PATIENT-LVL III: ICD-10-PCS | Mod: PBBFAC,,, | Performed by: NURSE PRACTITIONER

## 2022-07-01 PROCEDURE — 93010 ELECTROCARDIOGRAM REPORT: CPT | Mod: S$GLB,,, | Performed by: INTERNAL MEDICINE

## 2022-07-01 PROCEDURE — 3078F PR MOST RECENT DIASTOLIC BLOOD PRESSURE < 80 MM HG: ICD-10-PCS | Mod: CPTII,S$GLB,, | Performed by: NURSE PRACTITIONER

## 2022-07-01 PROCEDURE — 93005 ELECTROCARDIOGRAM TRACING: CPT | Mod: S$GLB,,, | Performed by: INTERNAL MEDICINE

## 2022-07-01 NOTE — Clinical Note
Pt is 3 mo s/p RFA of CTI. PAF (14%) identified on Bardy and she was instructed to restart flecainide but she did not tolerate it. Would you consider multaq for her? thx

## 2022-07-05 ENCOUNTER — PATIENT MESSAGE (OUTPATIENT)
Dept: ELECTROPHYSIOLOGY | Facility: CLINIC | Age: 52
End: 2022-07-05
Payer: COMMERCIAL

## 2022-07-05 ENCOUNTER — TELEPHONE (OUTPATIENT)
Dept: ELECTROPHYSIOLOGY | Facility: CLINIC | Age: 52
End: 2022-07-05
Payer: COMMERCIAL

## 2022-07-05 NOTE — TELEPHONE ENCOUNTER
Spoke with Ms. Hernandez to schedule a virtual appt for her with Dr. Kirk. The pt agreed to a virtual appt 9/14/22 for 9a.     ----- Message from Flakita Miller NP sent at 7/5/2022  3:47 PM CDT -----  Dr. Kirk would like pt to follow up with him in 2 mo. Can be virtual. Thanks.

## 2022-07-19 ENCOUNTER — OFFICE VISIT (OUTPATIENT)
Dept: PULMONOLOGY | Facility: CLINIC | Age: 52
End: 2022-07-19
Payer: COMMERCIAL

## 2022-07-19 VITALS
DIASTOLIC BLOOD PRESSURE: 84 MMHG | OXYGEN SATURATION: 97 % | WEIGHT: 293 LBS | HEIGHT: 63 IN | SYSTOLIC BLOOD PRESSURE: 131 MMHG | HEART RATE: 101 BPM | BODY MASS INDEX: 51.91 KG/M2

## 2022-07-19 DIAGNOSIS — E66.01 MORBID OBESITY WITH BMI OF 50.0-59.9, ADULT: ICD-10-CM

## 2022-07-19 DIAGNOSIS — G47.33 OSA (OBSTRUCTIVE SLEEP APNEA): Primary | ICD-10-CM

## 2022-07-19 DIAGNOSIS — L13.8 LAD (LINEAR IGA DERMATOSIS): ICD-10-CM

## 2022-07-19 DIAGNOSIS — R09.81 NASAL CONGESTION: ICD-10-CM

## 2022-07-19 PROCEDURE — 3079F DIAST BP 80-89 MM HG: CPT | Mod: CPTII,S$GLB,, | Performed by: INTERNAL MEDICINE

## 2022-07-19 PROCEDURE — 99999 PR PBB SHADOW E&M-EST. PATIENT-LVL IV: CPT | Mod: PBBFAC,,, | Performed by: INTERNAL MEDICINE

## 2022-07-19 PROCEDURE — 3075F PR MOST RECENT SYSTOLIC BLOOD PRESS GE 130-139MM HG: ICD-10-PCS | Mod: CPTII,S$GLB,, | Performed by: INTERNAL MEDICINE

## 2022-07-19 PROCEDURE — 99214 PR OFFICE/OUTPT VISIT, EST, LEVL IV, 30-39 MIN: ICD-10-PCS | Mod: S$GLB,,, | Performed by: INTERNAL MEDICINE

## 2022-07-19 PROCEDURE — 99214 OFFICE O/P EST MOD 30 MIN: CPT | Mod: S$GLB,,, | Performed by: INTERNAL MEDICINE

## 2022-07-19 PROCEDURE — 1159F MED LIST DOCD IN RCRD: CPT | Mod: CPTII,S$GLB,, | Performed by: INTERNAL MEDICINE

## 2022-07-19 PROCEDURE — 3008F PR BODY MASS INDEX (BMI) DOCUMENTED: ICD-10-PCS | Mod: CPTII,S$GLB,, | Performed by: INTERNAL MEDICINE

## 2022-07-19 PROCEDURE — 3008F BODY MASS INDEX DOCD: CPT | Mod: CPTII,S$GLB,, | Performed by: INTERNAL MEDICINE

## 2022-07-19 PROCEDURE — 4010F PR ACE/ARB THEARPY RXD/TAKEN: ICD-10-PCS | Mod: CPTII,S$GLB,, | Performed by: INTERNAL MEDICINE

## 2022-07-19 PROCEDURE — 1159F PR MEDICATION LIST DOCUMENTED IN MEDICAL RECORD: ICD-10-PCS | Mod: CPTII,S$GLB,, | Performed by: INTERNAL MEDICINE

## 2022-07-19 PROCEDURE — 99999 PR PBB SHADOW E&M-EST. PATIENT-LVL IV: ICD-10-PCS | Mod: PBBFAC,,, | Performed by: INTERNAL MEDICINE

## 2022-07-19 PROCEDURE — 3079F PR MOST RECENT DIASTOLIC BLOOD PRESSURE 80-89 MM HG: ICD-10-PCS | Mod: CPTII,S$GLB,, | Performed by: INTERNAL MEDICINE

## 2022-07-19 PROCEDURE — 4010F ACE/ARB THERAPY RXD/TAKEN: CPT | Mod: CPTII,S$GLB,, | Performed by: INTERNAL MEDICINE

## 2022-07-19 PROCEDURE — 3075F SYST BP GE 130 - 139MM HG: CPT | Mod: CPTII,S$GLB,, | Performed by: INTERNAL MEDICINE

## 2022-07-19 NOTE — PROGRESS NOTES
Micaela Hernandez  was seen as a follow up.    CHIEF COMPLAINT:  Results      HISTORY OF PRESENT ILLNESS: Micaela Hernandez is a 52 y.o. female  has a past medical history of Allergy, Atrial flutter, Degenerative lumbar disc (04/09/2021), Diabetes mellitus, Diabetes mellitus, type 2, Hyperlipidemia, Hypertension, PAF (paroxysmal atrial fibrillation) (6/30/2022), and Sleep apnea.  Patient was hospitalized 1/24/22-1/26/22 for SOB x several weeks.  In ED, patient was noted to have Afib/Aflutter. HR sustained in 150's.  Started on dilt gtt and sent to ICU.  Cardiology was consulted, and she was started on anticoagulation, flecainide, and a diltiazem drip w/ improvement in heart rate but remained in atrial flutter. She had shortness of breath which improved with furosemide. TTE showed normal LVEF. S/p successful cardioversion. She was discharged on flecainide and apixaban (CV 2+) to follow up with cardiology in the outpatient setting.  Was not discharged with lasix.  Patient was seen by Dr. Fiore who restarted lasix 20 mg.  Dyspnea has improved with lasix.  Patient was referred to EP for afib on 3/3/22 with Dr. Kirk.  cxr on 1/28/22 with worsening right effusion.  Patient was referred to pulmonary for further inputs.  Our first encounter was 2/11/22.      H/o LN of axilla were biopsied and told they were reactive, not malignant.        Today, patient denied chest pain.  Still with intermittent flutter. No lower extremities edema.  No wheezing.  Intermittent non-productive cough.  Intermittent nasal congestion at night.  No orthopnea.  No pnd.  Per patient, dyspnea is slightly worsen since multaq was started 2 weeks ago.      S/p hsat on 2/18/22 with ahi of 12.  Patient was started on apap around 5/2022.  Patient is using apap nightly.  Sleep is about the same.  No dry mouth.  No sleepiness a/w driving.      SLEEP ROUTINE:  Activity the hour prior to sleep: read in bed and living room     Bed partner:  alone  Time to bed:  9  pm   Lights off:  off  Sleep onset latency:  Few minutes         Disruptions or awakenings:    5-10 times (no difficulty going back to sleep)    Wakeup time:      6 am   Perceived sleep quality:  tire       Daytime naps:      Occasional napping for an hour on weekend   Weekend sleep routine:     9 pm till 7-8 am  Caffeine use: 1 cup of coffee in am and 2 coke zero per day  exercise habit:   none         PAST MEDICAL HISTORY:    Active Ambulatory Problems     Diagnosis Date Noted    Axillary mass 10/23/2013    Liver mass, right lobe 2016    Hernia, incisional 2016    ARORA (nonalcoholic steatohepatitis) 2017    Morbid obesity with BMI of 50.0-59.9, adult 2018    Type 2 diabetes mellitus with diabetic nephropathy, without long-term current use of insulin     Hyperlipidemia     Hypertension     Chronic right-sided low back pain without sciatica 2021    Chronic pain of right hip 2021    Chronic pain of both knees 2021    Degenerative lumbar disc 2021    Primary osteoarthritis of both knees 2021    Atrial flutter 2022    Elevated brain natriuretic peptide (BNP) level 2022    Pleural effusion 2022    NAPOLEON (obstructive sleep apnea) 2022    LAD (linear IgA dermatosis) 2022    Nasal congestion 03/15/2022    PAF (paroxysmal atrial fibrillation) 2022    S/P ablation of atrial flutter 2022     Resolved Ambulatory Problems     Diagnosis Date Noted    Type 2 diabetes mellitus without complication, without long-term current use of insulin 2017    Hypertension, essential 2017     Past Medical History:   Diagnosis Date    Allergy     Diabetes mellitus     Diabetes mellitus, type 2     Sleep apnea                 PAST SURGICAL HISTORY:    Past Surgical History:   Procedure Laterality Date    BREAST SURGERY       SECTION      CHOLECYSTECTOMY      ENDOMETRIAL ABLATION      HERNIA REPAIR       incisional     KNEE SURGERY      LIVER LOBECTOMY Right     LYMPH NODE DISSECTION      right axillary    TRANSESOPHAGEAL ECHOCARDIOGRAPHY N/A 1/26/2022    Procedure: ECHOCARDIOGRAM, TRANSESOPHAGEAL;  Surgeon: Ji Patricio MD;  Location: [facility] CATH LAB;  Service: Cardiology;  Laterality: N/A;         FAMILY HISTORY:                Family History   Problem Relation Age of Onset    Hypertension Mother     Asthma Mother     COPD Mother     Hyperlipidemia Mother     Diabetes type II Mother     Diabetes Father     Hypertension Father     Hyperlipidemia Father     Cancer Sister         type unknown, but CA in mouth    No Known Problems Brother     No Known Problems Maternal Aunt     No Known Problems Maternal Uncle     No Known Problems Paternal Aunt     No Known Problems Paternal Uncle     No Known Problems Maternal Grandmother     No Known Problems Maternal Grandfather     No Known Problems Paternal Grandmother     No Known Problems Paternal Grandfather     Breast cancer Neg Hx     Colon cancer Neg Hx     Ovarian cancer Neg Hx     Amblyopia Neg Hx     Blindness Neg Hx     Cataracts Neg Hx     Glaucoma Neg Hx     Macular degeneration Neg Hx     Retinal detachment Neg Hx     Strabismus Neg Hx     Stroke Neg Hx     Thyroid disease Neg Hx        SOCIAL HISTORY:          Tobacco:   Social History     Tobacco Use   Smoking Status Never Smoker   Smokeless Tobacco Never Used     alcohol use:    Social History     Substance and Sexual Activity   Alcohol Use Not Currently    Comment: occasionally               Occupation:   for law office    ALLERGIES:  Review of patient's allergies indicates:  No Known Allergies    CURRENT MEDICATIONS:    Current Outpatient Medications   Medication Sig Dispense Refill    albuterol (VENTOLIN HFA) 90 mcg/actuation inhaler Inhale 2 puffs into the lungs every 6 (six) hours as needed for Wheezing or Shortness of Breath. Rescue 18 g 0    apixaban (ELIQUIS)  5 mg Tab Take 1 tablet (5 mg total) by mouth 2 (two) times daily. 60 tablet 3    carvediloL (COREG) 6.25 MG tablet Take 1 tablet (6.25 mg total) by mouth 2 (two) times daily with meals. 180 tablet 3    dronedarone (MULTAQ) 400 mg Tab Take 1 tablet (400 mg total) by mouth 2 (two) times daily with meals. 60 tablet 3    FLUCELVAX QUAD 8125-6366, PF, 60 mcg (15 mcg x 4)/0.5 mL Syrg PHARMACY ADMINISTERED      fluticasone furoate-vilanteroL (BREO ELLIPTA) 100-25 mcg/dose diskus inhaler Inhale 1 puff into the lungs once daily. Controller 60 each 1    furosemide (LASIX) 20 MG tablet Take 1 tablet (20 mg total) by mouth once daily. 90 tablet 1    glimepiride (AMARYL) 2 MG tablet TAKE 1 TABLET BY MOUTH BEFORE BREAKFAST 90 tablet 0    hydrocortisone 2.5 % cream Apply topically 2 (two) times daily. 28 g 5    loratadine (CLARITIN) 10 mg tablet Take 10 mg by mouth once daily. prn      losartan (COZAAR) 50 MG tablet Take 1 tablet (50 mg total) by mouth 2 (two) times daily. 180 tablet 3    metFORMIN (GLUCOPHAGE) 1000 MG tablet Take 1 tablet (1,000 mg total) by mouth 2 (two) times daily with meals. 180 tablet 0    simvastatin (ZOCOR) 40 MG tablet Take 1 tablet (40 mg total) by mouth every evening. 90 tablet 3     No current facility-administered medications for this visit.     Facility-Administered Medications Ordered in Other Visits   Medication Dose Route Frequency Provider Last Rate Last Admin    sodium chloride 0.9% bolus 1,000 mL  1,000 mL Intravenous Once Kymberly Godfrey NP                      REVIEW OF SYSTEMS:     Pulmonary related symptoms as per HPI.  Gen:  no weight loss, no fever, no night sweat  HEENT:  no visual changes, no sore throat, no hearing loss  CV:  Per hpi  GI:  no melena, no hematochezia, no diarhea, no constipation.  :  no dysuria, no hematuria, no hesistancy, no dribbling  Neuro:  no syncope, no vertigo, no tinitus  Psych:  No homocide or suicide ideation; no depression.  Endocrine:  No heat  "or cold intolerance.  Sleep:  + snoring; no witnessed apnea.  Tire upon awake.    Otherwise, a balance of systems reviewed is negative.          PHYSICAL EXAM:  Vitals:    07/19/22 0800   BP: 131/84   Pulse: 101   SpO2: 97%   Weight: (!) 143.5 kg (316 lb 4 oz)   Height: 5' 3" (1.6 m)   PainSc: 0-No pain     Body mass index is 56.02 kg/m².     GENERAL:  well develop; no apparent distress  HEENT:  no nasal congestion; no discharge noted; class 4 modified mallampatti.   NECK:  supple; no palpable masses.  CARDIO: regular rate and rhythm  PULM:  clear to auscultation bilaterally; no intercostals retractions; no accessory muscle usage   ABDOMEN:  soft nontender/nondistended.  +bowel sound  EXTREMITIES no cce  NEURO:  CN II-XII intact.  5/5 motor in all extremities.  sensation grossly intact   to light touch.  PSYCH:  normal affect.  Alert and oriented x 4    LABS  Pulmonary Functions Testing Results(personally reviewed):  none  ABG (personally reviewed):  none  CXR (personally reviewed):    1/28/22 blunting of right costophrenic angle.  Worse when compare to 1/20/22 cxr.  2/11/22 resolution of right effusion    CT CHEST(personally reviewed):    1/24/22 small bilateral effusion.  Diffuse ggo.  +retropectoral lad (2:218)  5/23/22 Persistent retropectoral lad.  Clearing of effusion or ggo.    hsat 2/18/22 ahi o f12; rdi of f32    Echo 1/26/22  · The left ventricle is normal in size with normal systolic function.  · The estimated ejection fraction is 55%.  · Normal left ventricular diastolic function.  · Normal right ventricular size with normal right ventricular systolic function.  · Mild left atrial enlargement.  · No thrombus is present in the appendage.  · Mild mitral regurgitation.  · Mild tricuspid regurgitation.  · A 200 J synchronized cardioversion was successfully performed with restoration of normal sinus rhythm.        ASSESSMENT/PLAN  Problem List Items Addressed This Visit     LAD (linear IgA dermatosis)    " Overview     -right axillary and right post pectoral.  S/p axillary lymph node resection around 2014.   -persisted per recent ct.    -yearly mammogram with Dr. Zhao/Adebayo.            Relevant Orders    Ambulatory referral/consult to Bariatric Medicine    Morbid obesity with BMI of 50.0-59.9, adult    Overview     -does not have coverage for bariatric surgery.             Nasal congestion    Overview     -flonase           NAPOLEON (obstructive sleep apnea) - Primary    Overview     - ahi of 12; rdi of 32.  Currently on apap of 5-15  -87%>4 hours.  Residual ahi of 0.8.  P max of 6.8.    -tolerating apap.  Still with frequent awakening.  Main issue is frequent awakening along with airhunger.  Recommend patient to turn off ramp to help with air hunger.  In addition, may consider narrowing apap from 5-15 to 5-8 cm H20.  Patient did not have machine during clinic encounter.  She may bring in on anther day.    -will try nasal pillow.               Relevant Orders    CPAP/BIPAP SUPPLIES          Patient will No follow-ups on file. with md/np.    25 minutes of total time spent on the encounter, which includes face to face time and non-face to face time preparing to see the patient (eg, review of tests), Obtaining and/or reviewing separately obtained history, documenting clinical information in the electronic or other health record, independently interpreting results (not separately reported) and communicating results to the patient/family/caregiver, or Care coordination (not separately reported).

## 2022-07-24 DIAGNOSIS — E11.21 TYPE 2 DIABETES MELLITUS WITH DIABETIC NEPHROPATHY, WITHOUT LONG-TERM CURRENT USE OF INSULIN: ICD-10-CM

## 2022-07-25 NOTE — TELEPHONE ENCOUNTER
Care Due:                  Date            Visit Type   Department     Provider  --------------------------------------------------------------------------------    Last Visit: None Found      None         None Found  Next Visit: None Scheduled  None         None Found                                                            Last  Test          Frequency    Reason                     Performed    Due Date  --------------------------------------------------------------------------------    Office Visit  12 months..  albuterol, furosemide,     Not Found    Overdue                             glimepiride, losartan,                             simvastatin..............    HBA1C.......  6 months...  glimepiride..............  12- 06-    [employer] Embedded Care Gaps. Reference number: [id number]. 7/24/2022   8:16:59 PM CDT

## 2022-07-26 RX ORDER — GLIMEPIRIDE 2 MG/1
TABLET ORAL
Qty: 90 TABLET | Refills: 0 | OUTPATIENT
Start: 2022-07-26

## 2022-07-26 NOTE — TELEPHONE ENCOUNTER
Refill Routing Note   Medication(s) are not appropriate for processing by [facility] for the following reason(s):      - Required laboratory values are outdated    [facility] action(s):  Defer Medication-related problems identified:   Requires labs  Requires appointment        Medication reconciliation completed: No     Appointments  past 12m or future 3m with PCP    Date Provider   Last Visit   1/28/2022 Bailey Fiore MD   Next Visit   8/12/2022 Bailey Fiore MD   ED visits in past 90 days: 0        Note composed:9:48 PM 07/25/2022

## 2022-07-30 ENCOUNTER — LAB VISIT (OUTPATIENT)
Dept: LAB | Facility: HOSPITAL | Age: 52
End: 2022-07-30
Attending: NURSE PRACTITIONER
Payer: COMMERCIAL

## 2022-07-30 DIAGNOSIS — E11.9 TYPE 2 DIABETES MELLITUS WITHOUT COMPLICATION, WITHOUT LONG-TERM CURRENT USE OF INSULIN: ICD-10-CM

## 2022-07-30 LAB
ALBUMIN SERPL BCP-MCNC: 3.5 G/DL (ref 3.5–5.2)
ALP SERPL-CCNC: 86 U/L (ref 55–135)
ALT SERPL W/O P-5'-P-CCNC: 24 U/L (ref 10–44)
ANION GAP SERPL CALC-SCNC: 12 MMOL/L (ref 8–16)
AST SERPL-CCNC: 31 U/L (ref 10–40)
BASOPHILS # BLD AUTO: 0.06 K/UL (ref 0–0.2)
BASOPHILS NFR BLD: 0.5 % (ref 0–1.9)
BILIRUB SERPL-MCNC: 0.4 MG/DL (ref 0.1–1)
BUN SERPL-MCNC: 12 MG/DL (ref 6–20)
CALCIUM SERPL-MCNC: 10 MG/DL (ref 8.7–10.5)
CHLORIDE SERPL-SCNC: 100 MMOL/L (ref 95–110)
CHOLEST SERPL-MCNC: 148 MG/DL (ref 120–199)
CHOLEST/HDLC SERPL: 2.8 {RATIO} (ref 2–5)
CO2 SERPL-SCNC: 22 MMOL/L (ref 23–29)
CREAT SERPL-MCNC: 0.8 MG/DL (ref 0.5–1.4)
DIFFERENTIAL METHOD: ABNORMAL
EOSINOPHIL # BLD AUTO: 0.3 K/UL (ref 0–0.5)
EOSINOPHIL NFR BLD: 2.5 % (ref 0–8)
ERYTHROCYTE [DISTWIDTH] IN BLOOD BY AUTOMATED COUNT: 14.4 % (ref 11.5–14.5)
EST. GFR  (AFRICAN AMERICAN): >60 ML/MIN/1.73 M^2
EST. GFR  (NON AFRICAN AMERICAN): >60 ML/MIN/1.73 M^2
ESTIMATED AVG GLUCOSE: 126 MG/DL (ref 68–131)
GLUCOSE SERPL-MCNC: 136 MG/DL (ref 70–110)
HBA1C MFR BLD: 6 % (ref 4–5.6)
HCT VFR BLD AUTO: 34.8 % (ref 37–48.5)
HDLC SERPL-MCNC: 52 MG/DL (ref 40–75)
HDLC SERPL: 35.1 % (ref 20–50)
HGB BLD-MCNC: 10.8 G/DL (ref 12–16)
IMM GRANULOCYTES # BLD AUTO: 0.05 K/UL (ref 0–0.04)
IMM GRANULOCYTES NFR BLD AUTO: 0.5 % (ref 0–0.5)
LDLC SERPL CALC-MCNC: 73.8 MG/DL (ref 63–159)
LYMPHOCYTES # BLD AUTO: 2.8 K/UL (ref 1–4.8)
LYMPHOCYTES NFR BLD: 25.6 % (ref 18–48)
MCH RBC QN AUTO: 27.3 PG (ref 27–31)
MCHC RBC AUTO-ENTMCNC: 31 G/DL (ref 32–36)
MCV RBC AUTO: 88 FL (ref 82–98)
MONOCYTES # BLD AUTO: 0.7 K/UL (ref 0.3–1)
MONOCYTES NFR BLD: 6.4 % (ref 4–15)
NEUTROPHILS # BLD AUTO: 7.1 K/UL (ref 1.8–7.7)
NEUTROPHILS NFR BLD: 64.5 % (ref 38–73)
NONHDLC SERPL-MCNC: 96 MG/DL
NRBC BLD-RTO: 0 /100 WBC
PLATELET # BLD AUTO: 243 K/UL (ref 150–450)
PMV BLD AUTO: 13.3 FL (ref 9.2–12.9)
POTASSIUM SERPL-SCNC: 4.1 MMOL/L (ref 3.5–5.1)
PROT SERPL-MCNC: 8.2 G/DL (ref 6–8.4)
RBC # BLD AUTO: 3.96 M/UL (ref 4–5.4)
SODIUM SERPL-SCNC: 134 MMOL/L (ref 136–145)
TRIGL SERPL-MCNC: 111 MG/DL (ref 30–150)
TSH SERPL DL<=0.005 MIU/L-ACNC: 1.46 UIU/ML (ref 0.4–4)
WBC # BLD AUTO: 10.97 K/UL (ref 3.9–12.7)

## 2022-07-30 PROCEDURE — 80061 LIPID PANEL: CPT | Performed by: NURSE PRACTITIONER

## 2022-07-30 PROCEDURE — 85025 COMPLETE CBC W/AUTO DIFF WBC: CPT | Performed by: NURSE PRACTITIONER

## 2022-07-30 PROCEDURE — 84443 ASSAY THYROID STIM HORMONE: CPT | Performed by: NURSE PRACTITIONER

## 2022-07-30 PROCEDURE — 83036 HEMOGLOBIN GLYCOSYLATED A1C: CPT | Performed by: NURSE PRACTITIONER

## 2022-07-30 PROCEDURE — 80053 COMPREHEN METABOLIC PANEL: CPT | Performed by: NURSE PRACTITIONER

## 2022-07-30 PROCEDURE — 36415 COLL VENOUS BLD VENIPUNCTURE: CPT | Mod: PO | Performed by: NURSE PRACTITIONER

## 2022-08-03 DIAGNOSIS — R06.02 SOB (SHORTNESS OF BREATH): ICD-10-CM

## 2022-08-03 RX ORDER — FUROSEMIDE 20 MG/1
TABLET ORAL
Qty: 30 TABLET | Refills: 0 | OUTPATIENT
Start: 2022-08-03

## 2022-08-03 NOTE — TELEPHONE ENCOUNTER
Refill Decision Note   Micaela Hernandez  is requesting a refill authorization.  Brief Assessment and Rationale for Refill:  Quick Discontinue     Medication Therapy Plan:  Receipt confirmed by pharmacy (8/1/2022  4:45 PM CDT)    Medication Reconciliation Completed: No   Comments:     No Care Gaps recommended.     Note composed:11:48 AM 08/03/2022

## 2022-09-12 NOTE — PROGRESS NOTES
"Subjective:    Patient ID:  Micaela Hernandez is a 52 y.o. female who presents for follow-up of No chief complaint on file.      HPI  52 y.o. female with atrial fibrillation, morbid obesity, DM, HTN, HLD, AFL (RFA of CTI 3/31/2022), sleep apnea (on CPAP)    The patient location is: Work  The chief complaint leading to consultation is: atrial fibrillation     Visit type: audiovisual     Face to Face time with patient: 15 minutes  20 minutes of total time spent on the encounter, which includes face to face time and non-face to face time preparing to see the patient (eg, review of tests), Obtaining and/or reviewing separately obtained history, Documenting clinical information in the electronic or other health record, Independently interpreting results (not separately reported) and communicating results to the patient/family/caregiver, or Care coordination (not separately reported).        Each patient to whom he or she provides medical services by telemedicine is:  (1) informed of the relationship between the physician and patient and the respective role of any other health care provider with respect to management of the patient; and (2) notified that he or she may decline to receive medical services by telemedicine and may withdraw from such care at any time.       Background:    11/21 presented with symptomatic atrial flutter.  STEVEN/DCCV 1/26/22      Developed symptomatic recurrence.     3/31/2022: Successful ablation of atrial flutter (typical).  Noted continued palpitations.     Bardy 5/5/2022: SR with 14% PAF with RVR  Flecainide resumed >> felt poorly.  Switched to Multaq.    Started on CPAP 5/22.     Update:    Doing overall better with the Multaq. Has palpitations, describing it "feels like it wants to go out, but it does not." Was occurring weekly, now less.  Does note mild shortness of breath, although less so than with Flecainide.  ECG 9/13/22 reveals nsr with normal baseline intervals.      Review of Systems "   Constitutional: Negative. Negative for fever and malaise/fatigue.   HENT:  Negative for congestion and sore throat.    Cardiovascular:  Positive for palpitations. Negative for chest pain, dyspnea on exertion, irregular heartbeat, leg swelling, near-syncope, orthopnea, paroxysmal nocturnal dyspnea and syncope.   Respiratory:  Negative for cough and shortness of breath.    Gastrointestinal:  Negative for abdominal pain, constipation and diarrhea.   Neurological:  Negative for dizziness, light-headedness and weakness.   Psychiatric/Behavioral:  Negative for depression. The patient is not nervous/anxious.           Assessment:       1. PAF (paroxysmal atrial fibrillation)    2. Typical atrial flutter    3. Primary hypertension    4. Morbid obesity with BMI of 50.0-59.9, adult    5. Type 2 diabetes mellitus with diabetic nephropathy, without long-term current use of insulin    6. NAPOLEON (obstructive sleep apnea)         Plan:       Overall doing well from an AF burden standpoint. Suspect this is Multaq + treatment for her sleep apnea.  Discussed need for weight reduction.  Given shortness of breath >> echo.  F/u in 6 months.

## 2022-09-13 ENCOUNTER — HOSPITAL ENCOUNTER (OUTPATIENT)
Dept: CARDIOLOGY | Facility: HOSPITAL | Age: 52
Discharge: HOME | End: 2022-09-13
Attending: INTERNAL MEDICINE
Payer: COMMERCIAL

## 2022-09-13 DIAGNOSIS — I49.8 OTHER SPECIFIED CARDIAC ARRHYTHMIAS: Primary | ICD-10-CM

## 2022-09-13 DIAGNOSIS — I49.8 OTHER SPECIFIED CARDIAC ARRHYTHMIAS: ICD-10-CM

## 2022-09-13 PROCEDURE — 93010 EKG 12-LEAD: ICD-10-PCS | Mod: ,,, | Performed by: INTERNAL MEDICINE

## 2022-09-13 PROCEDURE — 93005 ELECTROCARDIOGRAM TRACING: CPT

## 2022-09-13 PROCEDURE — 93010 ELECTROCARDIOGRAM REPORT: CPT | Mod: ,,, | Performed by: INTERNAL MEDICINE

## 2022-09-14 ENCOUNTER — OFFICE VISIT (OUTPATIENT)
Dept: ELECTROPHYSIOLOGY | Facility: CLINIC | Age: 52
End: 2022-09-14
Payer: COMMERCIAL

## 2022-09-14 DIAGNOSIS — E66.01 MORBID OBESITY WITH BMI OF 50.0-59.9, ADULT: ICD-10-CM

## 2022-09-14 DIAGNOSIS — I10 PRIMARY HYPERTENSION: ICD-10-CM

## 2022-09-14 DIAGNOSIS — G47.33 OSA (OBSTRUCTIVE SLEEP APNEA): ICD-10-CM

## 2022-09-14 DIAGNOSIS — E11.21 TYPE 2 DIABETES MELLITUS WITH DIABETIC NEPHROPATHY, WITHOUT LONG-TERM CURRENT USE OF INSULIN: ICD-10-CM

## 2022-09-14 DIAGNOSIS — I48.0 PAF (PAROXYSMAL ATRIAL FIBRILLATION): Primary | ICD-10-CM

## 2022-09-14 DIAGNOSIS — I48.3 TYPICAL ATRIAL FLUTTER: ICD-10-CM

## 2022-09-14 PROCEDURE — 99214 OFFICE O/P EST MOD 30 MIN: CPT | Mod: 95,,, | Performed by: INTERNAL MEDICINE

## 2022-09-14 PROCEDURE — 3066F NEPHROPATHY DOC TX: CPT | Mod: CPTII,95,, | Performed by: INTERNAL MEDICINE

## 2022-09-14 PROCEDURE — 3061F PR NEG MICROALBUMINURIA RESULT DOCUMENTED/REVIEW: ICD-10-PCS | Mod: CPTII,95,, | Performed by: INTERNAL MEDICINE

## 2022-09-14 PROCEDURE — 4010F ACE/ARB THERAPY RXD/TAKEN: CPT | Mod: CPTII,95,, | Performed by: INTERNAL MEDICINE

## 2022-09-14 PROCEDURE — 3044F HG A1C LEVEL LT 7.0%: CPT | Mod: CPTII,95,, | Performed by: INTERNAL MEDICINE

## 2022-09-14 PROCEDURE — 99214 PR OFFICE/OUTPT VISIT, EST, LEVL IV, 30-39 MIN: ICD-10-PCS | Mod: 95,,, | Performed by: INTERNAL MEDICINE

## 2022-09-14 PROCEDURE — 4010F PR ACE/ARB THEARPY RXD/TAKEN: ICD-10-PCS | Mod: CPTII,95,, | Performed by: INTERNAL MEDICINE

## 2022-09-14 PROCEDURE — 3061F NEG MICROALBUMINURIA REV: CPT | Mod: CPTII,95,, | Performed by: INTERNAL MEDICINE

## 2022-09-14 PROCEDURE — 3066F PR DOCUMENTATION OF TREATMENT FOR NEPHROPATHY: ICD-10-PCS | Mod: CPTII,95,, | Performed by: INTERNAL MEDICINE

## 2022-09-14 PROCEDURE — 3044F PR MOST RECENT HEMOGLOBIN A1C LEVEL <7.0%: ICD-10-PCS | Mod: CPTII,95,, | Performed by: INTERNAL MEDICINE

## 2022-09-20 ENCOUNTER — HOSPITAL ENCOUNTER (OUTPATIENT)
Dept: CARDIOLOGY | Facility: HOSPITAL | Age: 52
Discharge: HOME | End: 2022-09-20
Attending: INTERNAL MEDICINE
Payer: COMMERCIAL

## 2022-09-20 LAB
AV PEAK GRADIENT: 11 MMHG
AV VELOCITY RATIO: 0.66
CV ECHO LV RWT: 0.47 CM
DOP CALC AO PEAK VEL: 1.66 M/S
DOP CALC LVOT AREA: 3.1 CM2
DOP CALC LVOT DIAMETER: 1.99 CM
DOP CALC LVOT PEAK VEL: 1.1 M/S
DOP CALC LVOT STROKE VOLUME: 74.61 CM3
DOP CALCLVOT PEAK VEL VTI: 24 CM
E WAVE DECELERATION TIME: 218.66 MSEC
E/A RATIO: 1.25
E/E' RATIO: 12.21 M/S
ECHO LV POSTERIOR WALL: 1.01 CM (ref 0.6–1.1)
EJECTION FRACTION: 65 %
FRACTIONAL SHORTENING: 35 % (ref 28–44)
INTERVENTRICULAR SEPTUM: 0.93 CM (ref 0.6–1.1)
IVC DIAMETER: 1.71 CM
IVRT: 69.2 MSEC
LA MAJOR: 5.64 CM
LA MINOR: 5.5 CM
LA WIDTH: 4.3 CM
LEFT ATRIUM SIZE: 4.43 CM
LEFT ATRIUM VOLUME: 90.17 CM3
LEFT INTERNAL DIMENSION IN SYSTOLE: 2.82 CM (ref 2.1–4)
LEFT VENTRICLE DIASTOLIC VOLUME: 84.62 ML
LEFT VENTRICLE SYSTOLIC VOLUME: 29.99 ML
LEFT VENTRICULAR INTERNAL DIMENSION IN DIASTOLE: 4.33 CM (ref 3.5–6)
LEFT VENTRICULAR MASS: 138.14 G
LV LATERAL E/E' RATIO: 9.67 M/S
LV SEPTAL E/E' RATIO: 16.57 M/S
LVOT MG: 2.85 MMHG
LVOT MV: 0.8 CM/S
MV PEAK A VEL: 0.93 M/S
MV PEAK E VEL: 1.16 M/S
MV STENOSIS PRESSURE HALF TIME: 63.41 MS
MV VALVE AREA P 1/2 METHOD: 3.47 CM2
PISA TR MAX VEL: 2.84 M/S
PV PEAK VELOCITY: 1.35 CM/S
RA MAJOR: 4.93 CM
RA PRESSURE: 3 MMHG
RA WIDTH: 3.5 CM
RIGHT VENTRICULAR END-DIASTOLIC DIMENSION: 3.76 CM
SINUS: 3 CM
STJ: 2.4 CM
TDI LATERAL: 0.12 M/S
TDI SEPTAL: 0.07 M/S
TDI: 0.1 M/S
TR MAX PG: 32 MMHG
TRICUSPID ANNULAR PLANE SYSTOLIC EXCURSION: 2.3 CM
TV REST PULMONARY ARTERY PRESSURE: 35 MMHG

## 2022-09-20 PROCEDURE — 93306 ECHO (CUPID ONLY): ICD-10-PCS | Mod: 26,,, | Performed by: INTERNAL MEDICINE

## 2022-09-20 PROCEDURE — 93306 TTE W/DOPPLER COMPLETE: CPT | Mod: 26,,, | Performed by: INTERNAL MEDICINE

## 2022-09-20 PROCEDURE — 93306 TTE W/DOPPLER COMPLETE: CPT

## 2022-09-21 ENCOUNTER — TELEPHONE (OUTPATIENT)
Dept: FAMILY MEDICINE | Facility: CLINIC | Age: 52
End: 2022-09-21
Payer: COMMERCIAL

## 2022-09-21 VITALS — SYSTOLIC BLOOD PRESSURE: 131 MMHG | DIASTOLIC BLOOD PRESSURE: 82 MMHG

## 2022-09-21 DIAGNOSIS — I10 PRIMARY HYPERTENSION: ICD-10-CM

## 2022-09-21 RX ORDER — AMLODIPINE BESYLATE 5 MG/1
5 TABLET ORAL NIGHTLY
Qty: 90 TABLET | Refills: 1 | Status: SHIPPED | OUTPATIENT
Start: 2022-09-21 | End: 2023-02-02 | Stop reason: SDUPTHER

## 2022-10-20 ENCOUNTER — OFFICE VISIT (OUTPATIENT)
Dept: CARDIOLOGY | Facility: CLINIC | Age: 52
End: 2022-10-20
Payer: COMMERCIAL

## 2022-10-20 VITALS
OXYGEN SATURATION: 98 % | HEIGHT: 63 IN | BODY MASS INDEX: 51.91 KG/M2 | SYSTOLIC BLOOD PRESSURE: 126 MMHG | RESPIRATION RATE: 18 BRPM | WEIGHT: 293 LBS | DIASTOLIC BLOOD PRESSURE: 78 MMHG | HEART RATE: 82 BPM

## 2022-10-20 DIAGNOSIS — I10 PRIMARY HYPERTENSION: ICD-10-CM

## 2022-10-20 DIAGNOSIS — Z98.890 S/P ABLATION OF ATRIAL FLUTTER: Primary | ICD-10-CM

## 2022-10-20 DIAGNOSIS — I48.0 PAF (PAROXYSMAL ATRIAL FIBRILLATION): ICD-10-CM

## 2022-10-20 DIAGNOSIS — E78.5 HYPERLIPIDEMIA, UNSPECIFIED HYPERLIPIDEMIA TYPE: ICD-10-CM

## 2022-10-20 DIAGNOSIS — Z86.79 S/P ABLATION OF ATRIAL FLUTTER: Primary | ICD-10-CM

## 2022-10-20 PROCEDURE — 4010F ACE/ARB THERAPY RXD/TAKEN: CPT | Mod: CPTII,S$GLB,, | Performed by: INTERNAL MEDICINE

## 2022-10-20 PROCEDURE — 3066F NEPHROPATHY DOC TX: CPT | Mod: CPTII,S$GLB,, | Performed by: INTERNAL MEDICINE

## 2022-10-20 PROCEDURE — 99214 OFFICE O/P EST MOD 30 MIN: CPT | Mod: S$GLB,,, | Performed by: INTERNAL MEDICINE

## 2022-10-20 PROCEDURE — 3078F DIAST BP <80 MM HG: CPT | Mod: CPTII,S$GLB,, | Performed by: INTERNAL MEDICINE

## 2022-10-20 PROCEDURE — 99999 PR PBB SHADOW E&M-EST. PATIENT-LVL IV: CPT | Mod: PBBFAC,,, | Performed by: INTERNAL MEDICINE

## 2022-10-20 PROCEDURE — 3066F PR DOCUMENTATION OF TREATMENT FOR NEPHROPATHY: ICD-10-PCS | Mod: CPTII,S$GLB,, | Performed by: INTERNAL MEDICINE

## 2022-10-20 PROCEDURE — 1159F MED LIST DOCD IN RCRD: CPT | Mod: CPTII,S$GLB,, | Performed by: INTERNAL MEDICINE

## 2022-10-20 PROCEDURE — 99214 PR OFFICE/OUTPT VISIT, EST, LEVL IV, 30-39 MIN: ICD-10-PCS | Mod: S$GLB,,, | Performed by: INTERNAL MEDICINE

## 2022-10-20 PROCEDURE — 3078F PR MOST RECENT DIASTOLIC BLOOD PRESSURE < 80 MM HG: ICD-10-PCS | Mod: CPTII,S$GLB,, | Performed by: INTERNAL MEDICINE

## 2022-10-20 PROCEDURE — 3044F HG A1C LEVEL LT 7.0%: CPT | Mod: CPTII,S$GLB,, | Performed by: INTERNAL MEDICINE

## 2022-10-20 PROCEDURE — 1159F PR MEDICATION LIST DOCUMENTED IN MEDICAL RECORD: ICD-10-PCS | Mod: CPTII,S$GLB,, | Performed by: INTERNAL MEDICINE

## 2022-10-20 PROCEDURE — 4010F PR ACE/ARB THEARPY RXD/TAKEN: ICD-10-PCS | Mod: CPTII,S$GLB,, | Performed by: INTERNAL MEDICINE

## 2022-10-20 PROCEDURE — 3074F PR MOST RECENT SYSTOLIC BLOOD PRESSURE < 130 MM HG: ICD-10-PCS | Mod: CPTII,S$GLB,, | Performed by: INTERNAL MEDICINE

## 2022-10-20 PROCEDURE — 99999 PR PBB SHADOW E&M-EST. PATIENT-LVL IV: ICD-10-PCS | Mod: PBBFAC,,, | Performed by: INTERNAL MEDICINE

## 2022-10-20 PROCEDURE — 3061F PR NEG MICROALBUMINURIA RESULT DOCUMENTED/REVIEW: ICD-10-PCS | Mod: CPTII,S$GLB,, | Performed by: INTERNAL MEDICINE

## 2022-10-20 PROCEDURE — 3061F NEG MICROALBUMINURIA REV: CPT | Mod: CPTII,S$GLB,, | Performed by: INTERNAL MEDICINE

## 2022-10-20 PROCEDURE — 3074F SYST BP LT 130 MM HG: CPT | Mod: CPTII,S$GLB,, | Performed by: INTERNAL MEDICINE

## 2022-10-20 PROCEDURE — 3044F PR MOST RECENT HEMOGLOBIN A1C LEVEL <7.0%: ICD-10-PCS | Mod: CPTII,S$GLB,, | Performed by: INTERNAL MEDICINE

## 2022-10-20 NOTE — PROGRESS NOTES
Subjective:    Patient ID:  Micaela Hernandez is a 52 y.o. female who presents for follow-up of Follow-up      HPI    A-flutter/A-fib  on eliquis and multaq - A-flutter RFA 3/31/22, HTN, HLD, DM, obesity     Admitted 1/24/22  Micaela Hernandez is a 51 y.o. female who  has a past medical history of Morbid Obesity, Degenerative lumbar disc, Diabetes mellitus, Diabetes mellitus, type 2, Hyperlipidemia, and Hypertension, presented to the ED with CC of SOB. Patient states that she has been feeling this since November, and has been wheezing intermittently as well. She even went to an allergist last week and had Xray, attempted albuterol.In Afib/Aflutter in the ED. HR sustained in 150's. Has never had an ECHO, never been to cardiologist. Started on dilt gtt and sent to ICU. SED rate 68. Concern for PNA with pleural fluid and infiltrates with LN swellings and new afib/flutter, started on CAP. LN of axilla were biopsied and told they were reactive, not malignant. Normal D-dimer for age. BNP was 102 but BMI 60. Patient denies any fevers, night sweats, n/v/d/c, abd pain, dysuria, hematuria or hematochezia, cough, CP, leg swelling, HA, dizziness, weakness, hallucinations, SI, HI, or self injury at this time.     Ms. Hernandez was admitted to the ICU for atrial flutter. Cardiology was consulted, and she was started on anticoagulation, flecainide, and a diltiazem drip w/ improvement in heart rate but remained in atrial flutter. She had shortness of breath which improved with furosemide. TTE showed normal LVEF. The following day she was cardioverted with successful restoration of sinus rhythm. She was discharged on flecainide and apixaban (CV 2+) to follow up with cardiology in the outpatient setting. Return precautions given, all questions answered prior to discharge, patient in agreement with plan.      3/31/22   Successful ablation of atrial flutter (typical).  3D mapping performed with Ensite.     1/26/22 STEVEN/CV - EF 55%, mild  MR/TR. No thrombus in IDANIA. CV 200J converted A-flutter to NSR     Echo 1/25/22  The left ventricle is normal in size with concentric hypertrophy and normal systolic function.  The estimated ejection fraction is 60%.  Normal left ventricular diastolic function.  Normal right ventricular size with normal right ventricular systolic function.  Mild left atrial enlargement.  The estimated PA systolic pressure is 22 mmHg.  Intermediate central venous pressure (8 mmHg).     2/3/22 Last weekend had an episode of heart racing which lasted all night and was similar to prior episode of A-flutter. Otherwise denies CP or SOB  EKG NSR QTc 493  Suspect breakthrough A-flutter. Increase coreg 6.25 bid. Will hold off on increasing flecainide with prolonged QT  Refer to EP for possible ablation  OV 1 month      Saw EP 3/8/22  Pt with recurrent atrial flutter despite Flecainide.   Recommend RFA.   Risks and benefits of RFA discussed, she would like to proceed.   In interim, increase Flecainide to 150 mg BID.   ECG in 1 week. If remains out of rhythm, consider repeat DCCV.        3/17/22 palpitations improved after taking increased dose of flecainide  EKG NSR QTc 515  Denies CP or SOB  Agree with plans for RFA of A-flutter  Continue Rx for HTN, HLD, A-flutter  OV 3 months     6/20/22 Flecainide was restarted for PAF 5/30/22 - she only took 2 doses - made her feel bad. Still with palpitations but not as severe  EKG NSR - ok  Continue Rx for HTN, HLD, A-flutter, PAF  OV 3 months    Saw Dr Kirk 9/12/22  Overall doing well from an AF burden standpoint. Suspect this is Multaq + treatment for her sleep apnea.  Discussed need for weight reduction.  Given shortness of breath >> echo.  F/u in 6 months.    10/20/22 Tolerating multaq - denies palpitations. Mild SALGUERO. Denies CP  BP controlled    Review of Systems   Constitutional: Negative for decreased appetite.   HENT:  Negative for ear discharge.    Eyes:  Negative for blurred vision.    Respiratory:  Negative for hemoptysis.    Endocrine: Negative for polyphagia.   Hematologic/Lymphatic: Negative for adenopathy.   Skin:  Negative for color change.   Musculoskeletal:  Negative for joint swelling.   Genitourinary:  Negative for bladder incontinence.   Neurological:  Negative for brief paralysis.   Psychiatric/Behavioral:  Negative for hallucinations.    Allergic/Immunologic: Negative for hives.      Objective:    Physical Exam  Constitutional:       Appearance: She is well-developed.   HENT:      Head: Normocephalic and atraumatic.   Eyes:      Conjunctiva/sclera: Conjunctivae normal.      Pupils: Pupils are equal, round, and reactive to light.   Cardiovascular:      Rate and Rhythm: Normal rate.      Pulses: Intact distal pulses.      Heart sounds: Normal heart sounds.   Pulmonary:      Effort: Pulmonary effort is normal.      Breath sounds: Normal breath sounds.   Abdominal:      General: Bowel sounds are normal.      Palpations: Abdomen is soft.   Musculoskeletal:         General: Normal range of motion.      Cervical back: Normal range of motion and neck supple.   Skin:     General: Skin is warm and dry.   Neurological:      Mental Status: She is alert and oriented to person, place, and time.         Assessment:       1. S/P ablation of atrial flutter    2. PAF (paroxysmal atrial fibrillation)    3. Primary hypertension    4. Hyperlipidemia, unspecified hyperlipidemia type         Plan:       Continue Rx for HTN, HLD, A-flutter, PAF  OV 3 months with holter

## 2023-02-02 ENCOUNTER — OFFICE VISIT (OUTPATIENT)
Dept: FAMILY MEDICINE | Facility: CLINIC | Age: 53
End: 2023-02-02
Payer: COMMERCIAL

## 2023-02-02 VITALS
SYSTOLIC BLOOD PRESSURE: 118 MMHG | HEIGHT: 63 IN | TEMPERATURE: 98 F | DIASTOLIC BLOOD PRESSURE: 78 MMHG | OXYGEN SATURATION: 97 % | WEIGHT: 293 LBS | HEART RATE: 80 BPM | BODY MASS INDEX: 51.91 KG/M2

## 2023-02-02 DIAGNOSIS — E66.01 MORBID OBESITY WITH BMI OF 50.0-59.9, ADULT: ICD-10-CM

## 2023-02-02 DIAGNOSIS — E11.21 TYPE 2 DIABETES MELLITUS WITH DIABETIC NEPHROPATHY, WITHOUT LONG-TERM CURRENT USE OF INSULIN: Primary | ICD-10-CM

## 2023-02-02 DIAGNOSIS — Z12.31 ENCOUNTER FOR SCREENING MAMMOGRAM FOR MALIGNANT NEOPLASM OF BREAST: ICD-10-CM

## 2023-02-02 DIAGNOSIS — I10 PRIMARY HYPERTENSION: ICD-10-CM

## 2023-02-02 DIAGNOSIS — E78.2 MIXED HYPERLIPIDEMIA: ICD-10-CM

## 2023-02-02 DIAGNOSIS — I48.3 TYPICAL ATRIAL FLUTTER: ICD-10-CM

## 2023-02-02 DIAGNOSIS — I48.0 PAF (PAROXYSMAL ATRIAL FIBRILLATION): ICD-10-CM

## 2023-02-02 PROBLEM — R79.89 ELEVATED BRAIN NATRIURETIC PEPTIDE (BNP) LEVEL: Status: RESOLVED | Noted: 2022-01-25 | Resolved: 2023-02-02

## 2023-02-02 PROCEDURE — 99214 PR OFFICE/OUTPT VISIT, EST, LEVL IV, 30-39 MIN: ICD-10-PCS | Mod: S$GLB,,, | Performed by: NURSE PRACTITIONER

## 2023-02-02 PROCEDURE — 3008F BODY MASS INDEX DOCD: CPT | Mod: CPTII,S$GLB,, | Performed by: NURSE PRACTITIONER

## 2023-02-02 PROCEDURE — 1159F PR MEDICATION LIST DOCUMENTED IN MEDICAL RECORD: ICD-10-PCS | Mod: CPTII,S$GLB,, | Performed by: NURSE PRACTITIONER

## 2023-02-02 PROCEDURE — 4010F PR ACE/ARB THEARPY RXD/TAKEN: ICD-10-PCS | Mod: CPTII,S$GLB,, | Performed by: NURSE PRACTITIONER

## 2023-02-02 PROCEDURE — 1159F MED LIST DOCD IN RCRD: CPT | Mod: CPTII,S$GLB,, | Performed by: NURSE PRACTITIONER

## 2023-02-02 PROCEDURE — 1160F PR REVIEW ALL MEDS BY PRESCRIBER/CLIN PHARMACIST DOCUMENTED: ICD-10-PCS | Mod: CPTII,S$GLB,, | Performed by: NURSE PRACTITIONER

## 2023-02-02 PROCEDURE — 3008F PR BODY MASS INDEX (BMI) DOCUMENTED: ICD-10-PCS | Mod: CPTII,S$GLB,, | Performed by: NURSE PRACTITIONER

## 2023-02-02 PROCEDURE — 3078F DIAST BP <80 MM HG: CPT | Mod: CPTII,S$GLB,, | Performed by: NURSE PRACTITIONER

## 2023-02-02 PROCEDURE — 3074F SYST BP LT 130 MM HG: CPT | Mod: CPTII,S$GLB,, | Performed by: NURSE PRACTITIONER

## 2023-02-02 PROCEDURE — 1160F RVW MEDS BY RX/DR IN RCRD: CPT | Mod: CPTII,S$GLB,, | Performed by: NURSE PRACTITIONER

## 2023-02-02 PROCEDURE — 99999 PR PBB SHADOW E&M-EST. PATIENT-LVL IV: CPT | Mod: PBBFAC,,, | Performed by: NURSE PRACTITIONER

## 2023-02-02 PROCEDURE — 99214 OFFICE O/P EST MOD 30 MIN: CPT | Mod: S$GLB,,, | Performed by: NURSE PRACTITIONER

## 2023-02-02 PROCEDURE — 3074F PR MOST RECENT SYSTOLIC BLOOD PRESSURE < 130 MM HG: ICD-10-PCS | Mod: CPTII,S$GLB,, | Performed by: NURSE PRACTITIONER

## 2023-02-02 PROCEDURE — 4010F ACE/ARB THERAPY RXD/TAKEN: CPT | Mod: CPTII,S$GLB,, | Performed by: NURSE PRACTITIONER

## 2023-02-02 PROCEDURE — 3078F PR MOST RECENT DIASTOLIC BLOOD PRESSURE < 80 MM HG: ICD-10-PCS | Mod: CPTII,S$GLB,, | Performed by: NURSE PRACTITIONER

## 2023-02-02 PROCEDURE — 99999 PR PBB SHADOW E&M-EST. PATIENT-LVL IV: ICD-10-PCS | Mod: PBBFAC,,, | Performed by: NURSE PRACTITIONER

## 2023-02-02 RX ORDER — BETAMETHASONE DIPROPIONATE 0.5 MG/G
OINTMENT TOPICAL
Qty: 15 G | Refills: 0 | Status: CANCELLED | OUTPATIENT
Start: 2023-02-02

## 2023-02-02 RX ORDER — DRONEDARONE 400 MG/1
1 TABLET, FILM COATED ORAL 2 TIMES DAILY WITH MEALS
Qty: 60 TABLET | Refills: 5 | Status: CANCELLED | OUTPATIENT
Start: 2023-02-02

## 2023-02-02 RX ORDER — AMLODIPINE BESYLATE 5 MG/1
5 TABLET ORAL NIGHTLY
Qty: 90 TABLET | Refills: 1 | Status: SHIPPED | OUTPATIENT
Start: 2023-02-02 | End: 2023-08-10 | Stop reason: SDUPTHER

## 2023-02-02 RX ORDER — LOSARTAN POTASSIUM 50 MG/1
50 TABLET ORAL 2 TIMES DAILY
Qty: 180 TABLET | Refills: 1 | Status: SHIPPED | OUTPATIENT
Start: 2023-02-02 | End: 2023-07-18 | Stop reason: SDUPTHER

## 2023-02-02 RX ORDER — FUROSEMIDE 20 MG/1
20 TABLET ORAL DAILY
Qty: 90 TABLET | Refills: 1 | Status: SHIPPED | OUTPATIENT
Start: 2023-02-02 | End: 2023-07-18 | Stop reason: SDUPTHER

## 2023-02-02 RX ORDER — CLOBETASOL PROPIONATE 0.46 MG/ML
SOLUTION TOPICAL 2 TIMES DAILY
Qty: 50 ML | Refills: 3 | Status: CANCELLED | OUTPATIENT
Start: 2023-02-02

## 2023-02-02 RX ORDER — LORATADINE 10 MG/1
10 TABLET ORAL DAILY
Status: CANCELLED | OUTPATIENT
Start: 2023-02-02

## 2023-02-02 RX ORDER — GLIMEPIRIDE 2 MG/1
2 TABLET ORAL DAILY
Qty: 90 TABLET | Refills: 1 | Status: SHIPPED | OUTPATIENT
Start: 2023-02-02 | End: 2023-08-10

## 2023-02-02 RX ORDER — SIMVASTATIN 40 MG/1
40 TABLET, FILM COATED ORAL NIGHTLY
Qty: 90 TABLET | Refills: 1 | Status: SHIPPED | OUTPATIENT
Start: 2023-02-02 | End: 2023-07-18 | Stop reason: SDUPTHER

## 2023-02-02 RX ORDER — CARVEDILOL 12.5 MG/1
12.5 TABLET ORAL 2 TIMES DAILY WITH MEALS
Qty: 180 TABLET | Refills: 1 | Status: SHIPPED | OUTPATIENT
Start: 2023-02-02 | End: 2023-08-10 | Stop reason: SDUPTHER

## 2023-02-02 RX ORDER — METFORMIN HYDROCHLORIDE 1000 MG/1
1000 TABLET ORAL 2 TIMES DAILY WITH MEALS
Qty: 180 TABLET | Refills: 1 | Status: SHIPPED | OUTPATIENT
Start: 2023-02-02 | End: 2023-08-10 | Stop reason: SDUPTHER

## 2023-02-02 RX ORDER — INFLUENZA A VIRUS A/NEBRASKA/14/2019 (H1N1) ANTIGEN (MDCK CELL DERIVED, PROPIOLACTONE INACTIVATED), INFLUENZA A VIRUS A/DELAWARE/39/2019 (H3N2) ANTIGEN (MDCK CELL DERIVED, PROPIOLACTONE INACTIVATED), INFLUENZA B VIRUS B/SINGAPORE/INFTT-16-0610/2016 ANTIGEN (MDCK CELL DERIVED, PROPIOLACTONE INACTIVATED), INFLUENZA B VIRUS B/DARWIN/7/2019 ANTIGEN (MDCK CELL DERIVED, PROPIOLACTONE INACTIVATED) 15; 15; 15; 15 UG/.5ML; UG/.5ML; UG/.5ML; UG/.5ML
INJECTION, SUSPENSION INTRAMUSCULAR
Status: CANCELLED | OUTPATIENT
Start: 2023-02-02

## 2023-02-03 ENCOUNTER — TELEPHONE (OUTPATIENT)
Dept: FAMILY MEDICINE | Facility: CLINIC | Age: 53
End: 2023-02-03
Payer: COMMERCIAL

## 2023-02-03 NOTE — TELEPHONE ENCOUNTER
Return call to pharmacy-[facility], and she stated that they will need to have a prescription for each dose of Mounjaro.

## 2023-02-03 NOTE — PROGRESS NOTES
"Subjective:      Patient ID: Micaela Hernandez is a 52 y.o. female.  Pt presents to clinic for routine 6mth follow up. Has not been check her BS at home and denies any pain or acute complaints today. Does not exercise though does try to monitor her diet.     Review of Systems   Constitutional:  Negative for activity change, appetite change, fatigue, fever and unexpected weight change.   Respiratory:  Negative for chest tightness and shortness of breath.    Cardiovascular:  Negative for chest pain and palpitations.   Gastrointestinal:  Negative for abdominal pain, change in bowel habit, constipation, diarrhea, nausea, vomiting and change in bowel habit.   Genitourinary:  Negative for difficulty urinating and dysuria.   Musculoskeletal:  Negative for arthralgias, back pain, gait problem and myalgias.   Integumentary:  Negative for rash.   Neurological:  Negative for headaches.   Psychiatric/Behavioral: Negative.     All other systems reviewed and are negative.      Objective:     Vitals:    02/02/23 1400   BP: 118/78   Pulse: 80   Temp: 98.3 °F (36.8 °C)   SpO2: 97%   Weight: (!) 142 kg (313 lb 0.9 oz)   Height: 5' 3" (1.6 m)     Physical Exam  Vitals and nursing note reviewed.   Constitutional:       General: She is not in acute distress.     Appearance: Normal appearance. She is well-developed and well-groomed. She is morbidly obese. She is not ill-appearing.   HENT:      Head: Normocephalic and atraumatic.      Right Ear: External ear normal.      Left Ear: External ear normal.      Nose: Nose normal.      Mouth/Throat:      Lips: Pink.      Mouth: Mucous membranes are moist.   Eyes:      General: Lids are normal. Vision grossly intact. Gaze aligned appropriately.      Conjunctiva/sclera: Conjunctivae normal.      Pupils: Pupils are equal, round, and reactive to light.   Neck:      Trachea: Phonation normal.   Cardiovascular:      Rate and Rhythm: Normal rate and regular rhythm.      Heart sounds: Normal heart " sounds.   Pulmonary:      Effort: Pulmonary effort is normal. No accessory muscle usage or respiratory distress.      Breath sounds: Normal breath sounds and air entry.   Abdominal:      General: Abdomen is protuberant. Bowel sounds are normal. There is no distension.      Palpations: Abdomen is soft.      Tenderness: There is no abdominal tenderness.   Musculoskeletal:      Cervical back: Neck supple.      Right lower leg: No edema.      Left lower leg: No edema.   Skin:     General: Skin is warm and dry.      Findings: No rash.   Neurological:      General: No focal deficit present.      Mental Status: She is alert and oriented to person, place, and time. Mental status is at baseline.      Sensory: Sensation is intact.      Motor: Motor function is intact.      Coordination: Coordination is intact.      Gait: Gait is intact.   Psychiatric:         Attention and Perception: Attention and perception normal.         Mood and Affect: Mood and affect normal.         Speech: Speech normal.         Behavior: Behavior normal. Behavior is cooperative.         Thought Content: Thought content normal.         Cognition and Memory: Cognition and memory normal.         Judgment: Judgment normal.     Assessment and Plan:     1. Type 2 diabetes mellitus with diabetic nephropathy, without long-term current use of insulin  Discussed general issues about diabetes pathophysiology and management.  Addressed ADA diet.  Suggested low cholesterol diet.  Encouraged aerobic exercise.  Discussed foot care.  Reminded to get yearly retinal exam.  Discussed ways to avoid symptomatic hypoglycemia.  Reminded to bring in blood sugar diary at next visit.  Follow up in 3 months or as needed.  - glimepiride (AMARYL) 2 MG tablet; Take 1 tablet (2 mg total) by mouth once daily.  Dispense: 90 tablet; Refill: 1  - metFORMIN (GLUCOPHAGE) 1000 MG tablet; Take 1 tablet (1,000 mg total) by mouth 2 (two) times daily with meals.  Dispense: 180 tablet;  Refill: 1  - simvastatin (ZOCOR) 40 MG tablet; Take 1 tablet (40 mg total) by mouth every evening.  Dispense: 90 tablet; Refill: 1  - CBC Auto Differential; Future  - Comprehensive Metabolic Panel; Future  - Hemoglobin A1C; Future  - Lipid Panel; Future  - Microalbumin/Creatinine Ratio, Urine; Future  - tirzepatide 2.5 mg/0.5 mL PnIj; Inject 2.5 mg into the skin every 7 days. for 4 doses  Dispense: 4 pen; Refill: 0  - tirzepatide 5 mg/0.5 mL PnIj; Inject 5 mg into the skin every 7 days. for 4 doses  Dispense: 4 pen; Refill: 0  - tirzepatide 7.5 mg/0.5 mL PnIj; Inject 7.5 mg into the skin every 7 days. for 4 doses  Dispense: 4 pen; Refill: 0  - tirzepatide 10 mg/0.5 mL PnIj; Inject 10 mg into the skin every 7 days. for 4 doses  Dispense: 4 pen; Refill: 0    2. PAF (paroxysmal atrial fibrillation)  Rate controlled with BB and multaq, anticoagulated with eliquis, closely followed by cards     3. Typical atrial flutter  Rate controlled with BB and multaq, anticoagulated with eliquis, closely followed by cards    4. Primary hypertension  Continue current treatment regimen.  Dietary sodium restriction.  Regular aerobic exercise.  Weight loss.  Patient Education: Reviewed risks of hypertension and principles of treatment.  - amLODIPine (NORVASC) 5 MG tablet; Take 1 tablet (5 mg total) by mouth every evening.  Dispense: 90 tablet; Refill: 1  - carvediloL (COREG) 12.5 MG tablet; Take 1 tablet (12.5 mg total) by mouth 2 (two) times daily with meals.  Dispense: 180 tablet; Refill: 1  - furosemide (LASIX) 20 MG tablet; Take 1 tablet (20 mg total) by mouth once daily.  Dispense: 90 tablet; Refill: 1  - losartan (COZAAR) 50 MG tablet; Take 1 tablet (50 mg total) by mouth 2 (two) times daily.  Dispense: 180 tablet; Refill: 1    5. Mixed hyperlipidemia  Continue dietary measures.  Continue regular exercise.  Lipid-lowering medications:  continue statin daily .  - Lipid Panel; Future  - TSH; Future    6. Morbid obesity with BMI of  50.0-59.9, adult  The patient is asked to make an attempt to improve diet and exercise patterns to aid in medical management of this problem.    7. Encounter for screening mammogram for malignant neoplasm of breast  - Mammo Digital Screening Bilat w/ Ruben; Future           VICENTA Maldonado, FNP-C  Family/Internal Medicine  [facility]

## 2023-02-03 NOTE — TELEPHONE ENCOUNTER
----- Message from Davon Ogden sent at 2/3/2023  8:47 AM CST -----  Type: Patient Call Back    Who called: Lisbeth Florentino      What is the request in detail: Calling in regards to a new Rx to be sent over for tirzepatide 10 mg/0.5 mL PnIj 10 pen     Can the clinic reply by [facility]? No     Would the patient rather a call back or a response via [facility]? Call     Best call back number: [phone]    Additional Information:[facility] [address]   Phone: [phone]  Fax:  [phone]

## 2023-02-06 ENCOUNTER — LAB VISIT (OUTPATIENT)
Dept: LAB | Facility: HOSPITAL | Age: 53
End: 2023-02-06
Payer: COMMERCIAL

## 2023-02-06 DIAGNOSIS — E78.2 MIXED HYPERLIPIDEMIA: ICD-10-CM

## 2023-02-06 DIAGNOSIS — E11.21 TYPE 2 DIABETES MELLITUS WITH DIABETIC NEPHROPATHY, WITHOUT LONG-TERM CURRENT USE OF INSULIN: ICD-10-CM

## 2023-02-06 LAB
ALBUMIN SERPL BCP-MCNC: 3.4 G/DL (ref 3.5–5.2)
ALBUMIN/CREAT UR: 7.7 UG/MG (ref 0–30)
ALP SERPL-CCNC: 90 U/L (ref 55–135)
ALT SERPL W/O P-5'-P-CCNC: 28 U/L (ref 10–44)
ANION GAP SERPL CALC-SCNC: 15 MMOL/L (ref 8–16)
AST SERPL-CCNC: 40 U/L (ref 10–40)
BASOPHILS # BLD AUTO: 0.07 K/UL (ref 0–0.2)
BASOPHILS NFR BLD: 0.7 % (ref 0–1.9)
BILIRUB SERPL-MCNC: 0.3 MG/DL (ref 0.1–1)
BUN SERPL-MCNC: 14 MG/DL (ref 6–20)
CALCIUM SERPL-MCNC: 9.9 MG/DL (ref 8.7–10.5)
CHLORIDE SERPL-SCNC: 101 MMOL/L (ref 95–110)
CHOLEST SERPL-MCNC: 145 MG/DL (ref 120–199)
CHOLEST/HDLC SERPL: 2.8 {RATIO} (ref 2–5)
CO2 SERPL-SCNC: 21 MMOL/L (ref 23–29)
CREAT SERPL-MCNC: 0.8 MG/DL (ref 0.5–1.4)
CREAT UR-MCNC: 221 MG/DL (ref 15–325)
DIFFERENTIAL METHOD: ABNORMAL
EOSINOPHIL # BLD AUTO: 0.2 K/UL (ref 0–0.5)
EOSINOPHIL NFR BLD: 2.1 % (ref 0–8)
ERYTHROCYTE [DISTWIDTH] IN BLOOD BY AUTOMATED COUNT: 16.7 % (ref 11.5–14.5)
EST. GFR  (NO RACE VARIABLE): >60 ML/MIN/1.73 M^2
ESTIMATED AVG GLUCOSE: 120 MG/DL (ref 68–131)
GLUCOSE SERPL-MCNC: 111 MG/DL (ref 70–110)
HBA1C MFR BLD: 5.8 % (ref 4–5.6)
HCT VFR BLD AUTO: 37 % (ref 37–48.5)
HDLC SERPL-MCNC: 51 MG/DL (ref 40–75)
HDLC SERPL: 35.2 % (ref 20–50)
HGB BLD-MCNC: 11.4 G/DL (ref 12–16)
IMM GRANULOCYTES # BLD AUTO: 0.02 K/UL (ref 0–0.04)
IMM GRANULOCYTES NFR BLD AUTO: 0.2 % (ref 0–0.5)
LDLC SERPL CALC-MCNC: 68.6 MG/DL (ref 63–159)
LYMPHOCYTES # BLD AUTO: 2.6 K/UL (ref 1–4.8)
LYMPHOCYTES NFR BLD: 25.9 % (ref 18–48)
MCH RBC QN AUTO: 27.5 PG (ref 27–31)
MCHC RBC AUTO-ENTMCNC: 30.8 G/DL (ref 32–36)
MCV RBC AUTO: 89 FL (ref 82–98)
MICROALBUMIN UR DL<=1MG/L-MCNC: 17 UG/ML
MONOCYTES # BLD AUTO: 0.7 K/UL (ref 0.3–1)
MONOCYTES NFR BLD: 6.8 % (ref 4–15)
NEUTROPHILS # BLD AUTO: 6.5 K/UL (ref 1.8–7.7)
NEUTROPHILS NFR BLD: 64.3 % (ref 38–73)
NONHDLC SERPL-MCNC: 94 MG/DL
NRBC BLD-RTO: 0 /100 WBC
PLATELET # BLD AUTO: 187 K/UL (ref 150–450)
PMV BLD AUTO: 13.6 FL (ref 9.2–12.9)
POTASSIUM SERPL-SCNC: 4.5 MMOL/L (ref 3.5–5.1)
PROT SERPL-MCNC: 7.9 G/DL (ref 6–8.4)
RBC # BLD AUTO: 4.15 M/UL (ref 4–5.4)
SODIUM SERPL-SCNC: 137 MMOL/L (ref 136–145)
TRIGL SERPL-MCNC: 127 MG/DL (ref 30–150)
TSH SERPL DL<=0.005 MIU/L-ACNC: 2.64 UIU/ML (ref 0.4–4)
WBC # BLD AUTO: 10.05 K/UL (ref 3.9–12.7)

## 2023-02-06 PROCEDURE — 85025 COMPLETE CBC W/AUTO DIFF WBC: CPT | Performed by: NURSE PRACTITIONER

## 2023-02-06 PROCEDURE — 84443 ASSAY THYROID STIM HORMONE: CPT | Performed by: NURSE PRACTITIONER

## 2023-02-06 PROCEDURE — 82570 ASSAY OF URINE CREATININE: CPT | Performed by: NURSE PRACTITIONER

## 2023-02-06 PROCEDURE — 83036 HEMOGLOBIN GLYCOSYLATED A1C: CPT | Performed by: NURSE PRACTITIONER

## 2023-02-06 PROCEDURE — 80053 COMPREHEN METABOLIC PANEL: CPT | Performed by: NURSE PRACTITIONER

## 2023-02-06 PROCEDURE — 80061 LIPID PANEL: CPT | Performed by: NURSE PRACTITIONER

## 2023-02-06 PROCEDURE — 36415 COLL VENOUS BLD VENIPUNCTURE: CPT | Mod: PO | Performed by: NURSE PRACTITIONER

## 2023-02-22 ENCOUNTER — TELEPHONE (OUTPATIENT)
Dept: OBSTETRICS AND GYNECOLOGY | Facility: CLINIC | Age: 53
End: 2023-02-22
Payer: COMMERCIAL

## 2023-02-22 ENCOUNTER — NURSE TRIAGE (OUTPATIENT)
Dept: ADMINISTRATIVE | Facility: CLINIC | Age: 53
End: 2023-02-22
Payer: COMMERCIAL

## 2023-02-22 DIAGNOSIS — N93.9 ABNORMAL UTERINE BLEEDING (AUB): Primary | ICD-10-CM

## 2023-02-22 RX ORDER — MEDROXYPROGESTERONE ACETATE 10 MG/1
10 TABLET ORAL DAILY
Qty: 30 TABLET | Refills: 0 | Status: ON HOLD | OUTPATIENT
Start: 2023-02-22 | End: 2023-03-28 | Stop reason: HOSPADM

## 2023-02-22 NOTE — TELEPHONE ENCOUNTER
Spoke with pt and she will go and  medication on today when it is ready.      ----- Message from Lola Zhao MD sent at 2/22/2023 10:35 AM CST -----  I'm going to send her Provera 10 mg daily.      [facility] [place], [place] - [address] [facility]  [address] [facility] [place] [place] [address]  Phone: [phone] Fax: [phone]       Lola Zhao   ----- Message -----  From: Dea Garcia MA  Sent: 2/22/2023  10:24 AM CST  To: Lola Zhao MD    Please advise.. pt is not allow to take birth control . I set her up for EMBX with you for march 21. Pt will like to know what can she do at the present time to help stop with the bleeding.  ----- Message -----  From: Alesha Pereira MA  Sent: 2/22/2023   8:12 AM CST  To: [facility] Staff    Type: Patient Call Back    Who called: Self    What is the request in detail: pt. States she has been on her cycle since Feb. 5 and she is bleeding constantly.. she is asking if someone can give her a call..    Can the clinic reply by [facility]?No    Would the patient rather a call back or a response via [facility]? yes    Best call back number: [phone]

## 2023-02-22 NOTE — TELEPHONE ENCOUNTER
Geovanna had a period x 5 months, now bleeding since feb 5. At first passing lots of blood clots.Also feeling increased weakness. Changed 3 tampons within 2 hours since awake.    Advised needs to be seen today. If continues to saturate tampon/hr x 6 hours, go to ED. VU. No available appts with PCP. Will route message to OBGYN for same day appt. Advised caller if unable to be seen in clinic. Go to ED/C. Pt hesitant but VU  Reason for Disposition   Taking Coumadin (warfarin) or other strong blood thinner, or known bleeding disorder (e.g., thrombocytopenia)     On eliquis for afib    Additional Information   Negative: SEVERE vaginal bleeding (e.g., continuous red blood from vagina, or large blood clots) and very weak (can't stand)   Negative: Passed out (i.e., fainted, collapsed and was not responding)   Negative: Difficult to awaken or acting confused (e.g., disoriented, slurred speech)   Negative: Shock suspected (e.g., cold/pale/clammy skin, too weak to stand, low BP, rapid pulse)   Negative: Sounds like a life-threatening emergency to the triager   Negative: SEVERE abdominal pain (e.g., excruciating)   Negative: SEVERE dizziness (e.g., unable to stand, requires support to walk, feels like passing out now)   Negative: SEVERE vaginal bleeding (e.g., soaking 2 pads or tampons per hour and present 2 or more hours; 1 menstrual cup every 2 hours)   Negative: MODERATE vaginal bleeding (i.e., soaking pad or tampon per hour and present > 6 hours; 1 menstrual cup every 6 hours)   Negative: Pale skin (pallor) of new-onset or worsening   Negative: Constant abdominal pain lasting > 2 hours   Negative: Patient sounds very sick or weak to the triager    Protocols used: Vaginal Bleeding - [id number]

## 2023-02-22 NOTE — TELEPHONE ENCOUNTER
Spoke with pt. Schedule pt for EMBX with provider. Emailed provider pts concerns about bleeding.          ----- Message from Alesha Pereira MA sent at 2/22/2023  8:10 AM CST -----  Type: Patient Call Back    Who called: Self    What is the request in detail: pt. States she has been on her cycle since Feb. 5 and she is bleeding constantly.. she is asking if someone can give her a call..    Can the clinic reply by [facility]?No    Would the patient rather a call back or a response via [facility]? yes    Best call back number: [phone]

## 2023-02-23 ENCOUNTER — HOSPITAL ENCOUNTER (OUTPATIENT)
Facility: HOSPITAL | Age: 53
Discharge: HOME | End: 2023-02-24
Attending: EMERGENCY MEDICINE | Admitting: OBSTETRICS & GYNECOLOGY
Payer: COMMERCIAL

## 2023-02-23 DIAGNOSIS — D64.9 SYMPTOMATIC ANEMIA: ICD-10-CM

## 2023-02-23 DIAGNOSIS — N93.9 ABNORMAL UTERINE BLEEDING (AUB): Primary | ICD-10-CM

## 2023-02-23 DIAGNOSIS — N93.9 ABNORMAL UTERINE BLEEDING: ICD-10-CM

## 2023-02-23 LAB
ABO + RH BLD: NORMAL
ALBUMIN SERPL BCP-MCNC: 3.3 G/DL (ref 3.5–5.2)
ALP SERPL-CCNC: 74 U/L (ref 55–135)
ALT SERPL W/O P-5'-P-CCNC: 23 U/L (ref 10–44)
ANION GAP SERPL CALC-SCNC: 14 MMOL/L (ref 8–16)
AST SERPL-CCNC: 28 U/L (ref 10–40)
B-HCG UR QL: NEGATIVE
BACTERIA #/AREA URNS HPF: ABNORMAL /HPF
BASOPHILS # BLD AUTO: 0.07 K/UL (ref 0–0.2)
BASOPHILS NFR BLD: 0.4 % (ref 0–1.9)
BILIRUB SERPL-MCNC: 0.3 MG/DL (ref 0.1–1)
BILIRUB UR QL STRIP: NEGATIVE
BLD GP AB SCN CELLS X3 SERPL QL: NORMAL
BLD PROD TYP BPU: NORMAL
BLOOD UNIT EXPIRATION DATE: NORMAL
BLOOD UNIT TYPE CODE: 9500
BLOOD UNIT TYPE: NORMAL
BUN SERPL-MCNC: 12 MG/DL (ref 6–20)
CALCIUM SERPL-MCNC: 9.3 MG/DL (ref 8.7–10.5)
CHLORIDE SERPL-SCNC: 101 MMOL/L (ref 95–110)
CLARITY UR: CLEAR
CO2 SERPL-SCNC: 22 MMOL/L (ref 23–29)
CODING SYSTEM: NORMAL
COLOR UR: YELLOW
CREAT SERPL-MCNC: 0.8 MG/DL (ref 0.5–1.4)
CROSSMATCH INTERPRETATION: NORMAL
CTP QC/QA: YES
DIFFERENTIAL METHOD: ABNORMAL
DISPENSE STATUS: NORMAL
EOSINOPHIL # BLD AUTO: 0.2 K/UL (ref 0–0.5)
EOSINOPHIL NFR BLD: 1.4 % (ref 0–8)
ERYTHROCYTE [DISTWIDTH] IN BLOOD BY AUTOMATED COUNT: 16.6 % (ref 11.5–14.5)
EST. GFR  (NO RACE VARIABLE): >60 ML/MIN/1.73 M^2
GLUCOSE SERPL-MCNC: 107 MG/DL (ref 70–110)
GLUCOSE UR QL STRIP: NEGATIVE
HCT VFR BLD AUTO: 21.2 % (ref 37–48.5)
HGB BLD-MCNC: 6.8 G/DL (ref 12–16)
HGB UR QL STRIP: ABNORMAL
HYALINE CASTS #/AREA URNS LPF: ABNORMAL /LPF
IMM GRANULOCYTES # BLD AUTO: 0.1 K/UL (ref 0–0.04)
IMM GRANULOCYTES NFR BLD AUTO: 0.6 % (ref 0–0.5)
KETONES UR QL STRIP: NEGATIVE
LEUKOCYTE ESTERASE UR QL STRIP: NEGATIVE
LYMPHOCYTES # BLD AUTO: 4.4 K/UL (ref 1–4.8)
LYMPHOCYTES NFR BLD: 26.1 % (ref 18–48)
MCH RBC QN AUTO: 28.5 PG (ref 27–31)
MCHC RBC AUTO-ENTMCNC: 32.1 G/DL (ref 32–36)
MCV RBC AUTO: 89 FL (ref 82–98)
MICROSCOPIC COMMENT: ABNORMAL
MONOCYTES # BLD AUTO: 0.9 K/UL (ref 0.3–1)
MONOCYTES NFR BLD: 5.6 % (ref 4–15)
NEUTROPHILS # BLD AUTO: 11.2 K/UL (ref 1.8–7.7)
NEUTROPHILS NFR BLD: 65.9 % (ref 38–73)
NITRITE UR QL STRIP: NEGATIVE
NRBC BLD-RTO: 0 /100 WBC
PH UR STRIP: 6 [PH] (ref 5–8)
PLATELET # BLD AUTO: 282 K/UL (ref 150–450)
PMV BLD AUTO: 12.7 FL (ref 9.2–12.9)
POTASSIUM SERPL-SCNC: 4 MMOL/L (ref 3.5–5.1)
PROT SERPL-MCNC: 7.3 G/DL (ref 6–8.4)
PROT UR QL STRIP: ABNORMAL
RBC # BLD AUTO: 2.39 M/UL (ref 4–5.4)
RBC #/AREA URNS HPF: 54 /HPF (ref 0–4)
SODIUM SERPL-SCNC: 137 MMOL/L (ref 136–145)
SP GR UR STRIP: 1.02 (ref 1–1.03)
TRANS ERYTHROCYTES VOL PATIENT: NORMAL ML
URN SPEC COLLECT METH UR: ABNORMAL
UROBILINOGEN UR STRIP-ACNC: NEGATIVE EU/DL
WBC # BLD AUTO: 16.89 K/UL (ref 3.9–12.7)
WBC #/AREA URNS HPF: 3 /HPF (ref 0–5)

## 2023-02-23 PROCEDURE — 99234 PR OBSERV/HOSP SAME DATE,LEVL III: ICD-10-PCS | Mod: ,,, | Performed by: OBSTETRICS & GYNECOLOGY

## 2023-02-23 PROCEDURE — 99234 HOSP IP/OBS SM DT SF/LOW 45: CPT | Mod: ,,, | Performed by: OBSTETRICS & GYNECOLOGY

## 2023-02-23 PROCEDURE — 86920 COMPATIBILITY TEST SPIN: CPT | Performed by: EMERGENCY MEDICINE

## 2023-02-23 PROCEDURE — 36430 TRANSFUSION BLD/BLD COMPNT: CPT

## 2023-02-23 PROCEDURE — 81000 URINALYSIS NONAUTO W/SCOPE: CPT | Performed by: EMERGENCY MEDICINE

## 2023-02-23 PROCEDURE — 96374 THER/PROPH/DIAG INJ IV PUSH: CPT

## 2023-02-23 PROCEDURE — 86920 COMPATIBILITY TEST SPIN: CPT | Performed by: OBSTETRICS & GYNECOLOGY

## 2023-02-23 PROCEDURE — 81025 URINE PREGNANCY TEST: CPT | Performed by: EMERGENCY MEDICINE

## 2023-02-23 PROCEDURE — 63600175 PHARM REV CODE 636 W HCPCS: Mod: JG | Performed by: EMERGENCY MEDICINE

## 2023-02-23 PROCEDURE — 85025 COMPLETE CBC W/AUTO DIFF WBC: CPT | Performed by: EMERGENCY MEDICINE

## 2023-02-23 PROCEDURE — 99285 EMERGENCY DEPT VISIT HI MDM: CPT | Mod: 25

## 2023-02-23 PROCEDURE — P9021 RED BLOOD CELLS UNIT: HCPCS | Performed by: EMERGENCY MEDICINE

## 2023-02-23 PROCEDURE — G0378 HOSPITAL OBSERVATION PER HR: HCPCS

## 2023-02-23 PROCEDURE — 80053 COMPREHEN METABOLIC PANEL: CPT | Performed by: EMERGENCY MEDICINE

## 2023-02-23 PROCEDURE — 86900 BLOOD TYPING SEROLOGIC ABO: CPT | Performed by: EMERGENCY MEDICINE

## 2023-02-23 RX ORDER — ACETAMINOPHEN 325 MG/1
650 TABLET ORAL EVERY 6 HOURS PRN
Status: DISCONTINUED | OUTPATIENT
Start: 2023-02-24 | End: 2023-02-24 | Stop reason: HOSPADM

## 2023-02-23 RX ORDER — HYDROCODONE BITARTRATE AND ACETAMINOPHEN 500; 5 MG/1; MG/1
TABLET ORAL
Status: DISCONTINUED | OUTPATIENT
Start: 2023-02-23 | End: 2023-02-24 | Stop reason: HOSPADM

## 2023-02-23 RX ORDER — AMLODIPINE BESYLATE 5 MG/1
5 TABLET ORAL NIGHTLY
Status: DISCONTINUED | OUTPATIENT
Start: 2023-02-24 | End: 2023-02-24 | Stop reason: HOSPADM

## 2023-02-23 RX ORDER — METFORMIN HYDROCHLORIDE 500 MG/1
1000 TABLET ORAL 2 TIMES DAILY WITH MEALS
Status: DISCONTINUED | OUTPATIENT
Start: 2023-02-24 | End: 2023-02-24 | Stop reason: HOSPADM

## 2023-02-23 RX ORDER — GLIMEPIRIDE 2 MG/1
2 TABLET ORAL
Status: DISCONTINUED | OUTPATIENT
Start: 2023-02-24 | End: 2023-02-24 | Stop reason: HOSPADM

## 2023-02-23 RX ORDER — LOSARTAN POTASSIUM 25 MG/1
50 TABLET ORAL 2 TIMES DAILY
Status: DISCONTINUED | OUTPATIENT
Start: 2023-02-24 | End: 2023-02-24 | Stop reason: HOSPADM

## 2023-02-23 RX ORDER — FUROSEMIDE 10 MG/ML
20 INJECTION INTRAMUSCULAR; INTRAVENOUS DAILY
Status: DISCONTINUED | OUTPATIENT
Start: 2023-02-24 | End: 2023-02-24 | Stop reason: HOSPADM

## 2023-02-23 RX ORDER — HYDROCODONE BITARTRATE AND ACETAMINOPHEN 500; 5 MG/1; MG/1
TABLET ORAL
Status: DISCONTINUED | OUTPATIENT
Start: 2023-02-24 | End: 2023-02-24 | Stop reason: HOSPADM

## 2023-02-23 RX ORDER — CARVEDILOL 12.5 MG/1
12.5 TABLET ORAL 2 TIMES DAILY WITH MEALS
Status: DISCONTINUED | OUTPATIENT
Start: 2023-02-24 | End: 2023-02-24 | Stop reason: HOSPADM

## 2023-02-23 RX ORDER — ATORVASTATIN CALCIUM 10 MG/1
20 TABLET, FILM COATED ORAL NIGHTLY
Status: DISCONTINUED | OUTPATIENT
Start: 2023-02-24 | End: 2023-02-24 | Stop reason: HOSPADM

## 2023-02-23 RX ADMIN — CONJUGATED ESTROGENS 25 MG: 25 INJECTION, POWDER, LYOPHILIZED, FOR SOLUTION INTRAMUSCULAR; INTRAVENOUS at 10:02

## 2023-02-23 NOTE — Clinical Note
Diagnosis: Symptomatic anemia [[id number]]   Future Attending Provider: MALIK BACH [[id number]]   Admitting Provider:: MALIK BACH [[id number]]

## 2023-02-24 ENCOUNTER — NURSE TRIAGE (OUTPATIENT)
Dept: ADMINISTRATIVE | Facility: CLINIC | Age: 53
End: 2023-02-24
Payer: COMMERCIAL

## 2023-02-24 VITALS
HEART RATE: 90 BPM | DIASTOLIC BLOOD PRESSURE: 66 MMHG | RESPIRATION RATE: 20 BRPM | BODY MASS INDEX: 51.91 KG/M2 | HEIGHT: 63 IN | OXYGEN SATURATION: 97 % | TEMPERATURE: 98 F | WEIGHT: 293 LBS | SYSTOLIC BLOOD PRESSURE: 121 MMHG

## 2023-02-24 PROBLEM — D64.9 SYMPTOMATIC ANEMIA: Status: ACTIVE | Noted: 2023-02-24

## 2023-02-24 PROBLEM — N93.9 ABNORMAL UTERINE BLEEDING (AUB): Status: ACTIVE | Noted: 2023-02-24

## 2023-02-24 LAB
BASOPHILS # BLD AUTO: 0.09 K/UL (ref 0–0.2)
BASOPHILS NFR BLD: 0.4 % (ref 0–1.9)
BLD PROD TYP BPU: NORMAL
BLOOD UNIT EXPIRATION DATE: NORMAL
BLOOD UNIT TYPE CODE: 9500
BLOOD UNIT TYPE: NORMAL
CODING SYSTEM: NORMAL
CROSSMATCH INTERPRETATION: NORMAL
DIFFERENTIAL METHOD: ABNORMAL
DISPENSE STATUS: NORMAL
EOSINOPHIL # BLD AUTO: 0.3 K/UL (ref 0–0.5)
EOSINOPHIL NFR BLD: 1.3 % (ref 0–8)
ERYTHROCYTE [DISTWIDTH] IN BLOOD BY AUTOMATED COUNT: 15.8 % (ref 11.5–14.5)
HCT VFR BLD AUTO: 23.3 % (ref 37–48.5)
HGB BLD-MCNC: 7.6 G/DL (ref 12–16)
IMM GRANULOCYTES # BLD AUTO: 0.13 K/UL (ref 0–0.04)
IMM GRANULOCYTES NFR BLD AUTO: 0.6 % (ref 0–0.5)
LYMPHOCYTES # BLD AUTO: 4.2 K/UL (ref 1–4.8)
LYMPHOCYTES NFR BLD: 20.7 % (ref 18–48)
MCH RBC QN AUTO: 28.6 PG (ref 27–31)
MCHC RBC AUTO-ENTMCNC: 32.6 G/DL (ref 32–36)
MCV RBC AUTO: 88 FL (ref 82–98)
MONOCYTES # BLD AUTO: 1.4 K/UL (ref 0.3–1)
MONOCYTES NFR BLD: 6.8 % (ref 4–15)
NEUTROPHILS # BLD AUTO: 14.1 K/UL (ref 1.8–7.7)
NEUTROPHILS NFR BLD: 70.2 % (ref 38–73)
NRBC BLD-RTO: 0 /100 WBC
PLATELET # BLD AUTO: 194 K/UL (ref 150–450)
PMV BLD AUTO: 12.6 FL (ref 9.2–12.9)
RBC # BLD AUTO: 2.66 M/UL (ref 4–5.4)
TRANS ERYTHROCYTES VOL PATIENT: NORMAL ML
WBC # BLD AUTO: 20.14 K/UL (ref 3.9–12.7)

## 2023-02-24 PROCEDURE — G0378 HOSPITAL OBSERVATION PER HR: HCPCS

## 2023-02-24 PROCEDURE — 25000003 PHARM REV CODE 250: Performed by: OBSTETRICS & GYNECOLOGY

## 2023-02-24 PROCEDURE — 96376 TX/PRO/DX INJ SAME DRUG ADON: CPT

## 2023-02-24 PROCEDURE — 85025 COMPLETE CBC W/AUTO DIFF WBC: CPT | Performed by: OBSTETRICS & GYNECOLOGY

## 2023-02-24 PROCEDURE — 36415 COLL VENOUS BLD VENIPUNCTURE: CPT | Performed by: OBSTETRICS & GYNECOLOGY

## 2023-02-24 PROCEDURE — 63600175 PHARM REV CODE 636 W HCPCS: Mod: JG | Performed by: EMERGENCY MEDICINE

## 2023-02-24 PROCEDURE — P9021 RED BLOOD CELLS UNIT: HCPCS | Performed by: OBSTETRICS & GYNECOLOGY

## 2023-02-24 RX ADMIN — CARVEDILOL 12.5 MG: 12.5 TABLET, FILM COATED ORAL at 08:02

## 2023-02-24 RX ADMIN — METFORMIN HYDROCHLORIDE 1000 MG: 500 TABLET ORAL at 08:02

## 2023-02-24 RX ADMIN — DRONEDARONE 400 MG: 400 TABLET, FILM COATED ORAL at 08:02

## 2023-02-24 RX ADMIN — CONJUGATED ESTROGENS 25 MG: 25 INJECTION, POWDER, LYOPHILIZED, FOR SOLUTION INTRAMUSCULAR; INTRAVENOUS at 06:02

## 2023-02-24 RX ADMIN — APIXABAN 5 MG: 5 TABLET, FILM COATED ORAL at 08:02

## 2023-02-24 RX ADMIN — GLIMEPIRIDE 2 MG: 2 TABLET ORAL at 08:02

## 2023-02-24 NOTE — ED PROVIDER NOTES
Encounter Date: 2023       History     Source of history:  Patient.    History obtained without limitations.      HPI:  The patient is a 52-year-old with a blow past medical history who presents with heavy vaginal bleeding since 2023.  Prior to onset, her last menstrual bleeding was five months prior.  She had some menstrual cycle irregularity over the preceding year.  This episode bleeding started large clots he is now having persistent then bloody discharge.  She denies abdominal pain, nausea, vomiting.  She feels fatigued and has postural palpitations, shortness of breath, lightheadedness, and dizziness.  She scheduled follow-up with a gynecologist but that appointment is not until next month.  She started taking an oral progesterone today.  She has a history of atrial flutter and takes Eliquis.      Review of patient's allergies indicates:  No Known Allergies  Past Medical History:   Diagnosis Date    Allergy     Atrial flutter     Degenerative lumbar disc 2021    Diabetes mellitus     Diabetes mellitus, type 2     Hyperlipidemia     Hypertension     PAF (paroxysmal atrial fibrillation) 2022    Sleep apnea      Past Surgical History:   Procedure Laterality Date    BREAST SURGERY       SECTION      CHOLECYSTECTOMY      ENDOMETRIAL ABLATION      HERNIA REPAIR      incisional     KNEE SURGERY      LIVER LOBECTOMY Right     LYMPH NODE DISSECTION      right axillary    TRANSESOPHAGEAL ECHOCARDIOGRAPHY N/A 2022    Procedure: ECHOCARDIOGRAM, TRANSESOPHAGEAL;  Surgeon: Ji Patricio MD;  Location: [facility] CATH LAB;  Service: Cardiology;  Laterality: N/A;     Family History   Problem Relation Age of Onset    Hypertension Mother     Asthma Mother     COPD Mother     Hyperlipidemia Mother     Diabetes type II Mother     Diabetes Father     Hypertension Father     Hyperlipidemia Father     Cancer Sister         type unknown, but CA in mouth    No Known Problems Brother     No Known  Problems Maternal Aunt     No Known Problems Maternal Uncle     No Known Problems Paternal Aunt     No Known Problems Paternal Uncle     No Known Problems Maternal Grandmother     No Known Problems Maternal Grandfather     No Known Problems Paternal Grandmother     No Known Problems Paternal Grandfather     Breast cancer Neg Hx     Colon cancer Neg Hx     Ovarian cancer Neg Hx     Amblyopia Neg Hx     Blindness Neg Hx     Cataracts Neg Hx     Glaucoma Neg Hx     Macular degeneration Neg Hx     Retinal detachment Neg Hx     Strabismus Neg Hx     Stroke Neg Hx     Thyroid disease Neg Hx      Social History     Tobacco Use    Smoking status: Never    Smokeless tobacco: Never   Substance Use Topics    Alcohol use: Not Currently     Comment: occasionally    Drug use: No     Review of Systems  See HPI.      Physical Exam     Initial Vitals [02/23/23 1808]   BP Pulse Resp Temp SpO2   130/73 87 18 97.8 °F (36.6 °C) 99 %      MAP       --         Physical Exam    Nursing note and vitals reviewed.  Constitutional: She is not diaphoretic. No distress.   HENT:   Head: Normocephalic and atraumatic.   Mouth/Throat: Oropharynx is clear and moist.   Eyes: Conjunctivae are normal. No scleral icterus.   Neck: No JVD present.   Cardiovascular:  Normal rate, regular rhythm and normal heart sounds.     Exam reveals no gallop and no friction rub.       No murmur heard.  Pulmonary/Chest: Effort normal and breath sounds normal. No stridor. No respiratory distress. She has no decreased breath sounds. She has no wheezes. She has no rhonchi. She has no rales.   Abdominal: Abdomen is soft. She exhibits no distension. There is no abdominal tenderness.     Neurological: She is alert and oriented to person, place, and time. GCS score is 15. GCS eye subscore is 4. GCS verbal subscore is 5. GCS motor subscore is 6.   Skin: Skin is warm and dry. There is pallor.   Psychiatric: She has a normal mood and affect. Her speech is normal and behavior is  normal. She is not actively hallucinating. She is attentive.       ED Course   Procedures  Labs Reviewed   CBC W/ AUTO DIFFERENTIAL - Abnormal; Notable for the following components:       Result Value    WBC 16.89 (*)     RBC 2.39 (*)     Hemoglobin 6.8 (*)     Hematocrit 21.2 (*)     RDW 16.6 (*)     Immature Granulocytes 0.6 (*)     Gran # (ANC) 11.2 (*)     Immature Grans (Abs) 0.10 (*)     All other components within normal limits   COMPREHENSIVE METABOLIC PANEL - Abnormal; Notable for the following components:    CO2 22 (*)     Albumin 3.3 (*)     All other components within normal limits   URINALYSIS, REFLEX TO URINE CULTURE - Abnormal; Notable for the following components:    Protein, UA 1+ (*)     Occult Blood UA 2+ (*)     All other components within normal limits    Narrative:     Specimen Source->Urine   URINALYSIS MICROSCOPIC - Abnormal; Notable for the following components:    RBC, UA 54 (*)     All other components within normal limits    Narrative:     Specimen Source->Urine   POCT URINE PREGNANCY   TYPE & SCREEN   PREPARE RBC SOFT   PREPARE RBC SOFT          Imaging Results              US Pelvis Comp with Transvag NON-OB (xpd) (Final result)  Result time 02/23/23 22:21:53      Final result by Kimberlyn Ken MD (02/23/23 22:21:53)                   Impression:      1. Uterine fibroids.  2. Poor visualization of the endometrium due to shadowing from uterine fibroids.  Potential underlying endometrial pathology is unable to be excluded.  3. Nonvisualization of the ovaries.      Electronically signed by: Kimberlyn Ken MD  Date:    02/23/2023  Time:    22:21               Narrative:    EXAMINATION:  US PELVIS COMP WITH TRANSVAG NON-OB (XPD)    CLINICAL HISTORY:  Menorrhagia;    TECHNIQUE:  Transabdominal sonography of the pelvis was performed, followed by transvaginal sonography to better evaluate the uterus and ovaries.    COMPARISON:  None.    FINDINGS:  The uterus measures 12 x 7 x 7 cm. Uterine  parenchyma heterogenous in appearance with fibroids seen, the largest of which measures at least 4 cm.  This results in shadowing with poor visualization of the endometrium.  Small nabothian cysts are noted.    Neither ovary was able to be visualized.  No adnexal abnormalities are seen.  No significant free fluid is seen.                                       Medications   0.9%  NaCl infusion (for blood administration) (has no administration in time range)   conjugated estrogens injection 25 mg (25 mg Intravenous Given 2/23/23 2223)   amLODIPine tablet 5 mg (has no administration in time range)   carvediloL tablet 12.5 mg (has no administration in time range)   dronedarone tablet 400 mg (has no administration in time range)   furosemide injection 20 mg (has no administration in time range)   losartan tablet 50 mg (has no administration in time range)   metFORMIN tablet 1,000 mg (has no administration in time range)   atorvastatin tablet 20 mg (has no administration in time range)   glimepiride tablet 2 mg (has no administration in time range)   apixaban tablet 5 mg (0 mg Oral Hold 2/24/23 0100)   acetaminophen tablet 650 mg (has no administration in time range)   0.9%  NaCl infusion (for blood administration) (has no administration in time range)     Medical Decision Making:   Clinical Tests:   Lab Tests: Reviewed    MDM:  This was an emergent evaluation.  She was afebrile.  Her vital signs were stable.  She had no focal neurological deficits.  She was found to be severely anemic.  Blood transfusion was initiated.  Her presentation and workup were consistent with symptomatic anemia secondary to abnormal uterine bleeding.  I discussed her presentation and workup with the on-call gynecologist, Dr. Zhao, who was familiar with ongoing bleeding.  She was admitted for blood transfusion, close monitoring, IV Premarin, and evaluation by the gynecologist.           ED Course as of 02/24/23 0104   u Feb 23, 2023   2030  WBC(!): 16.89  White blood cell count elevation consistent with ongoing blood loss resulting in symptomatic anemia. [LP]   2030 Hemoglobin(!): 6.8  Hemoglobin was 11.4 on 02/06/2023. [LP]   2030 Comprehensive metabolic panel(!)  Bicarb 22.  Unlikely to be associated with acute symptoms.  Albumin 3.3. [LP]   2100 Called and discussed the patient's presentation, exam, workup with Dr. Zhao.  She recommends admission for blood transfusion, IV Premarin q.6 hours, and pelvic ultrasound. [LP]      ED Course User Index  [LP] Arnav Dockery III, MD                   Clinical Impression:   Final diagnoses:  [D64.9] Symptomatic anemia (Primary)  [N93.9] Abnormal uterine bleeding        ED Disposition Condition    Observation Stable                Arnav Dockery III, MD  02/24/23 0104

## 2023-02-24 NOTE — TELEPHONE ENCOUNTER
Just released earlier today after admission for very heavy uterine bleeding. Says started bleeding again ab 30 mins ago, passing clots, smaller than before, pad ab 1/4 full at this time. Was advised to return if she saturates pad < 1 hr due to low blood count. Has not had Provera dose today yet, taking now. Pt also on eliquis.     Notified mateo Weber of above; also 2 units received while admitted, last H/H, & recently dose of Provera taken during call since did not have am dose. Per MD-continue to monitor closely over next several hrs. Return to ER if pt saturated 1pad/hr for 2 hrs. Pt updated.     Reason for Disposition   Taking Coumadin (warfarin) or other strong blood thinner, or known bleeding disorder (e.g., thrombocytopenia)    Additional Information   Negative: Shock suspected (e.g., cold/pale/clammy skin, too weak to stand, low BP, rapid pulse)   Negative: Difficult to awaken or acting confused (e.g., disoriented, slurred speech)   Negative: Passed out (i.e., lost consciousness, collapsed and was not responding)   Negative: Sounds like a life-threatening emergency to the triager   Negative: SEVERE abdominal pain   Negative: SEVERE dizziness (e.g., unable to stand, requires support to walk, feels like passing out now)   Negative: SEVERE vaginal bleeding (e.g., soaking 2 pads or tampons per hour and present 2 or more hours; 1 menstrual cup every 2 hours)   Negative: Patient sounds very sick or weak to the triager   Negative: MODERATE vaginal bleeding (e.g., soaking 1 pad or tampon per hour and present > 6 hours; 1 menstrual cup every 6 hours)   Negative: [1] Constant abdominal pain AND [2] present > 2 hours   Negative: Pale skin (pallor) of new-onset or worsening   Negative: Passed tissue (e.g., gray-white)    Protocols used: Vaginal Bleeding - [id number]

## 2023-02-24 NOTE — ED TRIAGE NOTES
"Pt presents to the ED tonight from home with c/o vaginal bleeding since 2/5. Pt states that she was passing "fist size" clots for about two weeks and that the clots are still present but now a "little larger than a quarter" in size. Reports bleeding through a regular tampon and a pad every hour. Pt states bleeding initially was of normal consistency but has since become watery. Pt also reports lightheadedness, dizziness, and SOB upon exertion that began a few days ago. Denies any urinary frequency, burning, or any other complaints at this time. Pt is AAOx4, resting comfortably, and has daughter at [facility].   "

## 2023-02-24 NOTE — H&P
[facility]  Obstetrics & Gynecology  History & Physical    Patient Name: Micaela Hernandez  MRN: [id number]  Admission Date: 2/23/2023  Primary Care Provider: Bailey Fiore MD    Subjective:     Chief Complaint/Reason for Admission: symptomatic anemia    History of Present Illness: Patient has a history of heavy vaginal bleeding.  She had no bleeding for 5 months, then began having bleeding on 2/5/23.  This episode bleeding started large clots he is now having persistent then bloody discharge.  She denies abdominal pain, nausea, vomiting.  She feels fatigued and is having some shortness of breath, lightheadedness and dizziness.    No current facility-administered medications on file prior to encounter.     Current Outpatient Medications on File Prior to Encounter   Medication Sig    amLODIPine (NORVASC) 5 MG tablet Take 1 tablet (5 mg total) by mouth every evening.    carvediloL (COREG) 12.5 MG tablet Take 1 tablet (12.5 mg total) by mouth 2 (two) times daily with meals.    dronedarone (MULTAQ) 400 mg Tab TAKE 1 TABLET BY MOUTH TWICE DAILY WITH MEALS    ELIQUIS 5 mg Tab Take 1 tablet by mouth twice daily    furosemide (LASIX) 20 MG tablet Take 1 tablet (20 mg total) by mouth once daily.    glimepiride (AMARYL) 2 MG tablet Take 1 tablet (2 mg total) by mouth once daily.    losartan (COZAAR) 50 MG tablet Take 1 tablet (50 mg total) by mouth 2 (two) times daily.    metFORMIN (GLUCOPHAGE) 1000 MG tablet Take 1 tablet (1,000 mg total) by mouth 2 (two) times daily with meals.    simvastatin (ZOCOR) 40 MG tablet Take 1 tablet (40 mg total) by mouth every evening.    [START ON 4/11/2023] tirzepatide 10 mg/0.5 mL PnIj Inject 10 mg into the skin every 7 days. for 4 doses    betamethasone dipropionate (DIPROLENE) 0.05 % ointment APPLY  OINTMENT TOPICALLY TWICE DAILY FOR 7 DAYS    clobetasoL (TEMOVATE) 0.05 % external solution Apply topically 2 (two) times daily.    loratadine (CLARITIN) 10 mg tablet Take 10 mg by  mouth once daily. prn    medroxyPROGESTERone (PROVERA) 10 MG tablet Take 1 tablet (10 mg total) by mouth once daily.    tirzepatide 2.5 mg/0.5 mL PnIj Inject 2.5 mg into the skin every 7 days. for 4 doses    [START ON 2023] tirzepatide 5 mg/0.5 mL PnIj Inject 5 mg into the skin every 7 days. for 4 doses    [START ON 3/19/2023] tirzepatide 7.5 mg/0.5 mL PnIj Inject 7.5 mg into the skin every 7 days. for 4 doses       Review of patient's allergies indicates:  No Known Allergies    Past Medical History:   Diagnosis Date    Allergy     Atrial flutter     Degenerative lumbar disc 2021    Diabetes mellitus     Diabetes mellitus, type 2     Hyperlipidemia     Hypertension     PAF (paroxysmal atrial fibrillation) 2022    Sleep apnea      OB History    Para Term  AB Living   1 1 1         SAB IAB Ectopic Multiple Live Births                  # Outcome Date GA Lbr Melvin/2nd Weight Sex Delivery Anes PTL Lv   1 Term              Past Surgical History:   Procedure Laterality Date    BREAST SURGERY       SECTION      CHOLECYSTECTOMY      ENDOMETRIAL ABLATION      HERNIA REPAIR      incisional     KNEE SURGERY      LIVER LOBECTOMY Right     LYMPH NODE DISSECTION      right axillary    TRANSESOPHAGEAL ECHOCARDIOGRAPHY N/A 2022    Procedure: ECHOCARDIOGRAM, TRANSESOPHAGEAL;  Surgeon: Ji Patricio MD;  Location: [facility] CATH LAB;  Service: Cardiology;  Laterality: N/A;     Family History       Problem Relation (Age of Onset)    Asthma Mother    COPD Mother    Cancer Sister    Diabetes Father    Diabetes type II Mother    Hyperlipidemia Mother, Father    Hypertension Mother, Father    No Known Problems Brother, Maternal Aunt, Maternal Uncle, Paternal Aunt, Paternal Uncle, Maternal Grandmother, Maternal Grandfather, Paternal Grandmother, Paternal Grandfather          Tobacco Use    Smoking status: Never    Smokeless tobacco: Never   Substance and Sexual Activity    Alcohol use: Not Currently      Comment: occasionally    Drug use: No    Sexual activity: Not Currently     Partners: Male     Birth control/protection: None     Review of Systems   Constitutional:  Positive for fatigue.   HENT: Negative.     Eyes: Negative.    Respiratory: Negative.     Cardiovascular: Negative.    Gastrointestinal: Negative.    Endocrine: Negative.    Genitourinary:  Positive for menorrhagia and menstrual problem.   Musculoskeletal: Negative.    Integumentary:  Negative.   Neurological: Negative.    Hematological: Negative.    Psychiatric/Behavioral: Negative.     Breast: negative.    Objective:     Vital Signs (Most Recent):  Temp: 97.5 °F (36.4 °C) (02/24/23 0815)  Pulse: 90 (02/24/23 0815)  Resp: 20 (02/24/23 0815)  BP: 121/66 (02/24/23 0815)  SpO2: 97 % (02/24/23 0815) Vital Signs (24h Range):  Temp:  [97.5 °F (36.4 °C)-98.7 °F (37.1 °C)] 97.5 °F (36.4 °C)  Pulse:  [84-90] 90  Resp:  [16-20] 20  SpO2:  [96 %-100 %] 97 %  BP: (121-153)/(55-73) 121/66     Weight: 135.6 kg (299 lb)  Body mass index is 52.97 kg/m².  Patient's last menstrual period was 09/07/2022 (exact date).    Physical Exam:   Constitutional: She is oriented to person, place, and time. She appears well-developed.    HENT:   Head: Normocephalic and atraumatic.    Eyes: EOM are normal.     Cardiovascular:  Normal rate.             Pulmonary/Chest: Effort normal.        Abdominal: Soft. There is no abdominal tenderness.     Genitourinary:    Vagina, uterus, right adnexa and left adnexa normal.      Pelvic exam was performed with patient supine.   The external female genitalia was normal.   No external genitalia lesions identified,Genitalia hair distrobution normal .   Labial bartholins normal.There is no rash, tenderness or lesion on the right labia. There is no rash, tenderness or lesion on the left labia. Cervix is normal. Right adnexum displays no tenderness and no fullness. Left adnexum displays no tenderness and no fullness. No erythema,  no vaginal  discharge or bleeding in the vagina.    No signs of injury in the vagina.   Vagina was moist.Cervix exhibits no motion tenderness. Uterus consistancy normal. and Uerus contour normal  Uterus is not enlarged and not tender. Normal urethral meatus.Urethral Meatus exhibits: urethral lesion and prolapsedUrethra findings: no urethral mass and no tendernessBladder findings: no bladder distention and no bladder tenderness          Musculoskeletal: Normal range of motion.       Neurological: She is oriented to person, place, and time.    Skin: Skin is warm.    Psychiatric: She has a normal mood and affect.     Laboratory:  Admission on 02/23/2023   Component Date Value Ref Range Status    Group & Rh 02/23/2023 O NEG   Final    Indirect Haun 02/23/2023 NEG   Final    WBC 02/23/2023 16.89 (H)  3.90 - 12.70 K/uL Final    RBC 02/23/2023 2.39 (L)  4.00 - 5.40 M/uL Final    Hemoglobin 02/23/2023 6.8 (L)  12.0 - 16.0 g/dL Final    Hematocrit 02/23/2023 21.2 (L)  37.0 - 48.5 % Final    MCV 02/23/2023 89  82 - 98 fL Final    MCH 02/23/2023 28.5  27.0 - 31.0 pg Final    MCHC 02/23/2023 32.1  32.0 - 36.0 g/dL Final    RDW 02/23/2023 16.6 (H)  11.5 - 14.5 % Final    Platelets 02/23/2023 282  150 - 450 K/uL Final    MPV 02/23/2023 12.7  9.2 - 12.9 fL Final    Immature Granulocytes 02/23/2023 0.6 (H)  0.0 - 0.5 % Final    Gran # (ANC) 02/23/2023 11.2 (H)  1.8 - 7.7 K/uL Final    Immature Grans (Abs) 02/23/2023 0.10 (H)  0.00 - 0.04 K/uL Final    Comment: Mild elevation in immature granulocytes is non specific and   can be seen in a variety of conditions including stress response,   acute inflammation, trauma and pregnancy. Correlation with other   laboratory and clinical findings is essential.      Lymph # 02/23/2023 4.4  1.0 - 4.8 K/uL Final    Mono # 02/23/2023 0.9  0.3 - 1.0 K/uL Final    Eos # 02/23/2023 0.2  0.0 - 0.5 K/uL Final    Baso # 02/23/2023 0.07  0.00 - 0.20 K/uL Final    nRBC 02/23/2023 0  0 /100 WBC Final    Gran %  02/23/2023 65.9  38.0 - 73.0 % Final    Lymph % 02/23/2023 26.1  18.0 - 48.0 % Final    Mono % 02/23/2023 5.6  4.0 - 15.0 % Final    Eosinophil % 02/23/2023 1.4  0.0 - 8.0 % Final    Basophil % 02/23/2023 0.4  0.0 - 1.9 % Final    Differential Method 02/23/2023 Automated   Final    Sodium 02/23/2023 137  136 - 145 mmol/L Final    Potassium 02/23/2023 4.0  3.5 - 5.1 mmol/L Final    Chloride 02/23/2023 101  95 - 110 mmol/L Final    CO2 02/23/2023 22 (L)  23 - 29 mmol/L Final    Glucose 02/23/2023 107  70 - 110 mg/dL Final    BUN 02/23/2023 12  6 - 20 mg/dL Final    Creatinine 02/23/2023 0.8  0.5 - 1.4 mg/dL Final    Calcium 02/23/2023 9.3  8.7 - 10.5 mg/dL Final    Total Protein 02/23/2023 7.3  6.0 - 8.4 g/dL Final    Albumin 02/23/2023 3.3 (L)  3.5 - 5.2 g/dL Final    Total Bilirubin 02/23/2023 0.3  0.1 - 1.0 mg/dL Final    Comment: For infants and newborns, interpretation of results should be based  on gestational age, weight and in agreement with clinical  observations.    Premature Infant recommended reference ranges:  Up to 24 hours.............<8.0 mg/dL  Up to 48 hours............<12.0 mg/dL  3-5 days..................<15.0 mg/dL  6-29 days.................<15.0 mg/dL      Alkaline Phosphatase 02/23/2023 74  55 - 135 U/L Final    AST 02/23/2023 28  10 - 40 U/L Final    ALT 02/23/2023 23  10 - 44 U/L Final    Anion Gap 02/23/2023 14  8 - 16 mmol/L Final    eGFR 02/23/2023 >60  >60 mL/min/1.73 m^2 Final    Specimen UA 02/23/2023 Urine, Clean Catch   Final    Color, UA 02/23/2023 Yellow  Yellow, Straw, Lupe Final    Appearance, UA 02/23/2023 Clear  Clear Final    pH, UA 02/23/2023 6.0  5.0 - 8.0 Final    Specific Gravity, UA 02/23/2023 1.025  1.005 - 1.030 Final    Protein, UA 02/23/2023 1+ (A)  Negative Final    Comment: Recommend a 24 hour urine protein or a urine   protein/creatinine ratio if globulin induced proteinuria is  clinically suspected.      Glucose, UA 02/23/2023 Negative  Negative Final    Ketones,  UA 02/23/2023 Negative  Negative Final    Bilirubin (UA) 02/23/2023 Negative  Negative Final    Occult Blood UA 02/23/2023 2+ (A)  Negative Final    Nitrite, UA 02/23/2023 Negative  Negative Final    Urobilinogen, UA 02/23/2023 Negative  <2.0 EU/dL Final    Leukocytes, UA 02/23/2023 Negative  Negative Final    POC Preg Test, Ur 02/23/2023 Negative  Negative Final     Acceptable 02/23/2023 Yes   Final    RBC, UA 02/23/2023 54 (H)  0 - 4 /hpf Final    WBC, UA 02/23/2023 3  0 - 5 /hpf Final    Bacteria 02/23/2023 None  None-Occ /hpf Final    Hyaline Casts, UA 02/23/2023 none  0-1/lpf /lpf Final    Microscopic Comment 02/23/2023 SEE COMMENT   Final    Comment: Other formed elements not mentioned in the report are not   present in the microscopic examination.       UNIT NUMBER 02/23/2023 [id number]   Final    Product Code 02/23/2023 [id number]   Final    DISPENSE STATUS 02/23/2023 TRANSFUSED   Final    CODING SYSTEM 02/23/2023 [id number]   Final    Unit Blood Type Code 02/23/2023 9500   Final    Unit Blood Type 02/23/2023 O NEG   Final    Unit Expiration 02/23/2023 [id number]   Final    CROSSMATCH INTERPRETATION 02/23/2023 Compatible   Final    UNIT NUMBER 02/23/2023 [id number]   Preliminary    Product Code 02/23/2023 [id number]   Preliminary    DISPENSE STATUS 02/23/2023 ISSUED   Preliminary    CODING SYSTEM 02/23/2023 [id number]   Preliminary    Unit Blood Type Code 02/23/2023 9500   Preliminary    Unit Blood Type 02/23/2023 O NEG   Preliminary    Unit Expiration 02/23/2023 [id number]   Preliminary    CROSSMATCH INTERPRETATION 02/23/2023 Compatible   Preliminary    WBC 02/24/2023 20.14 (H)  3.90 - 12.70 K/uL Final    RBC 02/24/2023 2.66 (L)  4.00 - 5.40 M/uL Final    Hemoglobin 02/24/2023 7.6 (L)  12.0 - 16.0 g/dL Final    Hematocrit 02/24/2023 23.3 (L)  37.0 - 48.5 % Final    MCV 02/24/2023 88  82 - 98 fL Final    MCH 02/24/2023 28.6  27.0 - 31.0 pg Final    MCHC 02/24/2023 32.6  32.0 - 36.0 g/dL Final     RDW 02/24/2023 15.8 (H)  11.5 - 14.5 % Final    Platelets 02/24/2023 194  150 - 450 K/uL Final    MPV 02/24/2023 12.6  9.2 - 12.9 fL Final    Immature Granulocytes 02/24/2023 0.6 (H)  0.0 - 0.5 % Final    Gran # (ANC) 02/24/2023 14.1 (H)  1.8 - 7.7 K/uL Final    Immature Grans (Abs) 02/24/2023 0.13 (H)  0.00 - 0.04 K/uL Final    Comment: Mild elevation in immature granulocytes is non specific and   can be seen in a variety of conditions including stress response,   acute inflammation, trauma and pregnancy. Correlation with other   laboratory and clinical findings is essential.      Lymph # 02/24/2023 4.2  1.0 - 4.8 K/uL Final    Mono # 02/24/2023 1.4 (H)  0.3 - 1.0 K/uL Final    Eos # 02/24/2023 0.3  0.0 - 0.5 K/uL Final    Baso # 02/24/2023 0.09  0.00 - 0.20 K/uL Final    nRBC 02/24/2023 0  0 /100 WBC Final    Gran % 02/24/2023 70.2  38.0 - 73.0 % Final    Lymph % 02/24/2023 20.7  18.0 - 48.0 % Final    Mono % 02/24/2023 6.8  4.0 - 15.0 % Final    Eosinophil % 02/24/2023 1.3  0.0 - 8.0 % Final    Basophil % 02/24/2023 0.4  0.0 - 1.9 % Final    Differential Method 02/24/2023 Automated   Final         Diagnostic Results:  Results for orders placed during the hospital encounter of 02/23/23    US Pelvis Comp with Transvag NON-OB (xpd)    Narrative  EXAMINATION:  US PELVIS COMP WITH TRANSVAG NON-OB (XPD)    CLINICAL HISTORY:  Menorrhagia;    TECHNIQUE:  Transabdominal sonography of the pelvis was performed, followed by transvaginal sonography to better evaluate the uterus and ovaries.    COMPARISON:  None.    FINDINGS:  The uterus measures 12 x 7 x 7 cm. Uterine parenchyma heterogenous in appearance with fibroids seen, the largest of which measures at least 4 cm.  This results in shadowing with poor visualization of the endometrium.  Small nabothian cysts are noted.    Neither ovary was able to be visualized.  No adnexal abnormalities are seen.  No significant free fluid is seen.    Impression  1. Uterine  fibroids.  2. Poor visualization of the endometrium due to shadowing from uterine fibroids.  Potential underlying endometrial pathology is unable to be excluded.  3. Nonvisualization of the ovaries.      Electronically signed by: Kimberlyn Ken MD  Date:    2023  Time:    22:21             Assessment/Plan:     52 y.o.  at here with abnormal uterine bleeding and symptomatic anemia.    - Will admit to observation.  - Transfuse 2 units of packed red blood cells.  - Premarin 25 mg IV Q 6 hours for a max of 4 doses.    Lola Zhao MD  Obstetrics & Gynecology  [facility]

## 2023-02-24 NOTE — PHARMACY MED REC
"Admission Medication History     The home medication history was taken by Yaneth Massey.    You may go to "Admission" then "Reconcile Home Medications" tabs to review and/or act upon these items.     The home medication list has been updated by the Pharmacy department.   Please read ALL comments highlighted in yellow.   Please address this information as you see fit.    Feel free to contact us if you have any questions or require assistance.    Medications listed below were obtained from: Patient/family and Analytic software- [employer]  (Not in a hospital admission)          Yaneth Massey  [phone]                 .        "

## 2023-02-24 NOTE — PLAN OF CARE
Pt progressing well. NAD noted. VSS. Pt received 2 units PRBCs. Tolerated well. PT ambulating and voiding. Scant bleeding noted to rhianna pad.  POC discussed with pt. Understanding verbalized.

## 2023-02-24 NOTE — DISCHARGE SUMMARY
[facility]  Obstetrics & Gynecology  Discharge Summary    Patient Name: Micaela Hernandez  MRN: [id number]  Admission Date: 2/23/2023  Hospital Length of Stay: 0 days  Discharge Date and Time:  02/24/2023 8:27 AM  Attending Physician: Lola Zhao MD   Discharging Provider: Lola Zhao MD  Primary Care Provider: Bailey Fiore MD    HPI: Patient admitted with abnormal uterine bleeding and symptomatic uterine anemia.    Hospital Course: Patient admitted and started on IV Premarin and given 2 units of packed red blood cells.  The next morning, her vaginal bleeding was only spotting and her anemia symptoms had resolved.  She was discharged home with follow up for EMB.    * No surgery found *     Consults:     Significant Diagnostic Studies:   Results for orders placed during the hospital encounter of 02/23/23    US Pelvis Comp with Transvag NON-OB (xpd)    Narrative  EXAMINATION:  US PELVIS COMP WITH TRANSVAG NON-OB (XPD)    CLINICAL HISTORY:  Menorrhagia;    TECHNIQUE:  Transabdominal sonography of the pelvis was performed, followed by transvaginal sonography to better evaluate the uterus and ovaries.    COMPARISON:  None.    FINDINGS:  The uterus measures 12 x 7 x 7 cm. Uterine parenchyma heterogenous in appearance with fibroids seen, the largest of which measures at least 4 cm.  This results in shadowing with poor visualization of the endometrium.  Small nabothian cysts are noted.    Neither ovary was able to be visualized.  No adnexal abnormalities are seen.  No significant free fluid is seen.    Impression  1. Uterine fibroids.  2. Poor visualization of the endometrium due to shadowing from uterine fibroids.  Potential underlying endometrial pathology is unable to be excluded.  3. Nonvisualization of the ovaries.      Electronically signed by: Kimberlyn Ken MD  Date:    02/23/2023  Time:    22:21          Pending Diagnostic Studies:       None          Final Active Diagnoses:    Diagnosis Date  Noted POA    PRINCIPAL PROBLEM:  Symptomatic anemia [D64.9] 02/24/2023 Yes    Abnormal uterine bleeding (AUB) [N93.9] 02/24/2023 Yes      Problems Resolved During this Admission:        Discharged Condition: good    Disposition: Home or Self Care    Follow Up:as previously scheduled.    Patient Instructions:      Diet Adult Regular     Lifting restrictions     No driving until:   Order Comments: No longer taking narcotics     Pelvic Rest     No dressing needed     Leave dressing on - Keep it clean, dry, and intact until clinic visit     Notify your health care provider if you experience any of the following:  temperature >100.4     Notify your health care provider if you experience any of the following:  persistent nausea and vomiting or diarrhea     Notify your health care provider if you experience any of the following:  severe uncontrolled pain     Notify your health care provider if you experience any of the following:  redness, tenderness, or signs of infection (pain, swelling, redness, odor or green/yellow discharge around incision site)     Notify your health care provider if you experience any of the following:  severe persistent headache     Notify your health care provider if you experience any of the following:  worsening rash     Notify your health care provider if you experience any of the following:  persistent dizziness, light-headedness, or visual disturbances     Notify your health care provider if you experience any of the following:  increased confusion or weakness     Medications:  Reconciled Home Medications:      Medication List        CONTINUE taking these medications      amLODIPine 5 MG tablet  Commonly known as: NORVASC  Take 1 tablet (5 mg total) by mouth every evening.     betamethasone dipropionate 0.05 % ointment  Commonly known as: DIPROLENE  APPLY  OINTMENT TOPICALLY TWICE DAILY FOR 7 DAYS     carvediloL 12.5 MG tablet  Commonly known as: COREG  Take 1 tablet (12.5 mg total) by mouth  2 (two) times daily with meals.     clobetasoL 0.05 % external solution  Commonly known as: TEMOVATE  Apply topically 2 (two) times daily.     ELIQUIS 5 mg Tab  Generic drug: apixaban  Take 1 tablet by mouth twice daily     furosemide 20 MG tablet  Commonly known as: LASIX  Take 1 tablet (20 mg total) by mouth once daily.     glimepiride 2 MG tablet  Commonly known as: AMARYL  Take 1 tablet (2 mg total) by mouth once daily.     loratadine 10 mg tablet  Commonly known as: CLARITIN  Take 10 mg by mouth once daily. prn     losartan 50 MG tablet  Commonly known as: COZAAR  Take 1 tablet (50 mg total) by mouth 2 (two) times daily.     medroxyPROGESTERone 10 MG tablet  Commonly known as: PROVERA  Take 1 tablet (10 mg total) by mouth once daily.     metFORMIN 1000 MG tablet  Commonly known as: GLUCOPHAGE  Take 1 tablet (1,000 mg total) by mouth 2 (two) times daily with meals.     MULTAQ 400 mg Tab  Generic drug: dronedarone  TAKE 1 TABLET BY MOUTH TWICE DAILY WITH MEALS     simvastatin 40 MG tablet  Commonly known as: ZOCOR  Take 1 tablet (40 mg total) by mouth every evening.     * tirzepatide 2.5 mg/0.5 mL Pnij  Inject 2.5 mg into the skin every 7 days. for 4 doses     * tirzepatide 5 mg/0.5 mL Pnij  Inject 5 mg into the skin every 7 days. for 4 doses  Start taking on: February 25, 2023     * tirzepatide 7.5 mg/0.5 mL Pnij  Inject 7.5 mg into the skin every 7 days. for 4 doses  Start taking on: March 19, 2023     * tirzepatide 10 mg/0.5 mL Pnij  Inject 10 mg into the skin every 7 days. for 4 doses  Start taking on: April 11, 2023           * This list has 4 medication(s) that are the same as other medications prescribed for you. Read the directions carefully, and ask your doctor or other care provider to review them with you.                  Lola Zhao MD  Obstetrics & Gynecology  [facility] [place] [facility]

## 2023-02-24 NOTE — DISCHARGE INSTRUCTIONS
Take showers for next 6 weeks  No tampons, douching or sexual intercourse for next 6 weeks or until ok with doctor  No driving for 2 weeks   Do not lift anything heavier than 10 pounds for next 6 weeks  Walking is only form of exercise allowed for next 6 weeks unless instructed otherwise by your doctor    Notify your doctor if you have:  -pain not relieved by your pain medication  -unexplained swelling of arms or legs  -uncontrolled nausea/vomiting  -redness, swelling, or drainage from incision  -fever of 100.4* or higher  -sudden severe pain in arms, back, or legs  -heavy vaginal bleeding

## 2023-02-27 ENCOUNTER — HOSPITAL ENCOUNTER (OUTPATIENT)
Facility: HOSPITAL | Age: 53
Discharge: HOME | End: 2023-03-01
Attending: EMERGENCY MEDICINE | Admitting: EMERGENCY MEDICINE
Payer: COMMERCIAL

## 2023-02-27 DIAGNOSIS — D64.9 ANEMIA, UNSPECIFIED TYPE: ICD-10-CM

## 2023-02-27 DIAGNOSIS — I48.0 PAF (PAROXYSMAL ATRIAL FIBRILLATION): ICD-10-CM

## 2023-02-27 DIAGNOSIS — Z98.890 STATUS POST HYSTEROSCOPY: Primary | ICD-10-CM

## 2023-02-27 DIAGNOSIS — Z01.818 PRE-OP EXAM: ICD-10-CM

## 2023-02-27 DIAGNOSIS — N92.1 MENORRHAGIA WITH IRREGULAR CYCLE: ICD-10-CM

## 2023-02-27 DIAGNOSIS — N93.9 VAGINAL BLEEDING: ICD-10-CM

## 2023-02-27 DIAGNOSIS — N93.9 ABNORMAL UTERINE BLEEDING (AUB): ICD-10-CM

## 2023-02-27 LAB
ABO + RH BLD: NORMAL
ALBUMIN SERPL BCP-MCNC: 3.2 G/DL (ref 3.5–5.2)
ALP SERPL-CCNC: 82 U/L (ref 55–135)
ALT SERPL W/O P-5'-P-CCNC: 25 U/L (ref 10–44)
ANION GAP SERPL CALC-SCNC: 12 MMOL/L (ref 8–16)
AST SERPL-CCNC: 30 U/L (ref 10–40)
B-HCG UR QL: NEGATIVE
BACTERIA #/AREA URNS HPF: ABNORMAL /HPF
BASOPHILS # BLD AUTO: 0.07 K/UL (ref 0–0.2)
BASOPHILS NFR BLD: 0.5 % (ref 0–1.9)
BILIRUB SERPL-MCNC: 0.4 MG/DL (ref 0.1–1)
BILIRUB UR QL STRIP: NEGATIVE
BLD GP AB SCN CELLS X3 SERPL QL: NORMAL
BUN SERPL-MCNC: 10 MG/DL (ref 6–20)
CALCIUM SERPL-MCNC: 9.1 MG/DL (ref 8.7–10.5)
CHLORIDE SERPL-SCNC: 104 MMOL/L (ref 95–110)
CLARITY UR: ABNORMAL
CO2 SERPL-SCNC: 22 MMOL/L (ref 23–29)
COLOR UR: ABNORMAL
CREAT SERPL-MCNC: 0.7 MG/DL (ref 0.5–1.4)
CTP QC/QA: YES
DIFFERENTIAL METHOD: ABNORMAL
EOSINOPHIL # BLD AUTO: 0.3 K/UL (ref 0–0.5)
EOSINOPHIL NFR BLD: 2 % (ref 0–8)
ERYTHROCYTE [DISTWIDTH] IN BLOOD BY AUTOMATED COUNT: 18.5 % (ref 11.5–14.5)
EST. GFR  (NO RACE VARIABLE): >60 ML/MIN/1.73 M^2
GLUCOSE SERPL-MCNC: 91 MG/DL (ref 70–110)
GLUCOSE UR QL STRIP: NEGATIVE
HCT VFR BLD AUTO: 23 % (ref 37–48.5)
HCT VFR BLD AUTO: 24.6 % (ref 37–48.5)
HGB BLD-MCNC: 7.3 G/DL (ref 12–16)
HGB BLD-MCNC: 7.7 G/DL (ref 12–16)
HGB UR QL STRIP: ABNORMAL
HYALINE CASTS #/AREA URNS LPF: 13 /LPF
IMM GRANULOCYTES # BLD AUTO: 0.09 K/UL (ref 0–0.04)
IMM GRANULOCYTES NFR BLD AUTO: 0.7 % (ref 0–0.5)
KETONES UR QL STRIP: ABNORMAL
LEUKOCYTE ESTERASE UR QL STRIP: ABNORMAL
LYMPHOCYTES # BLD AUTO: 3.3 K/UL (ref 1–4.8)
LYMPHOCYTES NFR BLD: 24.3 % (ref 18–48)
MCH RBC QN AUTO: 28.8 PG (ref 27–31)
MCHC RBC AUTO-ENTMCNC: 31.3 G/DL (ref 32–36)
MCV RBC AUTO: 92 FL (ref 82–98)
MICROSCOPIC COMMENT: ABNORMAL
MONOCYTES # BLD AUTO: 1 K/UL (ref 0.3–1)
MONOCYTES NFR BLD: 7.1 % (ref 4–15)
NEUTROPHILS # BLD AUTO: 9 K/UL (ref 1.8–7.7)
NEUTROPHILS NFR BLD: 65.4 % (ref 38–73)
NITRITE UR QL STRIP: NEGATIVE
NRBC BLD-RTO: 0 /100 WBC
PH UR STRIP: 6 [PH] (ref 5–8)
PLATELET # BLD AUTO: 271 K/UL (ref 150–450)
PMV BLD AUTO: 12.2 FL (ref 9.2–12.9)
POTASSIUM SERPL-SCNC: 3.6 MMOL/L (ref 3.5–5.1)
PROT SERPL-MCNC: 7.4 G/DL (ref 6–8.4)
PROT UR QL STRIP: ABNORMAL
RBC # BLD AUTO: 2.67 M/UL (ref 4–5.4)
RBC #/AREA URNS HPF: >100 /HPF (ref 0–4)
SODIUM SERPL-SCNC: 138 MMOL/L (ref 136–145)
SP GR UR STRIP: 1.02 (ref 1–1.03)
URN SPEC COLLECT METH UR: ABNORMAL
UROBILINOGEN UR STRIP-ACNC: NEGATIVE EU/DL
WBC # BLD AUTO: 13.72 K/UL (ref 3.9–12.7)
WBC #/AREA URNS HPF: 11 /HPF (ref 0–5)

## 2023-02-27 PROCEDURE — 80053 COMPREHEN METABOLIC PANEL: CPT | Performed by: PHYSICIAN ASSISTANT

## 2023-02-27 PROCEDURE — 85018 HEMOGLOBIN: CPT | Mod: 91 | Performed by: EMERGENCY MEDICINE

## 2023-02-27 PROCEDURE — 96374 THER/PROPH/DIAG INJ IV PUSH: CPT

## 2023-02-27 PROCEDURE — 63600175 PHARM REV CODE 636 W HCPCS: Mod: JG | Performed by: EMERGENCY MEDICINE

## 2023-02-27 PROCEDURE — 86920 COMPATIBILITY TEST SPIN: CPT | Performed by: EMERGENCY MEDICINE

## 2023-02-27 PROCEDURE — 86900 BLOOD TYPING SEROLOGIC ABO: CPT | Performed by: EMERGENCY MEDICINE

## 2023-02-27 PROCEDURE — G0378 HOSPITAL OBSERVATION PER HR: HCPCS

## 2023-02-27 PROCEDURE — 85014 HEMATOCRIT: CPT | Mod: 91 | Performed by: EMERGENCY MEDICINE

## 2023-02-27 PROCEDURE — 87086 URINE CULTURE/COLONY COUNT: CPT | Performed by: PHYSICIAN ASSISTANT

## 2023-02-27 PROCEDURE — 85025 COMPLETE CBC W/AUTO DIFF WBC: CPT | Performed by: PHYSICIAN ASSISTANT

## 2023-02-27 PROCEDURE — 99285 EMERGENCY DEPT VISIT HI MDM: CPT

## 2023-02-27 PROCEDURE — 86920 COMPATIBILITY TEST SPIN: CPT | Performed by: ANESTHESIOLOGY

## 2023-02-27 PROCEDURE — 81000 URINALYSIS NONAUTO W/SCOPE: CPT | Performed by: PHYSICIAN ASSISTANT

## 2023-02-27 PROCEDURE — 81025 URINE PREGNANCY TEST: CPT | Performed by: PHYSICIAN ASSISTANT

## 2023-02-27 RX ORDER — HYDROCODONE BITARTRATE AND ACETAMINOPHEN 500; 5 MG/1; MG/1
TABLET ORAL
Status: DISCONTINUED | OUTPATIENT
Start: 2023-02-27 | End: 2023-03-01 | Stop reason: HOSPADM

## 2023-02-27 RX ADMIN — CONJUGATED ESTROGENS 25 MG: 25 INJECTION, POWDER, LYOPHILIZED, FOR SOLUTION INTRAMUSCULAR; INTRAVENOUS at 11:02

## 2023-02-27 NOTE — FIRST PROVIDER EVALUATION
Emergency Department TeleTriage Encounter Note      CHIEF COMPLAINT    Chief Complaint   Patient presents with    Vaginal Bleeding     Pt reports vaginal bleeding since February 5th. Reports going through approx 1 pad an hour. Reports recently receiving a blood transfusion for same s/s on 2/23. Reports feeling dizzy,sob and lightheaded. Hx fibroids.        VITAL SIGNS   Initial Vitals [02/27/23 1244]   BP Pulse Resp Temp SpO2   132/63 86 20 98.1 °F (36.7 °C) 100 %      MAP       --            ALLERGIES    Review of patient's allergies indicates:  No Known Allergies    PROVIDER TRIAGE NOTE  Patient presents with complaint of vaginal bleeding for 22 days. She was discharged three days ago for the same symptoms. She reports bleeding more through a pad an hour which started last night. Patient reports feeling weak. She denied pain.      Phy:   Constitutional: well nourished, well developed, appearing stated age, NAD   HEENT: NCAT, symmetrical lids, No obvious facial deformity.  Normal phonation. Normal Conjunctiva   Neck: NAROM   Respiratory: Normal effort.  No obvious use of accessory muscles   Musculoskeletal: Moved upper extremities well   Neuro: Alert, answers questions appropriately    Psych: appropriate mood and affect      Initial orders will be placed and care will be transferred to an alternate provider when patient is roomed for a full evaluation. Any additional orders and the final disposition will be determined by that provider.        ORDERS  Labs Reviewed   CBC W/ AUTO DIFFERENTIAL   COMPREHENSIVE METABOLIC PANEL   URINALYSIS, REFLEX TO URINE CULTURE   POCT URINE PREGNANCY       ED Orders (720h ago, onward)      Start Ordered     Status Ordering Provider    02/27/23 1305 02/27/23 1304  Saline lock IV  Once         Ordered DEVONTE SEVILLA    02/27/23 1305 02/27/23 1304  POCT urine pregnancy  Once         Ordered DEVONTE SEVILLA    02/27/23 1305 02/27/23 1304  CBC auto differential   STAT         Ordered NEGROTDEVONTE GRAMAJO    02/27/23 1305 02/27/23 1304  Comprehensive metabolic panel  STAT         Ordered DEVONTE SEVILLA    02/27/23 1305 02/27/23 1304  Urinalysis, Reflex to Urine Culture Urine, Clean Catch  STAT         Ordered GUADALUPE SEVILLAINE    02/27/23 1305 02/27/23 1304  Setup Pelvic Tray  Once         Ordered DEVONTE SEVILLA              Virtual Visit Note: The provider triage portion of this emergency department evaluation and documentation was performed via [employer], a HIPAA-compliant telemedicine application, in concert with a tele-presenter in the room. A face to face patient evaluation with one of my colleagues will occur once the patient is placed in an emergency department room.      DISCLAIMER: This note was prepared with [employer] voice recognition transcription software. Garbled syntax, mangled pronouns, and other bizarre constructions may be attributed to that software system.

## 2023-02-28 ENCOUNTER — ANESTHESIA EVENT (OUTPATIENT)
Dept: SURGERY | Facility: HOSPITAL | Age: 53
End: 2023-02-28
Payer: COMMERCIAL

## 2023-02-28 ENCOUNTER — TELEPHONE (OUTPATIENT)
Dept: OBSTETRICS AND GYNECOLOGY | Facility: CLINIC | Age: 53
End: 2023-02-28
Payer: COMMERCIAL

## 2023-02-28 ENCOUNTER — ANESTHESIA (OUTPATIENT)
Dept: SURGERY | Facility: HOSPITAL | Age: 53
End: 2023-02-28
Payer: COMMERCIAL

## 2023-02-28 ENCOUNTER — TELEPHONE (OUTPATIENT)
Dept: GYNECOLOGIC ONCOLOGY | Facility: CLINIC | Age: 53
End: 2023-02-28
Payer: COMMERCIAL

## 2023-02-28 DIAGNOSIS — N92.1 MENORRHAGIA WITH IRREGULAR CYCLE: Primary | ICD-10-CM

## 2023-02-28 PROBLEM — Z01.810 PREOP CARDIOVASCULAR EXAM: Status: ACTIVE | Noted: 2023-02-28

## 2023-02-28 LAB
BASOPHILS # BLD AUTO: 0.06 K/UL (ref 0–0.2)
BASOPHILS NFR BLD: 0.5 % (ref 0–1.9)
BLD PROD TYP BPU: NORMAL
BLD PROD TYP BPU: NORMAL
BLOOD UNIT EXPIRATION DATE: NORMAL
BLOOD UNIT EXPIRATION DATE: NORMAL
BLOOD UNIT TYPE CODE: 5100
BLOOD UNIT TYPE CODE: 5100
BLOOD UNIT TYPE: NORMAL
BLOOD UNIT TYPE: NORMAL
CODING SYSTEM: NORMAL
CODING SYSTEM: NORMAL
CROSSMATCH INTERPRETATION: NORMAL
CROSSMATCH INTERPRETATION: NORMAL
DIFFERENTIAL METHOD: ABNORMAL
DISPENSE STATUS: NORMAL
DISPENSE STATUS: NORMAL
EOSINOPHIL # BLD AUTO: 0.2 K/UL (ref 0–0.5)
EOSINOPHIL NFR BLD: 1.7 % (ref 0–8)
ERYTHROCYTE [DISTWIDTH] IN BLOOD BY AUTOMATED COUNT: 18.5 % (ref 11.5–14.5)
FERRITIN SERPL-MCNC: 29 NG/ML (ref 20–300)
HCT VFR BLD AUTO: 18.1 % (ref 37–48.5)
HCT VFR BLD AUTO: 26 % (ref 37–48.5)
HGB BLD-MCNC: 5.5 G/DL (ref 12–16)
HGB BLD-MCNC: 8.1 G/DL (ref 12–16)
IMM GRANULOCYTES # BLD AUTO: 0.07 K/UL (ref 0–0.04)
IMM GRANULOCYTES NFR BLD AUTO: 0.5 % (ref 0–0.5)
IRON SERPL-MCNC: 101 UG/DL (ref 30–160)
LYMPHOCYTES # BLD AUTO: 3.5 K/UL (ref 1–4.8)
LYMPHOCYTES NFR BLD: 26.9 % (ref 18–48)
MCH RBC QN AUTO: 27.9 PG (ref 27–31)
MCHC RBC AUTO-ENTMCNC: 30.4 G/DL (ref 32–36)
MCV RBC AUTO: 92 FL (ref 82–98)
MONOCYTES # BLD AUTO: 1.1 K/UL (ref 0.3–1)
MONOCYTES NFR BLD: 8.5 % (ref 4–15)
NEUTROPHILS # BLD AUTO: 8 K/UL (ref 1.8–7.7)
NEUTROPHILS NFR BLD: 61.9 % (ref 38–73)
NRBC BLD-RTO: 0 /100 WBC
PLATELET # BLD AUTO: 203 K/UL (ref 150–450)
PMV BLD AUTO: 12.3 FL (ref 9.2–12.9)
POCT GLUCOSE: 105 MG/DL (ref 70–110)
POCT GLUCOSE: 118 MG/DL (ref 70–110)
RBC # BLD AUTO: 1.97 M/UL (ref 4–5.4)
SATURATED IRON: 21 % (ref 20–50)
TOTAL IRON BINDING CAPACITY: 481 UG/DL (ref 250–450)
TRANS ERYTHROCYTES VOL PATIENT: NORMAL ML
TRANS ERYTHROCYTES VOL PATIENT: NORMAL ML
TRANSFERRIN SERPL-MCNC: 325 MG/DL (ref 200–375)
WBC # BLD AUTO: 12.88 K/UL (ref 3.9–12.7)

## 2023-02-28 PROCEDURE — 25000003 PHARM REV CODE 250: Performed by: OBSTETRICS & GYNECOLOGY

## 2023-02-28 PROCEDURE — 36430 TRANSFUSION BLD/BLD COMPNT: CPT

## 2023-02-28 PROCEDURE — P9021 RED BLOOD CELLS UNIT: HCPCS | Performed by: EMERGENCY MEDICINE

## 2023-02-28 PROCEDURE — 93010 EKG 12-LEAD: ICD-10-PCS | Mod: ,,, | Performed by: INTERNAL MEDICINE

## 2023-02-28 PROCEDURE — 63600175 PHARM REV CODE 636 W HCPCS: Performed by: OBSTETRICS & GYNECOLOGY

## 2023-02-28 PROCEDURE — 96376 TX/PRO/DX INJ SAME DRUG ADON: CPT

## 2023-02-28 PROCEDURE — 99222 PR INITIAL HOSPITAL CARE,LEVL II: ICD-10-PCS | Mod: 25,,, | Performed by: INTERNAL MEDICINE

## 2023-02-28 PROCEDURE — 85018 HEMOGLOBIN: CPT | Performed by: OBSTETRICS & GYNECOLOGY

## 2023-02-28 PROCEDURE — G0378 HOSPITAL OBSERVATION PER HR: HCPCS

## 2023-02-28 PROCEDURE — 36415 COLL VENOUS BLD VENIPUNCTURE: CPT | Performed by: OBSTETRICS & GYNECOLOGY

## 2023-02-28 PROCEDURE — 85014 HEMATOCRIT: CPT | Mod: 91 | Performed by: OBSTETRICS & GYNECOLOGY

## 2023-02-28 PROCEDURE — 93005 ELECTROCARDIOGRAM TRACING: CPT

## 2023-02-28 PROCEDURE — 82728 ASSAY OF FERRITIN: CPT | Performed by: OBSTETRICS & GYNECOLOGY

## 2023-02-28 PROCEDURE — 93010 ELECTROCARDIOGRAM REPORT: CPT | Mod: ,,, | Performed by: INTERNAL MEDICINE

## 2023-02-28 PROCEDURE — 84466 ASSAY OF TRANSFERRIN: CPT | Performed by: OBSTETRICS & GYNECOLOGY

## 2023-02-28 PROCEDURE — 99222 1ST HOSP IP/OBS MODERATE 55: CPT | Mod: 25,,, | Performed by: INTERNAL MEDICINE

## 2023-02-28 PROCEDURE — 63600175 PHARM REV CODE 636 W HCPCS: Mod: JG | Performed by: EMERGENCY MEDICINE

## 2023-02-28 PROCEDURE — 85025 COMPLETE CBC W/AUTO DIFF WBC: CPT | Performed by: OBSTETRICS & GYNECOLOGY

## 2023-02-28 RX ORDER — IBUPROFEN 200 MG
16 TABLET ORAL
Status: DISCONTINUED | OUTPATIENT
Start: 2023-02-28 | End: 2023-03-01 | Stop reason: HOSPADM

## 2023-02-28 RX ORDER — INSULIN ASPART 100 [IU]/ML
0-5 INJECTION, SOLUTION INTRAVENOUS; SUBCUTANEOUS
Status: DISCONTINUED | OUTPATIENT
Start: 2023-02-28 | End: 2023-03-01 | Stop reason: HOSPADM

## 2023-02-28 RX ORDER — GLUCAGON 1 MG
1 KIT INJECTION
Status: DISCONTINUED | OUTPATIENT
Start: 2023-02-28 | End: 2023-03-01 | Stop reason: HOSPADM

## 2023-02-28 RX ORDER — SODIUM CHLORIDE, SODIUM LACTATE, POTASSIUM CHLORIDE, CALCIUM CHLORIDE 600; 310; 30; 20 MG/100ML; MG/100ML; MG/100ML; MG/100ML
INJECTION, SOLUTION INTRAVENOUS CONTINUOUS
Status: DISCONTINUED | OUTPATIENT
Start: 2023-02-28 | End: 2023-03-01 | Stop reason: HOSPADM

## 2023-02-28 RX ORDER — ACETAMINOPHEN 325 MG/1
650 TABLET ORAL ONCE
Status: COMPLETED | OUTPATIENT
Start: 2023-02-28 | End: 2023-02-28

## 2023-02-28 RX ORDER — IBUPROFEN 200 MG
24 TABLET ORAL
Status: DISCONTINUED | OUTPATIENT
Start: 2023-02-28 | End: 2023-03-01 | Stop reason: HOSPADM

## 2023-02-28 RX ORDER — DIPHENHYDRAMINE HCL 25 MG
25 CAPSULE ORAL EVERY 6 HOURS PRN
Status: DISCONTINUED | OUTPATIENT
Start: 2023-02-28 | End: 2023-03-01 | Stop reason: SDUPTHER

## 2023-02-28 RX ADMIN — CONJUGATED ESTROGENS 25 MG: 25 INJECTION, POWDER, LYOPHILIZED, FOR SOLUTION INTRAMUSCULAR; INTRAVENOUS at 12:02

## 2023-02-28 RX ADMIN — DIPHENHYDRAMINE HYDROCHLORIDE 25 MG: 25 CAPSULE ORAL at 08:02

## 2023-02-28 RX ADMIN — CONJUGATED ESTROGENS 25 MG: 25 INJECTION, POWDER, LYOPHILIZED, FOR SOLUTION INTRAMUSCULAR; INTRAVENOUS at 06:02

## 2023-02-28 RX ADMIN — SODIUM CHLORIDE, POTASSIUM CHLORIDE, SODIUM LACTATE AND CALCIUM CHLORIDE: 600; 310; 30; 20 INJECTION, SOLUTION INTRAVENOUS at 09:02

## 2023-02-28 RX ADMIN — ACETAMINOPHEN 650 MG: 325 TABLET ORAL at 08:02

## 2023-02-28 NOTE — SUBJECTIVE & OBJECTIVE
OB History    Para Term  AB Living   1 1 1 0 0 0   SAB IAB Ectopic Multiple Live Births   0 0 0 0 0      # Outcome Date GA Lbr Melvin/2nd Weight Sex Delivery Anes PTL Lv   1 Term              Past Medical History:   Diagnosis Date    Allergy     Atrial flutter     Degenerative lumbar disc 2021    Diabetes mellitus     Diabetes mellitus, type 2     Hyperlipidemia     Hypertension     PAF (paroxysmal atrial fibrillation) 2022    Sleep apnea     Symptomatic anemia 2023     Past Surgical History:   Procedure Laterality Date    BREAST SURGERY       SECTION      CHOLECYSTECTOMY      ENDOMETRIAL ABLATION      HERNIA REPAIR      incisional     KNEE SURGERY      LIVER LOBECTOMY Right     LYMPH NODE DISSECTION      right axillary    TRANSESOPHAGEAL ECHOCARDIOGRAPHY N/A 2022    Procedure: ECHOCARDIOGRAM, TRANSESOPHAGEAL;  Surgeon: Ji Patricio MD;  Location: [facility] CATH LAB;  Service: Cardiology;  Laterality: N/A;       PTA Medications   Medication Sig    amLODIPine (NORVASC) 5 MG tablet Take 1 tablet (5 mg total) by mouth every evening.    carvediloL (COREG) 12.5 MG tablet Take 1 tablet (12.5 mg total) by mouth 2 (two) times daily with meals.    dronedarone (MULTAQ) 400 mg Tab TAKE 1 TABLET BY MOUTH TWICE DAILY WITH MEALS    ELIQUIS 5 mg Tab Take 1 tablet by mouth twice daily    furosemide (LASIX) 20 MG tablet Take 1 tablet (20 mg total) by mouth once daily.    glimepiride (AMARYL) 2 MG tablet Take 1 tablet (2 mg total) by mouth once daily.    loratadine (CLARITIN) 10 mg tablet Take 10 mg by mouth once daily. prn    losartan (COZAAR) 50 MG tablet Take 1 tablet (50 mg total) by mouth 2 (two) times daily.    medroxyPROGESTERone (PROVERA) 10 MG tablet Take 1 tablet (10 mg total) by mouth once daily.    metFORMIN (GLUCOPHAGE) 1000 MG tablet Take 1 tablet (1,000 mg total) by mouth 2 (two) times daily with meals.    simvastatin (ZOCOR) 40 MG tablet Take 1 tablet (40 mg total) by mouth  every evening.    tirzepatide 5 mg/0.5 mL PnIj Inject 5 mg into the skin every 7 days. for 4 doses    betamethasone dipropionate (DIPROLENE) 0.05 % ointment APPLY  OINTMENT TOPICALLY TWICE DAILY FOR 7 DAYS    clobetasoL (TEMOVATE) 0.05 % external solution Apply topically 2 (two) times daily.    [START ON 4/11/2023] tirzepatide 10 mg/0.5 mL PnIj Inject 10 mg into the skin every 7 days. for 4 doses    [START ON 3/19/2023] tirzepatide 7.5 mg/0.5 mL PnIj Inject 7.5 mg into the skin every 7 days. for 4 doses       Review of patient's allergies indicates:  No Known Allergies     Family History       Problem Relation (Age of Onset)    Asthma Mother    COPD Mother    Cancer Sister    Diabetes Father    Diabetes type II Mother    Hyperlipidemia Mother, Father    Hypertension Mother, Father    No Known Problems Brother, Maternal Aunt, Maternal Uncle, Paternal Aunt, Paternal Uncle, Maternal Grandmother, Maternal Grandfather, Paternal Grandmother, Paternal Grandfather          Tobacco Use    Smoking status: Never    Smokeless tobacco: Never   Substance and Sexual Activity    Alcohol use: Not Currently     Comment: occasionally    Drug use: No    Sexual activity: Not Currently     Partners: Male     Birth control/protection: None     Review of Systems   Constitutional:  Positive for fatigue. Negative for activity change, appetite change, chills, fever and unexpected weight change.   HENT:  Negative for mouth sores.    Respiratory:  Negative for cough, shortness of breath and wheezing.    Cardiovascular:  Negative for chest pain and palpitations.   Gastrointestinal:  Positive for abdominal pain. Negative for bloating, blood in stool, constipation, nausea and vomiting.   Endocrine: Negative for diabetes and hot flashes.   Genitourinary:  Positive for menorrhagia, menstrual problem, pelvic pain and vaginal bleeding. Negative for dysmenorrhea, dyspareunia, dysuria, frequency, hematuria, urgency, vaginal discharge, vaginal pain,  urinary incontinence, postcoital bleeding and vaginal odor.   Musculoskeletal:  Negative for back pain and myalgias.   Integumentary:  Negative for rash, breast mass and nipple discharge.   Neurological:  Negative for seizures and headaches.   Psychiatric/Behavioral:  Negative for depression and sleep disturbance. The patient is not nervous/anxious.    Breast: Negative for mass, mastodynia and nipple discharge   Objective:     Vital Signs (Most Recent):  Temp: 98.6 °F (37 °C) (02/27/23 2106)  Pulse: 97 (02/27/23 2106)  Resp: 20 (02/27/23 2106)  BP: (!) 143/64 (02/27/23 2106)  SpO2: 97 % (02/27/23 2106)   Vital Signs (24h Range):  Temp:  [98.1 °F (36.7 °C)-98.6 °F (37 °C)] 98.6 °F (37 °C)  Pulse:  [] 97  Resp:  [20] 20  SpO2:  [97 %-100 %] 97 %  BP: (132-187)/(63-81) 143/64     Weight: 135.6 kg (299 lb)  Body mass index is 52.97 kg/m².    Patient's last menstrual period was 09/07/2022 (exact date).    Physical Exam:   Constitutional: She appears well-developed and well-nourished. No distress.    HENT:   Head: Normocephalic and atraumatic.    Eyes: EOM are normal.     Cardiovascular:  Normal rate.             Pulmonary/Chest: Effort normal. No respiratory distress.        Abdominal: Soft. She exhibits no distension. There is no abdominal tenderness. There is no rebound and no guarding.             Musculoskeletal: Normal range of motion.       Neurological: She is alert.    Skin: Skin is warm and dry.    Psychiatric: She has a normal mood and affect.     Laboratory:  CBC:   Recent Labs   Lab 02/27/23  1655 02/27/23  1851   WBC 13.72*  --    RBC 2.67*  --    HGB 7.7* 7.3*   HCT 24.6* 23.0*     --    MCV 92  --    MCH 28.8  --    MCHC 31.3*  --      CMP:   Recent Labs   Lab 02/27/23  1655   GLU 91   CALCIUM 9.1   ALBUMIN 3.2*   PROT 7.4      K 3.6   CO2 22*      BUN 10   CREATININE 0.7   ALKPHOS 82   ALT 25   AST 30   BILITOT 0.4     I have personallly reviewed all pertinent lab results from the  last 24 hours.    Diagnostic Results:  US: Reviewed

## 2023-02-28 NOTE — ASSESSMENT & PLAN NOTE
Anemia presumably from fibroids. D&C planned this admission, it appears EMB is planned as well.  - surgical/hormonal management per primary  - agree with transfusion  - will send iron studies; pt may benefit from iron repletion

## 2023-02-28 NOTE — SUBJECTIVE & OBJECTIVE
Past Medical History:   Diagnosis Date    Allergy     Atrial flutter     Degenerative lumbar disc 2021    Diabetes mellitus     Diabetes mellitus, type 2     Hyperlipidemia     Hypertension     PAF (paroxysmal atrial fibrillation) 2022    Sleep apnea     Symptomatic anemia 2023       Past Surgical History:   Procedure Laterality Date    BREAST SURGERY       SECTION      CHOLECYSTECTOMY      ENDOMETRIAL ABLATION      HERNIA REPAIR      incisional     KNEE SURGERY      LIVER LOBECTOMY Right     LYMPH NODE DISSECTION      right axillary    TRANSESOPHAGEAL ECHOCARDIOGRAPHY N/A 2022    Procedure: ECHOCARDIOGRAM, TRANSESOPHAGEAL;  Surgeon: Ji Patricio MD;  Location: [facility] CATH LAB;  Service: Cardiology;  Laterality: N/A;       Review of patient's allergies indicates:  No Known Allergies    No current facility-administered medications on file prior to encounter.     Current Outpatient Medications on File Prior to Encounter   Medication Sig    amLODIPine (NORVASC) 5 MG tablet Take 1 tablet (5 mg total) by mouth every evening.    carvediloL (COREG) 12.5 MG tablet Take 1 tablet (12.5 mg total) by mouth 2 (two) times daily with meals.    dronedarone (MULTAQ) 400 mg Tab TAKE 1 TABLET BY MOUTH TWICE DAILY WITH MEALS    ELIQUIS 5 mg Tab Take 1 tablet by mouth twice daily    furosemide (LASIX) 20 MG tablet Take 1 tablet (20 mg total) by mouth once daily.    glimepiride (AMARYL) 2 MG tablet Take 1 tablet (2 mg total) by mouth once daily.    loratadine (CLARITIN) 10 mg tablet Take 10 mg by mouth once daily. prn    losartan (COZAAR) 50 MG tablet Take 1 tablet (50 mg total) by mouth 2 (two) times daily.    medroxyPROGESTERone (PROVERA) 10 MG tablet Take 1 tablet (10 mg total) by mouth once daily.    metFORMIN (GLUCOPHAGE) 1000 MG tablet Take 1 tablet (1,000 mg total) by mouth 2 (two) times daily with meals.    simvastatin (ZOCOR) 40 MG tablet Take 1 tablet (40 mg total) by mouth every evening.     tirzepatide 5 mg/0.5 mL PnIj Inject 5 mg into the skin every 7 days. for 4 doses    betamethasone dipropionate (DIPROLENE) 0.05 % ointment APPLY  OINTMENT TOPICALLY TWICE DAILY FOR 7 DAYS    clobetasoL (TEMOVATE) 0.05 % external solution Apply topically 2 (two) times daily.    [START ON 4/11/2023] tirzepatide 10 mg/0.5 mL PnIj Inject 10 mg into the skin every 7 days. for 4 doses    [START ON 3/19/2023] tirzepatide 7.5 mg/0.5 mL PnIj Inject 7.5 mg into the skin every 7 days. for 4 doses     Family History       Problem Relation (Age of Onset)    Asthma Mother    COPD Mother    Cancer Sister    Diabetes Father    Diabetes type II Mother    Hyperlipidemia Mother, Father    Hypertension Mother, Father    No Known Problems Brother, Maternal Aunt, Maternal Uncle, Paternal Aunt, Paternal Uncle, Maternal Grandmother, Maternal Grandfather, Paternal Grandmother, Paternal Grandfather          Tobacco Use    Smoking status: Never    Smokeless tobacco: Never   Substance and Sexual Activity    Alcohol use: Not Currently     Comment: occasionally    Drug use: No    Sexual activity: Not Currently     Partners: Male     Birth control/protection: None     Review of Systems   Constitutional:  Negative for chills and fever.   Respiratory:  Negative for cough and shortness of breath.    Cardiovascular:  Negative for chest pain and leg swelling.   Gastrointestinal:  Negative for abdominal pain.   Genitourinary:  Positive for vaginal bleeding.   Neurological:  Positive for light-headedness.   Objective:     Vital Signs (Most Recent):  Temp: 97.9 °F (36.6 °C) (02/28/23 1028)  Pulse: 88 (02/28/23 1028)  Resp: 20 (02/28/23 1028)  BP: (!) 138/58 (02/28/23 1028)  SpO2: 99 % (02/28/23 1028)   Vital Signs (24h Range):  Temp:  [97.6 °F (36.4 °C)-98.6 °F (37 °C)] 97.9 °F (36.6 °C)  Pulse:  [] 88  Resp:  [20-22] 20  SpO2:  [96 %-100 %] 99 %  BP: (129-187)/(58-81) 138/58     Weight: 135.6 kg (298 lb 15.1 oz)  Body mass index is 52.96  kg/m².    Physical Exam  Constitutional:       General: She is not in acute distress.     Appearance: She is obese. She is ill-appearing. She is not toxic-appearing or diaphoretic.   Cardiovascular:      Rate and Rhythm: Normal rate and regular rhythm.      Heart sounds: No murmur heard.    No gallop.   Pulmonary:      Effort: Pulmonary effort is normal. No respiratory distress.      Breath sounds: Normal breath sounds. No wheezing.   Abdominal:      General: Bowel sounds are normal. There is no distension.      Palpations: Abdomen is soft.      Tenderness: There is no abdominal tenderness.           Significant Labs: All pertinent labs within the past 24 hours have been reviewed.    Significant Imaging: I have reviewed all pertinent imaging results/findings within the past 24 hours.

## 2023-02-28 NOTE — ED PROVIDER NOTES
Encounter Date: 2023    SCRIBE #1 NOTE: I, Tessa Abad, am scribing for, and in the presence of,  Eugenia Freitas MD. I have scribed the following portions of the note - Other sections scribed: HPI, ROS, PE.     History     Chief Complaint   Patient presents with    Vaginal Bleeding     Pt reports vaginal bleeding since . Reports going through approx 1 pad an hour. Reports recently receiving a blood transfusion for same s/s on . Reports feeling dizzy,sob and lightheaded. Hx fibroids.      Micaela Hernandez is a 52 y.o. female, with a past medical history of DM, HLD, HTN, and paroxysmal afib (on Eliquis), and fibroid uterus who presents to the ED with vaginal bleeding that began 3 weeks ago. Patient was admitted on  for similar symptoms and discharged on  after IV premarin treatment and she was transfused 2 units of blood. Patient reports bleeding stopped for 2 days but started again last night. Patient reports using 1 pad/hour. Patient has associated symptoms of shortness of breath with exertion, light-headedness, and dizziness with exertion. No other exacerbating or alleviating factors. Patient denies any other associated symptoms.  She states she was told not to discontinue her Eliquis.  She is scheduled for an endometrial biopsy this Thursday.  No known allergies.         The history is provided by the patient. No  was used.   Review of patient's allergies indicates:  No Known Allergies  Past Medical History:   Diagnosis Date    Allergy     Atrial flutter     Degenerative lumbar disc 2021    Diabetes mellitus     Diabetes mellitus, type 2     Hyperlipidemia     Hypertension     PAF (paroxysmal atrial fibrillation) 2022    Sleep apnea     Symptomatic anemia 2023     Past Surgical History:   Procedure Laterality Date    BREAST SURGERY       SECTION      CHOLECYSTECTOMY      ENDOMETRIAL ABLATION      HERNIA REPAIR      incisional      KNEE SURGERY      LIVER LOBECTOMY Right     LYMPH NODE DISSECTION      right axillary    TRANSESOPHAGEAL ECHOCARDIOGRAPHY N/A 1/26/2022    Procedure: ECHOCARDIOGRAM, TRANSESOPHAGEAL;  Surgeon: Ji Patricio MD;  Location: [facility] CATH LAB;  Service: Cardiology;  Laterality: N/A;     Family History   Problem Relation Age of Onset    Hypertension Mother     Asthma Mother     COPD Mother     Hyperlipidemia Mother     Diabetes type II Mother     Diabetes Father     Hypertension Father     Hyperlipidemia Father     Cancer Sister         type unknown, but CA in mouth    No Known Problems Brother     No Known Problems Maternal Aunt     No Known Problems Maternal Uncle     No Known Problems Paternal Aunt     No Known Problems Paternal Uncle     No Known Problems Maternal Grandmother     No Known Problems Maternal Grandfather     No Known Problems Paternal Grandmother     No Known Problems Paternal Grandfather     Breast cancer Neg Hx     Colon cancer Neg Hx     Ovarian cancer Neg Hx     Amblyopia Neg Hx     Blindness Neg Hx     Cataracts Neg Hx     Glaucoma Neg Hx     Macular degeneration Neg Hx     Retinal detachment Neg Hx     Strabismus Neg Hx     Stroke Neg Hx     Thyroid disease Neg Hx      Social History     Tobacco Use    Smoking status: Never    Smokeless tobacco: Never   Substance Use Topics    Alcohol use: Not Currently     Comment: occasionally    Drug use: No     Review of Systems   Constitutional:  Negative for chills and fever.   HENT:  Negative for facial swelling.    Eyes:  Negative for visual disturbance.   Respiratory:  Positive for shortness of breath. Negative for cough.    Cardiovascular:  Negative for chest pain.   Gastrointestinal:  Negative for abdominal pain, nausea and vomiting.   Genitourinary:  Positive for menstrual problem and vaginal bleeding. Negative for dysuria, pelvic pain and vaginal discharge.   Skin:  Negative for rash.   Neurological:  Positive for dizziness and light-headedness.    Psychiatric/Behavioral:  Negative for decreased concentration.      Physical Exam     Initial Vitals [02/27/23 1244]   BP Pulse Resp Temp SpO2   132/63 86 20 98.1 °F (36.7 °C) 100 %      MAP       --         Physical Exam    Nursing note and vitals reviewed.  Constitutional: She appears well-developed and well-nourished. She is not diaphoretic. No distress.   HENT:   Head: Normocephalic and atraumatic.   Eyes: Conjunctivae are normal. Pupils are equal, round, and reactive to light.   Neck: Neck supple.   Cardiovascular:  Normal rate and regular rhythm.           Pulmonary/Chest: Breath sounds normal.   Abdominal: Abdomen is soft. There is no abdominal tenderness.   Genitourinary:    Genitourinary Comments: Lillian RN present as chaperone for pelvic exam- vaginal vault immediately feels with blood, unable to visualize cervix.     Musculoskeletal:         General: No edema.      Cervical back: Neck supple.     Neurological: She is alert and oriented to person, place, and time.   Skin: Skin is warm and dry.   Psychiatric: She has a normal mood and affect.       ED Course   Procedures  Labs Reviewed   CBC W/ AUTO DIFFERENTIAL - Abnormal; Notable for the following components:       Result Value    WBC 13.72 (*)     RBC 2.67 (*)     Hemoglobin 7.7 (*)     Hematocrit 24.6 (*)     MCHC 31.3 (*)     RDW 18.5 (*)     Immature Granulocytes 0.7 (*)     Gran # (ANC) 9.0 (*)     Immature Grans (Abs) 0.09 (*)     All other components within normal limits   COMPREHENSIVE METABOLIC PANEL - Abnormal; Notable for the following components:    CO2 22 (*)     Albumin 3.2 (*)     All other components within normal limits   URINALYSIS, REFLEX TO URINE CULTURE - Abnormal; Notable for the following components:    Color, UA Orange (*)     Appearance, UA Hazy (*)     Protein, UA 2+ (*)     Ketones, UA Trace (*)     Occult Blood UA 3+ (*)     Leukocytes, UA 1+ (*)     All other components within normal limits    Narrative:     Specimen  Source->Urine   URINALYSIS MICROSCOPIC - Abnormal; Notable for the following components:    RBC, UA >100 (*)     WBC, UA 11 (*)     Hyaline Casts, UA 13 (*)     All other components within normal limits    Narrative:     Specimen Source->Urine   HEMOGLOBIN - Abnormal; Notable for the following components:    Hemoglobin 7.3 (*)     All other components within normal limits   HEMATOCRIT - Abnormal; Notable for the following components:    Hematocrit 23.0 (*)     All other components within normal limits   CULTURE, URINE   POCT URINE PREGNANCY   TYPE & SCREEN          Imaging Results    None          Medications   conjugated estrogens injection 25 mg (has no administration in time range)   0.9%  NaCl infusion (for blood administration) (has no administration in time range)     Medical Decision Making:   History:   Old Medical Records: I decided to obtain old medical records.  Initial Assessment:   52-year-old female with history of hypertension, paroxysmal a flutter on Eliquis presents to the emergency department with vaginal bleeding, shortness of breath with exertion, lightheadedness.  Symptoms started 2 nights ago.  Recent hospitalization for vaginal bleeding and symptomatic anemia, treated with IV Premarin and was transfused 2 units of blood.  She states after going home from the hospital, she did well for several days and bleeding recurred starting last night.  She endorses going through 1 pad an hour, she is changed her pad 6 times since being in the ED waiting for room.  She endorses lightheadedness with standing, shortness of breath with exertion.  She denies any pain.  She is known fibroids.  On exam, the patient is well-appearing, no distress, sats are 100%.  Heart with regular rate and rhythm.  Abdomen is soft and nontender.  Labs reviewed do show anemia with a hemoglobin of 7.7, appears to be stable from discharge.  Will resend H&H now, check type and screen and perform pelvic exam.    ED Management:    Tyson requests transfusion of 2 units pRBC, I obtained written and verbal consent from patient.         Scribe Attestation:   Scribe #1: I performed the above scribed service and the documentation accurately describes the services I performed. I attest to the accuracy of the note.      ED Course as of 02/27/23 2238 Mon Feb 27, 2023 1959 Case reviewed with Dr. Mehta- recommends IV Premarin, 25 mg IV Q 6.  Recommends cardiology consult for Eliquis recommendations. [LH]      ED Course User Index  [LH] Eugenia Freitas MD                 Clinical Impression:   Final diagnoses:  [N93.9] Vaginal bleeding (Primary)  [D64.9] Anemia, unspecified type        ED Disposition Condition    Observation            I, Eugenia Freitas MD, personally performed the services described in this documentation. All medical record entries made by the scribe were at my direction and in my presence. I have reviewed the chart and agree that the record reflects my personal performance and is accurate and complete.    This dictation has been generated using [employer] Direct dictation; some phonetic errors may occur.          Eugenia Freitas MD  02/27/23 2239

## 2023-02-28 NOTE — HPI
52 y.o. female, with a past medical history of DM, HLD, HTN, and paroxysmal afib (on Eliquis), and fibroid uterus who presents to the ED with vaginal bleeding that began 3 weeks ago. Patient was admitted on 2/23 for similar symptoms and discharged on 2/24 after IV premarin treatment and she was transfused 2 units of blood. Patient reports bleeding stopped for 2 days but started again last night. Patient reports using 1 pad/hour. Patient has associated symptoms of shortness of breath with exertion, light-headedness, and dizziness with exertion. No other exacerbating or alleviating factors. Patient denies any other associated symptoms.  She states she was told not to discontinue her Eliquis.  She is scheduled for an endometrial biopsy this Thursday.  No known allergies.     Pt follows with Dr. Patricio (last seen 10/2022) and Dr. Kirk (last seen 9/2022).    Cardiology consulted for menorrhagia in setting of eliquis.    The patient is a very pleasant 52-year-old woman who presents with menorrhagia on Eliquis.  This appears to telling off now that the Eliquis has been discontinued.  She is being transfused.  She needs a hysterectomy.  She denies angina or dyspnea.  Revised cardiac risk index is 0.

## 2023-02-28 NOTE — HOSPITAL COURSE
Exam in our ED by our ED physician with significant bleeding  However, CBC appears not changing much from three days ago  I was then called.    Per our chart review, she was supposed to see Dr Zhao for possible endometrial biopsy in the office in three days

## 2023-02-28 NOTE — CONSULTS
[facility]  [facility] Medicine  Consult Note    Patient Name: Micaela Hernandez  MRN: [id number]  Admission Date: 2023  Hospital Length of Stay: 0 days  Attending Physician: Rusty Arnold MD  Primary Care Provider: Bailey Fiore MD           Patient information was obtained from patient, past medical records and ER records.     Inpatient consult to Hospitalist  Consult performed by: Rusty Arnold MD  Consult ordered by: Castro Mehta MD        Subjective:     Principal Problem: Menorrhagia with irregular cycle    Chief Complaint:   Chief Complaint   Patient presents with    Vaginal Bleeding     Pt reports vaginal bleeding since . Reports going through approx 1 pad an hour. Reports recently receiving a blood transfusion for same s/s on . Reports feeling dizzy,sob and lightheaded. Hx fibroids.         HPI: 52y F w/ Afib on eliquis, HTN, DM, fibroids admitted for menorrhagia and symptomatic anemia. Found to have Hgb 5.5. Admitted to gynecology for further management. Hospital medicine consulted for medical management.       Past Medical History:   Diagnosis Date    Allergy     Atrial flutter     Degenerative lumbar disc 2021    Diabetes mellitus     Diabetes mellitus, type 2     Hyperlipidemia     Hypertension     PAF (paroxysmal atrial fibrillation) 2022    Sleep apnea     Symptomatic anemia 2023       Past Surgical History:   Procedure Laterality Date    BREAST SURGERY       SECTION      CHOLECYSTECTOMY      ENDOMETRIAL ABLATION      HERNIA REPAIR      incisional     KNEE SURGERY      LIVER LOBECTOMY Right     LYMPH NODE DISSECTION      right axillary    TRANSESOPHAGEAL ECHOCARDIOGRAPHY N/A 2022    Procedure: ECHOCARDIOGRAM, TRANSESOPHAGEAL;  Surgeon: Ji Patricio MD;  Location: [facility] CATH LAB;  Service: Cardiology;  Laterality: N/A;       Review of patient's allergies indicates:  No Known Allergies    No current  facility-administered medications on file prior to encounter.     Current Outpatient Medications on File Prior to Encounter   Medication Sig    amLODIPine (NORVASC) 5 MG tablet Take 1 tablet (5 mg total) by mouth every evening.    carvediloL (COREG) 12.5 MG tablet Take 1 tablet (12.5 mg total) by mouth 2 (two) times daily with meals.    dronedarone (MULTAQ) 400 mg Tab TAKE 1 TABLET BY MOUTH TWICE DAILY WITH MEALS    ELIQUIS 5 mg Tab Take 1 tablet by mouth twice daily    furosemide (LASIX) 20 MG tablet Take 1 tablet (20 mg total) by mouth once daily.    glimepiride (AMARYL) 2 MG tablet Take 1 tablet (2 mg total) by mouth once daily.    loratadine (CLARITIN) 10 mg tablet Take 10 mg by mouth once daily. prn    losartan (COZAAR) 50 MG tablet Take 1 tablet (50 mg total) by mouth 2 (two) times daily.    medroxyPROGESTERone (PROVERA) 10 MG tablet Take 1 tablet (10 mg total) by mouth once daily.    metFORMIN (GLUCOPHAGE) 1000 MG tablet Take 1 tablet (1,000 mg total) by mouth 2 (two) times daily with meals.    simvastatin (ZOCOR) 40 MG tablet Take 1 tablet (40 mg total) by mouth every evening.    tirzepatide 5 mg/0.5 mL PnIj Inject 5 mg into the skin every 7 days. for 4 doses    betamethasone dipropionate (DIPROLENE) 0.05 % ointment APPLY  OINTMENT TOPICALLY TWICE DAILY FOR 7 DAYS    clobetasoL (TEMOVATE) 0.05 % external solution Apply topically 2 (two) times daily.    [START ON 4/11/2023] tirzepatide 10 mg/0.5 mL PnIj Inject 10 mg into the skin every 7 days. for 4 doses    [START ON 3/19/2023] tirzepatide 7.5 mg/0.5 mL PnIj Inject 7.5 mg into the skin every 7 days. for 4 doses     Family History       Problem Relation (Age of Onset)    Asthma Mother    COPD Mother    Cancer Sister    Diabetes Father    Diabetes type II Mother    Hyperlipidemia Mother, Father    Hypertension Mother, Father    No Known Problems Brother, Maternal Aunt, Maternal Uncle, Paternal Aunt, Paternal Uncle, Maternal Grandmother,  Maternal Grandfather, Paternal Grandmother, Paternal Grandfather          Tobacco Use    Smoking status: Never    Smokeless tobacco: Never   Substance and Sexual Activity    Alcohol use: Not Currently     Comment: occasionally    Drug use: No    Sexual activity: Not Currently     Partners: Male     Birth control/protection: None     Review of Systems   Constitutional:  Negative for chills and fever.   Respiratory:  Negative for cough and shortness of breath.    Cardiovascular:  Negative for chest pain and leg swelling.   Gastrointestinal:  Negative for abdominal pain.   Genitourinary:  Positive for vaginal bleeding.   Neurological:  Positive for light-headedness.   Objective:     Vital Signs (Most Recent):  Temp: 97.9 °F (36.6 °C) (02/28/23 1028)  Pulse: 88 (02/28/23 1028)  Resp: 20 (02/28/23 1028)  BP: (!) 138/58 (02/28/23 1028)  SpO2: 99 % (02/28/23 1028)   Vital Signs (24h Range):  Temp:  [97.6 °F (36.4 °C)-98.6 °F (37 °C)] 97.9 °F (36.6 °C)  Pulse:  [] 88  Resp:  [20-22] 20  SpO2:  [96 %-100 %] 99 %  BP: (129-187)/(58-81) 138/58     Weight: 135.6 kg (298 lb 15.1 oz)  Body mass index is 52.96 kg/m².    Physical Exam  Constitutional:       General: She is not in acute distress.     Appearance: She is obese. She is ill-appearing. She is not toxic-appearing or diaphoretic.   Cardiovascular:      Rate and Rhythm: Normal rate and regular rhythm.      Heart sounds: No murmur heard.    No gallop.   Pulmonary:      Effort: Pulmonary effort is normal. No respiratory distress.      Breath sounds: Normal breath sounds. No wheezing.   Abdominal:      General: Bowel sounds are normal. There is no distension.      Palpations: Abdomen is soft.      Tenderness: There is no abdominal tenderness.           Significant Labs: All pertinent labs within the past 24 hours have been reviewed.    Significant Imaging: I have reviewed all pertinent imaging results/findings within the past 24 hours.    Assessment/Plan:     *  Menorrhagia with irregular cycle  Anemia presumably from fibroids. D&C planned this admission, it appears EMB is planned as well.  - surgical/hormonal management per primary  - agree with transfusion  - will send iron studies; pt may benefit from iron repletion       Preop cardiovascular exam  Per cardiology      Anemia  See above      PAF (paroxysmal atrial fibrillation)  Sinus tachycardia on admission ECG; telemetry shows NSR.   - defer to cardiology  - agree w/ holding eliquis temporarily        Hypertension  Defer to cardiology consultant      Hyperlipidemia  Cont statin      Type 2 diabetes mellitus with diabetic nephropathy, without long-term current use of insulin  Well controlled DM outpatient however on three medications. Will start low dose sliding scale insulin and monitor glucose w/ fingerstick blood glucose      Patient's FSGs are controlled on current medication regimen.  Last A1c reviewed-   Lab Results   Component Value Date    HGBA1C 5.8 (H) 02/06/2023     Most recent fingerstick glucose reviewed- No results for input(s): POCTGLUCOSE in the last 24 hours.  Current correctional scale  Low  Maintain anti-hyperglycemic dose as follows-   Antihyperglycemics (From admission, onward)    Start     Stop Route Frequency Ordered    02/28/23 1244  insulin aspart U-100 pen 0-5 Units         -- SubQ Before meals & nightly PRN 02/28/23 1145        Hold Oral hypoglycemics while patient is in the hospital.      VTE Risk Mitigation (From admission, onward)    None              Thank you for your consult. I will follow-up with patient. Please contact us if you have any additional questions.    Rusty Arnold MD  Department of Hospital Medicine   [facility] [place] [facility]

## 2023-02-28 NOTE — HPI
52y F w/ Afib on eliquis, HTN, DM, fibroids admitted for menorrhagia and symptomatic anemia. Found to have Hgb 5.5. Admitted to gynecology for further management. Hospital medicine consulted for medical management.

## 2023-02-28 NOTE — SUBJECTIVE & OBJECTIVE
Past Medical History:   Diagnosis Date    Allergy     Atrial flutter     Degenerative lumbar disc 2021    Diabetes mellitus     Diabetes mellitus, type 2     Hyperlipidemia     Hypertension     PAF (paroxysmal atrial fibrillation) 2022    Sleep apnea     Symptomatic anemia 2023       Past Surgical History:   Procedure Laterality Date    BREAST SURGERY       SECTION      CHOLECYSTECTOMY      ENDOMETRIAL ABLATION      HERNIA REPAIR      incisional     KNEE SURGERY      LIVER LOBECTOMY Right     LYMPH NODE DISSECTION      right axillary    TRANSESOPHAGEAL ECHOCARDIOGRAPHY N/A 2022    Procedure: ECHOCARDIOGRAM, TRANSESOPHAGEAL;  Surgeon: Ji Patricio MD;  Location: [facility] CATH LAB;  Service: Cardiology;  Laterality: N/A;       Review of patient's allergies indicates:  No Known Allergies    No current facility-administered medications on file prior to encounter.     Current Outpatient Medications on File Prior to Encounter   Medication Sig    amLODIPine (NORVASC) 5 MG tablet Take 1 tablet (5 mg total) by mouth every evening.    carvediloL (COREG) 12.5 MG tablet Take 1 tablet (12.5 mg total) by mouth 2 (two) times daily with meals.    dronedarone (MULTAQ) 400 mg Tab TAKE 1 TABLET BY MOUTH TWICE DAILY WITH MEALS    ELIQUIS 5 mg Tab Take 1 tablet by mouth twice daily    furosemide (LASIX) 20 MG tablet Take 1 tablet (20 mg total) by mouth once daily.    glimepiride (AMARYL) 2 MG tablet Take 1 tablet (2 mg total) by mouth once daily.    loratadine (CLARITIN) 10 mg tablet Take 10 mg by mouth once daily. prn    losartan (COZAAR) 50 MG tablet Take 1 tablet (50 mg total) by mouth 2 (two) times daily.    medroxyPROGESTERone (PROVERA) 10 MG tablet Take 1 tablet (10 mg total) by mouth once daily.    metFORMIN (GLUCOPHAGE) 1000 MG tablet Take 1 tablet (1,000 mg total) by mouth 2 (two) times daily with meals.    simvastatin (ZOCOR) 40 MG tablet Take 1 tablet (40 mg total) by mouth every evening.     tirzepatide 5 mg/0.5 mL PnIj Inject 5 mg into the skin every 7 days. for 4 doses    betamethasone dipropionate (DIPROLENE) 0.05 % ointment APPLY  OINTMENT TOPICALLY TWICE DAILY FOR 7 DAYS    clobetasoL (TEMOVATE) 0.05 % external solution Apply topically 2 (two) times daily.    [START ON 4/11/2023] tirzepatide 10 mg/0.5 mL PnIj Inject 10 mg into the skin every 7 days. for 4 doses    [START ON 3/19/2023] tirzepatide 7.5 mg/0.5 mL PnIj Inject 7.5 mg into the skin every 7 days. for 4 doses     Family History       Problem Relation (Age of Onset)    Asthma Mother    COPD Mother    Cancer Sister    Diabetes Father    Diabetes type II Mother    Hyperlipidemia Mother, Father    Hypertension Mother, Father    No Known Problems Brother, Maternal Aunt, Maternal Uncle, Paternal Aunt, Paternal Uncle, Maternal Grandmother, Maternal Grandfather, Paternal Grandmother, Paternal Grandfather          Tobacco Use    Smoking status: Never    Smokeless tobacco: Never   Substance and Sexual Activity    Alcohol use: Not Currently     Comment: occasionally    Drug use: No    Sexual activity: Not Currently     Partners: Male     Birth control/protection: None     Review of Systems   Constitutional: Negative for chills, diaphoresis, fever and malaise/fatigue.   HENT:  Negative for nosebleeds.    Eyes:  Negative for blurred vision and double vision.   Cardiovascular:  Positive for dyspnea on exertion. Negative for chest pain, claudication, cyanosis, leg swelling, orthopnea, palpitations, paroxysmal nocturnal dyspnea and syncope.   Respiratory:  Negative for cough, shortness of breath and wheezing.    Skin:  Negative for dry skin and poor wound healing.   Musculoskeletal:  Negative for back pain, joint swelling and myalgias.   Gastrointestinal:  Negative for abdominal pain, nausea and vomiting.   Genitourinary:  Positive for menorrhagia. Negative for hematuria.   Neurological:  Negative for dizziness, headaches, numbness, seizures and weakness.    Psychiatric/Behavioral:  Negative for altered mental status and depression.    Objective:     Vital Signs (Most Recent):  Temp: 98.5 °F (36.9 °C) (02/28/23 0328)  Pulse: 89 (02/28/23 0328)  Resp: 20 (02/28/23 0328)  BP: (!) 146/66 (02/28/23 0328)  SpO2: 98 % (02/28/23 0328)   Vital Signs (24h Range):  Temp:  [98.1 °F (36.7 °C)-98.6 °F (37 °C)] 98.5 °F (36.9 °C)  Pulse:  [] 89  Resp:  [20] 20  SpO2:  [97 %-100 %] 98 %  BP: (129-187)/(58-81) 146/66     Weight: 135.6 kg (298 lb 15.1 oz)  Body mass index is 52.96 kg/m².    SpO2: 98 %         Intake/Output Summary (Last 24 hours) at 2/28/2023 0810  Last data filed at 2/28/2023 0600  Gross per 24 hour   Intake --   Output 500 ml   Net -500 ml       Lines/Drains/Airways       Peripheral Intravenous Line  Duration                  Peripheral IV - Single Lumen 02/27/23 1847 20 G Anterior;Proximal;Right Forearm <1 day                    Physical Exam  Constitutional:       General: She is not in acute distress.     Appearance: She is well-developed. She is obese. She is not ill-appearing, toxic-appearing or diaphoretic.   HENT:      Head: Normocephalic and atraumatic.   Eyes:      General: No scleral icterus.     Extraocular Movements: Extraocular movements intact.      Conjunctiva/sclera: Conjunctivae normal.      Pupils: Pupils are equal, round, and reactive to light.   Neck:      Vascular: No JVD.      Trachea: No tracheal deviation.   Cardiovascular:      Rate and Rhythm: Normal rate and regular rhythm.      Heart sounds: S1 normal and S2 normal. No murmur heard.    No friction rub. No gallop.   Pulmonary:      Effort: Pulmonary effort is normal. No respiratory distress.      Breath sounds: Normal breath sounds. No stridor. No wheezing, rhonchi or rales.   Chest:      Chest wall: No tenderness.   Abdominal:      General: There is no distension.      Palpations: Abdomen is soft.   Musculoskeletal:         General: No swelling or tenderness. Normal range of motion.       Cervical back: Normal range of motion and neck supple. No rigidity.      Right lower leg: No edema.      Left lower leg: No edema.   Skin:     General: Skin is warm and dry.      Coloration: Skin is not jaundiced.   Neurological:      General: No focal deficit present.      Mental Status: She is alert and oriented to person, place, and time.      Cranial Nerves: No cranial nerve deficit.   Psychiatric:         Mood and Affect: Mood normal.         Behavior: Behavior normal.       Current Medications:   conjugated estrogens  25 mg Intravenous Q6H       sodium chloride    Laboratory (all labs reviewed):  CBC:  Recent Labs   Lab 02/06/23  0715 02/23/23 2006 02/24/23  0553 02/27/23  1655 02/27/23  1851 02/28/23  0602   WBC 10.05 16.89 H 20.14 H 13.72 H  --  12.88 H   Hemoglobin 11.4 L 6.8 L 7.6 L 7.7 L 7.3 L 5.5 LL   Hematocrit 37.0 21.2 L 23.3 L 24.6 L 23.0 L 18.1 LL   Platelets 187 282 194 271  --  203       CHEMISTRIES:  Recent Labs   Lab 01/24/22  1638 01/26/22  0633 02/12/22  0849 03/24/22  0738 07/30/22  0911 02/06/23  0715 02/23/23 2006 02/27/23  1655   Glucose 122 H 139 H 141 H 139 H 136 H 111 H 107 91   Sodium 139 138 139 139 134 L 137 137 138   Potassium 3.9 3.8 4.4 4.7 4.1 4.5 4.0 3.6   BUN 12 16 13 15 12 14 12 10   Creatinine 0.8 0.8 0.8 0.7 0.8 0.8 0.8 0.7   eGFR if non African American >60 >60 >60 >60 >60.0  --   --   --    eGFR  --   --   --   --   --  >60.0 >60 >60   Calcium 10.0 9.6 9.8 9.8 10.0 9.9 9.3 9.1       CARDIAC BIOMARKERS:  Recent Labs   Lab 01/24/22  1638   Troponin I <0.006       COAGS:  Recent Labs   Lab 03/24/22  0738   INR 1.0       LIPIDS/LFTS:  Recent Labs   Lab 12/18/20  0745 12/28/21  1336 01/24/22  1638 01/26/22  0633 07/30/22  0911 02/06/23  0715 02/23/23 2006 02/27/23  1655   Cholesterol 171 180  --   --  148 145  --   --    Triglycerides 139 138  --   --  111 127  --   --    HDL 55 53  --   --  52 51  --   --    LDL Cholesterol 88.2 99.4  --   --  73.8 68.6  --   --     Non-HDL Cholesterol 116 127  --   --  96 94  --   --    AST 61 H 53 H   < > 32 31 40 28 30   ALT 46 H 44   < > 33 24 28 23 25    < > = values in this interval not displayed.       BNP:  Recent Labs   Lab 01/24/22  1638 02/12/22  0849    H 40       TSH:  Recent Labs   Lab 12/18/20  0745 12/28/21  1336 01/24/22  1638 07/30/22  0911 02/06/23  0715   TSH 1.614 2.120 2.612 1.458 2.639       Free T4:        Diagnostic Results:  ECG (personally reviewed and interpreted tracing(s)):  2/28/23 0819 , low volt, NSSTTW changes    Echo: 9/20/22  The estimated ejection fraction is 65%.  The left ventricle is normal in size with normal systolic function.  Mild-to-moderate mitral regurgitation.  Normal right ventricular size with normal right ventricular systolic function.  Atrial fibrillation observed.  Moderate left atrial enlargement.  Mild right atrial enlargement.  Normal central venous pressure (3 mmHg).  The estimated PA systolic pressure is 35 mmHg.    EVM 5/5/22  Paroxysmal atrial fibrillation with RVR. AF burden 14%

## 2023-02-28 NOTE — PLAN OF CARE
VSS. Afebrile  Ambulating in room, c/o SOB with exertion.   Passing large clots. Moderate vaginal bleeding.  Tolerating regular diet without any GI symptoms voiced.  Voiding without difficulty. Passing flatus.  Pain controlled with PRN meds.  Plan of care reviewed with patient. All questions answered.

## 2023-02-28 NOTE — ASSESSMENT & PLAN NOTE
Sinus tachycardia on admission ECG; telemetry shows NSR.   - defer to cardiology  - agree w/ holding eliquis temporarily

## 2023-02-28 NOTE — PHARMACY MED REC
"Admission Medication History     The home medication history was taken by Yaneth Massey.    You may go to "Admission" then "Reconcile Home Medications" tabs to review and/or act upon these items.     The home medication list has been updated by the Pharmacy department.   Please read ALL comments highlighted in yellow.   Please address this information as you see fit.    Feel free to contact us if you have any questions or require assistance.    Medications listed below were obtained from: Patient/family and Analytic software- [employer]  (Not in a hospital admission)          Yaneth Massey  [phone]                 .        "

## 2023-02-28 NOTE — HPI
Micaela Hernandez is a 52 y.o. female, with a past medical history of DM, HLD, HTN, and paroxysmal afib (on Eliquis), and fibroid uterus who presents to the ED with vaginal bleeding that began 3 weeks ago. Patient was admitted on 2/23 for similar symptoms and discharged on 2/24 after IV premarin treatment and she was transfused 2 units of blood. Patient reports bleeding stopped for 2 days but started again last night. Patient reports using 1 pad/hour. Patient has associated symptoms of shortness of breath with exertion, light-headedness, and dizziness with exertion. No other exacerbating or alleviating factors. Patient denies any other associated symptoms.  She states she was told not to discontinue her Eliquis.  She is scheduled for an endometrial biopsy this Thursday.  No known allergies.

## 2023-02-28 NOTE — ASSESSMENT & PLAN NOTE
IV Premarin 25 mg x 4 doses  2 units pRBCs if significant drop.  Patient is O-neg.  Stop Eliquis  Consult Cardiology  To OR tomorrow with Dr Zhao for D&C    Would eventually need hysterectomy

## 2023-02-28 NOTE — PROGRESS NOTES
Called Dr. Zhao regarding transfusion orders. Dr. Zhao requested blood transfusion with O+ blood. Patient is O- but blood bank does not have O- blood. Dr. Zhao is aware and confirmed transfusion order. Blood bank also contacted to confirm compatibility. Pt. Premedicated with Benadryl and Tylenol per Dr. Valderrama. Dr. Zhao denied need for telemetry monitoring.

## 2023-02-28 NOTE — CONSULTS
[facility]  Cardiology  Consult Note    Patient Name: Micaela Hernandez  MRN: [id number]  Admission Date: 2/27/2023  Hospital Length of Stay: 0 days  Code Status: Prior   Attending Provider: Castro Mehta MD   Consulting Provider: Bennett Horton MD  Primary Care Physician: Bailey Fiore MD  Principal Problem:Menorrhagia with irregular cycle    Patient information was obtained from patient and ER records.     Inpatient consult to Cardiology  Consult performed by: Bennett Horton MD  Consult ordered by: Eugenia Freitas MD  Reason for consult: preop CV eval, on eliquis for PAF        Subjective:     Chief Complaint:  menorrhagia     HPI:   52 y.o. female, with a past medical history of DM, HLD, HTN, and paroxysmal afib (on Eliquis), and fibroid uterus who presents to the ED with vaginal bleeding that began 3 weeks ago. Patient was admitted on 2/23 for similar symptoms and discharged on 2/24 after IV premarin treatment and she was transfused 2 units of blood. Patient reports bleeding stopped for 2 days but started again last night. Patient reports using 1 pad/hour. Patient has associated symptoms of shortness of breath with exertion, light-headedness, and dizziness with exertion. No other exacerbating or alleviating factors. Patient denies any other associated symptoms.  She states she was told not to discontinue her Eliquis.  She is scheduled for an endometrial biopsy this Thursday.  No known allergies.     Pt follows with Dr. Patricio (last seen 10/2022) and Dr. Kirk (last seen 9/2022).    Cardiology consulted for menorrhagia in setting of eliquis.    The patient is a very pleasant 52-year-old woman who presents with menorrhagia on Eliquis.  This appears to telling off now that the Eliquis has been discontinued.  She is being transfused.  She needs a hysterectomy.  She denies angina or dyspnea.  Revised cardiac risk index is 0.      Past Medical History:   Diagnosis Date    Allergy     Atrial  flutter     Degenerative lumbar disc 2021    Diabetes mellitus     Diabetes mellitus, type 2     Hyperlipidemia     Hypertension     PAF (paroxysmal atrial fibrillation) 2022    Sleep apnea     Symptomatic anemia 2023       Past Surgical History:   Procedure Laterality Date    BREAST SURGERY       SECTION      CHOLECYSTECTOMY      ENDOMETRIAL ABLATION      HERNIA REPAIR      incisional     KNEE SURGERY      LIVER LOBECTOMY Right     LYMPH NODE DISSECTION      right axillary    TRANSESOPHAGEAL ECHOCARDIOGRAPHY N/A 2022    Procedure: ECHOCARDIOGRAM, TRANSESOPHAGEAL;  Surgeon: Ji Patricio MD;  Location: [facility] CATH LAB;  Service: Cardiology;  Laterality: N/A;       Review of patient's allergies indicates:  No Known Allergies    No current facility-administered medications on file prior to encounter.     Current Outpatient Medications on File Prior to Encounter   Medication Sig    amLODIPine (NORVASC) 5 MG tablet Take 1 tablet (5 mg total) by mouth every evening.    carvediloL (COREG) 12.5 MG tablet Take 1 tablet (12.5 mg total) by mouth 2 (two) times daily with meals.    dronedarone (MULTAQ) 400 mg Tab TAKE 1 TABLET BY MOUTH TWICE DAILY WITH MEALS    ELIQUIS 5 mg Tab Take 1 tablet by mouth twice daily    furosemide (LASIX) 20 MG tablet Take 1 tablet (20 mg total) by mouth once daily.    glimepiride (AMARYL) 2 MG tablet Take 1 tablet (2 mg total) by mouth once daily.    loratadine (CLARITIN) 10 mg tablet Take 10 mg by mouth once daily. prn    losartan (COZAAR) 50 MG tablet Take 1 tablet (50 mg total) by mouth 2 (two) times daily.    medroxyPROGESTERone (PROVERA) 10 MG tablet Take 1 tablet (10 mg total) by mouth once daily.    metFORMIN (GLUCOPHAGE) 1000 MG tablet Take 1 tablet (1,000 mg total) by mouth 2 (two) times daily with meals.    simvastatin (ZOCOR) 40 MG tablet Take 1 tablet (40 mg total) by mouth every evening.    tirzepatide 5 mg/0.5 mL PnIj Inject 5  mg into the skin every 7 days. for 4 doses    betamethasone dipropionate (DIPROLENE) 0.05 % ointment APPLY  OINTMENT TOPICALLY TWICE DAILY FOR 7 DAYS    clobetasoL (TEMOVATE) 0.05 % external solution Apply topically 2 (two) times daily.    [START ON 4/11/2023] tirzepatide 10 mg/0.5 mL PnIj Inject 10 mg into the skin every 7 days. for 4 doses    [START ON 3/19/2023] tirzepatide 7.5 mg/0.5 mL PnIj Inject 7.5 mg into the skin every 7 days. for 4 doses     Family History       Problem Relation (Age of Onset)    Asthma Mother    COPD Mother    Cancer Sister    Diabetes Father    Diabetes type II Mother    Hyperlipidemia Mother, Father    Hypertension Mother, Father    No Known Problems Brother, Maternal Aunt, Maternal Uncle, Paternal Aunt, Paternal Uncle, Maternal Grandmother, Maternal Grandfather, Paternal Grandmother, Paternal Grandfather          Tobacco Use    Smoking status: Never    Smokeless tobacco: Never   Substance and Sexual Activity    Alcohol use: Not Currently     Comment: occasionally    Drug use: No    Sexual activity: Not Currently     Partners: Male     Birth control/protection: None     Review of Systems   Constitutional: Negative for chills, diaphoresis, fever and malaise/fatigue.   HENT:  Negative for nosebleeds.    Eyes:  Negative for blurred vision and double vision.   Cardiovascular:  Positive for dyspnea on exertion. Negative for chest pain, claudication, cyanosis, leg swelling, orthopnea, palpitations, paroxysmal nocturnal dyspnea and syncope.   Respiratory:  Negative for cough, shortness of breath and wheezing.    Skin:  Negative for dry skin and poor wound healing.   Musculoskeletal:  Negative for back pain, joint swelling and myalgias.   Gastrointestinal:  Negative for abdominal pain, nausea and vomiting.   Genitourinary:  Positive for menorrhagia. Negative for hematuria.   Neurological:  Negative for dizziness, headaches, numbness, seizures and weakness.   Psychiatric/Behavioral:   Negative for altered mental status and depression.    Objective:     Vital Signs (Most Recent):  Temp: 98.5 °F (36.9 °C) (02/28/23 0328)  Pulse: 89 (02/28/23 0328)  Resp: 20 (02/28/23 0328)  BP: (!) 146/66 (02/28/23 0328)  SpO2: 98 % (02/28/23 0328)   Vital Signs (24h Range):  Temp:  [98.1 °F (36.7 °C)-98.6 °F (37 °C)] 98.5 °F (36.9 °C)  Pulse:  [] 89  Resp:  [20] 20  SpO2:  [97 %-100 %] 98 %  BP: (129-187)/(58-81) 146/66     Weight: 135.6 kg (298 lb 15.1 oz)  Body mass index is 52.96 kg/m².    SpO2: 98 %         Intake/Output Summary (Last 24 hours) at 2/28/2023 0810  Last data filed at 2/28/2023 0600  Gross per 24 hour   Intake --   Output 500 ml   Net -500 ml       Lines/Drains/Airways       Peripheral Intravenous Line  Duration                  Peripheral IV - Single Lumen 02/27/23 1847 20 G Anterior;Proximal;Right Forearm <1 day                    Physical Exam  Constitutional:       General: She is not in acute distress.     Appearance: She is well-developed. She is obese. She is not ill-appearing, toxic-appearing or diaphoretic.   HENT:      Head: Normocephalic and atraumatic.   Eyes:      General: No scleral icterus.     Extraocular Movements: Extraocular movements intact.      Conjunctiva/sclera: Conjunctivae normal.      Pupils: Pupils are equal, round, and reactive to light.   Neck:      Vascular: No JVD.      Trachea: No tracheal deviation.   Cardiovascular:      Rate and Rhythm: Normal rate and regular rhythm.      Heart sounds: S1 normal and S2 normal. No murmur heard.    No friction rub. No gallop.   Pulmonary:      Effort: Pulmonary effort is normal. No respiratory distress.      Breath sounds: Normal breath sounds. No stridor. No wheezing, rhonchi or rales.   Chest:      Chest wall: No tenderness.   Abdominal:      General: There is no distension.      Palpations: Abdomen is soft.   Musculoskeletal:         General: No swelling or tenderness. Normal range of motion.      Cervical back: Normal  range of motion and neck supple. No rigidity.      Right lower leg: No edema.      Left lower leg: No edema.   Skin:     General: Skin is warm and dry.      Coloration: Skin is not jaundiced.   Neurological:      General: No focal deficit present.      Mental Status: She is alert and oriented to person, place, and time.      Cranial Nerves: No cranial nerve deficit.   Psychiatric:         Mood and Affect: Mood normal.         Behavior: Behavior normal.       Current Medications:   conjugated estrogens  25 mg Intravenous Q6H       sodium chloride    Laboratory (all labs reviewed):  CBC:  Recent Labs   Lab 02/06/23  0715 02/23/23 2006 02/24/23  0553 02/27/23  1655 02/27/23  1851 02/28/23  0602   WBC 10.05 16.89 H 20.14 H 13.72 H  --  12.88 H   Hemoglobin 11.4 L 6.8 L 7.6 L 7.7 L 7.3 L 5.5 LL   Hematocrit 37.0 21.2 L 23.3 L 24.6 L 23.0 L 18.1 LL   Platelets 187 282 194 271  --  203       CHEMISTRIES:  Recent Labs   Lab 01/24/22  1638 01/26/22  0633 02/12/22  0849 03/24/22  0738 07/30/22  0911 02/06/23  0715 02/23/23 2006 02/27/23  1655   Glucose 122 H 139 H 141 H 139 H 136 H 111 H 107 91   Sodium 139 138 139 139 134 L 137 137 138   Potassium 3.9 3.8 4.4 4.7 4.1 4.5 4.0 3.6   BUN 12 16 13 15 12 14 12 10   Creatinine 0.8 0.8 0.8 0.7 0.8 0.8 0.8 0.7   eGFR if non African American >60 >60 >60 >60 >60.0  --   --   --    eGFR  --   --   --   --   --  >60.0 >60 >60   Calcium 10.0 9.6 9.8 9.8 10.0 9.9 9.3 9.1       CARDIAC BIOMARKERS:  Recent Labs   Lab 01/24/22  1638   Troponin I <0.006       COAGS:  Recent Labs   Lab 03/24/22  0738   INR 1.0       LIPIDS/LFTS:  Recent Labs   Lab 12/18/20  0745 12/28/21  1336 01/24/22  1638 01/26/22  0633 07/30/22  0911 02/06/23  0715 02/23/23  2006 02/27/23  1655   Cholesterol 171 180  --   --  148 145  --   --    Triglycerides 139 138  --   --  111 127  --   --    HDL 55 53  --   --  52 51  --   --    LDL Cholesterol 88.2 99.4  --   --  73.8 68.6  --   --    Non-HDL Cholesterol 116 127   --   --  96 94  --   --    AST 61 H 53 H   < > 32 31 40 28 30   ALT 46 H 44   < > 33 24 28 23 25    < > = values in this interval not displayed.       BNP:  Recent Labs   Lab 01/24/22  1638 02/12/22  0849    H 40       TSH:  Recent Labs   Lab 12/18/20  0745 12/28/21  1336 01/24/22  1638 07/30/22  0911 02/06/23  0715   TSH 1.614 2.120 2.612 1.458 2.639       Free T4:        Diagnostic Results:  ECG (personally reviewed and interpreted tracing(s)):  2/28/23 0819 , low volt, NSSTTW changes    Echo: 9/20/22   The estimated ejection fraction is 65%.   The left ventricle is normal in size with normal systolic function.   Mild-to-moderate mitral regurgitation.   Normal right ventricular size with normal right ventricular systolic function.   Atrial fibrillation observed.   Moderate left atrial enlargement.   Mild right atrial enlargement.   Normal central venous pressure (3 mmHg).   The estimated PA systolic pressure is 35 mmHg.    EVM 5/5/22   Paroxysmal atrial fibrillation with RVR. AF burden 14%          Assessment and Plan:     * Menorrhagia with irregular cycle  Mgmt per GYN  RCRI 0, no prohibitive cardiac risk to surgery/anesthesia.  No further preop CV testing planned.  OK to hold eliquis, resume ASAP at the direction of GYN when acceptable for a surgical perspective.  No bridging needed.    Preop cardiovascular exam  As above    PAF (paroxysmal atrial fibrillation)  In SR  Cont multaq  Resume eliquis when cleared by GYN    Type 2 diabetes mellitus with diabetic nephropathy, without long-term current use of insulin  Per primary team    Hyperlipidemia  Cont statin    Hypertension  Cont med rx        VTE Risk Mitigation (From admission, onward)    None          Thank you for your consult. I will follow-up with patient. Please contact us if you have any additional questions.    Bennett Horton MD  Cardiology   [facility] [facility]

## 2023-02-28 NOTE — ASSESSMENT & PLAN NOTE
Mgmt per GYN  RCRI 0, no prohibitive cardiac risk to surgery/anesthesia.  No further preop CV testing planned.  OK to hold eliquis, resume ASAP at the direction of GYN when acceptable for a surgical perspective.  No bridging needed.

## 2023-02-28 NOTE — PROGRESS NOTES
[facility]  Obstetrics  Postpartum Progress Note    Patient Name: Micaela Hernandez  MRN: [id number]  Admission Date: 2023  Hospital Length of Stay: 0 days  Attending Physician: Castro Mehta MD  Primary Care Provider: Bailey Fiore MD    Subjective:     Principal Problem:Menorrhagia with irregular cycle    She is doing well this morning. Still having dizziness with ambulation.  Vaginal bleeding has decreased.  No pain.         Objective:     Vital Signs (Most Recent):  Temp: 98.3 °F (36.8 °C) (23)  Pulse: 97 (23)  Resp: (!) 22 (23)  BP: (!) 145/71 (23)  SpO2: 97 % (23)   Vital Signs (24h Range):  Temp:  [97.6 °F (36.4 °C)-98.6 °F (37 °C)] 98.3 °F (36.8 °C)  Pulse:  [] 97  Resp:  [20-22] 22  SpO2:  [96 %-100 %] 97 %  BP: (129-187)/(58-81) 145/71     Weight: 135.6 kg (298 lb 15.1 oz)  Body mass index is 52.96 kg/m².      Intake/Output Summary (Last 24 hours) at 2023 0932  Last data filed at 2023 0600  Gross per 24 hour   Intake --   Output 500 ml   Net -500 ml       Physical Exam:   Constitutional: She is oriented to person, place, and time. She appears well-developed.    HENT:   Head: Normocephalic and atraumatic.    Eyes: EOM are normal.     Cardiovascular:  Normal rate.             Pulmonary/Chest: Effort normal.        Abdominal: Soft. There is no abdominal tenderness.             Musculoskeletal: Normal range of motion.       Neurological: She is oriented to person, place, and time.    Skin: Skin is warm.    Psychiatric: She has a normal mood and affect.     Significant Labs:  Lab Results   Component Value Date    GROUPTRH O NEG 2023    AFP 3.1 2016     Recent Labs   Lab 23  0602   HGB 5.5*   HCT 18.1*       I have personallly reviewed all pertinent lab results from the last 24 hours.    Assessment/Plan:     52 y.o. female  here with symptomatic anemia:    Active Diagnoses:    Diagnosis Date Noted POA     PRINCIPAL PROBLEM:  Menorrhagia with irregular cycle [N92.1] 02/27/2023 Yes    Preop cardiovascular exam [Z01.810] 02/28/2023 Not Applicable    Anemia [D64.9] 02/24/2023 Yes    PAF (paroxysmal atrial fibrillation) [I48.0] 06/30/2022 Yes    Type 2 diabetes mellitus with diabetic nephropathy, without long-term current use of insulin [E11.21]  Yes    Hyperlipidemia [E78.5]  Yes    Hypertension [I10]  Yes      Problems Resolved During this Admission:       - will transfuse 2 units of packed red blood cells as hemoglobin dropped to 5.5  - Cardiology and internal medicine consult.  - hold Eliquis.    -Continue IV premarin.  - to OR tomorrow for dilation and curettage  - consents are signed and on the chart.    Lola Zhao MD  Gynecology  [facility] [place]

## 2023-02-28 NOTE — H&P
[facility]  Obstetrics & Gynecology  History & Physical    Patient Name: Micaela Hernandez  MRN: [id number]  Admission Date: 2023  Primary Care Provider: Bailey Fiore MD    Subjective:     Chief Complaint/Reason for Admission:   Vaginal bleeding with severe anemia    History of Present Illness:  Micaela Hernandez is a 52 y.o. female, with a past medical history of DM, HLD, HTN, and paroxysmal afib (on Eliquis), and fibroid uterus who presents to the ED with vaginal bleeding that began 3 weeks ago. Patient was admitted on  for similar symptoms and discharged on  after IV premarin treatment and she was transfused 2 units of blood. Patient reports bleeding stopped for 2 days but started again last night. Patient reports using 1 pad/hour. Patient has associated symptoms of shortness of breath with exertion, light-headedness, and dizziness with exertion. No other exacerbating or alleviating factors. Patient denies any other associated symptoms.  She states she was told not to discontinue her Eliquis.  She is scheduled for an endometrial biopsy this Thursday.  No known allergies.              OB History    Para Term  AB Living   1 1 1 0 0 0   SAB IAB Ectopic Multiple Live Births   0 0 0 0 0      # Outcome Date GA Lbr Melvin/2nd Weight Sex Delivery Anes PTL Lv   1 Term              Past Medical History:   Diagnosis Date    Allergy     Atrial flutter     Degenerative lumbar disc 2021    Diabetes mellitus     Diabetes mellitus, type 2     Hyperlipidemia     Hypertension     PAF (paroxysmal atrial fibrillation) 2022    Sleep apnea     Symptomatic anemia 2023     Past Surgical History:   Procedure Laterality Date    BREAST SURGERY       SECTION      CHOLECYSTECTOMY      ENDOMETRIAL ABLATION      HERNIA REPAIR      incisional     KNEE SURGERY      LIVER LOBECTOMY Right     LYMPH NODE DISSECTION      right axillary    TRANSESOPHAGEAL ECHOCARDIOGRAPHY  N/A 1/26/2022    Procedure: ECHOCARDIOGRAM, TRANSESOPHAGEAL;  Surgeon: Ji Patricio MD;  Location: [facility] CATH LAB;  Service: Cardiology;  Laterality: N/A;       PTA Medications   Medication Sig    amLODIPine (NORVASC) 5 MG tablet Take 1 tablet (5 mg total) by mouth every evening.    carvediloL (COREG) 12.5 MG tablet Take 1 tablet (12.5 mg total) by mouth 2 (two) times daily with meals.    dronedarone (MULTAQ) 400 mg Tab TAKE 1 TABLET BY MOUTH TWICE DAILY WITH MEALS    ELIQUIS 5 mg Tab Take 1 tablet by mouth twice daily    furosemide (LASIX) 20 MG tablet Take 1 tablet (20 mg total) by mouth once daily.    glimepiride (AMARYL) 2 MG tablet Take 1 tablet (2 mg total) by mouth once daily.    loratadine (CLARITIN) 10 mg tablet Take 10 mg by mouth once daily. prn    losartan (COZAAR) 50 MG tablet Take 1 tablet (50 mg total) by mouth 2 (two) times daily.    medroxyPROGESTERone (PROVERA) 10 MG tablet Take 1 tablet (10 mg total) by mouth once daily.    metFORMIN (GLUCOPHAGE) 1000 MG tablet Take 1 tablet (1,000 mg total) by mouth 2 (two) times daily with meals.    simvastatin (ZOCOR) 40 MG tablet Take 1 tablet (40 mg total) by mouth every evening.    tirzepatide 5 mg/0.5 mL PnIj Inject 5 mg into the skin every 7 days. for 4 doses    betamethasone dipropionate (DIPROLENE) 0.05 % ointment APPLY  OINTMENT TOPICALLY TWICE DAILY FOR 7 DAYS    clobetasoL (TEMOVATE) 0.05 % external solution Apply topically 2 (two) times daily.    [START ON 4/11/2023] tirzepatide 10 mg/0.5 mL PnIj Inject 10 mg into the skin every 7 days. for 4 doses    [START ON 3/19/2023] tirzepatide 7.5 mg/0.5 mL PnIj Inject 7.5 mg into the skin every 7 days. for 4 doses       Review of patient's allergies indicates:  No Known Allergies     Family History       Problem Relation (Age of Onset)    Asthma Mother    COPD Mother    Cancer Sister    Diabetes Father    Diabetes type II Mother    Hyperlipidemia Mother, Father    Hypertension Mother,  Father    No Known Problems Brother, Maternal Aunt, Maternal Uncle, Paternal Aunt, Paternal Uncle, Maternal Grandmother, Maternal Grandfather, Paternal Grandmother, Paternal Grandfather          Tobacco Use    Smoking status: Never    Smokeless tobacco: Never   Substance and Sexual Activity    Alcohol use: Not Currently     Comment: occasionally    Drug use: No    Sexual activity: Not Currently     Partners: Male     Birth control/protection: None     Review of Systems   Constitutional:  Positive for fatigue. Negative for activity change, appetite change, chills, fever and unexpected weight change.   HENT:  Negative for mouth sores.    Respiratory:  Negative for cough, shortness of breath and wheezing.    Cardiovascular:  Negative for chest pain and palpitations.   Gastrointestinal:  Positive for abdominal pain. Negative for bloating, blood in stool, constipation, nausea and vomiting.   Endocrine: Negative for diabetes and hot flashes.   Genitourinary:  Positive for menorrhagia, menstrual problem, pelvic pain and vaginal bleeding. Negative for dysmenorrhea, dyspareunia, dysuria, frequency, hematuria, urgency, vaginal discharge, vaginal pain, urinary incontinence, postcoital bleeding and vaginal odor.   Musculoskeletal:  Negative for back pain and myalgias.   Integumentary:  Negative for rash, breast mass and nipple discharge.   Neurological:  Negative for seizures and headaches.   Psychiatric/Behavioral:  Negative for depression and sleep disturbance. The patient is not nervous/anxious.    Breast: Negative for mass, mastodynia and nipple discharge   Objective:     Vital Signs (Most Recent):  Temp: 98.6 °F (37 °C) (02/27/23 2106)  Pulse: 97 (02/27/23 2106)  Resp: 20 (02/27/23 2106)  BP: (!) 143/64 (02/27/23 2106)  SpO2: 97 % (02/27/23 2106)   Vital Signs (24h Range):  Temp:  [98.1 °F (36.7 °C)-98.6 °F (37 °C)] 98.6 °F (37 °C)  Pulse:  [] 97  Resp:  [20] 20  SpO2:  [97 %-100 %] 97 %  BP: (132-187)/(63-81)  143/64     Weight: 135.6 kg (299 lb)  Body mass index is 52.97 kg/m².    Patient's last menstrual period was 09/07/2022 (exact date).    Physical Exam:   Constitutional: She appears well-developed and well-nourished. No distress.    HENT:   Head: Normocephalic and atraumatic.    Eyes: EOM are normal.     Cardiovascular:  Normal rate.             Pulmonary/Chest: Effort normal. No respiratory distress.        Abdominal: Soft. She exhibits no distension. There is no abdominal tenderness. There is no rebound and no guarding.             Musculoskeletal: Normal range of motion.       Neurological: She is alert.    Skin: Skin is warm and dry.    Psychiatric: She has a normal mood and affect.     Laboratory:  CBC:   Recent Labs   Lab 02/27/23  1655 02/27/23  1851   WBC 13.72*  --    RBC 2.67*  --    HGB 7.7* 7.3*   HCT 24.6* 23.0*     --    MCV 92  --    MCH 28.8  --    MCHC 31.3*  --      CMP:   Recent Labs   Lab 02/27/23  1655   GLU 91   CALCIUM 9.1   ALBUMIN 3.2*   PROT 7.4      K 3.6   CO2 22*      BUN 10   CREATININE 0.7   ALKPHOS 82   ALT 25   AST 30   BILITOT 0.4     I have personallly reviewed all pertinent lab results from the last 24 hours.    Diagnostic Results:  US: Reviewed      Assessment/Plan:     Renal/  * Menorrhagia with irregular cycle  IV Premarin 25 mg x 4 doses  2 units pRBCs if significant drop.  Patient is O-neg.  Stop Eliquis  Consult Cardiology  To OR tomorrow with Dr Zhao for D&C    Would eventually need hysterectomy    Oncology  Anemia  IV Premarin 25 mg x 4 doses  2 units pRBCs if significant drop.  Patient is O-neg.  Stop Eliquis  Consult Cardiology  To OR tomorrow with Dr Zhao for D&C    Would eventually need hysterectomy        Castro Mehta MD  Obstetrics & Gynecology  [facility]

## 2023-02-28 NOTE — ASSESSMENT & PLAN NOTE
Well controlled DM outpatient however on three medications. Will start low dose sliding scale insulin and monitor glucose w/ fingerstick blood glucose      Patient's FSGs are controlled on current medication regimen.  Last A1c reviewed-   Lab Results   Component Value Date    HGBA1C 5.8 (H) 02/06/2023     Most recent fingerstick glucose reviewed- No results for input(s): POCTGLUCOSE in the last 24 hours.  Current correctional scale  Low  Maintain anti-hyperglycemic dose as follows-   Antihyperglycemics (From admission, onward)    Start     Stop Route Frequency Ordered    02/28/23 1244  insulin aspart U-100 pen 0-5 Units         -- SubQ Before meals & nightly PRN 02/28/23 1145        Hold Oral hypoglycemics while patient is in the hospital.   [FreeTextEntry1] : small amount of cerumen which was blocking canals bilaterally R>L was removed successfully using lighted curette with no incidence. Pt appreciated and will try her old hearing aids\par

## 2023-03-01 VITALS
DIASTOLIC BLOOD PRESSURE: 58 MMHG | OXYGEN SATURATION: 100 % | RESPIRATION RATE: 20 BRPM | BODY MASS INDEX: 51.91 KG/M2 | TEMPERATURE: 98 F | SYSTOLIC BLOOD PRESSURE: 121 MMHG | WEIGHT: 293 LBS | HEART RATE: 103 BPM | HEIGHT: 63 IN

## 2023-03-01 PROBLEM — Z98.890 STATUS POST HYSTEROSCOPY: Status: ACTIVE | Noted: 2023-03-01

## 2023-03-01 LAB
BACTERIA UR CULT: NORMAL
BASOPHILS # BLD AUTO: 0.06 K/UL (ref 0–0.2)
BASOPHILS NFR BLD: 0.6 % (ref 0–1.9)
DIFFERENTIAL METHOD: ABNORMAL
EOSINOPHIL # BLD AUTO: 0.3 K/UL (ref 0–0.5)
EOSINOPHIL NFR BLD: 2.5 % (ref 0–8)
ERYTHROCYTE [DISTWIDTH] IN BLOOD BY AUTOMATED COUNT: 17.2 % (ref 11.5–14.5)
HCT VFR BLD AUTO: 24.4 % (ref 37–48.5)
HGB BLD-MCNC: 7.8 G/DL (ref 12–16)
IMM GRANULOCYTES # BLD AUTO: 0.06 K/UL (ref 0–0.04)
IMM GRANULOCYTES NFR BLD AUTO: 0.6 % (ref 0–0.5)
LYMPHOCYTES # BLD AUTO: 1.8 K/UL (ref 1–4.8)
LYMPHOCYTES NFR BLD: 17.6 % (ref 18–48)
MCH RBC QN AUTO: 28.4 PG (ref 27–31)
MCHC RBC AUTO-ENTMCNC: 32 G/DL (ref 32–36)
MCV RBC AUTO: 89 FL (ref 82–98)
MONOCYTES # BLD AUTO: 0.7 K/UL (ref 0.3–1)
MONOCYTES NFR BLD: 6.8 % (ref 4–15)
NEUTROPHILS # BLD AUTO: 7.4 K/UL (ref 1.8–7.7)
NEUTROPHILS NFR BLD: 71.9 % (ref 38–73)
NRBC BLD-RTO: 0 /100 WBC
PLATELET # BLD AUTO: 212 K/UL (ref 150–450)
PMV BLD AUTO: 12 FL (ref 9.2–12.9)
POCT GLUCOSE: 100 MG/DL (ref 70–110)
POCT GLUCOSE: 105 MG/DL (ref 70–110)
POCT GLUCOSE: 158 MG/DL (ref 70–110)
RBC # BLD AUTO: 2.75 M/UL (ref 4–5.4)
WBC # BLD AUTO: 10.25 K/UL (ref 3.9–12.7)

## 2023-03-01 PROCEDURE — 99233 PR SUBSEQUENT HOSPITAL CARE,LEVL III: ICD-10-PCS | Mod: ,,, | Performed by: INTERNAL MEDICINE

## 2023-03-01 PROCEDURE — D9220A PRA ANESTHESIA: ICD-10-PCS | Mod: CRNA,,, | Performed by: NURSE ANESTHETIST, CERTIFIED REGISTERED

## 2023-03-01 PROCEDURE — 25000003 PHARM REV CODE 250: Performed by: NURSE ANESTHETIST, CERTIFIED REGISTERED

## 2023-03-01 PROCEDURE — 88305 TISSUE EXAM BY PATHOLOGIST: CPT | Mod: 26,,, | Performed by: PATHOLOGY

## 2023-03-01 PROCEDURE — 27201423 OPTIME MED/SURG SUP & DEVICES STERILE SUPPLY: Performed by: OBSTETRICS & GYNECOLOGY

## 2023-03-01 PROCEDURE — 37000008 HC ANESTHESIA 1ST 15 MINUTES: Performed by: OBSTETRICS & GYNECOLOGY

## 2023-03-01 PROCEDURE — 25000003 PHARM REV CODE 250: Performed by: INTERNAL MEDICINE

## 2023-03-01 PROCEDURE — 36415 COLL VENOUS BLD VENIPUNCTURE: CPT | Performed by: OBSTETRICS & GYNECOLOGY

## 2023-03-01 PROCEDURE — D9220A PRA ANESTHESIA: Mod: CRNA,,, | Performed by: NURSE ANESTHETIST, CERTIFIED REGISTERED

## 2023-03-01 PROCEDURE — G0378 HOSPITAL OBSERVATION PER HR: HCPCS

## 2023-03-01 PROCEDURE — D9220A PRA ANESTHESIA: ICD-10-PCS | Mod: ANES,,, | Performed by: ANESTHESIOLOGY

## 2023-03-01 PROCEDURE — 71000033 HC RECOVERY, INTIAL HOUR: Performed by: OBSTETRICS & GYNECOLOGY

## 2023-03-01 PROCEDURE — 58558 HYSTEROSCOPY BIOPSY: CPT | Mod: ,,, | Performed by: OBSTETRICS & GYNECOLOGY

## 2023-03-01 PROCEDURE — 36000707: Performed by: OBSTETRICS & GYNECOLOGY

## 2023-03-01 PROCEDURE — 63600175 PHARM REV CODE 636 W HCPCS: Performed by: NURSE ANESTHETIST, CERTIFIED REGISTERED

## 2023-03-01 PROCEDURE — 37000009 HC ANESTHESIA EA ADD 15 MINS: Performed by: OBSTETRICS & GYNECOLOGY

## 2023-03-01 PROCEDURE — 58558 PR HYSTEROSCOPY,W/ENDO BX: ICD-10-PCS | Mod: ,,, | Performed by: OBSTETRICS & GYNECOLOGY

## 2023-03-01 PROCEDURE — 63600175 PHARM REV CODE 636 W HCPCS: Performed by: ANESTHESIOLOGY

## 2023-03-01 PROCEDURE — 85025 COMPLETE CBC W/AUTO DIFF WBC: CPT | Performed by: OBSTETRICS & GYNECOLOGY

## 2023-03-01 PROCEDURE — 71000039 HC RECOVERY, EACH ADD'L HOUR: Performed by: OBSTETRICS & GYNECOLOGY

## 2023-03-01 PROCEDURE — 88305 TISSUE EXAM BY PATHOLOGIST: ICD-10-PCS | Mod: 26,,, | Performed by: PATHOLOGY

## 2023-03-01 PROCEDURE — D9220A PRA ANESTHESIA: Mod: ANES,,, | Performed by: ANESTHESIOLOGY

## 2023-03-01 PROCEDURE — 36000706: Performed by: OBSTETRICS & GYNECOLOGY

## 2023-03-01 PROCEDURE — 99233 SBSQ HOSP IP/OBS HIGH 50: CPT | Mod: ,,, | Performed by: INTERNAL MEDICINE

## 2023-03-01 PROCEDURE — 88305 TISSUE EXAM BY PATHOLOGIST: CPT | Performed by: PATHOLOGY

## 2023-03-01 RX ORDER — PROCHLORPERAZINE EDISYLATE 5 MG/ML
5 INJECTION INTRAMUSCULAR; INTRAVENOUS EVERY 6 HOURS PRN
Status: DISCONTINUED | OUTPATIENT
Start: 2023-03-01 | End: 2023-03-01 | Stop reason: HOSPADM

## 2023-03-01 RX ORDER — ROCURONIUM BROMIDE 10 MG/ML
INJECTION, SOLUTION INTRAVENOUS
Status: DISCONTINUED | OUTPATIENT
Start: 2023-03-01 | End: 2023-03-01

## 2023-03-01 RX ORDER — HYDROCODONE BITARTRATE AND ACETAMINOPHEN 500; 5 MG/1; MG/1
TABLET ORAL
Status: DISCONTINUED | OUTPATIENT
Start: 2023-03-01 | End: 2023-03-01 | Stop reason: HOSPADM

## 2023-03-01 RX ORDER — SUCCINYLCHOLINE CHLORIDE 20 MG/ML
INJECTION INTRAMUSCULAR; INTRAVENOUS
Status: DISCONTINUED | OUTPATIENT
Start: 2023-03-01 | End: 2023-03-01

## 2023-03-01 RX ORDER — MORPHINE SULFATE 4 MG/ML
3 INJECTION, SOLUTION INTRAMUSCULAR; INTRAVENOUS
Status: DISCONTINUED | OUTPATIENT
Start: 2023-03-01 | End: 2023-03-01 | Stop reason: HOSPADM

## 2023-03-01 RX ORDER — HYDROMORPHONE HYDROCHLORIDE 2 MG/ML
1 INJECTION, SOLUTION INTRAMUSCULAR; INTRAVENOUS; SUBCUTANEOUS EVERY 6 HOURS PRN
Status: DISCONTINUED | OUTPATIENT
Start: 2023-03-01 | End: 2023-03-01 | Stop reason: HOSPADM

## 2023-03-01 RX ORDER — DIPHENHYDRAMINE HYDROCHLORIDE 50 MG/ML
25 INJECTION INTRAMUSCULAR; INTRAVENOUS EVERY 4 HOURS PRN
Status: DISCONTINUED | OUTPATIENT
Start: 2023-03-01 | End: 2023-03-01 | Stop reason: HOSPADM

## 2023-03-01 RX ORDER — ONDANSETRON 2 MG/ML
INJECTION INTRAMUSCULAR; INTRAVENOUS
Status: DISCONTINUED | OUTPATIENT
Start: 2023-03-01 | End: 2023-03-01

## 2023-03-01 RX ORDER — IBUPROFEN 600 MG/1
600 TABLET ORAL EVERY 6 HOURS PRN
Status: DISCONTINUED | OUTPATIENT
Start: 2023-03-01 | End: 2023-03-01 | Stop reason: HOSPADM

## 2023-03-01 RX ORDER — LIDOCAINE HYDROCHLORIDE 20 MG/ML
INJECTION INTRAVENOUS
Status: DISCONTINUED | OUTPATIENT
Start: 2023-03-01 | End: 2023-03-01

## 2023-03-01 RX ORDER — SODIUM CHLORIDE 0.9 % (FLUSH) 0.9 %
10 SYRINGE (ML) INJECTION
Status: DISCONTINUED | OUTPATIENT
Start: 2023-03-01 | End: 2023-03-01 | Stop reason: HOSPADM

## 2023-03-01 RX ORDER — HYDROCODONE BITARTRATE AND ACETAMINOPHEN 5; 325 MG/1; MG/1
1 TABLET ORAL EVERY 4 HOURS PRN
Status: DISCONTINUED | OUTPATIENT
Start: 2023-03-01 | End: 2023-03-01 | Stop reason: HOSPADM

## 2023-03-01 RX ORDER — DIPHENHYDRAMINE HCL 25 MG
25 CAPSULE ORAL EVERY 4 HOURS PRN
Status: DISCONTINUED | OUTPATIENT
Start: 2023-03-01 | End: 2023-03-01 | Stop reason: HOSPADM

## 2023-03-01 RX ORDER — ONDANSETRON 8 MG/1
8 TABLET, ORALLY DISINTEGRATING ORAL EVERY 8 HOURS PRN
Status: DISCONTINUED | OUTPATIENT
Start: 2023-03-01 | End: 2023-03-01 | Stop reason: HOSPADM

## 2023-03-01 RX ORDER — PROPOFOL 10 MG/ML
VIAL (ML) INTRAVENOUS
Status: DISCONTINUED | OUTPATIENT
Start: 2023-03-01 | End: 2023-03-01

## 2023-03-01 RX ORDER — FENTANYL CITRATE 50 UG/ML
INJECTION, SOLUTION INTRAMUSCULAR; INTRAVENOUS
Status: DISCONTINUED | OUTPATIENT
Start: 2023-03-01 | End: 2023-03-01

## 2023-03-01 RX ORDER — SODIUM CHLORIDE, SODIUM LACTATE, POTASSIUM CHLORIDE, CALCIUM CHLORIDE 600; 310; 30; 20 MG/100ML; MG/100ML; MG/100ML; MG/100ML
INJECTION, SOLUTION INTRAVENOUS CONTINUOUS
Status: DISCONTINUED | OUTPATIENT
Start: 2023-03-01 | End: 2023-03-01 | Stop reason: HOSPADM

## 2023-03-01 RX ORDER — FENTANYL CITRATE 50 UG/ML
25 INJECTION, SOLUTION INTRAMUSCULAR; INTRAVENOUS EVERY 5 MIN PRN
Status: DISCONTINUED | OUTPATIENT
Start: 2023-03-01 | End: 2023-03-01 | Stop reason: HOSPADM

## 2023-03-01 RX ORDER — DEXAMETHASONE SODIUM PHOSPHATE 4 MG/ML
INJECTION, SOLUTION INTRA-ARTICULAR; INTRALESIONAL; INTRAMUSCULAR; INTRAVENOUS; SOFT TISSUE
Status: DISCONTINUED | OUTPATIENT
Start: 2023-03-01 | End: 2023-03-01

## 2023-03-01 RX ORDER — ACETAMINOPHEN 500 MG
1000 TABLET ORAL EVERY 6 HOURS PRN
Qty: 100 TABLET | Refills: 2 | Status: ON HOLD | OUTPATIENT
Start: 2023-03-01 | End: 2023-03-28 | Stop reason: SDUPTHER

## 2023-03-01 RX ORDER — ATORVASTATIN CALCIUM 10 MG/1
20 TABLET, FILM COATED ORAL NIGHTLY
Status: DISCONTINUED | OUTPATIENT
Start: 2023-03-01 | End: 2023-03-01 | Stop reason: HOSPADM

## 2023-03-01 RX ORDER — MIDAZOLAM HYDROCHLORIDE 1 MG/ML
INJECTION, SOLUTION INTRAMUSCULAR; INTRAVENOUS
Status: DISCONTINUED | OUTPATIENT
Start: 2023-03-01 | End: 2023-03-01

## 2023-03-01 RX ADMIN — FENTANYL CITRATE 25 MCG: 50 INJECTION INTRAMUSCULAR; INTRAVENOUS at 01:03

## 2023-03-01 RX ADMIN — SODIUM CHLORIDE, SODIUM LACTATE, POTASSIUM CHLORIDE, AND CALCIUM CHLORIDE: .6; .31; .03; .02 INJECTION, SOLUTION INTRAVENOUS at 12:03

## 2023-03-01 RX ADMIN — LIDOCAINE HYDROCHLORIDE 100 MG: 20 INJECTION, SOLUTION INTRAVENOUS at 12:03

## 2023-03-01 RX ADMIN — SUCCINYLCHOLINE CHLORIDE 140 MG: 20 INJECTION, SOLUTION INTRAMUSCULAR; INTRAVENOUS at 12:03

## 2023-03-01 RX ADMIN — ROCURONIUM BROMIDE 20 MG: 10 INJECTION, SOLUTION INTRAVENOUS at 12:03

## 2023-03-01 RX ADMIN — PROPOFOL 160 MG: 10 INJECTION, EMULSION INTRAVENOUS at 12:03

## 2023-03-01 RX ADMIN — ONDANSETRON 4 MG: 2 INJECTION, SOLUTION INTRAMUSCULAR; INTRAVENOUS at 01:03

## 2023-03-01 RX ADMIN — DEXAMETHASONE SODIUM PHOSPHATE 4 MG: 4 INJECTION, SOLUTION INTRAMUSCULAR; INTRAVENOUS at 01:03

## 2023-03-01 RX ADMIN — SUGAMMADEX 200 MG: 100 INJECTION, SOLUTION INTRAVENOUS at 01:03

## 2023-03-01 RX ADMIN — MIDAZOLAM HYDROCHLORIDE 2 MG: 1 INJECTION, SOLUTION INTRAMUSCULAR; INTRAVENOUS at 12:03

## 2023-03-01 RX ADMIN — FENTANYL CITRATE 100 MCG: 50 INJECTION, SOLUTION INTRAMUSCULAR; INTRAVENOUS at 12:03

## 2023-03-01 RX ADMIN — DRONEDARONE 400 MG: 400 TABLET, FILM COATED ORAL at 09:03

## 2023-03-01 NOTE — TRANSFER OF CARE
"Anesthesia Transfer of Care Note    Patient: Micaela Hernandez    Procedure(s) Performed: Procedure(s) (LRB):  HYSTEROSCOPY, WITH DILATION AND CURETTAGE OF UTERUS (N/A)    Patient location: PACU    Anesthesia Type: general    Transport from OR: Transported from OR on 6-10 L/min O2 by face mask with adequate spontaneous ventilation    Post pain: adequate analgesia    Post assessment: no apparent anesthetic complications and tolerated procedure well    Post vital signs: stable    Level of consciousness: lethargic and responds to stimulation    Nausea/Vomiting: no nausea/vomiting    Complications: none    Transfer of care protocol was followed      Last vitals:   Visit Vitals  /67 (BP Location: Right arm, Patient Position: Lying)   Pulse 103   Temp 36.8 °C (98.3 °F) (Oral)   Resp 16   Ht 5' 3" (1.6 m)   Wt 135.6 kg (298 lb 15.1 oz)   LMP 09/07/2022 (Exact Date)   SpO2 100%   BMI 52.96 kg/m²     "

## 2023-03-01 NOTE — ANESTHESIA PROCEDURE NOTES
Intubation    Date/Time: 3/1/2023 12:49 PM  Performed by: Geovany Watson CRNA  Authorized by: Miguelina Maynard MD     Intubation:     Induction:  Intravenous    Intubated:  Postinduction    Mask Ventilation:  Easy mask    Attempts:  1    Attempted By:  CRNA and student    Method of Intubation:  Direct and video laryngoscopy    Blade:  Glass 3    Laryngeal View Grade: Grade I - full view of cords      Difficult Airway Encountered?: No      Complications:  None    Airway Device:  Oral endotracheal tube    Airway Device Size:  7.0    Style/Cuff Inflation:  Cuffed (inflated to minimal occlusive pressure)    Inflation Amount (mL):  6    Tube secured:  21    Secured at:  The lips    Placement Verified By:  Capnometry    Complicating Factors:  None    Findings Post-Intubation:  BS equal bilateral and atraumatic/condition of teeth unchanged

## 2023-03-01 NOTE — ASSESSMENT & PLAN NOTE
Anemia presumably from fibroids. D&C planned this admission, it appears EMB is planned as well.  - surgical/hormonal management per primary  - borderline iron studies, may eventually benefit from iron supplementation if there are delays addressing bleeding

## 2023-03-01 NOTE — NURSING
Received from PACU via stretcher. Respirations even and non-labored. VSS, heart rate 103.Vaginal bleeding light pink.

## 2023-03-01 NOTE — ASSESSMENT & PLAN NOTE
At goal with current regimen. Will considering discontinuation of glucose checks if glucoses remain in range and pt remains admitted.   - cont SSI  - continue current outpatient regimen at discharge

## 2023-03-01 NOTE — BRIEF OP NOTE
[facility] - Surgery  OBGYN  Brief Operative Note    SUMMARY     Date of Procedure: 3/1/2023     Procedure: Procedure(s) (LRB):  HYSTEROSCOPY, WITH DILATION AND CURETTAGE OF UTERUS (N/A)       Surgeon(s) and Role:     * Lola Zhao MD - Primary    Assisting Surgeon: None    Pre-Operative Diagnosis: Menorrhagia with irregular cycle [N92.1]    Post-Operative Diagnosis: Post-Op Diagnosis Codes:     * Menorrhagia with irregular cycle [N92.1]    Anesthesia: Choice    Description of the Findings of the Procedure:   - enlarged fibroid uterus  - blood clot obscuring endometrial lining.        Complications: No    Estimated Blood Loss (EBL): minimal           Implants: * No implants in log *    Specimens:   Specimen (24h ago, onward)       Start     Ordered    03/01/23 1311  Specimen to Pathology, Surgery Gynecology and Obstetrics  Once        Comments: Pre-op Diagnosis: Menorrhagia with irregular cycle [N92.1]Procedure(s):HYSTEROSCOPY, WITH DILATION AND CURETTAGE OF UTERUS Number of specimens: 1Name of specimens: Endometrial Curettage     References:    Click here for ordering Quick Tip   Question Answer Comment   Procedure Type: Gynecology and Obstetrics    Specimen Class: Routine/Screening    Which provider would you like to cc? LOLA ZHAO    Release to patient Immediate        03/01/23 1314                            Condition: Good    Disposition: PACU - hemodynamically stable.    Attestation: I was present and scrubbed for the entire procedure.

## 2023-03-01 NOTE — DISCHARGE SUMMARY
[facility] [place] Surgery  Obstetrics & Gynecology  Discharge Summary    Patient Name: Micaela Hernandez  MRN: [id number]  Admission Date: 2/27/2023  Hospital Length of Stay: 0 days  Discharge Date and Time:  03/01/2023 3:04 PM  Attending Physician: Malik Zhao MD   Discharging Provider: Malik Zhao MD  Primary Care Provider: Bailey Fiore MD    HPI: Patient admitted with symptomatic anemia secondary to vaginal bleeding.    Hospital Course: Patient started on IV Premarin and Eliquis was stopped.   The vaginal bleeding resolved.  She was give 2 units of packed red blood cells.  A hysteroscopy D&C was performed on 3/1/2023. Please see operative report for complete  details. Following surgery, patient was transferred to the floor for routine post operative care. She had  an uncomplicated post operative course with no acute events. Her vital signs remained stable and  afebrile throughout her hospital stay. Her urine output was adequate and her physical exam was  appropriate. She was meeting all post operative milestones upon discharge.  She was ambulating and voiding without difficulty. She was tolerating a regular diet, and her vital signs  were stable and afebrile.     Procedure(s) (LRB):  HYSTEROSCOPY, WITH DILATION AND CURETTAGE OF UTERUS (N/A)     Consults:   Consults (From admission, onward)          Status Ordering Provider     Inpatient consult to Hospitalist  Once        Provider:  (Not yet assigned)    MALIK Hawkins     Inpatient consult to Cardiology  Once        Provider:  Bennett Horton MD    Completed TERI GALLEGO            Significant Diagnostic Studies: Labs: CBC   Recent Labs   Lab 02/27/23  1655 02/27/23  1851 02/28/23  0602 02/28/23  1824 03/01/23  0619   WBC 13.72*  --  12.88*  --  10.25   HGB 7.7*   < > 5.5* 8.1* 7.8*   HCT 24.6*   < > 18.1* 26.0* 24.4*     --  203  --  212    < > = values in this interval not displayed.       Pending Diagnostic Studies:       Procedure  Component Value Units Date/Time    Specimen to Pathology, Surgery Gynecology and Obstetrics [[id number]] Collected: 03/01/23 1310    Order Status: Sent Lab Status: In process Updated: 03/01/23 1315    Specimen: Tissue           Final Active Diagnoses:    Diagnosis Date Noted POA    PRINCIPAL PROBLEM:  Status post hysteroscopy [Z98.890] 03/01/2023 Not Applicable    Preop cardiovascular exam [Z01.810] 02/28/2023 Not Applicable    Menorrhagia with irregular cycle [N92.1] 02/27/2023 Yes    Anemia [D64.9] 02/24/2023 Yes    PAF (paroxysmal atrial fibrillation) [I48.0] 06/30/2022 Yes    Type 2 diabetes mellitus with diabetic nephropathy, without long-term current use of insulin [E11.21]  Yes    Hyperlipidemia [E78.5]  Yes    Hypertension [I10]  Yes      Problems Resolved During this Admission:        Discharged Condition: good    Disposition: Home or Self Care    Follow Up:    Patient Instructions:      Diet general     Pelvic Rest     No dressing needed     Call MD for:  temperature >100.4     Call MD for:  persistent nausea and vomiting     Call MD for:  severe uncontrolled pain     Call MD for:  difficulty breathing, headache or visual disturbances     Call MD for:  redness, tenderness, or signs of infection (pain, swelling, redness, odor or green/yellow discharge around incision site)     Call MD for:  hives     Call MD for:  persistent dizziness or light-headedness     Call MD for:  extreme fatigue     Medications:  Reconciled Home Medications:      Medication List        START taking these medications      acetaminophen 500 MG tablet  Commonly known as: TYLENOL EXTRA STRENGTH  Take 2 tablets (1,000 mg total) by mouth every 6 (six) hours as needed for Pain.            CONTINUE taking these medications      amLODIPine 5 MG tablet  Commonly known as: NORVASC  Take 1 tablet (5 mg total) by mouth every evening.     betamethasone dipropionate 0.05 % ointment  Commonly known as: DIPROLENE  APPLY  OINTMENT TOPICALLY TWICE  10 DAILY FOR 7 DAYS     carvediloL 12.5 MG tablet  Commonly known as: COREG  Take 1 tablet (12.5 mg total) by mouth 2 (two) times daily with meals.     clobetasoL 0.05 % external solution  Commonly known as: TEMOVATE  Apply topically 2 (two) times daily.     ELIQUIS 5 mg Tab  Generic drug: apixaban  Take 1 tablet by mouth twice daily     furosemide 20 MG tablet  Commonly known as: LASIX  Take 1 tablet (20 mg total) by mouth once daily.     glimepiride 2 MG tablet  Commonly known as: AMARYL  Take 1 tablet (2 mg total) by mouth once daily.     loratadine 10 mg tablet  Commonly known as: CLARITIN  Take 10 mg by mouth once daily. prn     losartan 50 MG tablet  Commonly known as: COZAAR  Take 1 tablet (50 mg total) by mouth 2 (two) times daily.     medroxyPROGESTERone 10 MG tablet  Commonly known as: PROVERA  Take 1 tablet (10 mg total) by mouth once daily.     metFORMIN 1000 MG tablet  Commonly known as: GLUCOPHAGE  Take 1 tablet (1,000 mg total) by mouth 2 (two) times daily with meals.     MULTAQ 400 mg Tab  Generic drug: dronedarone  TAKE 1 TABLET BY MOUTH TWICE DAILY WITH MEALS     simvastatin 40 MG tablet  Commonly known as: ZOCOR  Take 1 tablet (40 mg total) by mouth every evening.     * tirzepatide 5 mg/0.5 mL Pnij  Inject 5 mg into the skin every 7 days. for 4 doses     * tirzepatide 7.5 mg/0.5 mL Pnij  Inject 7.5 mg into the skin every 7 days. for 4 doses  Start taking on: March 19, 2023     * tirzepatide 10 mg/0.5 mL Pnij  Inject 10 mg into the skin every 7 days. for 4 doses  Start taking on: April 11, 2023           * This list has 3 medication(s) that are the same as other medications prescribed for you. Read the directions carefully, and ask your doctor or other care provider to review them with you.                  Lola Zhao MD  Obstetrics & Gynecology  [facility]

## 2023-03-01 NOTE — DISCHARGE INSTRUCTIONS
Take showers for next 6 weeks  No tampons, douching or sexual intercourse for next 6 weeks or until ok with doctor  No driving for 2 weeks   Do not lift anything heavier than 10 pounds for next 6 weeks  Walking is only form of exercise allowed for next 6 weeks unless instructed otherwise by your doctor    Notify your doctor if you have:  -pain not relieved by your pain medication  -unexplained swelling of arms or legs  -uncontrolled nausea/vomiting  -redness, swelling, or drainage from incision  -fever of 101* or higher  -sudden severe pain in arms, back, or legs  -heavy vaginal bleeding

## 2023-03-01 NOTE — OP NOTE
[facility] - [place] - Surgery  OBGYN  Operative Note    SUMMARY     Date of Procedure: 3/1/2023     Procedure: Procedure(s) (LRB):  HYSTEROSCOPY, WITH DILATION AND CURETTAGE OF UTERUS (N/A)       Surgeon(s) and Role:     * Lola Zhao MD - Primary    Assisting Surgeon: None    Pre-Operative Diagnosis: Menorrhagia with irregular cycle [N92.1]    Post-Operative Diagnosis: Post-Op Diagnosis Codes:     * Menorrhagia with irregular cycle [N92.1]    Anesthesia: Choice    Technical Procedures Used:   The patient was taken to the operating room where anesthesia was obtained without difficulty.  The patient was placed in the dorsal lithotomy position and prepped and draped in a normal sterile fashion.  A time-out was performed.    A speculum was inserted into the vagina.  The cervix was visualized and grasped with a tenaculum for retraction. The cervix was then gently serially dilated  using Jero dilators.      A diagnostic hysteroscope was inserted through the cervical os and the endometrial cavity was visualized.  Blood clot obscured the endometrial cavity.  The hysteroscope was removed, and sharp curettage of the endometrium was then performed.  The specimen was sent for pathologic diagnosis.    All instruments were removed from the vagina.  Hemostasis was noted.  Sponge, lap, and needle counts were correct x 2.  The patient was taken to the recovery room in stable condition.       Description of the Findings of the Procedure:   - enlarged fibroid uterus  - blood clot obscuring endometrial lining.        Complications: No    Estimated Blood Loss (EBL): minimal           Implants: * No implants in log *    Specimens:   Specimen (24h ago, onward)       Start     Ordered    03/01/23 1311  Specimen to Pathology, Surgery Gynecology and Obstetrics  Once        Comments: Pre-op Diagnosis: Menorrhagia with irregular cycle [N92.1]Procedure(s):HYSTEROSCOPY, WITH DILATION AND CURETTAGE OF UTERUS Number of specimens: 1Name of specimens:  Endometrial Curettage     References:    Click here for ordering Quick Tip   Question Answer Comment   Procedure Type: Gynecology and Obstetrics    Specimen Class: Routine/Screening    Which provider would you like to cc? MALIK BACH    Release to patient Immediate        03/01/23 1314                            Condition: Good    Disposition: PACU - hemodynamically stable.    Attestation: I was present and scrubbed for the entire procedure.

## 2023-03-01 NOTE — NURSING
To [facility] in [facility]. Chart sent with patient. NPO status maintained with medication given with sip of water.

## 2023-03-01 NOTE — PROGRESS NOTES
[facility]  [facility] Medicine  Progress Note    Patient Name: Micaela Hernandez  MRN: [id number]  Patient Class: OP- Observation   Admission Date: 2/27/2023  Length of Stay: 0 days  Attending Physician: Lola Zhao MD  Primary Care Provider: Bailey Fiore MD        Subjective:     Principal Problem:Menorrhagia with irregular cycle        HPI:  52y F w/ Afib on eliquis, HTN, DM, fibroids admitted for menorrhagia and symptomatic anemia. Found to have Hgb 5.5. Admitted to gynecology for further management. Hospital medicine consulted for medical management.       Overview/Hospital Course:  No notes on file    Interval History: No events overnight. Bleeding improving.    Review of Systems   Constitutional:  Negative for chills and fever.   Respiratory:  Negative for cough and shortness of breath.    Cardiovascular:  Negative for chest pain and leg swelling.   Gastrointestinal:  Negative for abdominal distention and abdominal pain.   Objective:     Vital Signs (Most Recent):  Temp: 98.4 °F (36.9 °C) (03/01/23 0726)  Pulse: 89 (03/01/23 0726)  Resp: 19 (03/01/23 0726)  BP: (!) 142/64 (03/01/23 0726)  SpO2: 96 % (03/01/23 0726)   Vital Signs (24h Range):  Temp:  [97.6 °F (36.4 °C)-98.8 °F (37.1 °C)] 98.4 °F (36.9 °C)  Pulse:  [] 89  Resp:  [19-22] 19  SpO2:  [96 %-100 %] 96 %  BP: (116-145)/(53-71) 142/64     Weight: 135.6 kg (298 lb 15.1 oz)  Body mass index is 52.96 kg/m².    Intake/Output Summary (Last 24 hours) at 3/1/2023 0813  Last data filed at 3/1/2023 0600  Gross per 24 hour   Intake 360 ml   Output 800 ml   Net -440 ml      Physical Exam  Constitutional:       General: She is not in acute distress.     Appearance: She is ill-appearing. She is not toxic-appearing or diaphoretic.   Cardiovascular:      Rate and Rhythm: Normal rate and regular rhythm.      Heart sounds: No murmur heard.    No gallop.   Pulmonary:      Effort: Pulmonary effort is normal. No respiratory distress.      Breath  sounds: Normal breath sounds. No wheezing.   Abdominal:      General: Bowel sounds are normal. There is no distension.      Palpations: Abdomen is soft.      Tenderness: There is no abdominal tenderness.       Significant Labs: All pertinent labs within the past 24 hours have been reviewed.    Significant Imaging: I have reviewed all pertinent imaging results/findings within the past 24 hours.      Assessment/Plan:      * Menorrhagia with irregular cycle  Anemia presumably from fibroids. D&C planned this admission, it appears EMB is planned as well.  - surgical/hormonal management per primary  - borderline iron studies, may eventually benefit from iron supplementation if there are delays addressing bleeding      Preop cardiovascular exam  Per cardiology      Anemia  See above      PAF (paroxysmal atrial fibrillation)  Sinus tachycardia on admission ECG; telemetry shows NSR.   - defer to cardiology  - agree w/ holding eliquis temporarily        Hypertension  Defer to cardiology consultant      Hyperlipidemia  Cont statin      Type 2 diabetes mellitus with diabetic nephropathy, without long-term current use of insulin  At goal with current regimen. Will considering discontinuation of glucose checks if glucoses remain in range and pt remains admitted.   - cont SSI  - continue current outpatient regimen at discharge      VTE Risk Mitigation (From admission, onward)    None          Discharge Planning Per Primary               Rusty Arnold MD  Department of Hospital Medicine   [facility]

## 2023-03-01 NOTE — SUBJECTIVE & OBJECTIVE
Interval History: No events overnight. Bleeding improving.    Review of Systems   Constitutional:  Negative for chills and fever.   Respiratory:  Negative for cough and shortness of breath.    Cardiovascular:  Negative for chest pain and leg swelling.   Gastrointestinal:  Negative for abdominal distention and abdominal pain.   Objective:     Vital Signs (Most Recent):  Temp: 98.4 °F (36.9 °C) (03/01/23 0726)  Pulse: 89 (03/01/23 0726)  Resp: 19 (03/01/23 0726)  BP: (!) 142/64 (03/01/23 0726)  SpO2: 96 % (03/01/23 0726)   Vital Signs (24h Range):  Temp:  [97.6 °F (36.4 °C)-98.8 °F (37.1 °C)] 98.4 °F (36.9 °C)  Pulse:  [] 89  Resp:  [19-22] 19  SpO2:  [96 %-100 %] 96 %  BP: (116-145)/(53-71) 142/64     Weight: 135.6 kg (298 lb 15.1 oz)  Body mass index is 52.96 kg/m².    Intake/Output Summary (Last 24 hours) at 3/1/2023 0813  Last data filed at 3/1/2023 0600  Gross per 24 hour   Intake 360 ml   Output 800 ml   Net -440 ml      Physical Exam  Constitutional:       General: She is not in acute distress.     Appearance: She is ill-appearing. She is not toxic-appearing or diaphoretic.   Cardiovascular:      Rate and Rhythm: Normal rate and regular rhythm.      Heart sounds: No murmur heard.    No gallop.   Pulmonary:      Effort: Pulmonary effort is normal. No respiratory distress.      Breath sounds: Normal breath sounds. No wheezing.   Abdominal:      General: Bowel sounds are normal. There is no distension.      Palpations: Abdomen is soft.      Tenderness: There is no abdominal tenderness.       Significant Labs: All pertinent labs within the past 24 hours have been reviewed.    Significant Imaging: I have reviewed all pertinent imaging results/findings within the past 24 hours.

## 2023-03-01 NOTE — SUBJECTIVE & OBJECTIVE
Past Medical History:   Diagnosis Date    Allergy     Atrial flutter     Degenerative lumbar disc 2021    Diabetes mellitus     Diabetes mellitus, type 2     Hyperlipidemia     Hypertension     PAF (paroxysmal atrial fibrillation) 2022    Sleep apnea     Symptomatic anemia 2023       Past Surgical History:   Procedure Laterality Date    BREAST SURGERY       SECTION      CHOLECYSTECTOMY      ENDOMETRIAL ABLATION      HERNIA REPAIR      incisional     KNEE SURGERY      LIVER LOBECTOMY Right     LYMPH NODE DISSECTION      right axillary    TRANSESOPHAGEAL ECHOCARDIOGRAPHY N/A 2022    Procedure: ECHOCARDIOGRAM, TRANSESOPHAGEAL;  Surgeon: Ji Patricio MD;  Location: [facility] CATH LAB;  Service: Cardiology;  Laterality: N/A;       Review of patient's allergies indicates:  No Known Allergies    No current facility-administered medications on file prior to encounter.     Current Outpatient Medications on File Prior to Encounter   Medication Sig    amLODIPine (NORVASC) 5 MG tablet Take 1 tablet (5 mg total) by mouth every evening.    carvediloL (COREG) 12.5 MG tablet Take 1 tablet (12.5 mg total) by mouth 2 (two) times daily with meals.    dronedarone (MULTAQ) 400 mg Tab TAKE 1 TABLET BY MOUTH TWICE DAILY WITH MEALS    ELIQUIS 5 mg Tab Take 1 tablet by mouth twice daily    furosemide (LASIX) 20 MG tablet Take 1 tablet (20 mg total) by mouth once daily.    glimepiride (AMARYL) 2 MG tablet Take 1 tablet (2 mg total) by mouth once daily.    loratadine (CLARITIN) 10 mg tablet Take 10 mg by mouth once daily. prn    losartan (COZAAR) 50 MG tablet Take 1 tablet (50 mg total) by mouth 2 (two) times daily.    medroxyPROGESTERone (PROVERA) 10 MG tablet Take 1 tablet (10 mg total) by mouth once daily.    metFORMIN (GLUCOPHAGE) 1000 MG tablet Take 1 tablet (1,000 mg total) by mouth 2 (two) times daily with meals.    simvastatin (ZOCOR) 40 MG tablet Take 1 tablet (40 mg total) by mouth every evening.     tirzepatide 5 mg/0.5 mL PnIj Inject 5 mg into the skin every 7 days. for 4 doses    betamethasone dipropionate (DIPROLENE) 0.05 % ointment APPLY  OINTMENT TOPICALLY TWICE DAILY FOR 7 DAYS    clobetasoL (TEMOVATE) 0.05 % external solution Apply topically 2 (two) times daily.    [START ON 4/11/2023] tirzepatide 10 mg/0.5 mL PnIj Inject 10 mg into the skin every 7 days. for 4 doses    [START ON 3/19/2023] tirzepatide 7.5 mg/0.5 mL PnIj Inject 7.5 mg into the skin every 7 days. for 4 doses     Family History       Problem Relation (Age of Onset)    Asthma Mother    COPD Mother    Cancer Sister    Diabetes Father    Diabetes type II Mother    Hyperlipidemia Mother, Father    Hypertension Mother, Father    No Known Problems Brother, Maternal Aunt, Maternal Uncle, Paternal Aunt, Paternal Uncle, Maternal Grandmother, Maternal Grandfather, Paternal Grandmother, Paternal Grandfather          Tobacco Use    Smoking status: Never    Smokeless tobacco: Never   Substance and Sexual Activity    Alcohol use: Not Currently     Comment: occasionally    Drug use: No    Sexual activity: Not Currently     Partners: Male     Birth control/protection: None     Review of Systems   Gastrointestinal:  Negative for melena.   Genitourinary:  Negative for hematuria.   Objective:     Vital Signs (Most Recent):  Temp: 98.4 °F (36.9 °C) (03/01/23 0726)  Pulse: 89 (03/01/23 0726)  Resp: 19 (03/01/23 0726)  BP: (!) 142/64 (03/01/23 0726)  SpO2: 96 % (03/01/23 0726)   Vital Signs (24h Range):  Temp:  [97.6 °F (36.4 °C)-98.8 °F (37.1 °C)] 98.4 °F (36.9 °C)  Pulse:  [] 89  Resp:  [19-22] 19  SpO2:  [96 %-100 %] 96 %  BP: (116-145)/(53-73) 142/64     Weight: 135.6 kg (298 lb 15.1 oz)  Body mass index is 52.96 kg/m².    SpO2: 96 %         Intake/Output Summary (Last 24 hours) at 3/1/2023 0757  Last data filed at 3/1/2023 0600  Gross per 24 hour   Intake 360 ml   Output 800 ml   Net -440 ml         Lines/Drains/Airways       Peripheral Intravenous  Line  Duration                  Peripheral IV - Single Lumen 02/27/23 1847 20 G Anterior;Proximal;Right Forearm 1 day         Peripheral IV - Single Lumen 02/28/23 0845 18 G Left Antecubital <1 day                  Exam unchanged vs 2/28/23  Physical Exam  Constitutional:       General: She is not in acute distress.     Appearance: She is well-developed. She is obese. She is not ill-appearing, toxic-appearing or diaphoretic.   HENT:      Head: Normocephalic and atraumatic.   Eyes:      General: No scleral icterus.     Extraocular Movements: Extraocular movements intact.      Conjunctiva/sclera: Conjunctivae normal.      Pupils: Pupils are equal, round, and reactive to light.   Neck:      Vascular: No JVD.      Trachea: No tracheal deviation.   Cardiovascular:      Rate and Rhythm: Normal rate and regular rhythm.      Heart sounds: S1 normal and S2 normal. No murmur heard.    No friction rub. No gallop.   Pulmonary:      Effort: Pulmonary effort is normal. No respiratory distress.      Breath sounds: Normal breath sounds. No stridor. No wheezing, rhonchi or rales.   Chest:      Chest wall: No tenderness.   Abdominal:      General: There is no distension.      Palpations: Abdomen is soft.   Musculoskeletal:         General: No swelling or tenderness. Normal range of motion.      Cervical back: Normal range of motion and neck supple. No rigidity.      Right lower leg: No edema.      Left lower leg: No edema.   Skin:     General: Skin is warm and dry.      Coloration: Skin is not jaundiced.   Neurological:      General: No focal deficit present.      Mental Status: She is alert and oriented to person, place, and time.      Cranial Nerves: No cranial nerve deficit.   Psychiatric:         Mood and Affect: Mood normal.         Behavior: Behavior normal.       Current Medications:   dronedarone  400 mg Oral BID      lactated ringers 125 mL/hr at 02/28/23 0936     sodium chloride, dextrose 10%, dextrose 10%, diphenhydrAMINE,  glucagon (human recombinant), glucose, glucose, insulin aspart U-100    Laboratory (all labs reviewed):  CBC:  Recent Labs   Lab 02/23/23 2006 02/24/23  0553 02/27/23  1655 02/27/23  1851 02/28/23  0602 02/28/23  1824 03/01/23  0619   WBC 16.89 H 20.14 H 13.72 H  --  12.88 H  --  10.25   Hemoglobin 6.8 L 7.6 L 7.7 L 7.3 L 5.5 LL 8.1 L 7.8 L   Hematocrit 21.2 L 23.3 L 24.6 L 23.0 L 18.1 LL 26.0 L 24.4 L   Platelets 282 194 271  --  203  --  212         CHEMISTRIES:  Recent Labs   Lab 01/24/22  1638 01/26/22  0633 02/12/22  0849 03/24/22  0738 07/30/22  0911 02/06/23  0715 02/23/23 2006 02/27/23  1655   Glucose 122 H 139 H 141 H 139 H 136 H 111 H 107 91   Sodium 139 138 139 139 134 L 137 137 138   Potassium 3.9 3.8 4.4 4.7 4.1 4.5 4.0 3.6   BUN 12 16 13 15 12 14 12 10   Creatinine 0.8 0.8 0.8 0.7 0.8 0.8 0.8 0.7   eGFR if non African American >60 >60 >60 >60 >60.0  --   --   --    eGFR  --   --   --   --   --  >60.0 >60 >60   Calcium 10.0 9.6 9.8 9.8 10.0 9.9 9.3 9.1         CARDIAC BIOMARKERS:  Recent Labs   Lab 01/24/22  1638   Troponin I <0.006         COAGS:  Recent Labs   Lab 03/24/22  0738   INR 1.0         LIPIDS/LFTS:  Recent Labs   Lab 12/18/20  0745 12/28/21  1336 01/24/22  1638 01/26/22  0633 07/30/22  0911 02/06/23  0715 02/23/23 2006 02/27/23  1655   Cholesterol 171 180  --   --  148 145  --   --    Triglycerides 139 138  --   --  111 127  --   --    HDL 55 53  --   --  52 51  --   --    LDL Cholesterol 88.2 99.4  --   --  73.8 68.6  --   --    Non-HDL Cholesterol 116 127  --   --  96 94  --   --    AST 61 H 53 H   < > 32 31 40 28 30   ALT 46 H 44   < > 33 24 28 23 25    < > = values in this interval not displayed.         BNP:  Recent Labs   Lab 01/24/22  1638 02/12/22  0849    H 40         TSH:  Recent Labs   Lab 12/18/20  0745 12/28/21  1336 01/24/22  1638 07/30/22  0911 02/06/23  0715   TSH 1.614 2.120 2.612 1.458 2.639         Free T4:        Diagnostic Results:  ECG (personally reviewed  and interpreted tracing(s)):  2/28/23 0819 , low volt, NSSTTW changes    Echo: 9/20/22  The estimated ejection fraction is 65%.  The left ventricle is normal in size with normal systolic function.  Mild-to-moderate mitral regurgitation.  Normal right ventricular size with normal right ventricular systolic function.  Atrial fibrillation observed.  Moderate left atrial enlargement.  Mild right atrial enlargement.  Normal central venous pressure (3 mmHg).  The estimated PA systolic pressure is 35 mmHg.    EVM 5/5/22  Paroxysmal atrial fibrillation with RVR. AF burden 14%

## 2023-03-01 NOTE — ANESTHESIA PREPROCEDURE EVALUATION
02/28/2023  Micaela Hernandez is a 52 y.o., female.    Pre-operative evaluation for Procedure(s) (LRB):  HYSTEROSCOPY, WITH DILATION AND CURETTAGE OF UTERUS (N/A)    NPO >8 instruction  METS >4    Bleeding s/p 2 u PRBC today. Eliquis last dose Monday 2/27 in AM.     Vitals:    02/28/23 1643   BP: (!) 142/64   Pulse: 92   Resp: 20   Temp: 36.8 °C (98.3 °F)         Patient Active Problem List   Diagnosis    Axillary mass    Liver mass, right lobe    Hernia, incisional    ARORA (nonalcoholic steatohepatitis)    Morbid obesity with BMI of 50.0-59.9, adult    Type 2 diabetes mellitus with diabetic nephropathy, without long-term current use of insulin    Hyperlipidemia    Hypertension    Chronic right-sided low back pain without sciatica    Chronic pain of right hip    Chronic pain of both knees    Degenerative lumbar disc    Primary osteoarthritis of both knees    Atrial flutter    Pleural effusion    NAPOLEON (obstructive sleep apnea)    LAD (linear IgA dermatosis)    Nasal congestion    PAF (paroxysmal atrial fibrillation)    S/P ablation of atrial flutter    Abnormal uterine bleeding (AUB)    Anemia    Menorrhagia with irregular cycle    Preop cardiovascular exam       Review of patient's allergies indicates:  No Known Allergies    No current facility-administered medications on file prior to encounter.     Current Outpatient Medications on File Prior to Encounter   Medication Sig Dispense Refill    amLODIPine (NORVASC) 5 MG tablet Take 1 tablet (5 mg total) by mouth every evening. 90 tablet 1    carvediloL (COREG) 12.5 MG tablet Take 1 tablet (12.5 mg total) by mouth 2 (two) times daily with meals. 180 tablet 1    dronedarone (MULTAQ) 400 mg Tab TAKE 1 TABLET BY MOUTH TWICE DAILY WITH MEALS 60 tablet 5    ELIQUIS 5 mg Tab Take 1 tablet by mouth twice daily 60 tablet 0    furosemide  (LASIX) 20 MG tablet Take 1 tablet (20 mg total) by mouth once daily. 90 tablet 1    glimepiride (AMARYL) 2 MG tablet Take 1 tablet (2 mg total) by mouth once daily. 90 tablet 1    loratadine (CLARITIN) 10 mg tablet Take 10 mg by mouth once daily. prn      losartan (COZAAR) 50 MG tablet Take 1 tablet (50 mg total) by mouth 2 (two) times daily. 180 tablet 1    medroxyPROGESTERone (PROVERA) 10 MG tablet Take 1 tablet (10 mg total) by mouth once daily. 30 tablet 0    metFORMIN (GLUCOPHAGE) 1000 MG tablet Take 1 tablet (1,000 mg total) by mouth 2 (two) times daily with meals. 180 tablet 1    simvastatin (ZOCOR) 40 MG tablet Take 1 tablet (40 mg total) by mouth every evening. 90 tablet 1    tirzepatide 5 mg/0.5 mL PnIj Inject 5 mg into the skin every 7 days. for 4 doses 4 pen 0    betamethasone dipropionate (DIPROLENE) 0.05 % ointment APPLY  OINTMENT TOPICALLY TWICE DAILY FOR 7 DAYS 15 g 0    clobetasoL (TEMOVATE) 0.05 % external solution Apply topically 2 (two) times daily. 50 mL 3    [START ON 2023] tirzepatide 10 mg/0.5 mL PnIj Inject 10 mg into the skin every 7 days. for 4 doses 4 pen 0    [START ON 3/19/2023] tirzepatide 7.5 mg/0.5 mL PnIj Inject 7.5 mg into the skin every 7 days. for 4 doses 4 pen 0       Past Surgical History:   Procedure Laterality Date    BREAST SURGERY       SECTION      CHOLECYSTECTOMY      ENDOMETRIAL ABLATION      HERNIA REPAIR      incisional     KNEE SURGERY      LIVER LOBECTOMY Right     LYMPH NODE DISSECTION      right axillary    TRANSESOPHAGEAL ECHOCARDIOGRAPHY N/A 2022    Procedure: ECHOCARDIOGRAM, TRANSESOPHAGEAL;  Surgeon: Ji Patricio MD;  Location: [facility] CATH LAB;  Service: Cardiology;  Laterality: N/A;       Social History     Socioeconomic History    Marital status:    Tobacco Use    Smoking status: Never    Smokeless tobacco: Never   Substance and Sexual Activity    Alcohol use: Not Currently     Comment: occasionally     Drug use: No    Sexual activity: Not Currently     Partners: Male     Birth control/protection: None         CBC:   Recent Labs     23  1655 23  1851 23  0602 23  1824   WBC 13.72*  --  12.88*  --    RBC 2.67*  --  1.97*  --    HGB 7.7*   < > 5.5* 8.1*   HCT 24.6*   < > 18.1* 26.0*     --  203  --    MCV 92  --  92  --    MCH 28.8  --  27.9  --    MCHC 31.3*  --  30.4*  --     < > = values in this interval not displayed.       CMP:   Recent Labs     23  1655      K 3.6      CO2 22*   BUN 10   CREATININE 0.7   GLU 91   CALCIUM 9.1   ALBUMIN 3.2*   PROT 7.4   ALKPHOS 82   ALT 25   AST 30   BILITOT 0.4       INR  No results for input(s): PT, INR, PROTIME, APTT in the last 72 hours.        Diagnostic Studies:      EKD Echo:  No results found for this or any previous visit.    Pre-op Assessment    I have reviewed the Patient Summary Reports.       I have reviewed the Medications.     Review of Systems  Anesthesia Hx:  No problems with previous Anesthesia  History of prior surgery of interest to airway management or planning:  Denies Personal Hx of Anesthesia complications.   Social:  Non-Smoker    Hematology/Oncology:         -- Anemia: --  Cancer in past history:  Breast right axillary node dissection surgery  Oncology Comments: Hx R LN dissection, no cancer detected and no Hx chemo/radiation per pt     EENT/Dental:   chronic allergic rhinitis   Cardiovascular:   Exercise tolerance: good Hypertension Dysrhythmias (eliquis) atrial fibrillation hyperlipidemia ECG has been reviewed.    Pulmonary:   Sleep Apnea    Education provided regarding risk of obstructive sleep apnea     Renal/:   Chronic Renal Disease    Hepatic/GI:   Liver Disease, Hepatitis    Musculoskeletal:   Arthritis     Endocrine:   Diabetes, type 2  Morbid Obesity / BMI > 40      Physical Exam  General: Cooperative and Alert    Airway:  Mallampati: III   Mouth Opening: Small, but > 3cm  TM  Distance: 4 - 6 cm  Tongue: Normal  Neck ROM: Normal ROM    Dental:  Intact        Anesthesia Plan  Type of Anesthesia, risks & benefits discussed:    Anesthesia Type: Gen ETT  Intra-op Monitoring Plan: Standard ASA Monitors  Post Op Pain Control Plan: multimodal analgesia  Induction:  IV  Airway Plan: Video, Post-Induction  Informed Consent: Informed consent signed with the Patient and all parties understand the risks and agree with anesthesia plan.  All questions answered. Patient consented to blood products? Yes  ASA Score: 3  Anesthesia Plan Notes: Am labs need to be reviewed. S/p 2 u PRBC today, 2U PRBC prepared for surgery.    Paper consent in chart.  IV access: 20G Rt AC, 18G Left AC    Ready For Surgery From Anesthesia Perspective.     .

## 2023-03-01 NOTE — PROGRESS NOTES
[facility]  Obstetrics  Postpartum Progress Note    Patient Name: Micaela Hernandez  MRN: [id number]  Admission Date: 2023  Hospital Length of Stay: 0 days  Attending Physician: Lola Zhao MD  Primary Care Provider: Bailey Fiore MD    Subjective:     Principal Problem:Menorrhagia with irregular cycle    She is doing well this morning. Anemia symptoms have resolved.  Vaginal bleeding is minimal.  No pain.         Objective:     Vital Signs (Most Recent):  Temp: 98.4 °F (36.9 °C) (23)  Pulse: 89 (23)  Resp: 19 (23)  BP: (!) 142/64 (23)  SpO2: 96 % (23)   Vital Signs (24h Range):  Temp:  [97.6 °F (36.4 °C)-98.8 °F (37.1 °C)] 98.4 °F (36.9 °C)  Pulse:  [] 89  Resp:  [19-22] 19  SpO2:  [96 %-100 %] 96 %  BP: (116-145)/(53-71) 142/64     Weight: 135.6 kg (298 lb 15.1 oz)  Body mass index is 52.96 kg/m².      Intake/Output Summary (Last 24 hours) at 3/1/2023 0831  Last data filed at 3/1/2023 0600  Gross per 24 hour   Intake 360 ml   Output 800 ml   Net -440 ml         Physical Exam:   Constitutional: She is oriented to person, place, and time. She appears well-developed.    HENT:   Head: Normocephalic and atraumatic.    Eyes: EOM are normal.     Cardiovascular:  Normal rate.             Pulmonary/Chest: Effort normal.        Abdominal: Soft. There is no abdominal tenderness.             Musculoskeletal: Normal range of motion.       Neurological: She is oriented to person, place, and time.    Skin: Skin is warm.    Psychiatric: She has a normal mood and affect.     Significant Labs:  Lab Results   Component Value Date    GROUPTRH O NEG 2023    AFP 3.1 2016     Recent Labs   Lab 23  0619   HGB 7.8*   HCT 24.4*         I have personallly reviewed all pertinent lab results from the last 24 hours.    Assessment/Plan:     52 y.o. female  here with symptomatic anemia:    Active Diagnoses:    Diagnosis Date Noted POA     PRINCIPAL PROBLEM:  Menorrhagia with irregular cycle [N92.1] 02/27/2023 Yes    Preop cardiovascular exam [Z01.810] 02/28/2023 Not Applicable    Anemia [D64.9] 02/24/2023 Yes    PAF (paroxysmal atrial fibrillation) [I48.0] 06/30/2022 Yes    Type 2 diabetes mellitus with diabetic nephropathy, without long-term current use of insulin [E11.21]  Yes    Hyperlipidemia [E78.5]  Yes    Hypertension [I10]  Yes      Problems Resolved During this Admission:       - CBC is stable.  - Cardiology and internal medicine following.  - hold Eliquis.    -Continue IV premarin.  - to OR today for dilation and curettage  - consents are signed and on the chart.  - plan discharge after D&C.    Lola Zhao MD  Gynecology  [facility]

## 2023-03-01 NOTE — PROGRESS NOTES
[facility]  Cardiology  Progress Note    Patient Name: Micaela Hernandez  MRN: [id number]  Admission Date: 2023  Hospital Length of Stay: 0 days  Code Status: Prior   Attending Physician: Lola Zhao MD   Primary Care Physician: Bailey Fiore MD  Expected Discharge Date:   Principal Problem:Menorrhagia with irregular cycle    Subjective:     Interval Hx: vaginal bleeding tailing off.  No cp/sob/palps.  Awaiting D&C today.        Past Medical History:   Diagnosis Date    Allergy     Atrial flutter     Degenerative lumbar disc 2021    Diabetes mellitus     Diabetes mellitus, type 2     Hyperlipidemia     Hypertension     PAF (paroxysmal atrial fibrillation) 2022    Sleep apnea     Symptomatic anemia 2023       Past Surgical History:   Procedure Laterality Date    BREAST SURGERY       SECTION      CHOLECYSTECTOMY      ENDOMETRIAL ABLATION      HERNIA REPAIR      incisional     KNEE SURGERY      LIVER LOBECTOMY Right     LYMPH NODE DISSECTION      right axillary    TRANSESOPHAGEAL ECHOCARDIOGRAPHY N/A 2022    Procedure: ECHOCARDIOGRAM, TRANSESOPHAGEAL;  Surgeon: Ji Patricio MD;  Location: [facility] CATH LAB;  Service: Cardiology;  Laterality: N/A;       Review of patient's allergies indicates:  No Known Allergies    No current facility-administered medications on file prior to encounter.     Current Outpatient Medications on File Prior to Encounter   Medication Sig    amLODIPine (NORVASC) 5 MG tablet Take 1 tablet (5 mg total) by mouth every evening.    carvediloL (COREG) 12.5 MG tablet Take 1 tablet (12.5 mg total) by mouth 2 (two) times daily with meals.    dronedarone (MULTAQ) 400 mg Tab TAKE 1 TABLET BY MOUTH TWICE DAILY WITH MEALS    ELIQUIS 5 mg Tab Take 1 tablet by mouth twice daily    furosemide (LASIX) 20 MG tablet Take 1 tablet (20 mg total) by mouth once daily.    glimepiride (AMARYL) 2 MG tablet Take 1 tablet (2 mg total) by mouth  once daily.    loratadine (CLARITIN) 10 mg tablet Take 10 mg by mouth once daily. prn    losartan (COZAAR) 50 MG tablet Take 1 tablet (50 mg total) by mouth 2 (two) times daily.    medroxyPROGESTERone (PROVERA) 10 MG tablet Take 1 tablet (10 mg total) by mouth once daily.    metFORMIN (GLUCOPHAGE) 1000 MG tablet Take 1 tablet (1,000 mg total) by mouth 2 (two) times daily with meals.    simvastatin (ZOCOR) 40 MG tablet Take 1 tablet (40 mg total) by mouth every evening.    tirzepatide 5 mg/0.5 mL PnIj Inject 5 mg into the skin every 7 days. for 4 doses    betamethasone dipropionate (DIPROLENE) 0.05 % ointment APPLY  OINTMENT TOPICALLY TWICE DAILY FOR 7 DAYS    clobetasoL (TEMOVATE) 0.05 % external solution Apply topically 2 (two) times daily.    [START ON 4/11/2023] tirzepatide 10 mg/0.5 mL PnIj Inject 10 mg into the skin every 7 days. for 4 doses    [START ON 3/19/2023] tirzepatide 7.5 mg/0.5 mL PnIj Inject 7.5 mg into the skin every 7 days. for 4 doses     Family History       Problem Relation (Age of Onset)    Asthma Mother    COPD Mother    Cancer Sister    Diabetes Father    Diabetes type II Mother    Hyperlipidemia Mother, Father    Hypertension Mother, Father    No Known Problems Brother, Maternal Aunt, Maternal Uncle, Paternal Aunt, Paternal Uncle, Maternal Grandmother, Maternal Grandfather, Paternal Grandmother, Paternal Grandfather          Tobacco Use    Smoking status: Never    Smokeless tobacco: Never   Substance and Sexual Activity    Alcohol use: Not Currently     Comment: occasionally    Drug use: No    Sexual activity: Not Currently     Partners: Male     Birth control/protection: None     Review of Systems   Gastrointestinal:  Negative for melena.   Genitourinary:  Negative for hematuria.   Objective:     Vital Signs (Most Recent):  Temp: 98.4 °F (36.9 °C) (03/01/23 0726)  Pulse: 89 (03/01/23 0726)  Resp: 19 (03/01/23 0726)  BP: (!) 142/64 (03/01/23 0726)  SpO2: 96 % (03/01/23 0726)    Vital Signs (24h Range):  Temp:  [97.6 °F (36.4 °C)-98.8 °F (37.1 °C)] 98.4 °F (36.9 °C)  Pulse:  [] 89  Resp:  [19-22] 19  SpO2:  [96 %-100 %] 96 %  BP: (116-145)/(53-73) 142/64     Weight: 135.6 kg (298 lb 15.1 oz)  Body mass index is 52.96 kg/m².    SpO2: 96 %         Intake/Output Summary (Last 24 hours) at 3/1/2023 0757  Last data filed at 3/1/2023 0600  Gross per 24 hour   Intake 360 ml   Output 800 ml   Net -440 ml         Lines/Drains/Airways       Peripheral Intravenous Line  Duration                  Peripheral IV - Single Lumen 02/27/23 1847 20 G Anterior;Proximal;Right Forearm 1 day         Peripheral IV - Single Lumen 02/28/23 0845 18 G Left Antecubital <1 day                  Exam unchanged vs 2/28/23  Physical Exam  Constitutional:       General: She is not in acute distress.     Appearance: She is well-developed. She is obese. She is not ill-appearing, toxic-appearing or diaphoretic.   HENT:      Head: Normocephalic and atraumatic.   Eyes:      General: No scleral icterus.     Extraocular Movements: Extraocular movements intact.      Conjunctiva/sclera: Conjunctivae normal.      Pupils: Pupils are equal, round, and reactive to light.   Neck:      Vascular: No JVD.      Trachea: No tracheal deviation.   Cardiovascular:      Rate and Rhythm: Normal rate and regular rhythm.      Heart sounds: S1 normal and S2 normal. No murmur heard.    No friction rub. No gallop.   Pulmonary:      Effort: Pulmonary effort is normal. No respiratory distress.      Breath sounds: Normal breath sounds. No stridor. No wheezing, rhonchi or rales.   Chest:      Chest wall: No tenderness.   Abdominal:      General: There is no distension.      Palpations: Abdomen is soft.   Musculoskeletal:         General: No swelling or tenderness. Normal range of motion.      Cervical back: Normal range of motion and neck supple. No rigidity.      Right lower leg: No edema.      Left lower leg: No edema.   Skin:     General: Skin is  warm and dry.      Coloration: Skin is not jaundiced.   Neurological:      General: No focal deficit present.      Mental Status: She is alert and oriented to person, place, and time.      Cranial Nerves: No cranial nerve deficit.   Psychiatric:         Mood and Affect: Mood normal.         Behavior: Behavior normal.       Current Medications:   dronedarone  400 mg Oral BID      lactated ringers 125 mL/hr at 02/28/23 0936     sodium chloride, dextrose 10%, dextrose 10%, diphenhydrAMINE, glucagon (human recombinant), glucose, glucose, insulin aspart U-100    Laboratory (all labs reviewed):  CBC:  Recent Labs   Lab 02/23/23 2006 02/24/23  0553 02/27/23  1655 02/27/23  1851 02/28/23  0602 02/28/23  1824 03/01/23  0619   WBC 16.89 H 20.14 H 13.72 H  --  12.88 H  --  10.25   Hemoglobin 6.8 L 7.6 L 7.7 L 7.3 L 5.5 LL 8.1 L 7.8 L   Hematocrit 21.2 L 23.3 L 24.6 L 23.0 L 18.1 LL 26.0 L 24.4 L   Platelets 282 194 271  --  203  --  212         CHEMISTRIES:  Recent Labs   Lab 01/24/22  1638 01/26/22  0633 02/12/22  0849 03/24/22  0738 07/30/22  0911 02/06/23  0715 02/23/23 2006 02/27/23  1655   Glucose 122 H 139 H 141 H 139 H 136 H 111 H 107 91   Sodium 139 138 139 139 134 L 137 137 138   Potassium 3.9 3.8 4.4 4.7 4.1 4.5 4.0 3.6   BUN 12 16 13 15 12 14 12 10   Creatinine 0.8 0.8 0.8 0.7 0.8 0.8 0.8 0.7   eGFR if non African American >60 >60 >60 >60 >60.0  --   --   --    eGFR  --   --   --   --   --  >60.0 >60 >60   Calcium 10.0 9.6 9.8 9.8 10.0 9.9 9.3 9.1         CARDIAC BIOMARKERS:  Recent Labs   Lab 01/24/22  1638   Troponin I <0.006         COAGS:  Recent Labs   Lab 03/24/22  0738   INR 1.0         LIPIDS/LFTS:  Recent Labs   Lab 12/18/20  0745 12/28/21  1336 01/24/22  1638 01/26/22  0633 07/30/22  0911 02/06/23  0715 02/23/23 2006 02/27/23  1655   Cholesterol 171 180  --   --  148 145  --   --    Triglycerides 139 138  --   --  111 127  --   --    HDL 55 53  --   --  52 51  --   --    LDL Cholesterol 88.2 99.4  --    --  73.8 68.6  --   --    Non-HDL Cholesterol 116 127  --   --  96 94  --   --    AST 61 H 53 H   < > 32 31 40 28 30   ALT 46 H 44   < > 33 24 28 23 25    < > = values in this interval not displayed.         BNP:  Recent Labs   Lab 01/24/22  1638 02/12/22  0849    H 40         TSH:  Recent Labs   Lab 12/18/20  0745 12/28/21  1336 01/24/22  1638 07/30/22  0911 02/06/23  0715   TSH 1.614 2.120 2.612 1.458 2.639         Free T4:        Diagnostic Results:  ECG (personally reviewed and interpreted tracing(s)):  2/28/23 0819 , low volt, NSSTTW changes    Echo: 9/20/22   The estimated ejection fraction is 65%.   The left ventricle is normal in size with normal systolic function.   Mild-to-moderate mitral regurgitation.   Normal right ventricular size with normal right ventricular systolic function.   Atrial fibrillation observed.   Moderate left atrial enlargement.   Mild right atrial enlargement.   Normal central venous pressure (3 mmHg).   The estimated PA systolic pressure is 35 mmHg.    EVM 5/5/22   Paroxysmal atrial fibrillation with RVR. AF burden 14%          Assessment and Plan:     * Menorrhagia with irregular cycle  Mgmt per GYN  RCRI 0, no prohibitive cardiac risk to surgery/anesthesia.  No further preop CV testing planned.  OK to hold eliquis, resume ASAP at the direction of GYN when acceptable for a surgical perspective.  No bridging needed.    Preop cardiovascular exam  As above    PAF (paroxysmal atrial fibrillation)  In SR  Cont multaq  Resume eliquis when cleared by GYN    Type 2 diabetes mellitus with diabetic nephropathy, without long-term current use of insulin  Per primary team    Hyperlipidemia  Cont statin    Hypertension  Cont med rx        VTE Risk Mitigation (From admission, onward)    None          Bennett Horton MD  Cardiology  [facility]

## 2023-03-02 NOTE — ANESTHESIA POSTPROCEDURE EVALUATION
Anesthesia Post Evaluation    Patient: Micaela Hernandez    Procedure(s) Performed: Procedure(s) (LRB):  HYSTEROSCOPY, WITH DILATION AND CURETTAGE OF UTERUS (N/A)    Final Anesthesia Type: general      Patient location during evaluation: GI PACU  Patient participation: Yes- Able to Participate  Level of consciousness: awake and alert, oriented and awake  Post-procedure vital signs: reviewed and stable  Airway patency: patent    PONV status at discharge: No PONV  Anesthetic complications: no      Cardiovascular status: blood pressure returned to baseline  Respiratory status: unassisted, spontaneous ventilation and room air  Hydration status: euvolemic  Follow-up not needed.          Vitals Value Taken Time   /58 03/01/23 1452   Temp 36.7 °C (98 °F) 03/01/23 1452   Pulse 103 03/01/23 1452   Resp 20 03/01/23 1452   SpO2 100 % 03/01/23 1452         Event Time   Out of Recovery 03/01/2023 14:37:30         Pain/Robinson Score: Pain Rating Prior to Med Admin: 6 (3/1/2023  1:46 PM)  Pain Rating Post Med Admin: 3 (3/1/2023  1:55 PM)  Robinson Score: 10 (3/1/2023  2:45 PM)

## 2023-03-03 ENCOUNTER — OFFICE VISIT (OUTPATIENT)
Dept: CARDIOLOGY | Facility: CLINIC | Age: 53
End: 2023-03-03
Payer: COMMERCIAL

## 2023-03-03 ENCOUNTER — TELEPHONE (OUTPATIENT)
Dept: OBSTETRICS AND GYNECOLOGY | Facility: CLINIC | Age: 53
End: 2023-03-03
Payer: COMMERCIAL

## 2023-03-03 VITALS
SYSTOLIC BLOOD PRESSURE: 118 MMHG | DIASTOLIC BLOOD PRESSURE: 62 MMHG | RESPIRATION RATE: 15 BRPM | WEIGHT: 293 LBS | HEART RATE: 79 BPM | OXYGEN SATURATION: 100 % | BODY MASS INDEX: 51.91 KG/M2 | HEIGHT: 63 IN

## 2023-03-03 DIAGNOSIS — I10 PRIMARY HYPERTENSION: ICD-10-CM

## 2023-03-03 DIAGNOSIS — Z86.79 S/P ABLATION OF ATRIAL FLUTTER: ICD-10-CM

## 2023-03-03 DIAGNOSIS — E78.5 HYPERLIPIDEMIA, UNSPECIFIED HYPERLIPIDEMIA TYPE: ICD-10-CM

## 2023-03-03 DIAGNOSIS — I48.0 PAF (PAROXYSMAL ATRIAL FIBRILLATION): Primary | ICD-10-CM

## 2023-03-03 DIAGNOSIS — Z98.890 S/P ABLATION OF ATRIAL FLUTTER: ICD-10-CM

## 2023-03-03 LAB
BLD PROD TYP BPU: NORMAL
BLOOD UNIT EXPIRATION DATE: NORMAL
BLOOD UNIT TYPE CODE: 5100
BLOOD UNIT TYPE: NORMAL
CODING SYSTEM: NORMAL
CROSSMATCH INTERPRETATION: NORMAL
DISPENSE STATUS: NORMAL
TRANS ERYTHROCYTES VOL PATIENT: NORMAL ML

## 2023-03-03 PROCEDURE — 3078F DIAST BP <80 MM HG: CPT | Mod: CPTII,S$GLB,, | Performed by: INTERNAL MEDICINE

## 2023-03-03 PROCEDURE — 3044F HG A1C LEVEL LT 7.0%: CPT | Mod: CPTII,S$GLB,, | Performed by: INTERNAL MEDICINE

## 2023-03-03 PROCEDURE — 3066F PR DOCUMENTATION OF TREATMENT FOR NEPHROPATHY: ICD-10-PCS | Mod: CPTII,S$GLB,, | Performed by: INTERNAL MEDICINE

## 2023-03-03 PROCEDURE — 99999 PR PBB SHADOW E&M-EST. PATIENT-LVL IV: ICD-10-PCS | Mod: PBBFAC,,, | Performed by: INTERNAL MEDICINE

## 2023-03-03 PROCEDURE — 1159F PR MEDICATION LIST DOCUMENTED IN MEDICAL RECORD: ICD-10-PCS | Mod: CPTII,S$GLB,, | Performed by: INTERNAL MEDICINE

## 2023-03-03 PROCEDURE — 3008F BODY MASS INDEX DOCD: CPT | Mod: CPTII,S$GLB,, | Performed by: INTERNAL MEDICINE

## 2023-03-03 PROCEDURE — 3078F PR MOST RECENT DIASTOLIC BLOOD PRESSURE < 80 MM HG: ICD-10-PCS | Mod: CPTII,S$GLB,, | Performed by: INTERNAL MEDICINE

## 2023-03-03 PROCEDURE — 99214 PR OFFICE/OUTPT VISIT, EST, LEVL IV, 30-39 MIN: ICD-10-PCS | Mod: S$GLB,,, | Performed by: INTERNAL MEDICINE

## 2023-03-03 PROCEDURE — 3008F PR BODY MASS INDEX (BMI) DOCUMENTED: ICD-10-PCS | Mod: CPTII,S$GLB,, | Performed by: INTERNAL MEDICINE

## 2023-03-03 PROCEDURE — 3061F PR NEG MICROALBUMINURIA RESULT DOCUMENTED/REVIEW: ICD-10-PCS | Mod: CPTII,S$GLB,, | Performed by: INTERNAL MEDICINE

## 2023-03-03 PROCEDURE — 3061F NEG MICROALBUMINURIA REV: CPT | Mod: CPTII,S$GLB,, | Performed by: INTERNAL MEDICINE

## 2023-03-03 PROCEDURE — 4010F PR ACE/ARB THEARPY RXD/TAKEN: ICD-10-PCS | Mod: CPTII,S$GLB,, | Performed by: INTERNAL MEDICINE

## 2023-03-03 PROCEDURE — 99214 OFFICE O/P EST MOD 30 MIN: CPT | Mod: S$GLB,,, | Performed by: INTERNAL MEDICINE

## 2023-03-03 PROCEDURE — 99999 PR PBB SHADOW E&M-EST. PATIENT-LVL IV: CPT | Mod: PBBFAC,,, | Performed by: INTERNAL MEDICINE

## 2023-03-03 PROCEDURE — 3074F SYST BP LT 130 MM HG: CPT | Mod: CPTII,S$GLB,, | Performed by: INTERNAL MEDICINE

## 2023-03-03 PROCEDURE — 3044F PR MOST RECENT HEMOGLOBIN A1C LEVEL <7.0%: ICD-10-PCS | Mod: CPTII,S$GLB,, | Performed by: INTERNAL MEDICINE

## 2023-03-03 PROCEDURE — 4010F ACE/ARB THERAPY RXD/TAKEN: CPT | Mod: CPTII,S$GLB,, | Performed by: INTERNAL MEDICINE

## 2023-03-03 PROCEDURE — 3074F PR MOST RECENT SYSTOLIC BLOOD PRESSURE < 130 MM HG: ICD-10-PCS | Mod: CPTII,S$GLB,, | Performed by: INTERNAL MEDICINE

## 2023-03-03 PROCEDURE — 3066F NEPHROPATHY DOC TX: CPT | Mod: CPTII,S$GLB,, | Performed by: INTERNAL MEDICINE

## 2023-03-03 PROCEDURE — 1159F MED LIST DOCD IN RCRD: CPT | Mod: CPTII,S$GLB,, | Performed by: INTERNAL MEDICINE

## 2023-03-03 NOTE — TELEPHONE ENCOUNTER
----- Message from Tracy Bass sent at 3/3/2023  3:51 PM CST -----  Regarding: patient call back  Type: Patient Call Back    Who called: Self     What is the request in detail: Said that she needs to get scheduled for a hysterectomy  surgery     Can the clinic reply by [facility]? No     Would the patient rather a call back or a response via [facility]? Call     Best call back number:  [phone]

## 2023-03-03 NOTE — PROGRESS NOTES
Subjective:    Patient ID:  Micaela Hernandez is a 52 y.o. female who presents for follow-up of No chief complaint on file.      HPI       A-flutter/A-fib  on eliquis and multaq - A-flutter RFA 3/31/22, HTN, HLD, DM, obesity     Admitted 1/24/22  Micaela Hernandez is a 51 y.o. female who  has a past medical history of Morbid Obesity, Degenerative lumbar disc, Diabetes mellitus, Diabetes mellitus, type 2, Hyperlipidemia, and Hypertension, presented to the ED with CC of SOB. Patient states that she has been feeling this since November, and has been wheezing intermittently as well. She even went to an allergist last week and had Xray, attempted albuterol.In Afib/Aflutter in the ED. HR sustained in 150's. Has never had an ECHO, never been to cardiologist. Started on dilt gtt and sent to ICU. SED rate 68. Concern for PNA with pleural fluid and infiltrates with LN swellings and new afib/flutter, started on CAP. LN of axilla were biopsied and told they were reactive, not malignant. Normal D-dimer for age. BNP was 102 but BMI 60. Patient denies any fevers, night sweats, n/v/d/c, abd pain, dysuria, hematuria or hematochezia, cough, CP, leg swelling, HA, dizziness, weakness, hallucinations, SI, HI, or self injury at this time.     Ms. Hernandez was admitted to the ICU for atrial flutter. Cardiology was consulted, and she was started on anticoagulation, flecainide, and a diltiazem drip w/ improvement in heart rate but remained in atrial flutter. She had shortness of breath which improved with furosemide. TTE showed normal LVEF. The following day she was cardioverted with successful restoration of sinus rhythm. She was discharged on flecainide and apixaban (CV 2+) to follow up with cardiology in the outpatient setting. Return precautions given, all questions answered prior to discharge, patient in agreement with plan.      3/31/22   Successful ablation of atrial flutter (typical).  3D mapping performed with Ensite.     1/26/22 STEVEN/CV  - EF 55%, mild MR/TR. No thrombus in IDANIA. CV 200J converted A-flutter to NSR     9/20/22  The estimated ejection fraction is 65%.  The left ventricle is normal in size with normal systolic function.  Mild-to-moderate mitral regurgitation.  Normal right ventricular size with normal right ventricular systolic function.  Atrial fibrillation observed.  Moderate left atrial enlargement.  Mild right atrial enlargement.  Normal central venous pressure (3 mmHg).  The estimated PA systolic pressure is 35 mmHg.    EVM 5/5/22  Paroxysmal atrial fibrillation with RVR. AF burden 14%        2/3/22 Last weekend had an episode of heart racing which lasted all night and was similar to prior episode of A-flutter. Otherwise denies CP or SOB  EKG NSR QTc 493  Suspect breakthrough A-flutter. Increase coreg 6.25 bid. Will hold off on increasing flecainide with prolonged QT  Refer to EP for possible ablation  OV 1 month      Saw EP 3/8/22  Pt with recurrent atrial flutter despite Flecainide.   Recommend RFA.   Risks and benefits of RFA discussed, she would like to proceed.   In interim, increase Flecainide to 150 mg BID.   ECG in 1 week. If remains out of rhythm, consider repeat DCCV.        3/17/22 palpitations improved after taking increased dose of flecainide  EKG NSR QTc 515  Denies CP or SOB  Agree with plans for RFA of A-flutter  Continue Rx for HTN, HLD, A-flutter  OV 3 months     6/20/22 Flecainide was restarted for PAF 5/30/22 - she only took 2 doses - made her feel bad. Still with palpitations but not as severe  EKG NSR - ok  Continue Rx for HTN, HLD, A-flutter, PAF  OV 3 months     Saw Dr Kirk 9/12/22  Overall doing well from an AF burden standpoint. Suspect this is Multaq + treatment for her sleep apnea.  Discussed need for weight reduction.  Given shortness of breath >> echo.  F/u in 6 months.     10/20/22 Tolerating multaq - denies palpitations. Mild SALGUERO. Denies CP  BP controlled  Continue Rx for HTN, HLD, A-flutter,  PAF  OV 3 months with holter    Admitted 2/27/23  HPI: Patient admitted with symptomatic anemia secondary to vaginal bleeding.  Hospital Course: Patient started on IV Premarin and Eliquis was stopped.   The vaginal bleeding resolved.  She was give 2 units of packed red blood cells.  A hysteroscopy D&C was performed on 3/1/2023. Please see operative report for complete  details. Following surgery, patient was transferred to the floor for routine post operative care. She had  an uncomplicated post operative course with no acute events. Her vital signs remained stable and  afebrile throughout her hospital stay. Her urine output was adequate and her physical exam was  appropriate. She was meeting all post operative milestones upon discharge.  She was ambulating and voiding without difficulty. She was tolerating a regular diet, and her vital signs  were stable and afebrile.   EKG 2/28/23 NSR PRWP    3/3/23 Recently admitted with vaginal bleeding. Still off of eliquis  Needs clearance for hysterectomy next week  Denies CP or SOB  DBP running low    Review of Systems   Constitutional: Negative for decreased appetite.   HENT:  Negative for ear discharge.    Eyes:  Negative for blurred vision.   Respiratory:  Negative for hemoptysis.    Endocrine: Negative for polyphagia.   Hematologic/Lymphatic: Negative for adenopathy.   Skin:  Negative for color change.   Musculoskeletal:  Negative for joint swelling.   Genitourinary:  Negative for bladder incontinence.   Neurological:  Negative for brief paralysis.   Psychiatric/Behavioral:  Negative for hallucinations.    Allergic/Immunologic: Negative for hives.      Objective:    Physical Exam  Constitutional:       Appearance: She is well-developed.   HENT:      Head: Normocephalic and atraumatic.   Eyes:      Conjunctiva/sclera: Conjunctivae normal.      Pupils: Pupils are equal, round, and reactive to light.   Cardiovascular:      Rate and Rhythm: Normal rate.      Pulses: Intact  distal pulses.      Heart sounds: Normal heart sounds.   Pulmonary:      Effort: Pulmonary effort is normal.      Breath sounds: Normal breath sounds.   Abdominal:      General: Bowel sounds are normal.      Palpations: Abdomen is soft.   Musculoskeletal:         General: Normal range of motion.      Cervical back: Normal range of motion and neck supple.   Skin:     General: Skin is warm and dry.   Neurological:      Mental Status: She is alert and oriented to person, place, and time.         Assessment:       1. PAF (paroxysmal atrial fibrillation)    2. S/P ablation of atrial flutter    3. Primary hypertension    4. Hyperlipidemia, unspecified hyperlipidemia type         Plan:       Cleared fir hysterectomy at low cardiac risk - resume eliquis when cleared by GYN  Continue Rx for HTN, HLD, A-flutter, PAF  OV 3 months

## 2023-03-03 NOTE — TELEPHONE ENCOUNTER
Pt call was returned. Pt was advised that her pathology results was still in process and that her provider would be in contact with her to scheduled surgery.

## 2023-03-06 ENCOUNTER — TELEPHONE (OUTPATIENT)
Dept: OBSTETRICS AND GYNECOLOGY | Facility: CLINIC | Age: 53
End: 2023-03-06
Payer: COMMERCIAL

## 2023-03-06 NOTE — TELEPHONE ENCOUNTER
Pt inquiring about pathology results. Pt advised of results still pending. Pt advised once provider receives and reviews the results, she will be notified.       ----- Message from Caitlyn Boo sent at 3/6/2023  2:24 PM CST -----  .Type: Patient Call Back    Who called: Self    What is the request in detail: Checking status of results     Can the clinic reply by [facility]? No    Would the patient rather a call back or a response via [facility]? Call    Best call back number:.[phone] (home) [phone] (work)      Additional Information:

## 2023-03-08 ENCOUNTER — TELEPHONE (OUTPATIENT)
Dept: OBSTETRICS AND GYNECOLOGY | Facility: CLINIC | Age: 53
End: 2023-03-08
Payer: COMMERCIAL

## 2023-03-08 ENCOUNTER — ANESTHESIA EVENT (OUTPATIENT)
Dept: SURGERY | Facility: HOSPITAL | Age: 53
End: 2023-03-08
Payer: COMMERCIAL

## 2023-03-08 DIAGNOSIS — N92.1 MENORRHAGIA WITH IRREGULAR CYCLE: Primary | ICD-10-CM

## 2023-03-08 DIAGNOSIS — D25.9 UTERINE LEIOMYOMA, UNSPECIFIED LOCATION: ICD-10-CM

## 2023-03-08 LAB
FINAL PATHOLOGIC DIAGNOSIS: NORMAL
Lab: NORMAL

## 2023-03-08 NOTE — TELEPHONE ENCOUNTER
Called patient and discussed pathology results.  Benign endometrium.  She has been admitted with for two blood transfusions last week.  Has had an endometrial ablation.  She is not supposed to take hormones because she had a lesion in her liver.  At this time, she would like to proceed with definitive surgical management with hysterectomy.    Case Request sent.

## 2023-03-14 ENCOUNTER — TELEPHONE (OUTPATIENT)
Dept: ELECTROPHYSIOLOGY | Facility: CLINIC | Age: 53
End: 2023-03-14
Payer: COMMERCIAL

## 2023-03-14 NOTE — PROGRESS NOTES
Subjective:    Patient ID:  Micaela Hernandez is a 52 y.o. female who presents for follow-up of No chief complaint on file.      HPI 52 y.o. female with atrial fibrillation, morbid obesity, DM, HTN, HLD, AFL (RFA of CTI 3/31/2022), sleep apnea (on CPAP)    The patient location is: Home  The chief complaint leading to consultation is: atrial fibrillation     Visit type: audiovisual     Face to Face time with patient: 15 minutes  20 minutes of total time spent on the encounter, which includes face to face time and non-face to face time preparing to see the patient (eg, review of tests), Obtaining and/or reviewing separately obtained history, Documenting clinical information in the electronic or other health record, Independently interpreting results (not separately reported) and communicating results to the patient/family/caregiver, or Care coordination (not separately reported).        Each patient to whom he or she provides medical services by telemedicine is:  (1) informed of the relationship between the physician and patient and the respective role of any other health care provider with respect to management of the patient; and (2) notified that he or she may decline to receive medical services by telemedicine and may withdraw from such care at any time.      Background:     11/21 presented with symptomatic atrial flutter.  STEVEN/DCCV 1/26/22      Developed symptomatic recurrence.     3/31/2022: Successful ablation of atrial flutter (typical).  Noted continued palpitations.     Bardy 5/5/2022: SR with 14% PAF with RVR  Flecainide resumed >> felt poorly.  Switched to Multaq.     Started on CPAP 5/22.    Echo 9/20/22 EF 65% normal RV size and function moderate LAE.     Update:  Admitted with vaginal bleeding. Found to have fibroids. Eliquis discontinued. Plan is for laparoscopic total hysterectomy.  Has not had any symptoms c/w atrial fibrillation for extended period.    Review of Systems   Constitutional: Negative.  Negative for fever and malaise/fatigue.   HENT:  Negative for congestion and sore throat.    Cardiovascular:  Negative for chest pain, dyspnea on exertion, irregular heartbeat, leg swelling, near-syncope, orthopnea, palpitations, paroxysmal nocturnal dyspnea and syncope.   Respiratory:  Negative for cough and shortness of breath.    Gastrointestinal:  Negative for abdominal pain, constipation and diarrhea.   Neurological:  Negative for dizziness, light-headedness and weakness.   Psychiatric/Behavioral:  Negative for depression. The patient is not nervous/anxious.             Assessment:       1. PAF (paroxysmal atrial fibrillation)    2. Primary hypertension    3. Morbid obesity with BMI of 50.0-59.9, adult    4. Type 2 diabetes mellitus with diabetic nephropathy, without long-term current use of insulin    5. NAPOLEON (obstructive sleep apnea)         Plan:       Agree with proceeding with surgery.  Resume Eliquis after surgery when OK from surgical standpoint.  If has post or rhianna operative AF, would endorse short trial of Amiodarone.

## 2023-03-15 ENCOUNTER — OFFICE VISIT (OUTPATIENT)
Dept: OBSTETRICS AND GYNECOLOGY | Facility: CLINIC | Age: 53
End: 2023-03-15
Payer: COMMERCIAL

## 2023-03-15 ENCOUNTER — HOSPITAL ENCOUNTER (OUTPATIENT)
Dept: PREADMISSION TESTING | Facility: HOSPITAL | Age: 53
Discharge: HOME | End: 2023-03-15
Attending: OBSTETRICS & GYNECOLOGY
Payer: COMMERCIAL

## 2023-03-15 ENCOUNTER — HOSPITAL ENCOUNTER (OUTPATIENT)
Dept: RADIOLOGY | Facility: HOSPITAL | Age: 53
Discharge: HOME | End: 2023-03-15
Attending: OBSTETRICS & GYNECOLOGY
Payer: COMMERCIAL

## 2023-03-15 ENCOUNTER — OFFICE VISIT (OUTPATIENT)
Dept: ELECTROPHYSIOLOGY | Facility: CLINIC | Age: 53
End: 2023-03-15
Payer: COMMERCIAL

## 2023-03-15 VITALS
SYSTOLIC BLOOD PRESSURE: 131 MMHG | RESPIRATION RATE: 18 BRPM | HEART RATE: 94 BPM | BODY MASS INDEX: 51.91 KG/M2 | TEMPERATURE: 99 F | OXYGEN SATURATION: 98 % | WEIGHT: 293 LBS | HEIGHT: 63 IN | DIASTOLIC BLOOD PRESSURE: 75 MMHG

## 2023-03-15 VITALS — SYSTOLIC BLOOD PRESSURE: 120 MMHG | DIASTOLIC BLOOD PRESSURE: 74 MMHG | BODY MASS INDEX: 52.37 KG/M2 | WEIGHT: 293 LBS

## 2023-03-15 DIAGNOSIS — E11.21 TYPE 2 DIABETES MELLITUS WITH DIABETIC NEPHROPATHY, WITHOUT LONG-TERM CURRENT USE OF INSULIN: ICD-10-CM

## 2023-03-15 DIAGNOSIS — N92.1 MENORRHAGIA WITH IRREGULAR CYCLE: Primary | ICD-10-CM

## 2023-03-15 DIAGNOSIS — Z01.818 PREOP TESTING: Primary | ICD-10-CM

## 2023-03-15 DIAGNOSIS — G47.33 OSA (OBSTRUCTIVE SLEEP APNEA): ICD-10-CM

## 2023-03-15 DIAGNOSIS — E66.01 MORBID OBESITY WITH BMI OF 50.0-59.9, ADULT: ICD-10-CM

## 2023-03-15 DIAGNOSIS — I48.0 PAF (PAROXYSMAL ATRIAL FIBRILLATION): Primary | ICD-10-CM

## 2023-03-15 DIAGNOSIS — I10 PRIMARY HYPERTENSION: ICD-10-CM

## 2023-03-15 DIAGNOSIS — N92.1 MENORRHAGIA WITH IRREGULAR CYCLE: ICD-10-CM

## 2023-03-15 DIAGNOSIS — D25.9 UTERINE LEIOMYOMA, UNSPECIFIED LOCATION: ICD-10-CM

## 2023-03-15 LAB
BASOPHILS # BLD AUTO: 0.08 K/UL (ref 0–0.2)
BASOPHILS NFR BLD: 0.8 % (ref 0–1.9)
DIFFERENTIAL METHOD: ABNORMAL
EOSINOPHIL # BLD AUTO: 0.2 K/UL (ref 0–0.5)
EOSINOPHIL NFR BLD: 2 % (ref 0–8)
ERYTHROCYTE [DISTWIDTH] IN BLOOD BY AUTOMATED COUNT: 16.6 % (ref 11.5–14.5)
HCT VFR BLD AUTO: 30.5 % (ref 37–48.5)
HGB BLD-MCNC: 9 G/DL (ref 12–16)
IMM GRANULOCYTES # BLD AUTO: 0.03 K/UL (ref 0–0.04)
IMM GRANULOCYTES NFR BLD AUTO: 0.3 % (ref 0–0.5)
LYMPHOCYTES # BLD AUTO: 2.7 K/UL (ref 1–4.8)
LYMPHOCYTES NFR BLD: 25.5 % (ref 18–48)
MCH RBC QN AUTO: 26.6 PG (ref 27–31)
MCHC RBC AUTO-ENTMCNC: 29.5 G/DL (ref 32–36)
MCV RBC AUTO: 90 FL (ref 82–98)
MONOCYTES # BLD AUTO: 0.7 K/UL (ref 0.3–1)
MONOCYTES NFR BLD: 6.2 % (ref 4–15)
NEUTROPHILS # BLD AUTO: 6.8 K/UL (ref 1.8–7.7)
NEUTROPHILS NFR BLD: 65.2 % (ref 38–73)
NRBC BLD-RTO: 0 /100 WBC
PLATELET # BLD AUTO: 317 K/UL (ref 150–450)
PMV BLD AUTO: 11.9 FL (ref 9.2–12.9)
RBC # BLD AUTO: 3.38 M/UL (ref 4–5.4)
WBC # BLD AUTO: 10.44 K/UL (ref 3.9–12.7)

## 2023-03-15 PROCEDURE — 4010F PR ACE/ARB THEARPY RXD/TAKEN: ICD-10-PCS | Mod: CPTII,S$GLB,, | Performed by: OBSTETRICS & GYNECOLOGY

## 2023-03-15 PROCEDURE — 99499 NO LOS: ICD-10-PCS | Mod: S$GLB,,, | Performed by: OBSTETRICS & GYNECOLOGY

## 2023-03-15 PROCEDURE — 3066F PR DOCUMENTATION OF TREATMENT FOR NEPHROPATHY: ICD-10-PCS | Mod: CPTII,S$GLB,, | Performed by: OBSTETRICS & GYNECOLOGY

## 2023-03-15 PROCEDURE — 3044F HG A1C LEVEL LT 7.0%: CPT | Mod: CPTII,95,, | Performed by: INTERNAL MEDICINE

## 2023-03-15 PROCEDURE — 99999 PR PBB SHADOW E&M-EST. PATIENT-LVL III: CPT | Mod: PBBFAC,,, | Performed by: OBSTETRICS & GYNECOLOGY

## 2023-03-15 PROCEDURE — 3066F PR DOCUMENTATION OF TREATMENT FOR NEPHROPATHY: ICD-10-PCS | Mod: CPTII,95,, | Performed by: INTERNAL MEDICINE

## 2023-03-15 PROCEDURE — 71046 X-RAY EXAM CHEST 2 VIEWS: CPT | Mod: TC,FY

## 2023-03-15 PROCEDURE — 3078F PR MOST RECENT DIASTOLIC BLOOD PRESSURE < 80 MM HG: ICD-10-PCS | Mod: CPTII,S$GLB,, | Performed by: OBSTETRICS & GYNECOLOGY

## 2023-03-15 PROCEDURE — 3061F PR NEG MICROALBUMINURIA RESULT DOCUMENTED/REVIEW: ICD-10-PCS | Mod: CPTII,S$GLB,, | Performed by: OBSTETRICS & GYNECOLOGY

## 2023-03-15 PROCEDURE — 3061F PR NEG MICROALBUMINURIA RESULT DOCUMENTED/REVIEW: ICD-10-PCS | Mod: CPTII,95,, | Performed by: INTERNAL MEDICINE

## 2023-03-15 PROCEDURE — 99999 PR PBB SHADOW E&M-EST. PATIENT-LVL III: ICD-10-PCS | Mod: PBBFAC,,, | Performed by: OBSTETRICS & GYNECOLOGY

## 2023-03-15 PROCEDURE — 71046 XR CHEST PA AND LATERAL PRE-OP: ICD-10-PCS | Mod: 26,,, | Performed by: RADIOLOGY

## 2023-03-15 PROCEDURE — 4010F ACE/ARB THERAPY RXD/TAKEN: CPT | Mod: CPTII,95,, | Performed by: INTERNAL MEDICINE

## 2023-03-15 PROCEDURE — 1159F MED LIST DOCD IN RCRD: CPT | Mod: CPTII,S$GLB,, | Performed by: OBSTETRICS & GYNECOLOGY

## 2023-03-15 PROCEDURE — 3074F PR MOST RECENT SYSTOLIC BLOOD PRESSURE < 130 MM HG: ICD-10-PCS | Mod: CPTII,S$GLB,, | Performed by: OBSTETRICS & GYNECOLOGY

## 2023-03-15 PROCEDURE — 4010F ACE/ARB THERAPY RXD/TAKEN: CPT | Mod: CPTII,S$GLB,, | Performed by: OBSTETRICS & GYNECOLOGY

## 2023-03-15 PROCEDURE — 71046 X-RAY EXAM CHEST 2 VIEWS: CPT | Mod: 26,,, | Performed by: RADIOLOGY

## 2023-03-15 PROCEDURE — 3044F PR MOST RECENT HEMOGLOBIN A1C LEVEL <7.0%: ICD-10-PCS | Mod: CPTII,95,, | Performed by: INTERNAL MEDICINE

## 2023-03-15 PROCEDURE — 3008F PR BODY MASS INDEX (BMI) DOCUMENTED: ICD-10-PCS | Mod: CPTII,S$GLB,, | Performed by: OBSTETRICS & GYNECOLOGY

## 2023-03-15 PROCEDURE — 1159F PR MEDICATION LIST DOCUMENTED IN MEDICAL RECORD: ICD-10-PCS | Mod: CPTII,95,, | Performed by: INTERNAL MEDICINE

## 2023-03-15 PROCEDURE — 3074F SYST BP LT 130 MM HG: CPT | Mod: CPTII,S$GLB,, | Performed by: OBSTETRICS & GYNECOLOGY

## 2023-03-15 PROCEDURE — 3066F NEPHROPATHY DOC TX: CPT | Mod: CPTII,S$GLB,, | Performed by: OBSTETRICS & GYNECOLOGY

## 2023-03-15 PROCEDURE — 3008F BODY MASS INDEX DOCD: CPT | Mod: CPTII,S$GLB,, | Performed by: OBSTETRICS & GYNECOLOGY

## 2023-03-15 PROCEDURE — 3044F PR MOST RECENT HEMOGLOBIN A1C LEVEL <7.0%: ICD-10-PCS | Mod: CPTII,S$GLB,, | Performed by: OBSTETRICS & GYNECOLOGY

## 2023-03-15 PROCEDURE — 1159F PR MEDICATION LIST DOCUMENTED IN MEDICAL RECORD: ICD-10-PCS | Mod: CPTII,S$GLB,, | Performed by: OBSTETRICS & GYNECOLOGY

## 2023-03-15 PROCEDURE — 3066F NEPHROPATHY DOC TX: CPT | Mod: CPTII,95,, | Performed by: INTERNAL MEDICINE

## 2023-03-15 PROCEDURE — 4010F PR ACE/ARB THEARPY RXD/TAKEN: ICD-10-PCS | Mod: CPTII,95,, | Performed by: INTERNAL MEDICINE

## 2023-03-15 PROCEDURE — 85025 COMPLETE CBC W/AUTO DIFF WBC: CPT | Performed by: OBSTETRICS & GYNECOLOGY

## 2023-03-15 PROCEDURE — 3061F NEG MICROALBUMINURIA REV: CPT | Mod: CPTII,S$GLB,, | Performed by: OBSTETRICS & GYNECOLOGY

## 2023-03-15 PROCEDURE — 3078F DIAST BP <80 MM HG: CPT | Mod: CPTII,S$GLB,, | Performed by: OBSTETRICS & GYNECOLOGY

## 2023-03-15 PROCEDURE — 3061F NEG MICROALBUMINURIA REV: CPT | Mod: CPTII,95,, | Performed by: INTERNAL MEDICINE

## 2023-03-15 PROCEDURE — 99499 UNLISTED E&M SERVICE: CPT | Mod: S$GLB,,, | Performed by: OBSTETRICS & GYNECOLOGY

## 2023-03-15 PROCEDURE — 1159F MED LIST DOCD IN RCRD: CPT | Mod: CPTII,95,, | Performed by: INTERNAL MEDICINE

## 2023-03-15 PROCEDURE — 3044F HG A1C LEVEL LT 7.0%: CPT | Mod: CPTII,S$GLB,, | Performed by: OBSTETRICS & GYNECOLOGY

## 2023-03-15 PROCEDURE — 99214 OFFICE O/P EST MOD 30 MIN: CPT | Mod: 95,,, | Performed by: INTERNAL MEDICINE

## 2023-03-15 PROCEDURE — 99214 PR OFFICE/OUTPT VISIT, EST, LEVL IV, 30-39 MIN: ICD-10-PCS | Mod: 95,,, | Performed by: INTERNAL MEDICINE

## 2023-03-15 RX ORDER — MUPIROCIN 20 MG/G
OINTMENT TOPICAL
Status: CANCELLED | OUTPATIENT
Start: 2023-03-15

## 2023-03-15 RX ORDER — SODIUM CHLORIDE, SODIUM LACTATE, POTASSIUM CHLORIDE, CALCIUM CHLORIDE 600; 310; 30; 20 MG/100ML; MG/100ML; MG/100ML; MG/100ML
INJECTION, SOLUTION INTRAVENOUS CONTINUOUS
Status: CANCELLED | OUTPATIENT
Start: 2023-03-15

## 2023-03-15 NOTE — H&P
Subjective:       Patient ID: Micaela Hernandez is a 52 y.o. female.    Chief Complaint:  Pre-op Exam      History of Present Illness  HPI  Patient has known history of uterine fibroids.  Was seen for two blood transfusions earlier this month.  Is taking Eliquis, but has currently stopped prior to hysterectomy.  Had has prior endometrial ablation for AUB that did not help.    At this time, she would like to proceed with definitive surgical management with hysterectomy.    GYN & OB History  Patient's last menstrual period was 2023.   Date of Last Pap: 2021    OB History    Para Term  AB Living   1 1 1         SAB IAB Ectopic Multiple Live Births                  # Outcome Date GA Lbr Melvin/2nd Weight Sex Delivery Anes PTL Lv   1 Term      CS-Unspec        Past Medical History:  Past Medical History:   Diagnosis Date    Allergy     Atrial flutter     Degenerative lumbar disc 2021    Diabetes mellitus     Diabetes mellitus, type 2     Hyperlipidemia     Hypertension     PAF (paroxysmal atrial fibrillation) 2022    Sleep apnea     Symptomatic anemia 2023       Past Surgical History:  Past Surgical History:   Procedure Laterality Date    BREAST SURGERY       SECTION      CHOLECYSTECTOMY      ENDOMETRIAL ABLATION      HERNIA REPAIR      incisional     HYSTEROSCOPY WITH DILATION AND CURETTAGE OF UTERUS N/A 3/1/2023    Procedure: HYSTEROSCOPY, WITH DILATION AND CURETTAGE OF UTERUS;  Surgeon: Lola Zhao MD;  Location: [facility] OR;  Service: OB/GYN;  Laterality: N/A;    KNEE SURGERY      LIVER LOBECTOMY Right     LYMPH NODE DISSECTION      right axillary    TRANSESOPHAGEAL ECHOCARDIOGRAPHY N/A 2022    Procedure: ECHOCARDIOGRAM, TRANSESOPHAGEAL;  Surgeon: Ji Patricio MD;  Location: [facility] CATH LAB;  Service: Cardiology;  Laterality: N/A;       Family History:  Family History   Problem Relation Age of Onset    Hypertension Mother     Asthma Mother     COPD Mother      Hyperlipidemia Mother     Diabetes type II Mother     Diabetes Father     Hypertension Father     Hyperlipidemia Father     Cancer Sister         type unknown, but CA in mouth    No Known Problems Brother     No Known Problems Maternal Aunt     No Known Problems Maternal Uncle     No Known Problems Paternal Aunt     No Known Problems Paternal Uncle     No Known Problems Maternal Grandmother     No Known Problems Maternal Grandfather     No Known Problems Paternal Grandmother     No Known Problems Paternal Grandfather     Breast cancer Neg Hx     Colon cancer Neg Hx     Ovarian cancer Neg Hx     Amblyopia Neg Hx     Blindness Neg Hx     Cataracts Neg Hx     Glaucoma Neg Hx     Macular degeneration Neg Hx     Retinal detachment Neg Hx     Strabismus Neg Hx     Stroke Neg Hx     Thyroid disease Neg Hx        Allergies:  Review of patient's allergies indicates:  No Known Allergies    Medications:  Current Outpatient Medications on File Prior to Visit   Medication Sig Dispense Refill    acetaminophen (TYLENOL EXTRA STRENGTH) 500 MG tablet Take 2 tablets (1,000 mg total) by mouth every 6 (six) hours as needed for Pain. 100 tablet 2    amLODIPine (NORVASC) 5 MG tablet Take 1 tablet (5 mg total) by mouth every evening. 90 tablet 1    carvediloL (COREG) 12.5 MG tablet Take 1 tablet (12.5 mg total) by mouth 2 (two) times daily with meals. 180 tablet 1    clobetasoL (TEMOVATE) 0.05 % external solution Apply topically 2 (two) times daily. 50 mL 3    dronedarone (MULTAQ) 400 mg Tab TAKE 1 TABLET BY MOUTH TWICE DAILY WITH MEALS 60 tablet 5    ELIQUIS 5 mg Tab Take 1 tablet by mouth twice daily 60 tablet 0    furosemide (LASIX) 20 MG tablet Take 1 tablet (20 mg total) by mouth once daily. 90 tablet 1    glimepiride (AMARYL) 2 MG tablet Take 1 tablet (2 mg total) by mouth once daily. 90 tablet 1    loratadine (CLARITIN) 10 mg tablet Take 10 mg by mouth once daily. prn      losartan (COZAAR) 50 MG tablet Take 1 tablet (50 mg total)  by mouth 2 (two) times daily. 180 tablet 1    medroxyPROGESTERone (PROVERA) 10 MG tablet Take 1 tablet (10 mg total) by mouth once daily. 30 tablet 0    metFORMIN (GLUCOPHAGE) 1000 MG tablet Take 1 tablet (1,000 mg total) by mouth 2 (two) times daily with meals. 180 tablet 1    simvastatin (ZOCOR) 40 MG tablet Take 1 tablet (40 mg total) by mouth every evening. 90 tablet 1    [START ON 4/11/2023] tirzepatide 10 mg/0.5 mL PnIj Inject 10 mg into the skin every 7 days. for 4 doses 4 pen 0    tirzepatide 5 mg/0.5 mL PnIj Inject 5 mg into the skin every 7 days. for 4 doses 4 pen 0    [START ON 3/19/2023] tirzepatide 7.5 mg/0.5 mL PnIj Inject 7.5 mg into the skin every 7 days. for 4 doses 4 pen 0    betamethasone dipropionate (DIPROLENE) 0.05 % ointment APPLY  OINTMENT TOPICALLY TWICE DAILY FOR 7 DAYS (Patient not taking: Reported on 3/15/2023) 15 g 0     No current facility-administered medications on file prior to visit.       Social History:  Social History     Tobacco Use    Smoking status: Never    Smokeless tobacco: Never   Substance Use Topics    Alcohol use: Not Currently     Comment: occasionally    Drug use: No             Review of Systems  Review of Systems   Constitutional: Negative.    HENT: Negative.     Eyes: Negative.    Respiratory: Negative.     Cardiovascular: Negative.    Gastrointestinal: Negative.    Endocrine: Negative.    Genitourinary:  Positive for dysmenorrhea, menorrhagia and menstrual problem.   Musculoskeletal: Negative.    Integumentary:  Negative.   Neurological: Negative.    Hematological: Negative.    Psychiatric/Behavioral: Negative.     Breast: negative.          Objective:    Physical Exam:   Constitutional: She is oriented to person, place, and time. She appears well-developed.    HENT:   Head: Normocephalic and atraumatic.    Eyes: EOM are normal.     Cardiovascular:  Normal rate.             Pulmonary/Chest: Effort normal.        Abdominal: Soft. There is no abdominal tenderness.              Musculoskeletal: Normal range of motion.       Neurological: She is oriented to person, place, and time.    Skin: Skin is warm.    Psychiatric: She has a normal mood and affect.        Results for orders placed during the hospital encounter of 02/23/23    US Pelvis Comp with Transvag NON-OB (xpd)    Narrative  EXAMINATION:  US PELVIS COMP WITH TRANSVAG NON-OB (XPD)    CLINICAL HISTORY:  Menorrhagia;    TECHNIQUE:  Transabdominal sonography of the pelvis was performed, followed by transvaginal sonography to better evaluate the uterus and ovaries.    COMPARISON:  None.    FINDINGS:  The uterus measures 12 x 7 x 7 cm. Uterine parenchyma heterogenous in appearance with fibroids seen, the largest of which measures at least 4 cm.  This results in shadowing with poor visualization of the endometrium.  Small nabothian cysts are noted.    Neither ovary was able to be visualized.  No adnexal abnormalities are seen.  No significant free fluid is seen.    Impression  1. Uterine fibroids.  2. Poor visualization of the endometrium due to shadowing from uterine fibroids.  Potential underlying endometrial pathology is unable to be excluded.  3. Nonvisualization of the ovaries.      Electronically signed by: Kimberlyn Ken MD  Date:    02/23/2023  Time:    22:21     EMBX from 3/1/2023:  ENDOMETRIAL CURETTAGE:   - Fragments of endometrium with pseudodecidualization, most consistent with   endometrial polyp.   - Fragments of endocervical mucosa with no pathologic abnormalities.   - No evidence of hyperplasia or carcinoma.     Last pap:  12/30/2020:  Neg and HPV neg      Assessment:        1. Menorrhagia with irregular cycle    2. Uterine leiomyoma, unspecified location                Plan:      - Risks, benefits, and alternatives of Total laparoscopic hysterectomy reviewed with patient.  She voiced understanding.  All questions answered.  Consents are signed and on the chart.       1. Menorrhagia with irregular cycle  -  Place in Outpatient; Standing  - Full code; Standing  - Vital signs; Standing  - Insert peripheral IV; Standing  - Pedersen to Gravity; Standing  - POCT glucose; Standing  - Notify physician if BS > 180 for hysterectomy patients; Standing  - Chlorhexidine (CHG) 2% Wipes; Standing  - Notify Physician/Vital Signs Parameters Urine output less than 0.5mL/kg/hr (with indwelling catheter) or 30 mL/hr (without indwelling catheter) or blood glucose greater than 200 mg/dL; Standing  - Notify physician; Standing  - Notify Physician - Potential Need of Opioid Reversal; Standing  - Diet NPO; Standing  - Diet NPO; Standing  - Place sequential compression device; Standing  - Chlorohexidine Gluconate Bath; Standing  - Comprehensive metabolic panel; Standing  - Type & Screen Pre Op; Standing    2. Uterine leiomyoma, unspecified location  - Place in Outpatient; Standing  - Full code; Standing  - Vital signs; Standing  - Insert peripheral IV; Standing  - Pedersen to Gravity; Standing  - POCT glucose; Standing  - Notify physician if BS > 180 for hysterectomy patients; Standing  - Chlorhexidine (CHG) 2% Wipes; Standing  - Notify Physician/Vital Signs Parameters Urine output less than 0.5mL/kg/hr (with indwelling catheter) or 30 mL/hr (without indwelling catheter) or blood glucose greater than 200 mg/dL; Standing  - Notify physician; Standing  - Notify Physician - Potential Need of Opioid Reversal; Standing  - Diet NPO; Standing  - Diet NPO; Standing  - Place sequential compression device; Standing  - Chlorohexidine Gluconate Bath; Standing  - Comprehensive metabolic panel; Standing  - Type & Screen Pre Op; Standing

## 2023-03-15 NOTE — H&P (VIEW-ONLY)
Subjective:       Patient ID: Micaela Hernandez is a 52 y.o. female.    Chief Complaint:  Pre-op Exam      History of Present Illness  HPI  Patient has known history of uterine fibroids.  Was seen for two blood transfusions earlier this month.  Is taking Eliquis, but has currently stopped prior to hysterectomy.  Had has prior endometrial ablation for AUB that did not help.    At this time, she would like to proceed with definitive surgical management with hysterectomy.    GYN & OB History  Patient's last menstrual period was 2023.   Date of Last Pap: 2021    OB History    Para Term  AB Living   1 1 1         SAB IAB Ectopic Multiple Live Births                  # Outcome Date GA Lbr Melvin/2nd Weight Sex Delivery Anes PTL Lv   1 Term      CS-Unspec        Past Medical History:  Past Medical History:   Diagnosis Date    Allergy     Atrial flutter     Degenerative lumbar disc 2021    Diabetes mellitus     Diabetes mellitus, type 2     Hyperlipidemia     Hypertension     PAF (paroxysmal atrial fibrillation) 2022    Sleep apnea     Symptomatic anemia 2023       Past Surgical History:  Past Surgical History:   Procedure Laterality Date    BREAST SURGERY       SECTION      CHOLECYSTECTOMY      ENDOMETRIAL ABLATION      HERNIA REPAIR      incisional     HYSTEROSCOPY WITH DILATION AND CURETTAGE OF UTERUS N/A 3/1/2023    Procedure: HYSTEROSCOPY, WITH DILATION AND CURETTAGE OF UTERUS;  Surgeon: Lola Zhao MD;  Location: [facility] OR;  Service: OB/GYN;  Laterality: N/A;    KNEE SURGERY      LIVER LOBECTOMY Right     LYMPH NODE DISSECTION      right axillary    TRANSESOPHAGEAL ECHOCARDIOGRAPHY N/A 2022    Procedure: ECHOCARDIOGRAM, TRANSESOPHAGEAL;  Surgeon: Ji Patricio MD;  Location: [facility] CATH LAB;  Service: Cardiology;  Laterality: N/A;       Family History:  Family History   Problem Relation Age of Onset    Hypertension Mother     Asthma Mother     COPD Mother      Hyperlipidemia Mother     Diabetes type II Mother     Diabetes Father     Hypertension Father     Hyperlipidemia Father     Cancer Sister         type unknown, but CA in mouth    No Known Problems Brother     No Known Problems Maternal Aunt     No Known Problems Maternal Uncle     No Known Problems Paternal Aunt     No Known Problems Paternal Uncle     No Known Problems Maternal Grandmother     No Known Problems Maternal Grandfather     No Known Problems Paternal Grandmother     No Known Problems Paternal Grandfather     Breast cancer Neg Hx     Colon cancer Neg Hx     Ovarian cancer Neg Hx     Amblyopia Neg Hx     Blindness Neg Hx     Cataracts Neg Hx     Glaucoma Neg Hx     Macular degeneration Neg Hx     Retinal detachment Neg Hx     Strabismus Neg Hx     Stroke Neg Hx     Thyroid disease Neg Hx        Allergies:  Review of patient's allergies indicates:  No Known Allergies    Medications:  Current Outpatient Medications on File Prior to Visit   Medication Sig Dispense Refill    acetaminophen (TYLENOL EXTRA STRENGTH) 500 MG tablet Take 2 tablets (1,000 mg total) by mouth every 6 (six) hours as needed for Pain. 100 tablet 2    amLODIPine (NORVASC) 5 MG tablet Take 1 tablet (5 mg total) by mouth every evening. 90 tablet 1    carvediloL (COREG) 12.5 MG tablet Take 1 tablet (12.5 mg total) by mouth 2 (two) times daily with meals. 180 tablet 1    clobetasoL (TEMOVATE) 0.05 % external solution Apply topically 2 (two) times daily. 50 mL 3    dronedarone (MULTAQ) 400 mg Tab TAKE 1 TABLET BY MOUTH TWICE DAILY WITH MEALS 60 tablet 5    ELIQUIS 5 mg Tab Take 1 tablet by mouth twice daily 60 tablet 0    furosemide (LASIX) 20 MG tablet Take 1 tablet (20 mg total) by mouth once daily. 90 tablet 1    glimepiride (AMARYL) 2 MG tablet Take 1 tablet (2 mg total) by mouth once daily. 90 tablet 1    loratadine (CLARITIN) 10 mg tablet Take 10 mg by mouth once daily. prn      losartan (COZAAR) 50 MG tablet Take 1 tablet (50 mg total)  by mouth 2 (two) times daily. 180 tablet 1    medroxyPROGESTERone (PROVERA) 10 MG tablet Take 1 tablet (10 mg total) by mouth once daily. 30 tablet 0    metFORMIN (GLUCOPHAGE) 1000 MG tablet Take 1 tablet (1,000 mg total) by mouth 2 (two) times daily with meals. 180 tablet 1    simvastatin (ZOCOR) 40 MG tablet Take 1 tablet (40 mg total) by mouth every evening. 90 tablet 1    [START ON 4/11/2023] tirzepatide 10 mg/0.5 mL PnIj Inject 10 mg into the skin every 7 days. for 4 doses 4 pen 0    tirzepatide 5 mg/0.5 mL PnIj Inject 5 mg into the skin every 7 days. for 4 doses 4 pen 0    [START ON 3/19/2023] tirzepatide 7.5 mg/0.5 mL PnIj Inject 7.5 mg into the skin every 7 days. for 4 doses 4 pen 0    betamethasone dipropionate (DIPROLENE) 0.05 % ointment APPLY  OINTMENT TOPICALLY TWICE DAILY FOR 7 DAYS (Patient not taking: Reported on 3/15/2023) 15 g 0     No current facility-administered medications on file prior to visit.       Social History:  Social History     Tobacco Use    Smoking status: Never    Smokeless tobacco: Never   Substance Use Topics    Alcohol use: Not Currently     Comment: occasionally    Drug use: No             Review of Systems  Review of Systems   Constitutional: Negative.    HENT: Negative.     Eyes: Negative.    Respiratory: Negative.     Cardiovascular: Negative.    Gastrointestinal: Negative.    Endocrine: Negative.    Genitourinary:  Positive for dysmenorrhea, menorrhagia and menstrual problem.   Musculoskeletal: Negative.    Integumentary:  Negative.   Neurological: Negative.    Hematological: Negative.    Psychiatric/Behavioral: Negative.     Breast: negative.          Objective:    Physical Exam:   Constitutional: She is oriented to person, place, and time. She appears well-developed.    HENT:   Head: Normocephalic and atraumatic.    Eyes: EOM are normal.     Cardiovascular:  Normal rate.             Pulmonary/Chest: Effort normal.        Abdominal: Soft. There is no abdominal tenderness.              Musculoskeletal: Normal range of motion.       Neurological: She is oriented to person, place, and time.    Skin: Skin is warm.    Psychiatric: She has a normal mood and affect.        Results for orders placed during the hospital encounter of 02/23/23    US Pelvis Comp with Transvag NON-OB (xpd)    Narrative  EXAMINATION:  US PELVIS COMP WITH TRANSVAG NON-OB (XPD)    CLINICAL HISTORY:  Menorrhagia;    TECHNIQUE:  Transabdominal sonography of the pelvis was performed, followed by transvaginal sonography to better evaluate the uterus and ovaries.    COMPARISON:  None.    FINDINGS:  The uterus measures 12 x 7 x 7 cm. Uterine parenchyma heterogenous in appearance with fibroids seen, the largest of which measures at least 4 cm.  This results in shadowing with poor visualization of the endometrium.  Small nabothian cysts are noted.    Neither ovary was able to be visualized.  No adnexal abnormalities are seen.  No significant free fluid is seen.    Impression  1. Uterine fibroids.  2. Poor visualization of the endometrium due to shadowing from uterine fibroids.  Potential underlying endometrial pathology is unable to be excluded.  3. Nonvisualization of the ovaries.      Electronically signed by: Kimberlyn Ken MD  Date:    02/23/2023  Time:    22:21     EMBX from 3/1/2023:  ENDOMETRIAL CURETTAGE:   - Fragments of endometrium with pseudodecidualization, most consistent with   endometrial polyp.   - Fragments of endocervical mucosa with no pathologic abnormalities.   - No evidence of hyperplasia or carcinoma.     Last pap:  12/30/2020:  Neg and HPV neg      Assessment:        1. Menorrhagia with irregular cycle    2. Uterine leiomyoma, unspecified location                Plan:      - Risks, benefits, and alternatives of Total laparoscopic hysterectomy reviewed with patient.  She voiced understanding.  All questions answered.  Consents are signed and on the chart.       1. Menorrhagia with irregular cycle  -  Place in Outpatient; Standing  - Full code; Standing  - Vital signs; Standing  - Insert peripheral IV; Standing  - Pedersen to Gravity; Standing  - POCT glucose; Standing  - Notify physician if BS > 180 for hysterectomy patients; Standing  - Chlorhexidine (CHG) 2% Wipes; Standing  - Notify Physician/Vital Signs Parameters Urine output less than 0.5mL/kg/hr (with indwelling catheter) or 30 mL/hr (without indwelling catheter) or blood glucose greater than 200 mg/dL; Standing  - Notify physician; Standing  - Notify Physician - Potential Need of Opioid Reversal; Standing  - Diet NPO; Standing  - Diet NPO; Standing  - Place sequential compression device; Standing  - Chlorohexidine Gluconate Bath; Standing  - Comprehensive metabolic panel; Standing  - Type & Screen Pre Op; Standing    2. Uterine leiomyoma, unspecified location  - Place in Outpatient; Standing  - Full code; Standing  - Vital signs; Standing  - Insert peripheral IV; Standing  - Pedersen to Gravity; Standing  - POCT glucose; Standing  - Notify physician if BS > 180 for hysterectomy patients; Standing  - Chlorhexidine (CHG) 2% Wipes; Standing  - Notify Physician/Vital Signs Parameters Urine output less than 0.5mL/kg/hr (with indwelling catheter) or 30 mL/hr (without indwelling catheter) or blood glucose greater than 200 mg/dL; Standing  - Notify physician; Standing  - Notify Physician - Potential Need of Opioid Reversal; Standing  - Diet NPO; Standing  - Diet NPO; Standing  - Place sequential compression device; Standing  - Chlorohexidine Gluconate Bath; Standing  - Comprehensive metabolic panel; Standing  - Type & Screen Pre Op; Standing

## 2023-03-15 NOTE — DISCHARGE INSTRUCTIONS

## 2023-03-15 NOTE — ANESTHESIA PREPROCEDURE EVALUATION
03/15/2023  Micaela Hernandez is a 52 y.o., female   To undergo Procedure(s) (LRB):  HYSTERECTOMY, TOTAL, LAPAROSCOPIC (N/A)  POSSIBLE HYSTERECTOMY, TOTAL, ABDOMINAL (N/A)     Denies CP/GERD/MI/CVA/URI symptoms.  Endorses SOB with walking about 10 minutes.  METS > 4  NPO > 8    Past Medical History:  Past Medical History:   Diagnosis Date    Allergy     Atrial flutter     Degenerative lumbar disc 2021    Diabetes mellitus     Diabetes mellitus, type 2     Hyperlipidemia     Hypertension     PAF (paroxysmal atrial fibrillation) 2022    Sleep apnea     Symptomatic anemia 2023       Past Surgical History:  Past Surgical History:   Procedure Laterality Date    BREAST SURGERY       SECTION      CHOLECYSTECTOMY      ENDOMETRIAL ABLATION      HERNIA REPAIR      incisional     HYSTEROSCOPY WITH DILATION AND CURETTAGE OF UTERUS N/A 3/1/2023    Procedure: HYSTEROSCOPY, WITH DILATION AND CURETTAGE OF UTERUS;  Surgeon: Lola Zhao MD;  Location: [facility] OR;  Service: OB/GYN;  Laterality: N/A;    KNEE SURGERY      LIVER LOBECTOMY Right     LYMPH NODE DISSECTION      right axillary    TRANSESOPHAGEAL ECHOCARDIOGRAPHY N/A 2022    Procedure: ECHOCARDIOGRAM, TRANSESOPHAGEAL;  Surgeon: Ji Patricio MD;  Location: [facility] CATH LAB;  Service: Cardiology;  Laterality: N/A;       Social History:  Social History     Socioeconomic History    Marital status:    Tobacco Use    Smoking status: Never    Smokeless tobacco: Never   Substance and Sexual Activity    Alcohol use: Not Currently     Comment: occasionally    Drug use: No    Sexual activity: Not Currently     Partners: Male     Birth control/protection: None       Medications:  No current facility-administered medications on file prior to encounter.     Current Outpatient Medications on File Prior to Encounter   Medication Sig Dispense Refill    amLODIPine (NORVASC) 5 MG tablet Take 1 tablet (5 mg total) by mouth every  evening. 90 tablet 1    betamethasone dipropionate (DIPROLENE) 0.05 % ointment APPLY  OINTMENT TOPICALLY TWICE DAILY FOR 7 DAYS 15 g 0    carvediloL (COREG) 12.5 MG tablet Take 1 tablet (12.5 mg total) by mouth 2 (two) times daily with meals. 180 tablet 1    dronedarone (MULTAQ) 400 mg Tab TAKE 1 TABLET BY MOUTH TWICE DAILY WITH MEALS 60 tablet 5    ELIQUIS 5 mg Tab Take 1 tablet by mouth twice daily 60 tablet 0    furosemide (LASIX) 20 MG tablet Take 1 tablet (20 mg total) by mouth once daily. 90 tablet 1    glimepiride (AMARYL) 2 MG tablet Take 1 tablet (2 mg total) by mouth once daily. 90 tablet 1    loratadine (CLARITIN) 10 mg tablet Take 10 mg by mouth once daily. prn      losartan (COZAAR) 50 MG tablet Take 1 tablet (50 mg total) by mouth 2 (two) times daily. 180 tablet 1    medroxyPROGESTERone (PROVERA) 10 MG tablet Take 1 tablet (10 mg total) by mouth once daily. 30 tablet 0    metFORMIN (GLUCOPHAGE) 1000 MG tablet Take 1 tablet (1,000 mg total) by mouth 2 (two) times daily with meals. 180 tablet 1    simvastatin (ZOCOR) 40 MG tablet Take 1 tablet (40 mg total) by mouth every evening. 90 tablet 1    acetaminophen (TYLENOL EXTRA STRENGTH) 500 MG tablet Take 2 tablets (1,000 mg total) by mouth every 6 (six) hours as needed for Pain. 100 tablet 2    clobetasoL (TEMOVATE) 0.05 % external solution Apply topically 2 (two) times daily. 50 mL 3    [START ON 4/11/2023] tirzepatide 10 mg/0.5 mL PnIj Inject 10 mg into the skin every 7 days. for 4 doses 4 pen 0    tirzepatide 7.5 mg/0.5 mL PnIj Inject 7.5 mg into the skin every 7 days. for 4 doses 4 pen 0       Allergies:  Review of patient's allergies indicates:  No Known Allergies    Active Problems:  Patient Active Problem List   Diagnosis    Axillary mass    Liver mass, right lobe    Hernia, incisional    ARORA (nonalcoholic steatohepatitis)    Morbid obesity with BMI of 50.0-59.9, adult    Type 2 diabetes mellitus with diabetic nephropathy,  without long-term current use of insulin    Hyperlipidemia    Hypertension    Chronic right-sided low back pain without sciatica    Chronic pain of right hip    Chronic pain of both knees    Degenerative lumbar disc    Primary osteoarthritis of both knees    Atrial flutter    Pleural effusion    NAPOLEON (obstructive sleep apnea)    LAD (linear IgA dermatosis)    Nasal congestion    PAF (paroxysmal atrial fibrillation)    S/P ablation of atrial flutter    Abnormal uterine bleeding (AUB)    Anemia    Menorrhagia with irregular cycle    Preop cardiovascular exam    Status post hysteroscopy       Diagnostic Studies:    CBC:  Recent Labs   Lab 03/27/23  1523   WBC 9.75   RBC 3.50*   HGB 9.6*   HCT 31.9*      MCV 91   MCH 27.4   MCHC 30.1*      EKG (2/28/23):  Sinus tachycardia   Cannot rule out Anterior infarct ,age undetermined     TTE (9/20/22):  The estimated ejection fraction is 65%.   The left ventricle is normal in size with normal systolic function.   Mild-to-moderate mitral regurgitation.   Normal right ventricular size with normal right ventricular systolic function.   Atrial fibrillation observed.   Moderate left atrial enlargement.   Mild right atrial enlargement.   Normal central venous pressure (3 mmHg).   The estimated PA systolic pressure is 35 mmHg.    24 Hour Vitals:  Temp:  [36.7 °C (98 °F)] 36.7 °C (98 °F)  Pulse:  [86] 86  Resp:  [18] 18  SpO2:  [99 %] 99 %  BP: (147)/(65) 147/65   See Nursing Charting For Additional Vitals    Pre-op Assessment    I have reviewed the Patient Summary Reports.     I have reviewed the Nursing Notes. I have reviewed the NPO Status.   I have reviewed the Medications.     Review of Systems  Anesthesia Hx:  No problems with previous Anesthesia  Denies Family Hx of Anesthesia complications.   Denies Personal Hx of Anesthesia complications.   Social:  Non-Smoker, No Alcohol Use    Hematology/Oncology:     Oncology Normal    -- Anemia:  "  EENT/Dental:EENT/Dental Normal   Cardiovascular:   Exercise tolerance: good Hypertension Dysrhythmias atrial fibrillation hyperlipidemia ECG has been reviewed. Eliquis, last dose a month ago Functional Capacity good / => 4 METS    Pulmonary:   Sleep Apnea Has not used in 2 mos   Education provided regarding risk of obstructive sleep apnea     Hepatic/GI:   Liver Disease, Liver lobectomy due to liver mass in 2016   Musculoskeletal:   Arthritis     Neurological:  Neurology Normal    Endocrine:   Diabetes, type 2  Morbid Obesity / BMI > 40  Dermatological:  Skin Normal    Psych:  Psychiatric Normal           Physical Exam  General: Well nourished and Cooperative    Airway:  Mallampati: III   Mouth Opening: Small, but > 3cm  TM Distance: Normal      Dental:  Intact    Chest/Lungs:  Clear to auscultation, Normal Respiratory Rate    Heart:  Rate: Normal  Rhythm: Regular Rhythm        Anesthesia Plan  Type of Anesthesia, risks & benefits discussed:    Anesthesia Type: Gen ETT  Intra-op Monitoring Plan: Standard ASA Monitors  Post Op Pain Control Plan: multimodal analgesia and IV/PO Opioids PRN  Induction:  IV  Airway Plan: Direct and Video, Post-Induction  Informed Consent: Informed consent signed with the Patient and all parties understand the risks and agree with anesthesia plan.  All questions answered. Patient consented to blood products? Yes  ASA Score: 3  Anesthesia Plan Notes: PMHx significant for PAF, HTN, obesity, HTN, NAPOLEON  Clearance by Cardiology Dr. Kirk-Agree with proceeding with surgery.  Resume Eliquis after surgery when OK from surgical standpoint.  If has post or rhianna operative AF, would endorse short trial of Amiodarone."    GA with OETT  Standard ASA monitors  Recovery in PACU  PONV: 3    Ready For Surgery From Anesthesia Perspective.     .      "

## 2023-03-20 ENCOUNTER — LAB VISIT (OUTPATIENT)
Dept: LAB | Facility: HOSPITAL | Age: 53
End: 2023-03-20
Attending: OBSTETRICS & GYNECOLOGY
Payer: COMMERCIAL

## 2023-03-20 DIAGNOSIS — Z01.818 PREOPERATIVE TESTING: ICD-10-CM

## 2023-03-20 DIAGNOSIS — Z01.818 PREOP TESTING: ICD-10-CM

## 2023-03-20 LAB
ABO + RH BLD: NORMAL
B-HCG UR QL: NEGATIVE
BLD GP AB SCN CELLS X3 SERPL QL: NORMAL

## 2023-03-20 PROCEDURE — 81025 URINE PREGNANCY TEST: CPT | Performed by: ANESTHESIOLOGY

## 2023-03-20 PROCEDURE — 36415 COLL VENOUS BLD VENIPUNCTURE: CPT | Performed by: OBSTETRICS & GYNECOLOGY

## 2023-03-20 PROCEDURE — 86900 BLOOD TYPING SEROLOGIC ABO: CPT | Performed by: OBSTETRICS & GYNECOLOGY

## 2023-03-21 ENCOUNTER — ANESTHESIA (OUTPATIENT)
Dept: SURGERY | Facility: HOSPITAL | Age: 53
End: 2023-03-21
Payer: COMMERCIAL

## 2023-03-22 ENCOUNTER — TELEPHONE (OUTPATIENT)
Dept: OBSTETRICS AND GYNECOLOGY | Facility: CLINIC | Age: 53
End: 2023-03-22
Payer: COMMERCIAL

## 2023-03-22 DIAGNOSIS — D25.9 UTERINE LEIOMYOMA, UNSPECIFIED LOCATION: ICD-10-CM

## 2023-03-22 DIAGNOSIS — N92.1 MENORRHAGIA WITH IRREGULAR CYCLE: Primary | ICD-10-CM

## 2023-03-22 NOTE — TELEPHONE ENCOUNTER
Spoke with pt and she will have lab work done on Monday and the following Friday she will come and pre op with provider. Pt voiced understanding.        ----- Message from Lola Zhao MD sent at 3/22/2023 12:06 PM CDT -----  I put in the orders for her.  She can go to the lab on Monday to get it done.    And, I just re-scheduled her post op to the following Monday.    Lola Zhao   ----- Message -----  From: Dea Garcia MA  Sent: 3/22/2023  11:46 AM CDT  To: Lola Zhao MD    Please advise  ----- Message -----  From: Luanne Thibodeaux  Sent: 3/22/2023   8:56 AM CDT  To: Cece Davila Staff    Type: Patient Call Back    Who called: self     What is the request in detail: pt rescheduled her surgery for next Tuesday  she is asking if she has to do another type and screen blood work again Also wants to make sure her post op get rescheduled for an accurate time and date after the surgery   If she has to repeat the blood work she would like to do it on Monday   Can the clinic reply by [facility]?    Would the patient rather a call back or a response via [facility]? Either     Best call back number:[phone] (home) [phone] (work)      Additional Information:

## 2023-03-24 ENCOUNTER — TELEPHONE (OUTPATIENT)
Dept: OBSTETRICS AND GYNECOLOGY | Facility: CLINIC | Age: 53
End: 2023-03-24
Payer: COMMERCIAL

## 2023-03-24 NOTE — TELEPHONE ENCOUNTER
Pt notified she is still on for surgery on 3/28/2023 and sx dept will contact her on Monday afternoon to advise of arrival time. Pt advised she will be completing labs on Monday 3/27/2023    ----- Message from Luanne Thibodeaux sent at 3/24/2023 11:13 AM CDT -----  Type: Patient Call Back    Who called: self     What is the request in detail: pt is calling to make sure that her surgery is still on for next week, and asking does Dr Zhao feel better Please advise     Can the clinic reply by [facility]?    Would the patient rather a call back or a response via [facility]?     Best call back number: [phone] (home) [phone] (work)      Additional Information:

## 2023-03-27 ENCOUNTER — LAB VISIT (OUTPATIENT)
Dept: LAB | Facility: HOSPITAL | Age: 53
End: 2023-03-27
Attending: OBSTETRICS & GYNECOLOGY
Payer: COMMERCIAL

## 2023-03-27 DIAGNOSIS — N92.1 MENORRHAGIA WITH IRREGULAR CYCLE: ICD-10-CM

## 2023-03-27 DIAGNOSIS — Z01.818 PREOPERATIVE TESTING: ICD-10-CM

## 2023-03-27 DIAGNOSIS — D25.9 UTERINE LEIOMYOMA, UNSPECIFIED LOCATION: ICD-10-CM

## 2023-03-27 LAB
ABO + RH BLD: ABNORMAL
B-HCG UR QL: NEGATIVE
BASOPHILS # BLD AUTO: 0.06 K/UL (ref 0–0.2)
BASOPHILS NFR BLD: 0.6 % (ref 0–1.9)
BLD GP AB SCN CELLS X3 SERPL QL: ABNORMAL
BLOOD GROUP ANTIBODIES SERPL: NORMAL
DIFFERENTIAL METHOD: ABNORMAL
EOSINOPHIL # BLD AUTO: 0.2 K/UL (ref 0–0.5)
EOSINOPHIL NFR BLD: 2.4 % (ref 0–8)
ERYTHROCYTE [DISTWIDTH] IN BLOOD BY AUTOMATED COUNT: 16.2 % (ref 11.5–14.5)
HCT VFR BLD AUTO: 31.9 % (ref 37–48.5)
HGB BLD-MCNC: 9.6 G/DL (ref 12–16)
IMM GRANULOCYTES # BLD AUTO: 0.03 K/UL (ref 0–0.04)
IMM GRANULOCYTES NFR BLD AUTO: 0.3 % (ref 0–0.5)
LYMPHOCYTES # BLD AUTO: 2.7 K/UL (ref 1–4.8)
LYMPHOCYTES NFR BLD: 27.7 % (ref 18–48)
MCH RBC QN AUTO: 27.4 PG (ref 27–31)
MCHC RBC AUTO-ENTMCNC: 30.1 G/DL (ref 32–36)
MCV RBC AUTO: 91 FL (ref 82–98)
MONOCYTES # BLD AUTO: 0.7 K/UL (ref 0.3–1)
MONOCYTES NFR BLD: 7.1 % (ref 4–15)
NEUTROPHILS # BLD AUTO: 6 K/UL (ref 1.8–7.7)
NEUTROPHILS NFR BLD: 61.9 % (ref 38–73)
NRBC BLD-RTO: 0 /100 WBC
PLATELET # BLD AUTO: 280 K/UL (ref 150–450)
PMV BLD AUTO: 11.8 FL (ref 9.2–12.9)
RBC # BLD AUTO: 3.5 M/UL (ref 4–5.4)
SPECIMEN OUTDATE: ABNORMAL
WBC # BLD AUTO: 9.75 K/UL (ref 3.9–12.7)

## 2023-03-27 PROCEDURE — 86870 RBC ANTIBODY IDENTIFICATION: CPT | Performed by: OBSTETRICS & GYNECOLOGY

## 2023-03-27 PROCEDURE — 36415 COLL VENOUS BLD VENIPUNCTURE: CPT | Performed by: OBSTETRICS & GYNECOLOGY

## 2023-03-27 PROCEDURE — 86900 BLOOD TYPING SEROLOGIC ABO: CPT | Performed by: OBSTETRICS & GYNECOLOGY

## 2023-03-27 PROCEDURE — 86922 COMPATIBILITY TEST ANTIGLOB: CPT | Performed by: ANESTHESIOLOGY

## 2023-03-27 PROCEDURE — 85025 COMPLETE CBC W/AUTO DIFF WBC: CPT | Performed by: OBSTETRICS & GYNECOLOGY

## 2023-03-27 PROCEDURE — 81025 URINE PREGNANCY TEST: CPT | Performed by: OBSTETRICS & GYNECOLOGY

## 2023-03-28 ENCOUNTER — HOSPITAL ENCOUNTER (OUTPATIENT)
Facility: HOSPITAL | Age: 53
Discharge: HOME | End: 2023-03-28
Attending: OBSTETRICS & GYNECOLOGY | Admitting: OBSTETRICS & GYNECOLOGY
Payer: COMMERCIAL

## 2023-03-28 VITALS
TEMPERATURE: 98 F | BODY MASS INDEX: 52.32 KG/M2 | RESPIRATION RATE: 18 BRPM | HEART RATE: 95 BPM | OXYGEN SATURATION: 94 % | SYSTOLIC BLOOD PRESSURE: 136 MMHG | DIASTOLIC BLOOD PRESSURE: 62 MMHG | WEIGHT: 293 LBS

## 2023-03-28 DIAGNOSIS — N92.1 MENORRHAGIA WITH IRREGULAR CYCLE: ICD-10-CM

## 2023-03-28 DIAGNOSIS — Z01.818 PREOPERATIVE TESTING: ICD-10-CM

## 2023-03-28 DIAGNOSIS — D25.9 UTERINE LEIOMYOMA, UNSPECIFIED LOCATION: ICD-10-CM

## 2023-03-28 DIAGNOSIS — Z90.710 S/P LAPAROSCOPIC HYSTERECTOMY: Primary | ICD-10-CM

## 2023-03-28 LAB — POCT GLUCOSE: 116 MG/DL (ref 70–110)

## 2023-03-28 PROCEDURE — 88342 IMHCHEM/IMCYTCHM 1ST ANTB: CPT | Mod: 26,,, | Performed by: PATHOLOGY

## 2023-03-28 PROCEDURE — 25000003 PHARM REV CODE 250: Performed by: OBSTETRICS & GYNECOLOGY

## 2023-03-28 PROCEDURE — 27201423 OPTIME MED/SURG SUP & DEVICES STERILE SUPPLY: Performed by: OBSTETRICS & GYNECOLOGY

## 2023-03-28 PROCEDURE — 58571 TLH W/T/O 250 G OR LESS: CPT | Mod: 80,22,, | Performed by: OBSTETRICS & GYNECOLOGY

## 2023-03-28 PROCEDURE — 36000710: Performed by: OBSTETRICS & GYNECOLOGY

## 2023-03-28 PROCEDURE — 88307 PR  SURG PATH,LEVEL V: ICD-10-PCS | Mod: 26,,, | Performed by: PATHOLOGY

## 2023-03-28 PROCEDURE — C2628 CATHETER, OCCLUSION: HCPCS | Performed by: OBSTETRICS & GYNECOLOGY

## 2023-03-28 PROCEDURE — 71000033 HC RECOVERY, INTIAL HOUR: Performed by: OBSTETRICS & GYNECOLOGY

## 2023-03-28 PROCEDURE — 58571 PR LAPAROSCOPY W TOT HYSTERECTUTERUS <=250 GRAM  W TUBE/OVARY: ICD-10-PCS | Mod: 22,,, | Performed by: OBSTETRICS & GYNECOLOGY

## 2023-03-28 PROCEDURE — 71000039 HC RECOVERY, EACH ADD'L HOUR: Performed by: OBSTETRICS & GYNECOLOGY

## 2023-03-28 PROCEDURE — 71000016 HC POSTOP RECOV ADDL HR: Performed by: OBSTETRICS & GYNECOLOGY

## 2023-03-28 PROCEDURE — D9220A PRA ANESTHESIA: ICD-10-PCS | Mod: CRNA,,, | Performed by: NURSE ANESTHETIST, CERTIFIED REGISTERED

## 2023-03-28 PROCEDURE — 63600175 PHARM REV CODE 636 W HCPCS: Performed by: OBSTETRICS & GYNECOLOGY

## 2023-03-28 PROCEDURE — D9220A PRA ANESTHESIA: ICD-10-PCS | Mod: ANES,,, | Performed by: ANESTHESIOLOGY

## 2023-03-28 PROCEDURE — 63600175 PHARM REV CODE 636 W HCPCS: Performed by: ANESTHESIOLOGY

## 2023-03-28 PROCEDURE — 25000003 PHARM REV CODE 250: Performed by: ANESTHESIOLOGY

## 2023-03-28 PROCEDURE — 71000015 HC POSTOP RECOV 1ST HR: Performed by: OBSTETRICS & GYNECOLOGY

## 2023-03-28 PROCEDURE — 37000009 HC ANESTHESIA EA ADD 15 MINS: Performed by: OBSTETRICS & GYNECOLOGY

## 2023-03-28 PROCEDURE — 88342 CHG IMMUNOCYTOCHEMISTRY: ICD-10-PCS | Mod: 26,,, | Performed by: PATHOLOGY

## 2023-03-28 PROCEDURE — 58571 PR LAPAROSCOPY W TOT HYSTERECTUTERUS <=250 GRAM  W TUBE/OVARY: ICD-10-PCS | Mod: 80,22,, | Performed by: OBSTETRICS & GYNECOLOGY

## 2023-03-28 PROCEDURE — 58571 TLH W/T/O 250 G OR LESS: CPT | Mod: 22,,, | Performed by: OBSTETRICS & GYNECOLOGY

## 2023-03-28 PROCEDURE — 88307 TISSUE EXAM BY PATHOLOGIST: CPT | Mod: 26,,, | Performed by: PATHOLOGY

## 2023-03-28 PROCEDURE — 36000711: Performed by: OBSTETRICS & GYNECOLOGY

## 2023-03-28 PROCEDURE — 88307 TISSUE EXAM BY PATHOLOGIST: CPT | Performed by: PATHOLOGY

## 2023-03-28 PROCEDURE — 82962 GLUCOSE BLOOD TEST: CPT | Performed by: OBSTETRICS & GYNECOLOGY

## 2023-03-28 PROCEDURE — 63600175 PHARM REV CODE 636 W HCPCS: Performed by: NURSE ANESTHETIST, CERTIFIED REGISTERED

## 2023-03-28 PROCEDURE — D9220A PRA ANESTHESIA: Mod: CRNA,,, | Performed by: NURSE ANESTHETIST, CERTIFIED REGISTERED

## 2023-03-28 PROCEDURE — 25000003 PHARM REV CODE 250: Performed by: NURSE ANESTHETIST, CERTIFIED REGISTERED

## 2023-03-28 PROCEDURE — D9220A PRA ANESTHESIA: Mod: ANES,,, | Performed by: ANESTHESIOLOGY

## 2023-03-28 PROCEDURE — 37000008 HC ANESTHESIA 1ST 15 MINUTES: Performed by: OBSTETRICS & GYNECOLOGY

## 2023-03-28 RX ORDER — BUPIVACAINE HYDROCHLORIDE 2.5 MG/ML
INJECTION, SOLUTION EPIDURAL; INFILTRATION; INTRACAUDAL
Status: DISCONTINUED | OUTPATIENT
Start: 2023-03-28 | End: 2023-03-28 | Stop reason: HOSPADM

## 2023-03-28 RX ORDER — PROCHLORPERAZINE EDISYLATE 5 MG/ML
5 INJECTION INTRAMUSCULAR; INTRAVENOUS EVERY 6 HOURS PRN
Status: DISCONTINUED | OUTPATIENT
Start: 2023-03-28 | End: 2023-03-28 | Stop reason: HOSPADM

## 2023-03-28 RX ORDER — MUPIROCIN 20 MG/G
OINTMENT TOPICAL
Status: DISCONTINUED | OUTPATIENT
Start: 2023-03-28 | End: 2023-03-28 | Stop reason: HOSPADM

## 2023-03-28 RX ORDER — ONDANSETRON 4 MG/1
4 TABLET, FILM COATED ORAL EVERY 6 HOURS PRN
Qty: 30 TABLET | Refills: 4 | Status: SHIPPED | OUTPATIENT
Start: 2023-03-28 | End: 2023-08-10

## 2023-03-28 RX ORDER — ACETAMINOPHEN 500 MG
1000 TABLET ORAL
Status: COMPLETED | OUTPATIENT
Start: 2023-03-28 | End: 2023-03-28

## 2023-03-28 RX ORDER — OXYCODONE HYDROCHLORIDE 5 MG/1
10 TABLET ORAL EVERY 4 HOURS PRN
Status: DISCONTINUED | OUTPATIENT
Start: 2023-03-28 | End: 2023-03-28 | Stop reason: HOSPADM

## 2023-03-28 RX ORDER — DIPHENHYDRAMINE HCL 25 MG
25 CAPSULE ORAL EVERY 4 HOURS PRN
Status: DISCONTINUED | OUTPATIENT
Start: 2023-03-28 | End: 2023-03-28 | Stop reason: HOSPADM

## 2023-03-28 RX ORDER — ACETAMINOPHEN 500 MG
1000 TABLET ORAL EVERY 6 HOURS PRN
Qty: 100 TABLET | Refills: 2 | Status: SHIPPED | OUTPATIENT
Start: 2023-03-28 | End: 2023-08-10

## 2023-03-28 RX ORDER — ONDANSETRON 4 MG/1
8 TABLET, ORALLY DISINTEGRATING ORAL EVERY 8 HOURS PRN
Status: DISCONTINUED | OUTPATIENT
Start: 2023-03-28 | End: 2023-03-28 | Stop reason: HOSPADM

## 2023-03-28 RX ORDER — ROCURONIUM BROMIDE 10 MG/ML
INJECTION, SOLUTION INTRAVENOUS
Status: DISCONTINUED | OUTPATIENT
Start: 2023-03-28 | End: 2023-03-28

## 2023-03-28 RX ORDER — ONDANSETRON 2 MG/ML
INJECTION INTRAMUSCULAR; INTRAVENOUS
Status: DISCONTINUED | OUTPATIENT
Start: 2023-03-28 | End: 2023-03-28

## 2023-03-28 RX ORDER — SODIUM CHLORIDE, SODIUM LACTATE, POTASSIUM CHLORIDE, CALCIUM CHLORIDE 600; 310; 30; 20 MG/100ML; MG/100ML; MG/100ML; MG/100ML
INJECTION, SOLUTION INTRAVENOUS CONTINUOUS
Status: DISCONTINUED | OUTPATIENT
Start: 2023-03-28 | End: 2023-08-10

## 2023-03-28 RX ORDER — PHENAZOPYRIDINE HYDROCHLORIDE 100 MG/1
100 TABLET, FILM COATED ORAL 3 TIMES DAILY PRN
Qty: 30 TABLET | Refills: 0 | Status: SHIPPED | OUTPATIENT
Start: 2023-03-28 | End: 2023-04-07

## 2023-03-28 RX ORDER — ONDANSETRON 2 MG/ML
4 INJECTION INTRAMUSCULAR; INTRAVENOUS DAILY PRN
Status: DISCONTINUED | OUTPATIENT
Start: 2023-03-28 | End: 2023-03-28 | Stop reason: HOSPADM

## 2023-03-28 RX ORDER — DOCUSATE SODIUM 100 MG/1
100 CAPSULE, LIQUID FILLED ORAL 2 TIMES DAILY
Qty: 60 CAPSULE | Refills: 1 | Status: SHIPPED | OUTPATIENT
Start: 2023-03-28 | End: 2023-08-10

## 2023-03-28 RX ORDER — DIPHENHYDRAMINE HYDROCHLORIDE 50 MG/ML
25 INJECTION INTRAMUSCULAR; INTRAVENOUS EVERY 4 HOURS PRN
Status: DISCONTINUED | OUTPATIENT
Start: 2023-03-28 | End: 2023-03-28 | Stop reason: HOSPADM

## 2023-03-28 RX ORDER — HYDROMORPHONE HYDROCHLORIDE 2 MG/ML
0.2 INJECTION, SOLUTION INTRAMUSCULAR; INTRAVENOUS; SUBCUTANEOUS EVERY 5 MIN PRN
Status: DISCONTINUED | OUTPATIENT
Start: 2023-03-28 | End: 2023-03-28 | Stop reason: HOSPADM

## 2023-03-28 RX ORDER — LIDOCAINE HYDROCHLORIDE 10 MG/ML
1 INJECTION, SOLUTION EPIDURAL; INFILTRATION; INTRACAUDAL; PERINEURAL ONCE
Status: DISCONTINUED | OUTPATIENT
Start: 2023-03-28 | End: 2023-08-10

## 2023-03-28 RX ORDER — DEXAMETHASONE SODIUM PHOSPHATE 4 MG/ML
INJECTION, SOLUTION INTRA-ARTICULAR; INTRALESIONAL; INTRAMUSCULAR; INTRAVENOUS; SOFT TISSUE
Status: DISCONTINUED | OUTPATIENT
Start: 2023-03-28 | End: 2023-03-28

## 2023-03-28 RX ORDER — HALOPERIDOL 5 MG/ML
0.5 INJECTION INTRAMUSCULAR EVERY 10 MIN PRN
Status: DISCONTINUED | OUTPATIENT
Start: 2023-03-28 | End: 2023-03-28 | Stop reason: HOSPADM

## 2023-03-28 RX ORDER — SODIUM CHLORIDE 0.9 % (FLUSH) 0.9 %
10 SYRINGE (ML) INJECTION
Status: DISCONTINUED | OUTPATIENT
Start: 2023-03-28 | End: 2023-03-28 | Stop reason: HOSPADM

## 2023-03-28 RX ORDER — FENTANYL CITRATE 50 UG/ML
INJECTION, SOLUTION INTRAMUSCULAR; INTRAVENOUS
Status: DISCONTINUED | OUTPATIENT
Start: 2023-03-28 | End: 2023-03-28

## 2023-03-28 RX ORDER — PHENYLEPHRINE HYDROCHLORIDE 10 MG/ML
INJECTION INTRAVENOUS
Status: DISCONTINUED | OUTPATIENT
Start: 2023-03-28 | End: 2023-03-28

## 2023-03-28 RX ORDER — SODIUM CHLORIDE, SODIUM LACTATE, POTASSIUM CHLORIDE, CALCIUM CHLORIDE 600; 310; 30; 20 MG/100ML; MG/100ML; MG/100ML; MG/100ML
INJECTION, SOLUTION INTRAVENOUS CONTINUOUS
Status: DISCONTINUED | OUTPATIENT
Start: 2023-03-28 | End: 2023-03-28 | Stop reason: HOSPADM

## 2023-03-28 RX ORDER — PROPOFOL 10 MG/ML
VIAL (ML) INTRAVENOUS
Status: DISCONTINUED | OUTPATIENT
Start: 2023-03-28 | End: 2023-03-28

## 2023-03-28 RX ORDER — OXYCODONE HYDROCHLORIDE 5 MG/1
5 TABLET ORAL EVERY 4 HOURS PRN
Status: DISCONTINUED | OUTPATIENT
Start: 2023-03-28 | End: 2023-03-28 | Stop reason: HOSPADM

## 2023-03-28 RX ORDER — MIDAZOLAM HYDROCHLORIDE 1 MG/ML
INJECTION INTRAMUSCULAR; INTRAVENOUS
Status: DISCONTINUED | OUTPATIENT
Start: 2023-03-28 | End: 2023-03-28

## 2023-03-28 RX ORDER — LIDOCAINE HYDROCHLORIDE 20 MG/ML
INJECTION INTRAVENOUS
Status: DISCONTINUED | OUTPATIENT
Start: 2023-03-28 | End: 2023-03-28

## 2023-03-28 RX ORDER — DOCUSATE SODIUM 100 MG/1
100 CAPSULE, LIQUID FILLED ORAL 2 TIMES DAILY
Status: DISCONTINUED | OUTPATIENT
Start: 2023-03-28 | End: 2023-03-28 | Stop reason: HOSPADM

## 2023-03-28 RX ORDER — CEFAZOLIN SODIUM 2 G/50ML
2 SOLUTION INTRAVENOUS
Status: COMPLETED | OUTPATIENT
Start: 2023-03-28 | End: 2023-03-28

## 2023-03-28 RX ORDER — SCOLOPAMINE TRANSDERMAL SYSTEM 1 MG/1
PATCH, EXTENDED RELEASE TRANSDERMAL
Status: DISCONTINUED | OUTPATIENT
Start: 2023-03-28 | End: 2023-03-28

## 2023-03-28 RX ORDER — OXYCODONE HYDROCHLORIDE 5 MG/1
5 TABLET ORAL EVERY 4 HOURS PRN
Qty: 30 TABLET | Refills: 0 | Status: SHIPPED | OUTPATIENT
Start: 2023-03-28 | End: 2023-08-10

## 2023-03-28 RX ORDER — IBUPROFEN 600 MG/1
600 TABLET ORAL EVERY 6 HOURS PRN
Status: DISCONTINUED | OUTPATIENT
Start: 2023-03-28 | End: 2023-03-28 | Stop reason: HOSPADM

## 2023-03-28 RX ORDER — HYDROMORPHONE HYDROCHLORIDE 2 MG/ML
1 INJECTION, SOLUTION INTRAMUSCULAR; INTRAVENOUS; SUBCUTANEOUS EVERY 6 HOURS PRN
Status: DISCONTINUED | OUTPATIENT
Start: 2023-03-28 | End: 2023-03-28 | Stop reason: HOSPADM

## 2023-03-28 RX ORDER — HYDROCODONE BITARTRATE AND ACETAMINOPHEN 500; 5 MG/1; MG/1
TABLET ORAL
Status: DISCONTINUED | OUTPATIENT
Start: 2023-03-28 | End: 2023-03-28 | Stop reason: HOSPADM

## 2023-03-28 RX ADMIN — HYDROMORPHONE HYDROCHLORIDE 0.2 MG: 2 INJECTION INTRAMUSCULAR; INTRAVENOUS; SUBCUTANEOUS at 10:03

## 2023-03-28 RX ADMIN — PHENYLEPHRINE HYDROCHLORIDE 200 MCG: 10 INJECTION INTRAVENOUS at 07:03

## 2023-03-28 RX ADMIN — MUPIROCIN: 20 OINTMENT TOPICAL at 06:03

## 2023-03-28 RX ADMIN — ROCURONIUM BROMIDE 30 MG: 10 INJECTION, SOLUTION INTRAVENOUS at 07:03

## 2023-03-28 RX ADMIN — SODIUM CHLORIDE, SODIUM LACTATE, POTASSIUM CHLORIDE, AND CALCIUM CHLORIDE: .6; .31; .03; .02 INJECTION, SOLUTION INTRAVENOUS at 07:03

## 2023-03-28 RX ADMIN — FENTANYL CITRATE 25 MCG: 50 INJECTION, SOLUTION INTRAMUSCULAR; INTRAVENOUS at 10:03

## 2023-03-28 RX ADMIN — SUGAMMADEX 200 MG: 100 INJECTION, SOLUTION INTRAVENOUS at 10:03

## 2023-03-28 RX ADMIN — OXYCODONE HYDROCHLORIDE 10 MG: 5 TABLET ORAL at 02:03

## 2023-03-28 RX ADMIN — CEFAZOLIN SODIUM 1 G: 2 SOLUTION INTRAVENOUS at 07:03

## 2023-03-28 RX ADMIN — PROPOFOL 180 MG: 10 INJECTION, EMULSION INTRAVENOUS at 07:03

## 2023-03-28 RX ADMIN — ROCURONIUM BROMIDE 10 MG: 10 INJECTION, SOLUTION INTRAVENOUS at 09:03

## 2023-03-28 RX ADMIN — PHENYLEPHRINE HYDROCHLORIDE 100 MCG: 10 INJECTION INTRAVENOUS at 09:03

## 2023-03-28 RX ADMIN — MIDAZOLAM HYDROCHLORIDE 2 MG: 1 INJECTION INTRAMUSCULAR; INTRAVENOUS at 07:03

## 2023-03-28 RX ADMIN — ROCURONIUM BROMIDE 50 MG: 10 INJECTION, SOLUTION INTRAVENOUS at 07:03

## 2023-03-28 RX ADMIN — ACETAMINOPHEN 1000 MG: 500 TABLET, FILM COATED ORAL at 06:03

## 2023-03-28 RX ADMIN — PHENYLEPHRINE HYDROCHLORIDE 100 MCG: 10 INJECTION INTRAVENOUS at 08:03

## 2023-03-28 RX ADMIN — ROCURONIUM BROMIDE 10 MG: 10 INJECTION, SOLUTION INTRAVENOUS at 08:03

## 2023-03-28 RX ADMIN — SODIUM CHLORIDE, SODIUM LACTATE, POTASSIUM CHLORIDE, AND CALCIUM CHLORIDE: .6; .31; .03; .02 INJECTION, SOLUTION INTRAVENOUS at 08:03

## 2023-03-28 RX ADMIN — ONDANSETRON 4 MG: 2 INJECTION, SOLUTION INTRAMUSCULAR; INTRAVENOUS at 09:03

## 2023-03-28 RX ADMIN — PHENYLEPHRINE HYDROCHLORIDE 100 MCG: 10 INJECTION INTRAVENOUS at 07:03

## 2023-03-28 RX ADMIN — LIDOCAINE HYDROCHLORIDE 100 MG: 20 INJECTION, SOLUTION INTRAVENOUS at 07:03

## 2023-03-28 RX ADMIN — SCOPOLAMINE 1 PATCH: 1 PATCH, EXTENDED RELEASE TRANSDERMAL at 07:03

## 2023-03-28 RX ADMIN — FENTANYL CITRATE 100 MCG: 50 INJECTION, SOLUTION INTRAMUSCULAR; INTRAVENOUS at 07:03

## 2023-03-28 RX ADMIN — FENTANYL CITRATE 50 MCG: 50 INJECTION, SOLUTION INTRAMUSCULAR; INTRAVENOUS at 09:03

## 2023-03-28 RX ADMIN — CEFAZOLIN SODIUM 2 G: 2 SOLUTION INTRAVENOUS at 07:03

## 2023-03-28 RX ADMIN — DEXAMETHASONE SODIUM PHOSPHATE 4 MG: 4 INJECTION, SOLUTION INTRAMUSCULAR; INTRAVENOUS at 07:03

## 2023-03-28 NOTE — DISCHARGE INSTRUCTIONS
BATHING:                   You may shower after your dressing is removed, but no tub baths, hot tubs, saunas or swimming until you see the doctor.    DRESSING:  Remove your bandage in 24 hours. If there are skin tapes over the incision, leave them in place. They will start to come off in 5-7 days.    ACTIVITY LEVEL: If you have received sedation or an anesthetic, you may feel sleepy for   several hours. Rest until you are more awake. Gradually resume your normal activities  No heavy lifting or straining, nothing over 10 lbs., like a gallon of milk.  Pelvic rest- no sex, tampons or douching until follow up or instructed by doctor.  DIET:  You may resume your home diet. If nausea is present, increase your diet gradually with fluids and bland foods.    Medications:  Pain medication should be taken only if needed and as directed. If antibiotics are prescribed, the medication should be taken until completed. You will be given an updated list of you medications.  No driving, alcoholic beverages or signing legal documents for next 24 hours or while taking pain medication    CALL THE DOCTOR:   For any obvious bleeding (some dried blood over the incision is normal).     Redness, swelling, foul smell around incision or fever over 101.  Shortness of breath, Coughing up Bloody Sputum or Pains or Swelling in your calves.  Persistent pain or nausea not relieved by medication.  If vaginal bleeding is in excess of a normal period.  Problems urinating    If any unusual problems or difficulties occur contact your doctor. If you cannot contact your doctor but feel your signs and symptoms warrant a physicians attention return to the emergency room.            Fall Prevention  Millions of people fall every year and injure themselves. You may have had anesthesia or sedation which may increase your risk of falling. You may have health issues that put you at an increased risk of falling.     Here are ways to reduce your risk of  falling.    Make your home safe by keeping walkways clear of objects you may trip over.  Use non-slip pads under rugs. Do not use area rugs or small throw rugs.  Use non-slip mats in bathtubs and showers.  Install handrails and lights on staircases.  Do not walk in poorly lit areas.  Do not stand on chairs or wobbly ladders.  Use caution when reaching overhead or looking upward. This position can cause a loss of balance.  Be sure your shoes fit properly, have non-slip bottoms and are in good condition.   Wear shoes both inside and out. Avoid going barefoot or wearing slippers.  Be cautious when going up and down stairs, curbs, and when walking on uneven sidewalks.  If your balance is poor, consider using a cane or walker.  If your fall was related to alcohol use, stop or limit alcohol intake.   If your fall was related to use of sleeping medicines, talk to your doctor about this. You may need to reduce your dosage at bedtime if you awaken during the night to go to the bathroom.    To reduce the need for nighttime bathroom trips:  Avoid drinking fluids for several hours before going to bed  Empty your bladder before going to bed  Men can keep a urinal at the bedside  Stay as active as you can. Balance, flexibility, strength, and endurance all come from exercise. They all play a role in preventing falls. Ask your healthcare provider which types of activity are right for you.  Get your vision checked on a regular basis.  If you have pets, know where they are before you stand up or walk so you don't trip over them.  Use night lights.

## 2023-03-28 NOTE — OP NOTE
[facility]  OBGYN  Operative Note    SUMMARY     Date of Procedure: 3/28/2023     Procedure: Procedure(s) (LRB):  HYSTERECTOMY, TOTAL, LAPAROSCOPIC (N/A)      Surgeon(s) and Role:     * Lola Zhao MD - Primary     * Peterson Billy MD - Co-Surgeon      Pre-Operative Diagnosis: Menorrhagia with irregular cycle [N92.1]  Uterine leiomyoma, unspecified location [D25.9]    Post-Operative Diagnosis: Post-Op Diagnosis Codes:     * Menorrhagia with irregular cycle [N92.1]     * Uterine leiomyoma, unspecified location [D25.9]    Anesthesia: General    Technical Procedures Used: The patient was taken to the operating room and placed first in the supine position. Calf high pneumatic compression boots were placed for DVT prophylaxis. She received 2 gms of Cefazolin IV as antibiotic prophylaxis. She then underwent uneventful general endotracheal anesthesia. She was placed in the semi-dorsolithotomy position using padded Reji stirrups. She was then prepped and draped in the usual sterile fashion. A surgical time-out was performed.     A Pedersen catheter was placed in the usual fashion. The cervix was visualized and grasped with a tenaculum for retraction.  Using 0 Vicryl suture, 2 stay sutures were placed at 3 o'clock and 9 o'clock on the cervix.  The uterus was sounded to 12 cm.  The DENNY uterine manipulator with a KOH cup was then inserted without difficulty.    Gloves were changed and attention was turned to the abdomen.     All trocar sites were pretreated with 0.25% plain Marcaine. A 10 mm incision was made in the left upper quadrant and grasped with towel clips and the Veress needle was passed through the base of the incision into the abdomen with an opening pressure of 5 mmHG. The abdomen was insufflated to a pressure of 15, the Veress needle was removed, and a 11 mm trocar was placed under optical guidance. There was no evidence of injury below the level of insertion. The upper abdomen was inspected and was  noted to be normal. The pelvis was inspected with the below findings. A 5 mm right lateral and 5 mm left lateral trocar, each lateral to their respective epigastric vessels in the lower quadrants were placed under direct visualization.     Both fallopian tubes were identified and were normal.  Both ovaries were normal.  Cul-de-sac was normal.  The uterus appeared enlarged and with multiple fibroids.      There was adhesion of bowel to the umbilicus from prior umbilical hernia surgery.    Using a 5 mm camera to guide insertion, a 10 mm incision was made approximately 10 cm below the umbilicus.  A 10 mm trocar was placed below the level of the bowel adhesions under direct visualization.    Due to patient body habitus and scar tissue from previous surgeries, this was an exceedingly difficult case and required a longer amount of time to complete.       The procedure was performed with a Ligasure and Harmonic Hook.    Attention was turned to the right-hand side.  Using the Ligasure, the right fallopian tube was  from the underlying ovary by transecting the mesosalpinx.   The right utero-ovarian ligament was cauterized and transected to the level of the round ligament.  The round ligament was cauterized and transected.  The anterior and posterior leaves of the broad ligament were dissected open. The bladder was dissected down below the level of the KOH cup anteriorly.     The uterine vessels were then skeletonized, cauterized, transected and lateralized.       The same procedures were repeated on the left hand side. The harmonic hook was then used to make an anterior colpotomy. Using the KOH cup as a guide, a circumferential incision was made around the cervico-uterine junction to free the specimen. An attempt was made to deliver the specimen through the vagina.  However, it was unsuccessful.  Vaginal morcellation was performed and the specimen was then delivered through the vagina.         Gloves were changed  and attention was then returned to the abdomen.   The vagina was then closed laparoscopically using 0- Vicryl suture in an interrupted fashion.       Irrigation was performed and there was noted to be excellent hemostasis on low pressure.     A limited diagnostic cystoscopy was then performed. The Faulkner was removed and a 30 degree cystoscope was placed. 200 cc of sterile saline were instilled into the bladder. The dome of the bladder was intact-there was no suture material visible. Both ureteral orifices were visualized and strong jets of urine were noted bilaterally. Suture material was noted on the posterior aspect of the bladder.  The cystoscope was then removed and the bladder emptied of saline.      Gloves were changed and attention was then returned to turned the abdomen.  The vaginal cuff suture that was suspected to be in the bladder was removed with scissors.      A second limited diagnostic cystoscopy was then performed. The Faulkner was removed and a 30 degree cystoscope was placed. 200 cc of sterile saline were instilled into the bladder. The dome of the bladder was intact-there was no suture material visible. Both ureteral orifices were visualized and strong jets of urine were noted bilaterally.  The suture material that was seen on the posterior aspect of the bladder was no longer present.      Gloves were changed and attention was then returned to turned the abdomen.  Urology was called to look at the bladder.  Dr. Graham inspected the bladder laparoscopically and there was no leakage of urine.  She did not recommend bladder repair at this time.  She recommended faulkner catheter for one week to allow bladder to heal.        The abdomen was desufflated and ports were removed.  The fascia of the left upper quadrant port incision and the incision inferior to the umbilicus was closed with 0-Vicryl suture.  The skin of these two port incisions were closed with 4-0 Monocryl.  Dermabond was used to close the  remaining port incisions.     The patient tolerated the procedure well.      Sponge, lap, and needle count was correct     The patient was taken to the recover room in stable condition.         Description of the Findings of the Procedure:   - Enlarged fibroid uterus  - Normal fallopian tubes and ovaries  - Suture material noted in the bladder on first cytoscopy.  - No suture material in the bladder on second cystoscopy after removal of a vaginal cuff suture.  - strong jets of urine from both ureteral orifices were noted bilaterally       Complications: No    Estimated Blood Loss (EBL): 200 mL           Implants: * No implants in log *    Specimens:   Specimen (24h ago, onward)       Start     Ordered    03/28/23 0931  Specimen to Pathology, Surgery Gynecology and Obstetrics  Once        Comments: Pre-op Diagnosis: Menorrhagia with irregular cycle [N92.1]Uterine leiomyoma, unspecified location [D25.9]Procedure(s):HYSTERECTOMY, TOTAL, LAPAROSCOPICPOSSIBLE HYSTERECTOMY, TOTAL, ABDOMINAL Number of specimens: 1Name of specimens:CERVIX, UTERUS, BILATERAL FALLOPIAN TUBES     References:    Click here for ordering Quick Tip   Question Answer Comment   Procedure Type: Gynecology and Obstetrics    Specimen Class: Routine/Screening    Which provider would you like to cc? MALIK BACH    Release to patient Immediate        03/28/23 4865                            Condition: Good    Disposition: PACU - hemodynamically stable.    Attestation: I was present and scrubbed for the entire procedure.

## 2023-03-28 NOTE — ANESTHESIA PROCEDURE NOTES
Intubation    Date/Time: 3/28/2023 7:23 AM  Performed by: Tramaine Martinez CRNA  Authorized by: Gustavo Grant MD     Intubation:     Induction:  Intravenous    Intubated:  Postinduction    Mask Ventilation:  Easy with oral airway    Attempts:  1    Attempted By:  Student (RAINE BARLOW)    Method of Intubation:  Video laryngoscopy and direct    Blade:  Glass 3    Laryngeal View Grade: Grade I - full view of cords      Difficult Airway Encountered?: No      Complications:  None    Airway Device:  Oral endotracheal tube    Airway Device Size:  7.0    Style/Cuff Inflation:  Cuffed (inflated to minimal occlusive pressure)    Inflation Amount (mL):  5    Tube secured:  21    Secured at:  The teeth    Placement Verified By:  Capnometry    Complicating Factors:  Obesity and short neck    Findings Post-Intubation:  BS equal bilateral and atraumatic/condition of teeth unchanged

## 2023-03-28 NOTE — PLAN OF CARE
Pt verbalized a readiness to go home. VSS. Pedersen catheter draining clear yellow urine. Pt provided standard drainage bag for night use. Pt instructed and verbalized an understanding how to switch from leg bag to standard drainage bag. Pt able to return demonstration use of  IS provided. Written and verbal discharge instructions given. Pt aware of follow-up appt.

## 2023-03-28 NOTE — BRIEF OP NOTE
[facility] - Surgery  OBGYN  Brief Operative Note    SUMMARY     Date of Procedure: 3/28/2023     Procedure: Procedure(s) (LRB):  HYSTERECTOMY, TOTAL, LAPAROSCOPIC (N/A)      Surgeon(s) and Role:     * Lola Zhao MD - Primary     * Peterson Billy MD - Co-Surgeon      Pre-Operative Diagnosis: Menorrhagia with irregular cycle [N92.1]  Uterine leiomyoma, unspecified location [D25.9]    Post-Operative Diagnosis: Post-Op Diagnosis Codes:     * Menorrhagia with irregular cycle [N92.1]     * Uterine leiomyoma, unspecified location [D25.9]    Anesthesia: General    Description of the Findings of the Procedure:   - Enlarged fibroid uterus  - Normal fallopian tubes and ovaries  - Suture material noted in the bladder on first cytoscopy.  - No suture material in the bladder on second cystoscopy after removal of a vaginal cuff suture.  - strong jets of urine from both ureteral orifices were noted bilaterally       Complications: No    Estimated Blood Loss (EBL): 200 mL           Implants: * No implants in log *    Specimens:   Specimen (24h ago, onward)       Start     Ordered    03/28/23 0931  Specimen to Pathology, Surgery Gynecology and Obstetrics  Once        Comments: Pre-op Diagnosis: Menorrhagia with irregular cycle [N92.1]Uterine leiomyoma, unspecified location [D25.9]Procedure(s):HYSTERECTOMY, TOTAL, LAPAROSCOPICPOSSIBLE HYSTERECTOMY, TOTAL, ABDOMINAL Number of specimens: 1Name of specimens:CERVIX, UTERUS, BILATERAL FALLOPIAN TUBES     References:    Click here for ordering Quick Tip   Question Answer Comment   Procedure Type: Gynecology and Obstetrics    Specimen Class: Routine/Screening    Which provider would you like to cc? LOLA ZHAO    Release to patient Immediate        03/28/23 0931                            Condition: Good    Disposition: PACU - hemodynamically stable.    Attestation: I was present and scrubbed for the entire procedure.

## 2023-03-28 NOTE — ANESTHESIA POSTPROCEDURE EVALUATION
Anesthesia Post Evaluation    Patient: Micaela Hernandez    Procedure(s) Performed: Procedure(s) (LRB):  HYSTERECTOMY, TOTAL, LAPAROSCOPIC (N/A)    Final Anesthesia Type: general      Patient location during evaluation: PACU  Patient participation: Yes- Able to Participate  Level of consciousness: awake and alert and oriented  Post-procedure vital signs: reviewed and stable  Pain management: adequate  Airway patency: patent    PONV status at discharge: No PONV  Anesthetic complications: no      Cardiovascular status: hemodynamically stable and blood pressure returned to baseline  Respiratory status: spontaneous ventilation, room air and unassisted  Hydration status: euvolemic  Follow-up not needed.          Vitals Value Taken Time   /62 03/28/23 1524   Temp 36.8 °C (98.2 °F) 03/28/23 1524   Pulse 95 03/28/23 1524   Resp 18 03/28/23 1524   SpO2 94 % 03/28/23 1524         Event Time   Out of Recovery 12:34:08         Pain/Robinson Score: Pain Rating Prior to Med Admin: 7 (3/28/2023  2:01 PM)  Pain Rating Post Med Admin: 5 (3/28/2023 12:27 PM)  Robinson Score: 10 (3/28/2023 12:38 PM)

## 2023-03-28 NOTE — OR NURSING
Patient stable at this time and meeting criteria to send to next phase of care.  Awake and alert.  Pain rating 5/10 at this time.  Robinson score 10 presently.  PIV infusing without redness or edema.  Surgical site clean, dry and intact.  In NAD.

## 2023-03-31 LAB
BLD PROD TYP BPU: NORMAL
BLOOD UNIT EXPIRATION DATE: NORMAL
BLOOD UNIT TYPE CODE: 9500
BLOOD UNIT TYPE: NORMAL
CODING SYSTEM: NORMAL
CROSSMATCH INTERPRETATION: NORMAL
DISPENSE STATUS: NORMAL
TRANS ERYTHROCYTES VOL PATIENT: NORMAL ML

## 2023-04-01 ENCOUNTER — NURSE TRIAGE (OUTPATIENT)
Dept: ADMINISTRATIVE | Facility: CLINIC | Age: 53
End: 2023-04-01
Payer: COMMERCIAL

## 2023-04-01 ENCOUNTER — HOSPITAL ENCOUNTER (EMERGENCY)
Facility: HOSPITAL | Age: 53
Discharge: HOME | End: 2023-04-01
Attending: EMERGENCY MEDICINE
Payer: COMMERCIAL

## 2023-04-01 VITALS
WEIGHT: 293 LBS | HEIGHT: 63 IN | DIASTOLIC BLOOD PRESSURE: 68 MMHG | BODY MASS INDEX: 51.91 KG/M2 | OXYGEN SATURATION: 98 % | TEMPERATURE: 98 F | HEART RATE: 86 BPM | RESPIRATION RATE: 16 BRPM | SYSTOLIC BLOOD PRESSURE: 126 MMHG

## 2023-04-01 DIAGNOSIS — T83.9XXA FOLEY CATHETER PROBLEM, INITIAL ENCOUNTER: Primary | ICD-10-CM

## 2023-04-01 PROCEDURE — 99282 EMERGENCY DEPT VISIT SF MDM: CPT

## 2023-04-01 RX ORDER — LIDOCAINE HYDROCHLORIDE 20 MG/ML
JELLY TOPICAL
Status: DISCONTINUED | OUTPATIENT
Start: 2023-04-01 | End: 2023-04-01 | Stop reason: HOSPADM

## 2023-04-01 RX ORDER — ONDANSETRON 2 MG/ML
4 INJECTION INTRAMUSCULAR; INTRAVENOUS
Status: DISCONTINUED | OUTPATIENT
Start: 2023-04-01 | End: 2023-04-01

## 2023-04-01 NOTE — ED TRIAGE NOTES
Pt reports having hysterectomy surgery on Tuesday and had urinary cath placed. States this morning began to see blood clots in urine bag, states feeling like urine is darker and is urinating around faulkner.  Pt denies any further issues.   Pt AAOX4

## 2023-04-01 NOTE — TELEPHONE ENCOUNTER
Spoke with patient states she is s/p hysterectomy on Tuesday.  Patient states she has a faulkner catheter and it is not functioning properly.  Patient is urinating around the tip. States she has strings of blood in the urine bad.  Patient states since 7:30 am she has collected about 100 ml inside the bag.  Advised ED for evaluation. Patient verbalized understanding.   Reason for Disposition   [1] Catheter is broken AND [2] is not usable   Patient sounds very sick or weak to the triager    Additional Information   Negative: Shock suspected (e.g., cold/pale/clammy skin, too weak to stand, low BP, rapid pulse)   Negative: Sounds like a life-threatening emergency to the triager   Negative: [1] Catheter was accidentally pulled-out AND [2] bright red continuous bleeding   Negative: SEVERE abdominal pain   Negative: Fever > 100.4 F (38.0 C)   Negative: [1] Drinking very little AND [2] dehydration suspected (e.g., no urine > 12 hours, very dry mouth, very lightheaded)   Negative: Patient sounds very sick or weak to the triager   Negative: Catheter was accidentally pulled-out   Negative: Sounds like a life-threatening emergency to the triager   Negative: [1] Widespread rash AND [2] bright red, sunburn-like   Negative: [1] SEVERE headache AND [2] after spinal (epidural) anesthesia   Negative: [1] Vomiting AND [2] persists > 4 hours   Negative: [1] Vomiting AND [2] abdomen looks much more swollen than usual   Negative: [1] Drinking very little AND [2] dehydration suspected (e.g., no urine > 12 hours, very dry mouth, very lightheaded)    Protocols used: Urinary Catheter Symptoms and [id number], Post-Op Symptoms and Jaomexwft-U-HA

## 2023-04-01 NOTE — ED PROVIDER NOTES
Encounter Date: 2023    SCRIBE #1 NOTE: I, Flakita Arreola, am scribing for, and in the presence of,  Boris Cardoso MD.     History     Chief Complaint   Patient presents with    Faulkner Problem     Pt reports having hysterectomy surgery on Tuesday and had urinary cath placed. States this morning began to see blood clots in urine bag, states feeling like urine is darker and is urinating around faulkner. Denies abdominal pain.      53 yo F with PMHx of DM, HTN, PAF, presenting to the ED with catheter problem. She is s/p laparoscopic hysterectomy from 4d ago, done by Dr Zhao. She had a 16 Fr faulkner catheter placed in following the surgery, advised to place in for a week. Since yesterday, she started experiencing leakage from outside the catheter, and noticed darker urine w/ blood clots in urine output bag today, prompting ED arrival. No other modifying factors. Denies any changes in urine output, or other associated symptoms.     The history is provided by the patient.   Review of patient's allergies indicates:  No Known Allergies  Past Medical History:   Diagnosis Date    Allergy     Atrial flutter     Degenerative lumbar disc 2021    Diabetes mellitus     Diabetes mellitus, type 2     Hyperlipidemia     Hypertension     PAF (paroxysmal atrial fibrillation) 2022    Sleep apnea     Symptomatic anemia 2023     Past Surgical History:   Procedure Laterality Date    BREAST SURGERY       SECTION      CHOLECYSTECTOMY      ENDOMETRIAL ABLATION      HERNIA REPAIR      incisional     HYSTEROSCOPY WITH DILATION AND CURETTAGE OF UTERUS N/A 3/1/2023    Procedure: HYSTEROSCOPY, WITH DILATION AND CURETTAGE OF UTERUS;  Surgeon: Lola Zhao MD;  Location: [facility] OR;  Service: OB/GYN;  Laterality: N/A;    KNEE SURGERY      LAPAROSCOPIC TOTAL HYSTERECTOMY N/A 3/28/2023    Procedure: HYSTERECTOMY, TOTAL, LAPAROSCOPIC;  Surgeon: Lola Zhao MD;  Location: [facility] OR;  Service: OB/GYN;  Laterality: N/A;   Large patient and large uterus, may be difficult case.  May convert to open.  T/S=---UPT  ON 3/27  RN PREOP 3/15/2023---BMI--52.32    LIVER LOBECTOMY Right     LYMPH NODE DISSECTION      right axillary    TRANSESOPHAGEAL ECHOCARDIOGRAPHY N/A 1/26/2022    Procedure: ECHOCARDIOGRAM, TRANSESOPHAGEAL;  Surgeon: Ji Patricio MD;  Location: [facility] CATH LAB;  Service: Cardiology;  Laterality: N/A;     Family History   Problem Relation Age of Onset    Hypertension Mother     Asthma Mother     COPD Mother     Hyperlipidemia Mother     Diabetes type II Mother     Diabetes Father     Hypertension Father     Hyperlipidemia Father     Cancer Sister         type unknown, but CA in mouth    No Known Problems Brother     No Known Problems Maternal Aunt     No Known Problems Maternal Uncle     No Known Problems Paternal Aunt     No Known Problems Paternal Uncle     No Known Problems Maternal Grandmother     No Known Problems Maternal Grandfather     No Known Problems Paternal Grandmother     No Known Problems Paternal Grandfather     Breast cancer Neg Hx     Colon cancer Neg Hx     Ovarian cancer Neg Hx     Amblyopia Neg Hx     Blindness Neg Hx     Cataracts Neg Hx     Glaucoma Neg Hx     Macular degeneration Neg Hx     Retinal detachment Neg Hx     Strabismus Neg Hx     Stroke Neg Hx     Thyroid disease Neg Hx      Social History     Tobacco Use    Smoking status: Never    Smokeless tobacco: Never   Substance Use Topics    Alcohol use: Not Currently     Comment: occasionally    Drug use: No     Review of Systems   Constitutional:  Negative for chills and fever.   HENT:  Negative for congestion, rhinorrhea and sore throat.    Eyes:  Negative for visual disturbance.   Respiratory:  Negative for cough and shortness of breath.    Cardiovascular:  Negative for chest pain.   Gastrointestinal:  Negative for abdominal pain, diarrhea, nausea and vomiting.   Genitourinary:  Negative for decreased urine volume, dysuria, frequency and  hematuria.        Reports darker urine and possible blood clots, and leakage from faulkner catheter.    Musculoskeletal:  Negative for back pain.   Skin:  Negative for rash.   Neurological:  Negative for dizziness, weakness and headaches.     Physical Exam     Initial Vitals [04/01/23 1406]   BP Pulse Resp Temp SpO2   (!) 153/69 87 18 97.9 °F (36.6 °C) 98 %      MAP       --         Physical Exam    Nursing note and vitals reviewed.  Constitutional: She appears well-developed and well-nourished.   HENT:   Head: Normocephalic and atraumatic.   Eyes: EOM are normal. Pupils are equal, round, and reactive to light.   Neck: Neck supple. No thyromegaly present. No JVD present.   Normal range of motion.  Cardiovascular:  Normal rate and regular rhythm.     Exam reveals no gallop and no friction rub.       No murmur heard.  Pulmonary/Chest: Breath sounds normal. No respiratory distress.   Abdominal: Abdomen is soft. Bowel sounds are normal. There is no abdominal tenderness.   Genitourinary:    Genitourinary Comments: Faulkner catheter in place. Urine in leg bag.      Musculoskeletal:         General: No tenderness or edema. Normal range of motion.      Cervical back: Normal range of motion and neck supple.     Neurological: She is alert and oriented to person, place, and time. She has normal strength. GCS score is 15. GCS eye subscore is 4. GCS verbal subscore is 5. GCS motor subscore is 6.   Skin: Skin is warm and dry. Capillary refill takes less than 2 seconds.   Psychiatric: She has a normal mood and affect.       ED Course   Procedures  Labs Reviewed - No data to display       Imaging Results    None          Medications   LIDOcaine HCl 2% urojet (has no administration in time range)     Medical Decision Making:   History:   Old Medical Records: I decided to obtain old medical records.  Initial Assessment:   Urgent evaluation of 53 yo F with PMHx of DM, HTN, PAF, presenting to the ED with catheter problem. She is s/p  laparoscopic hysterectomy from 4d ago, done by Dr Zhao. BP elevated at 153/69 on arrival, other VS WNL. On exam, she has a 16 Fr faulkner catheter in place, and urine in leg bag. Will plan for faulkner catheter change.    Patient presents complaining of leaking around her Faulkner catheter.  Is scheduled to be evaluated and/or removed on Tuesday.  She asked removed earlier which was declined by me.  She has follow-up on Tuesday for re-evaluation.  Patient states she had a squiggly substance come out of her Faulkner catheter bag and since then urine has returned into the catheter bag without leaking.  This was likely collection of cells or blood.  Faulkner appears to be functioning normally at this time.  Still recommended changing the Faulkner catheter just to ensure no partial blockage remains.  When Faulkner catheter went to be removed the bulb was not inflated.  Per nursing they inflated the bulb and it appears to be holding volume.  Still recommended changing due to potential slow leak in the bulb.  Patient declined changing out the Faulkner catheter with a currently working.  Patient has follow-up in 3 days.  Will discharge the patient to follow up on Tuesday.  Return for any new or recurrent problems.     Scribe Attestation:   Scribe #1: I performed the above scribed service and the documentation accurately describes the services I performed. I attest to the accuracy of the note.                 I, Boris Landry, personally performed the services described in this documentation. All medical record entries made by the scribe were at my direction and in my presence. I have reviewed the chart and agree that the record reflects my personal performance and is accurate and complete.    Clinical Impression:   Final diagnoses:  [T83.9XXA] Faulkner catheter problem, initial encounter (Primary)        ED Disposition Condition    Discharge Stable          ED Prescriptions    None       Follow-up Information       Follow up With Specialties  Details Why Contact Info    Lola Zhao MD Obstetrics and Gynecology  Tuesday as scheduled [address]  [address]  [facility] [id number] [phone]               Boris Cardoso MD  04/01/23 1526

## 2023-04-01 NOTE — ED NOTES
Pt faulkner balloon was deflated slightly at 8 cc. Faulkner balloon inflated, pt informed to bear down to urinate, no leakage noted,150 cc urine output.

## 2023-04-01 NOTE — ED TRIAGE NOTES
Pt reports having hysterectomy surgery on Tuesday and had urinary cath placed. States this morning began to see blood clots in urine bag, states feeling like urine is darker and is urinating around faulkner. Denies abdominal pain or any other symptoms.   Pt AAOX4

## 2023-04-04 LAB
FINAL PATHOLOGIC DIAGNOSIS: NORMAL
Lab: NORMAL

## 2023-04-04 NOTE — DISCHARGE SUMMARY
[facility]  Obstetrics & Gynecology  Discharge Summary    Patient Name: Micaela Hernandez  MRN: [id number]  Admission Date: 3/28/2023  Hospital Length of Stay: 0 days  Discharge Date and Time: 3/28/2023  4:46 PM  Attending Physician: No att. providers found   Discharging Provider: Lola Zhao MD  Primary Care Provider: Bailey Fiore MD    HPI: Patient admitted for scheduled laparoscopic hysterectomy.    Hospital Course: Patient was admitted and a Total laparoscopic hysterectomy was performed on 4/4/2023. Please see operative report for complete  details. Following surgery, patient was transferred to the floor for routine post operative care. She had  an uncomplicated post operative course with no acute events. Her vital signs remained stable and  afebrile throughout her hospital stay. Her urine output was adequate and her physical exam was  appropriate. She was meeting all post operative milestones upon discharge.  She was ambulating without difficulty. She was tolerating a regular diet, and her vital signs  were stable and afebrile.     Patient was discharged home with a faulkner catheter.    Procedure(s) (LRB):  HYSTERECTOMY, TOTAL, LAPAROSCOPIC (N/A)     Consults:     Significant Diagnostic Studies: Labs: CBC No results for input(s): WBC, HGB, HCT, PLT in the last 48 hours.    Pending Diagnostic Studies:       Procedure Component Value Units Date/Time    Specimen to Pathology, Surgery Gynecology and Obstetrics [[id number]] Collected: 03/28/23 0941    Order Status: Sent Lab Status: In process Updated: 03/29/23 1136    Specimen: Tissue           Final Active Diagnoses:    Diagnosis Date Noted POA    PRINCIPAL PROBLEM:  S/P laparoscopic hysterectomy [Z90.710] 03/28/2023 No    Uterine leiomyoma [D25.9] 03/28/2023 Yes      Problems Resolved During this Admission:        Discharged Condition: good    Disposition: Home or Self Care    Follow Up:   Follow-up Information       Lola Zhao MD. Go in 1 week(s).     Specialty: Obstetrics and Gynecology  Why: voiding trial  Contact information:  [address]  [address]  [place] [address]  [phone]                           Patient Instructions:      Pregnancy, urine rapid   Standing Status: Future Number of Occurrences: 1 Standing Exp. Date: 05/15/24     Order Specific Question Answer Comments   Specimen Source Urine      Pregnancy, urine rapid   Standing Status: Future Number of Occurrences: 1 Standing Exp. Date: 05/21/24     Order Specific Question Answer Comments   Specimen Source Urine      Diet general     Pelvic Rest     Lifting restrictions     No dressing needed     Call MD for:  temperature >100.4     Call MD for:  persistent nausea and vomiting     Call MD for:  severe uncontrolled pain     Call MD for:  difficulty breathing, headache or visual disturbances     Call MD for:  redness, tenderness, or signs of infection (pain, swelling, redness, odor or green/yellow discharge around incision site)     Call MD for:  hives     Call MD for:  persistent dizziness or light-headedness     Call MD for:  extreme fatigue     Medications:  Reconciled Home Medications:      Medication List        START taking these medications      docusate sodium 100 MG capsule  Commonly known as: COLACE  Take 1 capsule (100 mg total) by mouth 2 (two) times daily.     ondansetron 4 MG tablet  Commonly known as: ZOFRAN  Take 1 tablet (4 mg total) by mouth every 6 (six) hours as needed for Nausea.     oxyCODONE 5 MG immediate release tablet  Commonly known as: ROXICODONE  Take 1 tablet (5 mg total) by mouth every 4 (four) hours as needed for Pain.     phenazopyridine 100 MG tablet  Commonly known as: PYRIDIUM  Take 1 tablet (100 mg total) by mouth 3 (three) times daily as needed for Pain.            CONTINUE taking these medications      acetaminophen 500 MG tablet  Commonly known as: TYLENOL EXTRA STRENGTH  Take 2 tablets (1,000 mg total) by mouth every 6 (six) hours as needed for Pain.      amLODIPine 5 MG tablet  Commonly known as: NORVASC  Take 1 tablet (5 mg total) by mouth every evening.     betamethasone dipropionate 0.05 % ointment  Commonly known as: DIPROLENE  APPLY  OINTMENT TOPICALLY TWICE DAILY FOR 7 DAYS     carvediloL 12.5 MG tablet  Commonly known as: COREG  Take 1 tablet (12.5 mg total) by mouth 2 (two) times daily with meals.     clobetasoL 0.05 % external solution  Commonly known as: TEMOVATE  Apply topically 2 (two) times daily.     ELIQUIS 5 mg Tab  Generic drug: apixaban  Take 1 tablet by mouth twice daily     furosemide 20 MG tablet  Commonly known as: LASIX  Take 1 tablet (20 mg total) by mouth once daily.     glimepiride 2 MG tablet  Commonly known as: AMARYL  Take 1 tablet (2 mg total) by mouth once daily.     loratadine 10 mg tablet  Commonly known as: CLARITIN  Take 10 mg by mouth once daily. prn     losartan 50 MG tablet  Commonly known as: COZAAR  Take 1 tablet (50 mg total) by mouth 2 (two) times daily.     metFORMIN 1000 MG tablet  Commonly known as: GLUCOPHAGE  Take 1 tablet (1,000 mg total) by mouth 2 (two) times daily with meals.     MULTAQ 400 mg Tab  Generic drug: dronedarone  TAKE 1 TABLET BY MOUTH TWICE DAILY WITH MEALS     simvastatin 40 MG tablet  Commonly known as: ZOCOR  Take 1 tablet (40 mg total) by mouth every evening.     * tirzepatide 7.5 mg/0.5 mL Pnij  Inject 7.5 mg into the skin every 7 days. for 4 doses     * tirzepatide 10 mg/0.5 mL Pnij  Inject 10 mg into the skin every 7 days. for 4 doses  Start taking on: April 11, 2023           * This list has 2 medication(s) that are the same as other medications prescribed for you. Read the directions carefully, and ask your doctor or other care provider to review them with you.                STOP taking these medications      medroxyPROGESTERone 10 MG tablet  Commonly known as: LUZ Zhao MD  Obstetrics & Gynecology  [facility]

## 2023-04-05 ENCOUNTER — OFFICE VISIT (OUTPATIENT)
Dept: OBSTETRICS AND GYNECOLOGY | Facility: CLINIC | Age: 53
End: 2023-04-05
Payer: COMMERCIAL

## 2023-04-05 VITALS — WEIGHT: 291 LBS | SYSTOLIC BLOOD PRESSURE: 124 MMHG | DIASTOLIC BLOOD PRESSURE: 70 MMHG | BODY MASS INDEX: 51.55 KG/M2

## 2023-04-05 DIAGNOSIS — Z79.4 TYPE 2 DIABETES MELLITUS WITHOUT COMPLICATION, WITH LONG-TERM CURRENT USE OF INSULIN: ICD-10-CM

## 2023-04-05 DIAGNOSIS — Z90.710 S/P LAPAROSCOPIC HYSTERECTOMY: Primary | ICD-10-CM

## 2023-04-05 DIAGNOSIS — E11.9 TYPE 2 DIABETES MELLITUS WITHOUT COMPLICATION, WITH LONG-TERM CURRENT USE OF INSULIN: ICD-10-CM

## 2023-04-05 DIAGNOSIS — N30.00 ACUTE CYSTITIS WITHOUT HEMATURIA: ICD-10-CM

## 2023-04-05 PROCEDURE — 4010F PR ACE/ARB THEARPY RXD/TAKEN: ICD-10-PCS | Mod: CPTII,S$GLB,, | Performed by: OBSTETRICS & GYNECOLOGY

## 2023-04-05 PROCEDURE — 87086 URINE CULTURE/COLONY COUNT: CPT | Performed by: OBSTETRICS & GYNECOLOGY

## 2023-04-05 PROCEDURE — 3074F PR MOST RECENT SYSTOLIC BLOOD PRESSURE < 130 MM HG: ICD-10-PCS | Mod: CPTII,S$GLB,, | Performed by: OBSTETRICS & GYNECOLOGY

## 2023-04-05 PROCEDURE — 3066F PR DOCUMENTATION OF TREATMENT FOR NEPHROPATHY: ICD-10-PCS | Mod: CPTII,S$GLB,, | Performed by: OBSTETRICS & GYNECOLOGY

## 2023-04-05 PROCEDURE — 3061F NEG MICROALBUMINURIA REV: CPT | Mod: CPTII,S$GLB,, | Performed by: OBSTETRICS & GYNECOLOGY

## 2023-04-05 PROCEDURE — 3061F PR NEG MICROALBUMINURIA RESULT DOCUMENTED/REVIEW: ICD-10-PCS | Mod: CPTII,S$GLB,, | Performed by: OBSTETRICS & GYNECOLOGY

## 2023-04-05 PROCEDURE — 3078F PR MOST RECENT DIASTOLIC BLOOD PRESSURE < 80 MM HG: ICD-10-PCS | Mod: CPTII,S$GLB,, | Performed by: OBSTETRICS & GYNECOLOGY

## 2023-04-05 PROCEDURE — 87088 URINE BACTERIA CULTURE: CPT | Performed by: OBSTETRICS & GYNECOLOGY

## 2023-04-05 PROCEDURE — 3078F DIAST BP <80 MM HG: CPT | Mod: CPTII,S$GLB,, | Performed by: OBSTETRICS & GYNECOLOGY

## 2023-04-05 PROCEDURE — 3008F BODY MASS INDEX DOCD: CPT | Mod: CPTII,S$GLB,, | Performed by: OBSTETRICS & GYNECOLOGY

## 2023-04-05 PROCEDURE — 99999 PR PBB SHADOW E&M-EST. PATIENT-LVL III: CPT | Mod: PBBFAC,,, | Performed by: OBSTETRICS & GYNECOLOGY

## 2023-04-05 PROCEDURE — 4010F ACE/ARB THERAPY RXD/TAKEN: CPT | Mod: CPTII,S$GLB,, | Performed by: OBSTETRICS & GYNECOLOGY

## 2023-04-05 PROCEDURE — 3044F HG A1C LEVEL LT 7.0%: CPT | Mod: CPTII,S$GLB,, | Performed by: OBSTETRICS & GYNECOLOGY

## 2023-04-05 PROCEDURE — 3008F PR BODY MASS INDEX (BMI) DOCUMENTED: ICD-10-PCS | Mod: CPTII,S$GLB,, | Performed by: OBSTETRICS & GYNECOLOGY

## 2023-04-05 PROCEDURE — 3044F PR MOST RECENT HEMOGLOBIN A1C LEVEL <7.0%: ICD-10-PCS | Mod: CPTII,S$GLB,, | Performed by: OBSTETRICS & GYNECOLOGY

## 2023-04-05 PROCEDURE — 3066F NEPHROPATHY DOC TX: CPT | Mod: CPTII,S$GLB,, | Performed by: OBSTETRICS & GYNECOLOGY

## 2023-04-05 PROCEDURE — 87077 CULTURE AEROBIC IDENTIFY: CPT | Mod: 59 | Performed by: OBSTETRICS & GYNECOLOGY

## 2023-04-05 PROCEDURE — 87186 SC STD MICRODIL/AGAR DIL: CPT | Performed by: OBSTETRICS & GYNECOLOGY

## 2023-04-05 PROCEDURE — 1159F PR MEDICATION LIST DOCUMENTED IN MEDICAL RECORD: ICD-10-PCS | Mod: CPTII,S$GLB,, | Performed by: OBSTETRICS & GYNECOLOGY

## 2023-04-05 PROCEDURE — 3074F SYST BP LT 130 MM HG: CPT | Mod: CPTII,S$GLB,, | Performed by: OBSTETRICS & GYNECOLOGY

## 2023-04-05 PROCEDURE — 99999 PR PBB SHADOW E&M-EST. PATIENT-LVL III: ICD-10-PCS | Mod: PBBFAC,,, | Performed by: OBSTETRICS & GYNECOLOGY

## 2023-04-05 PROCEDURE — 99024 POSTOP FOLLOW-UP VISIT: CPT | Mod: S$GLB,,, | Performed by: OBSTETRICS & GYNECOLOGY

## 2023-04-05 PROCEDURE — 1159F MED LIST DOCD IN RCRD: CPT | Mod: CPTII,S$GLB,, | Performed by: OBSTETRICS & GYNECOLOGY

## 2023-04-05 PROCEDURE — 99024 PR POST-OP FOLLOW-UP VISIT: ICD-10-PCS | Mod: S$GLB,,, | Performed by: OBSTETRICS & GYNECOLOGY

## 2023-04-05 RX ORDER — TIRZEPATIDE 5 MG/.5ML
5 INJECTION, SOLUTION SUBCUTANEOUS
Qty: 4 PEN | Refills: 4 | Status: SHIPPED | OUTPATIENT
Start: 2023-04-05 | End: 2023-05-22

## 2023-04-05 RX ORDER — NITROFURANTOIN 25; 75 MG/1; MG/1
100 CAPSULE ORAL 2 TIMES DAILY
Qty: 14 CAPSULE | Refills: 0 | Status: SHIPPED | OUTPATIENT
Start: 2023-04-05 | End: 2023-04-12

## 2023-04-05 NOTE — PROGRESS NOTES
Subjective:      Patient ID: Micaela Hernandez is a 52 y.o. female.    Chief Complaint:  Post-op Evaluation      History of Present Illness  HPI  Postoperative Follow-up  Patient presents to the clinic 1 weeks status post total laparoscopic hysterectomy for abnormal uterine bleeding. Eating a regular diet without difficulty. Bowel movements are normal. Pain is controlled with current analgesics. Medications being used: acetaminophen and oxycodone.    Was discharged with afulkner catheter.  GYN & OB History  Patient's last menstrual period was 2023 (approximate).   Date of Last Pap: 2021    OB History    Para Term  AB Living   1 1 1         SAB IAB Ectopic Multiple Live Births                  # Outcome Date GA Lbr Melvin/2nd Weight Sex Delivery Anes PTL Lv   1 Term      CS-Unspec          Review of Systems  Review of Systems   Constitutional: Negative.    HENT: Negative.     Eyes: Negative.    Respiratory: Negative.     Cardiovascular: Negative.    Gastrointestinal: Negative.    Endocrine: Negative.    Genitourinary: Negative.    Musculoskeletal: Negative.    Integumentary:  Negative.   Neurological: Negative.    Hematological: Negative.    Psychiatric/Behavioral: Negative.     Breast: negative.         Objective:     Physical Exam:   Constitutional: She is oriented to person, place, and time. She appears well-developed.    HENT:   Head: Normocephalic and atraumatic.    Eyes: EOM are normal.     Cardiovascular:  Normal rate.             Pulmonary/Chest: Effort normal.        Abdominal: Soft. There is no abdominal tenderness.             Musculoskeletal: Normal range of motion.       Neurological: She is oriented to person, place, and time.    Skin: Skin is warm.    Psychiatric: She has a normal mood and affect.       Voiding trial performed.  120 mL of normal saline infused into bladder.   Patient able to void 120 mL.  Assessment:     1. S/P laparoscopic hysterectomy    2. Acute cystitis without  hematuria    3. Type 2 diabetes mellitus without complication, with long-term current use of insulin               Plan:     1. S/P laparoscopic hysterectomy  - Recovering well.  - continue pelvic rest.    2. Acute cystitis without hematuria  - nitrofurantoin, macrocrystal-monohydrate, (MACROBID) 100 MG capsule; Take 1 capsule (100 mg total) by mouth 2 (two) times daily. for 7 days  Dispense: 14 capsule; Refill: 0  - Urine culture    3. Type 2 diabetes mellitus without complication, with long-term current use of insulin  - tirzepatide (MOUNJARO) 5 mg/0.5 mL PnIj; Inject 5 mg into the skin every 7 days.  Dispense: 4 pen; Refill: 4

## 2023-04-05 NOTE — LETTER
April 5, 2023    Micaela Hernandez  [address] [address]         [facility]  [address]  [place] [address]  Phone: [phone] April 5, 2023     Patient: Micaela Hernandez   YOB: 1970   Date of Visit: 4/5/2023       To Whom It May Concern:    It is my medical opinion that Micaela Hernandez may return to work on April 17,2023. Micaela Hernandez can not do any heavy lifting.     If you have any questions or concerns, please don't hesitate to call.    Sincerely,        Dea Garcia

## 2023-04-07 LAB
BACTERIA UR CULT: ABNORMAL
BACTERIA UR CULT: ABNORMAL

## 2023-04-12 ENCOUNTER — PATIENT MESSAGE (OUTPATIENT)
Dept: ADMINISTRATIVE | Facility: HOSPITAL | Age: 53
End: 2023-04-12
Payer: COMMERCIAL

## 2023-04-18 ENCOUNTER — NURSE TRIAGE (OUTPATIENT)
Dept: ADMINISTRATIVE | Facility: CLINIC | Age: 53
End: 2023-04-18
Payer: COMMERCIAL

## 2023-04-18 ENCOUNTER — TELEPHONE (OUTPATIENT)
Dept: OBSTETRICS AND GYNECOLOGY | Facility: CLINIC | Age: 53
End: 2023-04-18
Payer: COMMERCIAL

## 2023-04-18 NOTE — TELEPHONE ENCOUNTER
Error. Chart reviewed. Pt on call with provider.   Reason for Disposition   Caller has already spoken with the PCP (or office), and has no further questions    Protocols used: No Contact or Duplicate Contact Call-A-OH

## 2023-04-18 NOTE — TELEPHONE ENCOUNTER
On call back to pt, pt advised her bleeding has increased since surgery. Pt advised she is filling 2 pads per hour and is concerned she is losing too much blood. Pt requesting provider to contact her in reference. Dr Zhao's first available appt is on Thursday 4/20/2023. Pt does not want to wait that long, and does not want to go to ed. Message routed to provider on call and Dr. Zhao.     ----- Message from Nella Rai sent at 4/18/2023  3:16 PM CDT -----  Regarding: [id number]  Type:  Patient Returning Call    Who Called: self    Who Left Message for Patient: Benny    Does the patient know what this is regarding?:yes    Would the patient rather a call back or a response via [facility]? Call back    Best Call Back Number:[phone]      Additional Information: pt states she will like a call back in regards to her POST op, she states her bleeding keeps getting heavier and heavier.

## 2023-04-19 ENCOUNTER — OFFICE VISIT (OUTPATIENT)
Dept: OBSTETRICS AND GYNECOLOGY | Facility: CLINIC | Age: 53
End: 2023-04-19
Payer: COMMERCIAL

## 2023-04-19 VITALS
BODY MASS INDEX: 50.38 KG/M2 | SYSTOLIC BLOOD PRESSURE: 120 MMHG | DIASTOLIC BLOOD PRESSURE: 74 MMHG | WEIGHT: 284.38 LBS

## 2023-04-19 DIAGNOSIS — Z90.710 S/P LAPAROSCOPIC HYSTERECTOMY: Primary | ICD-10-CM

## 2023-04-19 PROCEDURE — 1159F MED LIST DOCD IN RCRD: CPT | Mod: CPTII,S$GLB,, | Performed by: OBSTETRICS & GYNECOLOGY

## 2023-04-19 PROCEDURE — 3074F PR MOST RECENT SYSTOLIC BLOOD PRESSURE < 130 MM HG: ICD-10-PCS | Mod: CPTII,S$GLB,, | Performed by: OBSTETRICS & GYNECOLOGY

## 2023-04-19 PROCEDURE — 3074F SYST BP LT 130 MM HG: CPT | Mod: CPTII,S$GLB,, | Performed by: OBSTETRICS & GYNECOLOGY

## 2023-04-19 PROCEDURE — 3044F HG A1C LEVEL LT 7.0%: CPT | Mod: CPTII,S$GLB,, | Performed by: OBSTETRICS & GYNECOLOGY

## 2023-04-19 PROCEDURE — 1159F PR MEDICATION LIST DOCUMENTED IN MEDICAL RECORD: ICD-10-PCS | Mod: CPTII,S$GLB,, | Performed by: OBSTETRICS & GYNECOLOGY

## 2023-04-19 PROCEDURE — 99999 PR PBB SHADOW E&M-EST. PATIENT-LVL III: ICD-10-PCS | Mod: PBBFAC,,, | Performed by: OBSTETRICS & GYNECOLOGY

## 2023-04-19 PROCEDURE — 99999 PR PBB SHADOW E&M-EST. PATIENT-LVL III: CPT | Mod: PBBFAC,,, | Performed by: OBSTETRICS & GYNECOLOGY

## 2023-04-19 PROCEDURE — 3008F PR BODY MASS INDEX (BMI) DOCUMENTED: ICD-10-PCS | Mod: CPTII,S$GLB,, | Performed by: OBSTETRICS & GYNECOLOGY

## 2023-04-19 PROCEDURE — 3061F PR NEG MICROALBUMINURIA RESULT DOCUMENTED/REVIEW: ICD-10-PCS | Mod: CPTII,S$GLB,, | Performed by: OBSTETRICS & GYNECOLOGY

## 2023-04-19 PROCEDURE — 3008F BODY MASS INDEX DOCD: CPT | Mod: CPTII,S$GLB,, | Performed by: OBSTETRICS & GYNECOLOGY

## 2023-04-19 PROCEDURE — 3066F PR DOCUMENTATION OF TREATMENT FOR NEPHROPATHY: ICD-10-PCS | Mod: CPTII,S$GLB,, | Performed by: OBSTETRICS & GYNECOLOGY

## 2023-04-19 PROCEDURE — 3078F PR MOST RECENT DIASTOLIC BLOOD PRESSURE < 80 MM HG: ICD-10-PCS | Mod: CPTII,S$GLB,, | Performed by: OBSTETRICS & GYNECOLOGY

## 2023-04-19 PROCEDURE — 3066F NEPHROPATHY DOC TX: CPT | Mod: CPTII,S$GLB,, | Performed by: OBSTETRICS & GYNECOLOGY

## 2023-04-19 PROCEDURE — 3044F PR MOST RECENT HEMOGLOBIN A1C LEVEL <7.0%: ICD-10-PCS | Mod: CPTII,S$GLB,, | Performed by: OBSTETRICS & GYNECOLOGY

## 2023-04-19 PROCEDURE — 99024 PR POST-OP FOLLOW-UP VISIT: ICD-10-PCS | Mod: S$GLB,,, | Performed by: OBSTETRICS & GYNECOLOGY

## 2023-04-19 PROCEDURE — 3078F DIAST BP <80 MM HG: CPT | Mod: CPTII,S$GLB,, | Performed by: OBSTETRICS & GYNECOLOGY

## 2023-04-19 PROCEDURE — 4010F PR ACE/ARB THEARPY RXD/TAKEN: ICD-10-PCS | Mod: CPTII,S$GLB,, | Performed by: OBSTETRICS & GYNECOLOGY

## 2023-04-19 PROCEDURE — 3061F NEG MICROALBUMINURIA REV: CPT | Mod: CPTII,S$GLB,, | Performed by: OBSTETRICS & GYNECOLOGY

## 2023-04-19 PROCEDURE — 99024 POSTOP FOLLOW-UP VISIT: CPT | Mod: S$GLB,,, | Performed by: OBSTETRICS & GYNECOLOGY

## 2023-04-19 PROCEDURE — 4010F ACE/ARB THERAPY RXD/TAKEN: CPT | Mod: CPTII,S$GLB,, | Performed by: OBSTETRICS & GYNECOLOGY

## 2023-04-19 NOTE — PROGRESS NOTES
Subjective:      Patient ID: Micaela Hernnadez is a 52 y.o. female.    Chief Complaint:  Post-op Evaluation      History of Present Illness  HPI  Postoperative Follow-up  Patient presents to the clinic 3 weeks status post total laparoscopic hysterectomy for abnormal uterine bleeding. Eating a regular diet without difficulty. Bowel movements are normal. The patient is not having any pain.    She was doing well, but then started having heavy vaginal bleeding.  She stopped her Eliquis and the bleeding has seemed to lighten.    GYN & OB History  Patient's last menstrual period was 2023 (approximate).   Date of Last Pap: 2021    OB History    Para Term  AB Living   1 1 1         SAB IAB Ectopic Multiple Live Births                  # Outcome Date GA Lbr Melvin/2nd Weight Sex Delivery Anes PTL Lv   1 Term      CS-Unspec          Review of Systems  Review of Systems   Constitutional: Negative.    HENT: Negative.     Eyes: Negative.    Respiratory: Negative.     Cardiovascular: Negative.    Gastrointestinal: Negative.    Endocrine: Negative.    Genitourinary: Negative.    Musculoskeletal: Negative.    Integumentary:  Negative.   Neurological: Negative.    Hematological: Negative.    Psychiatric/Behavioral: Negative.     Breast: negative.         Objective:     Physical Exam:   Constitutional: She is oriented to person, place, and time. She appears well-developed and well-nourished.    HENT:   Head: Normocephalic and atraumatic.    Eyes: EOM are normal. Right eye exhibits normal extraocular motion. Left eye exhibits normal extraocular motion.    Neck: No thyromegaly present.    Cardiovascular:  Normal rate.             Pulmonary/Chest: Effort normal. No respiratory distress. Right breast exhibits no mass, no skin change and no tenderness. Left breast exhibits no mass, no skin change and no tenderness. Breasts are symmetrical.        Abdominal: Soft. She exhibits no distension and no mass. There is no  abdominal tenderness. Hernia confirmed negative in the right inguinal area and confirmed negative in the left inguinal area.     Genitourinary:    Right adnexa and left adnexa normal.      Pelvic exam was performed with patient supine.   The external female genitalia was normal.   No external genitalia lesions identified,Genitalia hair distrobution normal .   Labial bartholins normal.There is no rash or lesion on the right labia. There is no rash or lesion on the left labia. Right adnexum displays no tenderness and no fullness. Left adnexum displays no tenderness and no fullness. Vaginal cuff normal.  No  no vaginal discharge, bleeding or unspecified prolapse of vaginal walls in the vagina.    There are signs of injury (vaginal cuff intact.  Areas of granulation tissue noted.  Granulation tissue cauterized with silver nitrate.) in the vagina.   Vagina was moist.Cervix is absent.Uterus is absent. Normal urethral meatus.Urethral Meatus exhibits: urethral lesion and prolapsedUrethra findings: no urethral mass and no tendernessBladder findings: no bladder distention and no bladder tenderness          Musculoskeletal: Normal range of motion.      Lymphadenopathy:     She has no cervical adenopathy.    Neurological: She is oriented to person, place, and time.   Cranial Nerves II-XII grossly intact.    Skin: Skin is warm. No rash noted. No erythema.    Psychiatric: She has a normal mood and affect. Her behavior is normal.         Assessment:     1. S/P laparoscopic hysterectomy               Plan:     1. S/P laparoscopic hysterectomy  - Hold anticoagulation until next post op visit.  - continue pelvic rest.  - follow up in 3 wks or prn.

## 2023-04-25 NOTE — ADDENDUM NOTE
Addendum  created 04/25/23 0931 by Gustavo Grant MD    Attestation recorded in Intraprocedure, Intraprocedure Attestations filed, Intraprocedure Event edited

## 2023-05-10 ENCOUNTER — OFFICE VISIT (OUTPATIENT)
Dept: OBSTETRICS AND GYNECOLOGY | Facility: CLINIC | Age: 53
End: 2023-05-10
Payer: COMMERCIAL

## 2023-05-10 VITALS
BODY MASS INDEX: 50.65 KG/M2 | WEIGHT: 285.94 LBS | DIASTOLIC BLOOD PRESSURE: 74 MMHG | SYSTOLIC BLOOD PRESSURE: 120 MMHG

## 2023-05-10 DIAGNOSIS — Z90.710 S/P LAPAROSCOPIC HYSTERECTOMY: Primary | ICD-10-CM

## 2023-05-10 PROCEDURE — 3078F DIAST BP <80 MM HG: CPT | Mod: CPTII,S$GLB,, | Performed by: OBSTETRICS & GYNECOLOGY

## 2023-05-10 PROCEDURE — 3044F HG A1C LEVEL LT 7.0%: CPT | Mod: CPTII,S$GLB,, | Performed by: OBSTETRICS & GYNECOLOGY

## 2023-05-10 PROCEDURE — 1159F PR MEDICATION LIST DOCUMENTED IN MEDICAL RECORD: ICD-10-PCS | Mod: CPTII,S$GLB,, | Performed by: OBSTETRICS & GYNECOLOGY

## 2023-05-10 PROCEDURE — 1159F MED LIST DOCD IN RCRD: CPT | Mod: CPTII,S$GLB,, | Performed by: OBSTETRICS & GYNECOLOGY

## 2023-05-10 PROCEDURE — 99999 PR PBB SHADOW E&M-EST. PATIENT-LVL III: ICD-10-PCS | Mod: PBBFAC,,, | Performed by: OBSTETRICS & GYNECOLOGY

## 2023-05-10 PROCEDURE — 4010F PR ACE/ARB THEARPY RXD/TAKEN: ICD-10-PCS | Mod: CPTII,S$GLB,, | Performed by: OBSTETRICS & GYNECOLOGY

## 2023-05-10 PROCEDURE — 3008F PR BODY MASS INDEX (BMI) DOCUMENTED: ICD-10-PCS | Mod: CPTII,S$GLB,, | Performed by: OBSTETRICS & GYNECOLOGY

## 2023-05-10 PROCEDURE — 3078F PR MOST RECENT DIASTOLIC BLOOD PRESSURE < 80 MM HG: ICD-10-PCS | Mod: CPTII,S$GLB,, | Performed by: OBSTETRICS & GYNECOLOGY

## 2023-05-10 PROCEDURE — 99024 POSTOP FOLLOW-UP VISIT: CPT | Mod: S$GLB,,, | Performed by: OBSTETRICS & GYNECOLOGY

## 2023-05-10 PROCEDURE — 99999 PR PBB SHADOW E&M-EST. PATIENT-LVL III: CPT | Mod: PBBFAC,,, | Performed by: OBSTETRICS & GYNECOLOGY

## 2023-05-10 PROCEDURE — 3066F PR DOCUMENTATION OF TREATMENT FOR NEPHROPATHY: ICD-10-PCS | Mod: CPTII,S$GLB,, | Performed by: OBSTETRICS & GYNECOLOGY

## 2023-05-10 PROCEDURE — 3066F NEPHROPATHY DOC TX: CPT | Mod: CPTII,S$GLB,, | Performed by: OBSTETRICS & GYNECOLOGY

## 2023-05-10 PROCEDURE — 4010F ACE/ARB THERAPY RXD/TAKEN: CPT | Mod: CPTII,S$GLB,, | Performed by: OBSTETRICS & GYNECOLOGY

## 2023-05-10 PROCEDURE — 3061F PR NEG MICROALBUMINURIA RESULT DOCUMENTED/REVIEW: ICD-10-PCS | Mod: CPTII,S$GLB,, | Performed by: OBSTETRICS & GYNECOLOGY

## 2023-05-10 PROCEDURE — 3061F NEG MICROALBUMINURIA REV: CPT | Mod: CPTII,S$GLB,, | Performed by: OBSTETRICS & GYNECOLOGY

## 2023-05-10 PROCEDURE — 3074F PR MOST RECENT SYSTOLIC BLOOD PRESSURE < 130 MM HG: ICD-10-PCS | Mod: CPTII,S$GLB,, | Performed by: OBSTETRICS & GYNECOLOGY

## 2023-05-10 PROCEDURE — 99024 PR POST-OP FOLLOW-UP VISIT: ICD-10-PCS | Mod: S$GLB,,, | Performed by: OBSTETRICS & GYNECOLOGY

## 2023-05-10 PROCEDURE — 3008F BODY MASS INDEX DOCD: CPT | Mod: CPTII,S$GLB,, | Performed by: OBSTETRICS & GYNECOLOGY

## 2023-05-10 PROCEDURE — 3044F PR MOST RECENT HEMOGLOBIN A1C LEVEL <7.0%: ICD-10-PCS | Mod: CPTII,S$GLB,, | Performed by: OBSTETRICS & GYNECOLOGY

## 2023-05-10 PROCEDURE — 3074F SYST BP LT 130 MM HG: CPT | Mod: CPTII,S$GLB,, | Performed by: OBSTETRICS & GYNECOLOGY

## 2023-05-10 NOTE — PROGRESS NOTES
Subjective:      Patient ID: Micaela Hernandez is a 53 y.o. female.    Chief Complaint:  Post-op Evaluation      History of Present Illness  HPI  Postoperative Follow-up  Patient presents to the clinic 6 weeks status post total laparoscopic hysterectomy for abnormal uterine bleeding. Eating a regular diet without difficulty. Bowel movements are normal. The patient is not having any pain.      GYN & OB History  Patient's last menstrual period was 2023 (approximate).   Date of Last Pap: 2021    OB History    Para Term  AB Living   1 1 1         SAB IAB Ectopic Multiple Live Births                  # Outcome Date GA Lbr Melvin/2nd Weight Sex Delivery Anes PTL Lv   1 Term      CS-Unspec          Review of Systems  Review of Systems   Constitutional: Negative.    HENT: Negative.     Eyes: Negative.    Respiratory: Negative.     Cardiovascular: Negative.    Gastrointestinal: Negative.    Endocrine: Negative.    Genitourinary: Negative.    Musculoskeletal: Negative.    Integumentary:  Negative.   Neurological: Negative.    Hematological: Negative.    Psychiatric/Behavioral: Negative.     Breast: negative.         Objective:     Physical Exam:   Constitutional: She is oriented to person, place, and time. She appears well-developed.    HENT:   Head: Normocephalic and atraumatic.    Eyes: EOM are normal.     Cardiovascular:  Normal rate.             Pulmonary/Chest: Effort normal.        Abdominal: Soft. There is no abdominal tenderness.     Genitourinary:    Vagina, right adnexa and left adnexa normal.      Pelvic exam was performed with patient supine.   The external female genitalia was normal.   No external genitalia lesions identified,Genitalia hair distrobution normal .   Labial bartholins normal.There is no rash, tenderness, lesion or injury on the right labia. There is no rash, tenderness, lesion or injury on the left labia. Right adnexum displays no tenderness and no fullness. Left adnexum  displays no tenderness and no fullness. Vaginal cuff normal.  No erythema,  no vaginal discharge, bleeding or unspecified prolapse of vaginal walls in the vagina. Vagina was moist.Cervix is absent.Uterus is absent. Normal urethral meatus.Urethral Meatus exhibits: urethral lesion and prolapsedUrethra findings: no urethral mass and no tendernessBladder findings: no bladder distention and no bladder tenderness          Musculoskeletal: Normal range of motion.       Neurological: She is oriented to person, place, and time.    Skin: Skin is warm.    Psychiatric: She has a normal mood and affect.         Assessment:     1. S/P laparoscopic hysterectomy               Plan:     1. S/P laparoscopic hysterectomy  - Re-start Eliquis  - recovered from surgery.

## 2023-05-15 DIAGNOSIS — I48.0 PAF (PAROXYSMAL ATRIAL FIBRILLATION): ICD-10-CM

## 2023-05-16 RX ORDER — DRONEDARONE 400 MG/1
TABLET, FILM COATED ORAL
Qty: 180 TABLET | Refills: 3 | Status: SHIPPED | OUTPATIENT
Start: 2023-05-16

## 2023-05-22 ENCOUNTER — PATIENT MESSAGE (OUTPATIENT)
Dept: FAMILY MEDICINE | Facility: CLINIC | Age: 53
End: 2023-05-22
Payer: COMMERCIAL

## 2023-05-22 DIAGNOSIS — E11.21 TYPE 2 DIABETES MELLITUS WITH DIABETIC NEPHROPATHY, WITHOUT LONG-TERM CURRENT USE OF INSULIN: Primary | ICD-10-CM

## 2023-05-22 RX ORDER — TIRZEPATIDE 12.5 MG/.5ML
12.5 INJECTION, SOLUTION SUBCUTANEOUS
Qty: 13 PEN | Refills: 1 | Status: SHIPPED | OUTPATIENT
Start: 2023-05-22 | End: 2023-08-10 | Stop reason: SDUPTHER

## 2023-07-15 DIAGNOSIS — E11.21 TYPE 2 DIABETES MELLITUS WITH DIABETIC NEPHROPATHY, WITHOUT LONG-TERM CURRENT USE OF INSULIN: ICD-10-CM

## 2023-07-15 DIAGNOSIS — I10 PRIMARY HYPERTENSION: ICD-10-CM

## 2023-07-18 RX ORDER — FUROSEMIDE 20 MG/1
TABLET ORAL
Qty: 90 TABLET | Refills: 0 | Status: SHIPPED | OUTPATIENT
Start: 2023-07-18 | End: 2023-08-10 | Stop reason: SDUPTHER

## 2023-07-18 RX ORDER — LOSARTAN POTASSIUM 50 MG/1
TABLET ORAL
Qty: 180 TABLET | Refills: 0 | Status: SHIPPED | OUTPATIENT
Start: 2023-07-18 | End: 2023-08-10 | Stop reason: SDUPTHER

## 2023-07-18 RX ORDER — SIMVASTATIN 40 MG/1
TABLET, FILM COATED ORAL
Qty: 90 TABLET | Refills: 0 | Status: SHIPPED | OUTPATIENT
Start: 2023-07-18 | End: 2023-08-10 | Stop reason: SDUPTHER

## 2023-07-31 ENCOUNTER — OFFICE VISIT (OUTPATIENT)
Dept: CARDIOLOGY | Facility: CLINIC | Age: 53
End: 2023-07-31
Payer: COMMERCIAL

## 2023-07-31 VITALS
HEIGHT: 63 IN | BODY MASS INDEX: 49.18 KG/M2 | HEART RATE: 81 BPM | SYSTOLIC BLOOD PRESSURE: 124 MMHG | WEIGHT: 277.56 LBS | OXYGEN SATURATION: 100 % | DIASTOLIC BLOOD PRESSURE: 85 MMHG | RESPIRATION RATE: 15 BRPM

## 2023-07-31 DIAGNOSIS — I48.0 PAF (PAROXYSMAL ATRIAL FIBRILLATION): ICD-10-CM

## 2023-07-31 DIAGNOSIS — Z86.79 S/P ABLATION OF ATRIAL FLUTTER: ICD-10-CM

## 2023-07-31 DIAGNOSIS — I10 PRIMARY HYPERTENSION: ICD-10-CM

## 2023-07-31 DIAGNOSIS — E78.5 HYPERLIPIDEMIA, UNSPECIFIED HYPERLIPIDEMIA TYPE: ICD-10-CM

## 2023-07-31 DIAGNOSIS — Z98.890 S/P ABLATION OF ATRIAL FLUTTER: ICD-10-CM

## 2023-07-31 DIAGNOSIS — I48.3 TYPICAL ATRIAL FLUTTER: Primary | ICD-10-CM

## 2023-07-31 PROCEDURE — 3079F DIAST BP 80-89 MM HG: CPT | Mod: CPTII,S$GLB,, | Performed by: INTERNAL MEDICINE

## 2023-07-31 PROCEDURE — 1159F PR MEDICATION LIST DOCUMENTED IN MEDICAL RECORD: ICD-10-PCS | Mod: CPTII,S$GLB,, | Performed by: INTERNAL MEDICINE

## 2023-07-31 PROCEDURE — 99999 PR PBB SHADOW E&M-EST. PATIENT-LVL V: ICD-10-PCS | Mod: PBBFAC,,, | Performed by: INTERNAL MEDICINE

## 2023-07-31 PROCEDURE — 93000 ELECTROCARDIOGRAM COMPLETE: CPT | Mod: S$GLB,,, | Performed by: INTERNAL MEDICINE

## 2023-07-31 PROCEDURE — 3066F NEPHROPATHY DOC TX: CPT | Mod: CPTII,S$GLB,, | Performed by: INTERNAL MEDICINE

## 2023-07-31 PROCEDURE — 99214 OFFICE O/P EST MOD 30 MIN: CPT | Mod: S$GLB,,, | Performed by: INTERNAL MEDICINE

## 2023-07-31 PROCEDURE — 3044F HG A1C LEVEL LT 7.0%: CPT | Mod: CPTII,S$GLB,, | Performed by: INTERNAL MEDICINE

## 2023-07-31 PROCEDURE — 3061F PR NEG MICROALBUMINURIA RESULT DOCUMENTED/REVIEW: ICD-10-PCS | Mod: CPTII,S$GLB,, | Performed by: INTERNAL MEDICINE

## 2023-07-31 PROCEDURE — 3074F PR MOST RECENT SYSTOLIC BLOOD PRESSURE < 130 MM HG: ICD-10-PCS | Mod: CPTII,S$GLB,, | Performed by: INTERNAL MEDICINE

## 2023-07-31 PROCEDURE — 3074F SYST BP LT 130 MM HG: CPT | Mod: CPTII,S$GLB,, | Performed by: INTERNAL MEDICINE

## 2023-07-31 PROCEDURE — 3008F PR BODY MASS INDEX (BMI) DOCUMENTED: ICD-10-PCS | Mod: CPTII,S$GLB,, | Performed by: INTERNAL MEDICINE

## 2023-07-31 PROCEDURE — 3066F PR DOCUMENTATION OF TREATMENT FOR NEPHROPATHY: ICD-10-PCS | Mod: CPTII,S$GLB,, | Performed by: INTERNAL MEDICINE

## 2023-07-31 PROCEDURE — 3008F BODY MASS INDEX DOCD: CPT | Mod: CPTII,S$GLB,, | Performed by: INTERNAL MEDICINE

## 2023-07-31 PROCEDURE — 1159F MED LIST DOCD IN RCRD: CPT | Mod: CPTII,S$GLB,, | Performed by: INTERNAL MEDICINE

## 2023-07-31 PROCEDURE — 4010F PR ACE/ARB THEARPY RXD/TAKEN: ICD-10-PCS | Mod: CPTII,S$GLB,, | Performed by: INTERNAL MEDICINE

## 2023-07-31 PROCEDURE — 4010F ACE/ARB THERAPY RXD/TAKEN: CPT | Mod: CPTII,S$GLB,, | Performed by: INTERNAL MEDICINE

## 2023-07-31 PROCEDURE — 3079F PR MOST RECENT DIASTOLIC BLOOD PRESSURE 80-89 MM HG: ICD-10-PCS | Mod: CPTII,S$GLB,, | Performed by: INTERNAL MEDICINE

## 2023-07-31 PROCEDURE — 99214 PR OFFICE/OUTPT VISIT, EST, LEVL IV, 30-39 MIN: ICD-10-PCS | Mod: S$GLB,,, | Performed by: INTERNAL MEDICINE

## 2023-07-31 PROCEDURE — 93000 EKG 12-LEAD: ICD-10-PCS | Mod: S$GLB,,, | Performed by: INTERNAL MEDICINE

## 2023-07-31 PROCEDURE — 3044F PR MOST RECENT HEMOGLOBIN A1C LEVEL <7.0%: ICD-10-PCS | Mod: CPTII,S$GLB,, | Performed by: INTERNAL MEDICINE

## 2023-07-31 PROCEDURE — 99999 PR PBB SHADOW E&M-EST. PATIENT-LVL V: CPT | Mod: PBBFAC,,, | Performed by: INTERNAL MEDICINE

## 2023-07-31 PROCEDURE — 3061F NEG MICROALBUMINURIA REV: CPT | Mod: CPTII,S$GLB,, | Performed by: INTERNAL MEDICINE

## 2023-07-31 NOTE — PROGRESS NOTES
Subjective:   Patient ID:  Micaela Hernandez is a 53 y.o. female who presents for follow-up of No chief complaint on file.      HPI  A-flutter/A-fib  on eliquis and multaq - A-flutter RFA 3/31/22, HTN, HLD, DM, obesity     Admitted 1/24/22  Micaela Hernandez is a 51 y.o. female who  has a past medical history of Morbid Obesity, Degenerative lumbar disc, Diabetes mellitus, Diabetes mellitus, type 2, Hyperlipidemia, and Hypertension, presented to the ED with CC of SOB. Patient states that she has been feeling this since November, and has been wheezing intermittently as well. She even went to an allergist last week and had Xray, attempted albuterol.In Afib/Aflutter in the ED. HR sustained in 150's. Has never had an ECHO, never been to cardiologist. Started on dilt gtt and sent to ICU. SED rate 68. Concern for PNA with pleural fluid and infiltrates with LN swellings and new afib/flutter, started on CAP. LN of axilla were biopsied and told they were reactive, not malignant. Normal D-dimer for age. BNP was 102 but BMI 60. Patient denies any fevers, night sweats, n/v/d/c, abd pain, dysuria, hematuria or hematochezia, cough, CP, leg swelling, HA, dizziness, weakness, hallucinations, SI, HI, or self injury at this time.     Ms. Hernandez was admitted to the ICU for atrial flutter. Cardiology was consulted, and she was started on anticoagulation, flecainide, and a diltiazem drip w/ improvement in heart rate but remained in atrial flutter. She had shortness of breath which improved with furosemide. TTE showed normal LVEF. The following day she was cardioverted with successful restoration of sinus rhythm. She was discharged on flecainide and apixaban (CV 2+) to follow up with cardiology in the outpatient setting. Return precautions given, all questions answered prior to discharge, patient in agreement with plan.      3/31/22   Successful ablation of atrial flutter (typical).  3D mapping performed with Ensite.     1/26/22 STEVEN/CV - EF  55%, mild MR/TR. No thrombus in IDANIA. CV 200J converted A-flutter to NSR     9/20/22  The estimated ejection fraction is 65%.  The left ventricle is normal in size with normal systolic function.  Mild-to-moderate mitral regurgitation.  Normal right ventricular size with normal right ventricular systolic function.  Atrial fibrillation observed.  Moderate left atrial enlargement.  Mild right atrial enlargement.  Normal central venous pressure (3 mmHg).  The estimated PA systolic pressure is 35 mmHg.     EVM 5/5/22  Paroxysmal atrial fibrillation with RVR. AF burden 14%        2/3/22 Last weekend had an episode of heart racing which lasted all night and was similar to prior episode of A-flutter. Otherwise denies CP or SOB  EKG NSR QTc 493  Suspect breakthrough A-flutter. Increase coreg 6.25 bid. Will hold off on increasing flecainide with prolonged QT  Refer to EP for possible ablation  OV 1 month      Saw EP 3/8/22  Pt with recurrent atrial flutter despite Flecainide.   Recommend RFA.   Risks and benefits of RFA discussed, she would like to proceed.   In interim, increase Flecainide to 150 mg BID.   ECG in 1 week. If remains out of rhythm, consider repeat DCCV.        3/17/22 palpitations improved after taking increased dose of flecainide  EKG NSR QTc 515  Denies CP or SOB  Agree with plans for RFA of A-flutter  Continue Rx for HTN, HLD, A-flutter  OV 3 months     6/20/22 Flecainide was restarted for PAF 5/30/22 - she only took 2 doses - made her feel bad. Still with palpitations but not as severe  EKG NSR - ok  Continue Rx for HTN, HLD, A-flutter, PAF  OV 3 months     Saw Dr Kirk 9/12/22  Overall doing well from an AF burden standpoint. Suspect this is Multaq + treatment for her sleep apnea.  Discussed need for weight reduction.  Given shortness of breath >> echo.  F/u in 6 months.     10/20/22 Tolerating multaq - denies palpitations. Mild SALGUERO. Denies CP  BP controlled  Continue Rx for HTN, HLD, A-flutter, PAF  OV 3  months with holter     Admitted 2/27/23  HPI: Patient admitted with symptomatic anemia secondary to vaginal bleeding.  Hospital Course: Patient started on IV Premarin and Eliquis was stopped.   The vaginal bleeding resolved.  She was give 2 units of packed red blood cells.  A hysteroscopy D&C was performed on 3/1/2023. Please see operative report for complete  details. Following surgery, patient was transferred to the floor for routine post operative care. She had  an uncomplicated post operative course with no acute events. Her vital signs remained stable and  afebrile throughout her hospital stay. Her urine output was adequate and her physical exam was  appropriate. She was meeting all post operative milestones upon discharge.  She was ambulating and voiding without difficulty. She was tolerating a regular diet, and her vital signs  were stable and afebrile.   EKG 2/28/23 NSR PRWP     3/3/23 Recently admitted with vaginal bleeding. Still off of eliquis  Needs clearance for hysterectomy next week  Denies CP or SOB  DBP running low  Cleared fir hysterectomy at low cardiac risk - resume eliquis when cleared by GYN  Continue Rx for HTN, HLD, A-flutter, PAF  OV 3 months     7/31/23 Denies CP or SOB. BP controlled  Rarely gets palpitations  EKG NSR PRWP    Review of Systems   Constitutional: Negative for decreased appetite.   HENT:  Negative for ear discharge.    Eyes:  Negative for blurred vision.   Respiratory:  Negative for hemoptysis.    Endocrine: Negative for polyphagia.   Hematologic/Lymphatic: Negative for adenopathy.   Skin:  Negative for color change.   Musculoskeletal:  Negative for joint swelling.   Genitourinary:  Negative for bladder incontinence.   Neurological:  Negative for brief paralysis.   Psychiatric/Behavioral:  Negative for hallucinations.    Allergic/Immunologic: Negative for hives.       Objective:   Physical Exam  Constitutional:       Appearance: She is well-developed.   HENT:      Head:  Normocephalic and atraumatic.   Eyes:      Conjunctiva/sclera: Conjunctivae normal.      Pupils: Pupils are equal, round, and reactive to light.   Cardiovascular:      Rate and Rhythm: Normal rate.      Pulses: Intact distal pulses.      Heart sounds: Normal heart sounds.   Pulmonary:      Effort: Pulmonary effort is normal.      Breath sounds: Normal breath sounds.   Abdominal:      General: Bowel sounds are normal.      Palpations: Abdomen is soft.   Musculoskeletal:         General: Normal range of motion.      Cervical back: Normal range of motion and neck supple.   Skin:     General: Skin is warm and dry.   Neurological:      Mental Status: She is alert and oriented to person, place, and time.         Assessment:      1. Typical atrial flutter    2. Hyperlipidemia, unspecified hyperlipidemia type    3. Primary hypertension    4. S/P ablation of atrial flutter    5. PAF (paroxysmal atrial fibrillation)        Plan:     Continue Rx for HTN, HLD, A-flutter, PAF  OV 6 months with echo

## 2023-08-10 ENCOUNTER — OFFICE VISIT (OUTPATIENT)
Dept: FAMILY MEDICINE | Facility: CLINIC | Age: 53
End: 2023-08-10
Payer: COMMERCIAL

## 2023-08-10 ENCOUNTER — HOSPITAL ENCOUNTER (OUTPATIENT)
Dept: CARDIOLOGY | Facility: HOSPITAL | Age: 53
Discharge: HOME | End: 2023-08-10
Attending: INTERNAL MEDICINE
Payer: COMMERCIAL

## 2023-08-10 VITALS — HEIGHT: 63 IN | WEIGHT: 277 LBS | BODY MASS INDEX: 49.08 KG/M2

## 2023-08-10 VITALS
DIASTOLIC BLOOD PRESSURE: 82 MMHG | TEMPERATURE: 98 F | SYSTOLIC BLOOD PRESSURE: 126 MMHG | OXYGEN SATURATION: 98 % | HEIGHT: 63 IN | HEART RATE: 88 BPM | BODY MASS INDEX: 48.28 KG/M2 | WEIGHT: 272.5 LBS

## 2023-08-10 DIAGNOSIS — I48.0 PAF (PAROXYSMAL ATRIAL FIBRILLATION): ICD-10-CM

## 2023-08-10 DIAGNOSIS — I10 PRIMARY HYPERTENSION: ICD-10-CM

## 2023-08-10 DIAGNOSIS — Z98.890 S/P ABLATION OF ATRIAL FLUTTER: ICD-10-CM

## 2023-08-10 DIAGNOSIS — I48.3 TYPICAL ATRIAL FLUTTER: ICD-10-CM

## 2023-08-10 DIAGNOSIS — Z86.79 S/P ABLATION OF ATRIAL FLUTTER: ICD-10-CM

## 2023-08-10 DIAGNOSIS — E78.5 HYPERLIPIDEMIA, UNSPECIFIED HYPERLIPIDEMIA TYPE: ICD-10-CM

## 2023-08-10 DIAGNOSIS — E11.21 TYPE 2 DIABETES MELLITUS WITH DIABETIC NEPHROPATHY, WITHOUT LONG-TERM CURRENT USE OF INSULIN: Primary | ICD-10-CM

## 2023-08-10 PROBLEM — Z01.810 PREOP CARDIOVASCULAR EXAM: Status: RESOLVED | Noted: 2023-02-28 | Resolved: 2023-08-10

## 2023-08-10 PROBLEM — N93.9 ABNORMAL UTERINE BLEEDING (AUB): Status: RESOLVED | Noted: 2023-02-24 | Resolved: 2023-08-10

## 2023-08-10 PROBLEM — N92.1 MENORRHAGIA WITH IRREGULAR CYCLE: Status: RESOLVED | Noted: 2023-02-27 | Resolved: 2023-08-10

## 2023-08-10 PROBLEM — D25.9 UTERINE LEIOMYOMA: Status: RESOLVED | Noted: 2023-03-28 | Resolved: 2023-08-10

## 2023-08-10 LAB
ASCENDING AORTA: 2.53 CM
AV INDEX (PROSTH): 0.78
AV MEAN GRADIENT: 6 MMHG
AV PEAK GRADIENT: 9 MMHG
AV VALVE AREA BY VELOCITY RATIO: 1.85 CM²
AV VALVE AREA: 1.86 CM²
AV VELOCITY RATIO: 0.77
BSA FOR ECHO PROCEDURE: 2.36 M2
CV ECHO LV RWT: 0.31 CM
DOP CALC AO PEAK VEL: 1.48 M/S
DOP CALC AO VTI: 31.2 CM
DOP CALC LVOT AREA: 2.4 CM2
DOP CALC LVOT DIAMETER: 1.75 CM
DOP CALC LVOT PEAK VEL: 1.14 M/S
DOP CALC LVOT STROKE VOLUME: 58.18 CM3
DOP CALC MV VTI: 24.8 CM
DOP CALCLVOT PEAK VEL VTI: 24.2 CM
E WAVE DECELERATION TIME: 156.13 MSEC
E/A RATIO: 0.79
E/E' RATIO: 9 M/S
ECHO LV POSTERIOR WALL: 0.8 CM (ref 0.6–1.1)
FRACTIONAL SHORTENING: 36 % (ref 28–44)
INTERVENTRICULAR SEPTUM: 0.79 CM (ref 0.6–1.1)
IVC DIAMETER: 1.5 CM
LA MAJOR: 5.1 CM
LA MINOR: 4.8 CM
LA WIDTH: 3.8 CM
LEFT ATRIUM SIZE: 3.04 CM
LEFT ATRIUM VOLUME INDEX: 21.9 ML/M2
LEFT ATRIUM VOLUME: 48.56 CM3
LEFT INTERNAL DIMENSION IN SYSTOLE: 3.26 CM (ref 2.1–4)
LEFT VENTRICLE DIASTOLIC VOLUME INDEX: 55.83 ML/M2
LEFT VENTRICLE DIASTOLIC VOLUME: 123.95 ML
LEFT VENTRICLE MASS INDEX: 63 G/M2
LEFT VENTRICLE SYSTOLIC VOLUME INDEX: 19.3 ML/M2
LEFT VENTRICLE SYSTOLIC VOLUME: 42.92 ML
LEFT VENTRICULAR INTERNAL DIMENSION IN DIASTOLE: 5.1 CM (ref 3.5–6)
LEFT VENTRICULAR MASS: 139.35 G
LV LATERAL E/E' RATIO: 7.36 M/S
LV SEPTAL E/E' RATIO: 11.57 M/S
LVOT MG: 2.95 MMHG
LVOT MV: 0.81 CM/S
MV MEAN GRADIENT: 3 MMHG
MV PEAK A VEL: 1.03 M/S
MV PEAK E VEL: 0.81 M/S
MV PEAK GRADIENT: 5 MMHG
MV STENOSIS PRESSURE HALF TIME: 45.28 MS
MV VALVE AREA BY CONTINUITY EQUATION: 2.35 CM2
MV VALVE AREA P 1/2 METHOD: 4.86 CM2
OHS CV RV/LV RATIO: 0.61 CM
PISA TR MAX VEL: 1.81 M/S
PV PEAK GRADIENT: 6 MMHG
PV PEAK VELOCITY: 1.22 M/S
RA MAJOR: 4.5 CM
RA PRESSURE ESTIMATED: 3 MMHG
RA WIDTH: 3.2 CM
RIGHT VENTRICLE ID DIMENSION: 3.1 CM
RIGHT VENTRICULAR END-DIASTOLIC DIMENSION: 3.08 CM
RV TB RVSP: 5 MMHG
RV TISSUE DOPPLER FREE WALL SYSTOLIC VELOCITY 1 (APICAL 4 CHAMBER VIEW): 15.2 CM/S
SINUS: 2.58 CM
STJ: 2.07 CM
TDI LATERAL: 0.11 M/S
TDI SEPTAL: 0.07 M/S
TDI: 0.09 M/S
TR MAX PG: 13 MMHG
TRICUSPID ANNULAR PLANE SYSTOLIC EXCURSION: 2.4 CM
TV REST PULMONARY ARTERY PRESSURE: 16 MMHG
Z-SCORE OF LEFT VENTRICULAR DIMENSION IN END DIASTOLE: -4.19
Z-SCORE OF LEFT VENTRICULAR DIMENSION IN END SYSTOLE: -2.9

## 2023-08-10 PROCEDURE — 1159F MED LIST DOCD IN RCRD: CPT | Mod: CPTII,S$GLB,, | Performed by: NURSE PRACTITIONER

## 2023-08-10 PROCEDURE — 3074F SYST BP LT 130 MM HG: CPT | Mod: CPTII,S$GLB,, | Performed by: NURSE PRACTITIONER

## 2023-08-10 PROCEDURE — 1160F RVW MEDS BY RX/DR IN RCRD: CPT | Mod: CPTII,S$GLB,, | Performed by: NURSE PRACTITIONER

## 2023-08-10 PROCEDURE — 3066F NEPHROPATHY DOC TX: CPT | Mod: CPTII,S$GLB,, | Performed by: NURSE PRACTITIONER

## 2023-08-10 PROCEDURE — 3066F PR DOCUMENTATION OF TREATMENT FOR NEPHROPATHY: ICD-10-PCS | Mod: CPTII,S$GLB,, | Performed by: NURSE PRACTITIONER

## 2023-08-10 PROCEDURE — 3008F BODY MASS INDEX DOCD: CPT | Mod: CPTII,S$GLB,, | Performed by: NURSE PRACTITIONER

## 2023-08-10 PROCEDURE — 1159F PR MEDICATION LIST DOCUMENTED IN MEDICAL RECORD: ICD-10-PCS | Mod: CPTII,S$GLB,, | Performed by: NURSE PRACTITIONER

## 2023-08-10 PROCEDURE — 4010F ACE/ARB THERAPY RXD/TAKEN: CPT | Mod: CPTII,S$GLB,, | Performed by: NURSE PRACTITIONER

## 2023-08-10 PROCEDURE — 3079F PR MOST RECENT DIASTOLIC BLOOD PRESSURE 80-89 MM HG: ICD-10-PCS | Mod: CPTII,S$GLB,, | Performed by: NURSE PRACTITIONER

## 2023-08-10 PROCEDURE — 99999 PR PBB SHADOW E&M-EST. PATIENT-LVL IV: CPT | Mod: PBBFAC,,, | Performed by: NURSE PRACTITIONER

## 2023-08-10 PROCEDURE — 3079F DIAST BP 80-89 MM HG: CPT | Mod: CPTII,S$GLB,, | Performed by: NURSE PRACTITIONER

## 2023-08-10 PROCEDURE — 93306 ECHO (CUPID ONLY): ICD-10-PCS | Mod: 26,,, | Performed by: INTERNAL MEDICINE

## 2023-08-10 PROCEDURE — 4010F PR ACE/ARB THEARPY RXD/TAKEN: ICD-10-PCS | Mod: CPTII,S$GLB,, | Performed by: NURSE PRACTITIONER

## 2023-08-10 PROCEDURE — 99999 PR PBB SHADOW E&M-EST. PATIENT-LVL IV: ICD-10-PCS | Mod: PBBFAC,,, | Performed by: NURSE PRACTITIONER

## 2023-08-10 PROCEDURE — 1160F PR REVIEW ALL MEDS BY PRESCRIBER/CLIN PHARMACIST DOCUMENTED: ICD-10-PCS | Mod: CPTII,S$GLB,, | Performed by: NURSE PRACTITIONER

## 2023-08-10 PROCEDURE — 93306 TTE W/DOPPLER COMPLETE: CPT

## 2023-08-10 PROCEDURE — 99214 OFFICE O/P EST MOD 30 MIN: CPT | Mod: S$GLB,,, | Performed by: NURSE PRACTITIONER

## 2023-08-10 PROCEDURE — 93306 TTE W/DOPPLER COMPLETE: CPT | Mod: 26,,, | Performed by: INTERNAL MEDICINE

## 2023-08-10 PROCEDURE — 99214 PR OFFICE/OUTPT VISIT, EST, LEVL IV, 30-39 MIN: ICD-10-PCS | Mod: S$GLB,,, | Performed by: NURSE PRACTITIONER

## 2023-08-10 PROCEDURE — 3074F PR MOST RECENT SYSTOLIC BLOOD PRESSURE < 130 MM HG: ICD-10-PCS | Mod: CPTII,S$GLB,, | Performed by: NURSE PRACTITIONER

## 2023-08-10 PROCEDURE — 3061F NEG MICROALBUMINURIA REV: CPT | Mod: CPTII,S$GLB,, | Performed by: NURSE PRACTITIONER

## 2023-08-10 PROCEDURE — 3044F HG A1C LEVEL LT 7.0%: CPT | Mod: CPTII,S$GLB,, | Performed by: NURSE PRACTITIONER

## 2023-08-10 PROCEDURE — 3044F PR MOST RECENT HEMOGLOBIN A1C LEVEL <7.0%: ICD-10-PCS | Mod: CPTII,S$GLB,, | Performed by: NURSE PRACTITIONER

## 2023-08-10 PROCEDURE — 3008F PR BODY MASS INDEX (BMI) DOCUMENTED: ICD-10-PCS | Mod: CPTII,S$GLB,, | Performed by: NURSE PRACTITIONER

## 2023-08-10 PROCEDURE — 3061F PR NEG MICROALBUMINURIA RESULT DOCUMENTED/REVIEW: ICD-10-PCS | Mod: CPTII,S$GLB,, | Performed by: NURSE PRACTITIONER

## 2023-08-10 RX ORDER — TIRZEPATIDE 12.5 MG/.5ML
12.5 INJECTION, SOLUTION SUBCUTANEOUS
Qty: 13 PEN | Refills: 1 | Status: SHIPPED | OUTPATIENT
Start: 2023-08-10 | End: 2023-09-05 | Stop reason: SDUPTHER

## 2023-08-10 RX ORDER — SIMVASTATIN 40 MG/1
40 TABLET, FILM COATED ORAL NIGHTLY
Qty: 90 TABLET | Refills: 1 | Status: SHIPPED | OUTPATIENT
Start: 2023-08-10

## 2023-08-10 RX ORDER — LOSARTAN POTASSIUM 50 MG/1
50 TABLET ORAL 2 TIMES DAILY
Qty: 180 TABLET | Refills: 1 | Status: SHIPPED | OUTPATIENT
Start: 2023-08-10

## 2023-08-10 RX ORDER — TIRZEPATIDE 10 MG/.5ML
INJECTION, SOLUTION SUBCUTANEOUS
COMMUNITY
Start: 2023-05-15 | End: 2023-08-10 | Stop reason: DRUGHIGH

## 2023-08-10 RX ORDER — AMLODIPINE BESYLATE 5 MG/1
5 TABLET ORAL NIGHTLY
Qty: 90 EACH | Refills: 1 | Status: SHIPPED | OUTPATIENT
Start: 2023-08-10 | End: 2024-03-05 | Stop reason: SDUPTHER

## 2023-08-10 RX ORDER — FUROSEMIDE 20 MG/1
20 TABLET ORAL DAILY
Qty: 90 TABLET | Refills: 1 | Status: SHIPPED | OUTPATIENT
Start: 2023-08-10

## 2023-08-10 RX ORDER — METFORMIN HYDROCHLORIDE 1000 MG/1
1000 TABLET ORAL 2 TIMES DAILY WITH MEALS
Qty: 180 EACH | Refills: 1 | Status: SHIPPED | OUTPATIENT
Start: 2023-08-10 | End: 2024-03-05 | Stop reason: SDUPTHER

## 2023-08-10 RX ORDER — CARVEDILOL 12.5 MG/1
12.5 TABLET ORAL 2 TIMES DAILY WITH MEALS
Qty: 180 TABLET | Refills: 1 | Status: SHIPPED | OUTPATIENT
Start: 2023-08-10 | End: 2024-04-02 | Stop reason: SDUPTHER

## 2023-08-15 NOTE — PROGRESS NOTES
"Subjective:      Patient ID: Micaela Hernandez is a 53 y.o. female.  Pt returns to clinic for 6mth check. She is ~6mths s/p hysterectomy and reports she is feeling well. Energy level has returned and denies any pain. She has DM which has been improving since being on mounjaro though she does not check her BS frequently. She is eating fairly well with limited sugars though is not exercising. Has recently seen  who follows her a.fib/flutter. denies any acute complaints today.        Review of Systems   Constitutional:  Negative for activity change, appetite change, fatigue, fever and unexpected weight change.   HENT: Negative.     Eyes: Negative.  Negative for pain and visual disturbance.   Respiratory:  Negative for chest tightness and shortness of breath.    Cardiovascular:  Negative for chest pain and palpitations.   Gastrointestinal:  Negative for abdominal pain, change in bowel habit, constipation, diarrhea, nausea, vomiting and change in bowel habit.   Genitourinary:  Negative for difficulty urinating and dysuria.   Musculoskeletal: Negative.    Integumentary:  Negative for rash.   Allergic/Immunologic: Negative.    Neurological: Negative.  Negative for headaches.   Hematological:  Does not bruise/bleed easily.   Psychiatric/Behavioral: Negative.     All other systems reviewed and are negative.        Objective:     Vitals:    08/10/23 1300   BP: 126/82   Pulse: 88   Temp: 98.1 °F (36.7 °C)   TempSrc: Oral   SpO2: 98%   Weight: 123.6 kg (272 lb 7.8 oz)   Height: 5' 3" (1.6 m)   PF: (!) 0 L/min     Physical Exam  Vitals and nursing note reviewed.   Constitutional:       General: She is not in acute distress.     Appearance: Normal appearance. She is well-developed and well-groomed. She is not ill-appearing.   HENT:      Head: Normocephalic and atraumatic.      Right Ear: External ear normal.      Left Ear: External ear normal.      Nose: Nose normal.      Mouth/Throat:      Lips: Pink.      Mouth: Mucous " membranes are moist.   Eyes:      General: Lids are normal. Vision grossly intact. Gaze aligned appropriately.      Conjunctiva/sclera: Conjunctivae normal.      Pupils: Pupils are equal, round, and reactive to light.   Neck:      Trachea: Phonation normal.   Cardiovascular:      Rate and Rhythm: Normal rate and regular rhythm.      Heart sounds: Normal heart sounds.   Pulmonary:      Effort: Pulmonary effort is normal. No accessory muscle usage or respiratory distress.      Breath sounds: Normal breath sounds and air entry.   Abdominal:      General: Abdomen is flat. Bowel sounds are normal. There is no distension.      Palpations: Abdomen is soft.      Tenderness: There is no abdominal tenderness.   Musculoskeletal:      Cervical back: Neck supple.      Right lower leg: No edema.      Left lower leg: No edema.   Skin:     General: Skin is warm and dry.      Findings: No rash.   Neurological:      General: No focal deficit present.      Mental Status: She is alert and oriented to person, place, and time. Mental status is at baseline.      Sensory: Sensation is intact.      Motor: Motor function is intact.      Coordination: Coordination is intact.      Gait: Gait is intact.   Psychiatric:         Attention and Perception: Attention and perception normal.         Mood and Affect: Mood and affect normal.         Speech: Speech normal.         Behavior: Behavior normal. Behavior is cooperative.         Thought Content: Thought content normal.         Cognition and Memory: Cognition and memory normal.         Judgment: Judgment normal.       Assessment and Plan:     1. Type 2 diabetes mellitus with diabetic nephropathy, without long-term current use of insulin  Discussed general issues about diabetes pathophysiology and management.  Addressed ADA diet.  Suggested low cholesterol diet.  Encouraged aerobic exercise.  Discussed foot care.  Reminded to get yearly retinal exam.  Discussed ways to avoid symptomatic  hypoglycemia.  Reminded to bring in blood sugar diary at next visit.  Rx changes: none  Education: Reviewed ABCs of diabetes management (respective goals in parentheses):  A1C (<7), blood pressure (<130/80), and cholesterol (LDL <100).  Follow up: 6 months  - simvastatin (ZOCOR) 40 MG tablet; Take 1 tablet (40 mg total) by mouth every evening.  Dispense: 90 tablet; Refill: 1  - tirzepatide (MOUNJARO) 12.5 mg/0.5 mL PnIj; Inject 12.5 mg into the skin every 7 days.  Dispense: 13 pen ; Refill: 1  - metFORMIN (GLUCOPHAGE) 1000 MG tablet; Take 1 tablet (1,000 mg total) by mouth 2 (two) times daily with meals.  Dispense: 180 each; Refill: 1  - CBC Auto Differential; Future  - Comprehensive Metabolic Panel; Future  - Hemoglobin A1C; Future  - Ambulatory referral/consult to Ophthalmology; Future    2. Primary hypertension  Continue current treatment regimen.  Dietary sodium restriction.  Regular aerobic exercise.  Weight loss.  Follow up in 6 months.  Patient Education: Reviewed risks of hypertension and principles of treatment.  - losartan (COZAAR) 50 MG tablet; Take 1 tablet (50 mg total) by mouth 2 (two) times daily.  Dispense: 180 tablet; Refill: 1  - furosemide (LASIX) 20 MG tablet; Take 1 tablet (20 mg total) by mouth once daily.  Dispense: 90 tablet; Refill: 1  - carvediloL (COREG) 12.5 MG tablet; Take 1 tablet (12.5 mg total) by mouth 2 (two) times daily with meals.  Dispense: 180 tablet; Refill: 1  - amLODIPine (NORVASC) 5 MG tablet; Take 1 tablet (5 mg total) by mouth every evening.  Dispense: 90 each; Refill: 1    3. Typical atrial flutter  Rate controlled on multaq and anticoagulated on eliquis, asymptomatic and uncomplicated,  closely followed by cards   - apixaban (ELIQUIS) 5 mg Tab; Take 1 tablet (5 mg total) by mouth 2 (two) times daily.  Dispense: 180 tablet; Refill: 1    4. PAF (paroxysmal atrial fibrillation)  Rate controlled on multaq and anticoagulated on eliquis, asymptomatic and uncomplicated,   closely followed by cards   - apixaban (ELIQUIS) 5 mg Tab; Take 1 tablet (5 mg total) by mouth 2 (two) times daily.  Dispense: 180 tablet; Refill: 1           VICENTA Maldonado, FNP-C  Family/Internal Medicine  [facility]

## 2023-08-16 ENCOUNTER — LAB VISIT (OUTPATIENT)
Dept: LAB | Facility: HOSPITAL | Age: 53
End: 2023-08-16
Attending: NURSE PRACTITIONER
Payer: COMMERCIAL

## 2023-08-16 DIAGNOSIS — E11.21 TYPE 2 DIABETES MELLITUS WITH DIABETIC NEPHROPATHY, WITHOUT LONG-TERM CURRENT USE OF INSULIN: ICD-10-CM

## 2023-08-16 LAB
ALBUMIN SERPL BCP-MCNC: 3.3 G/DL (ref 3.5–5.2)
ALP SERPL-CCNC: 88 U/L (ref 55–135)
ALT SERPL W/O P-5'-P-CCNC: 18 U/L (ref 10–44)
ANION GAP SERPL CALC-SCNC: 10 MMOL/L (ref 8–16)
AST SERPL-CCNC: 23 U/L (ref 10–40)
BASOPHILS # BLD AUTO: 0.06 K/UL (ref 0–0.2)
BASOPHILS NFR BLD: 0.7 % (ref 0–1.9)
BILIRUB SERPL-MCNC: 0.3 MG/DL (ref 0.1–1)
BUN SERPL-MCNC: 12 MG/DL (ref 6–20)
CALCIUM SERPL-MCNC: 9.6 MG/DL (ref 8.7–10.5)
CHLORIDE SERPL-SCNC: 106 MMOL/L (ref 95–110)
CO2 SERPL-SCNC: 23 MMOL/L (ref 23–29)
CREAT SERPL-MCNC: 0.8 MG/DL (ref 0.5–1.4)
DIFFERENTIAL METHOD: ABNORMAL
EOSINOPHIL # BLD AUTO: 0.2 K/UL (ref 0–0.5)
EOSINOPHIL NFR BLD: 2.6 % (ref 0–8)
ERYTHROCYTE [DISTWIDTH] IN BLOOD BY AUTOMATED COUNT: 20 % (ref 11.5–14.5)
EST. GFR  (NO RACE VARIABLE): >60 ML/MIN/1.73 M^2
ESTIMATED AVG GLUCOSE: 108 MG/DL (ref 68–131)
GLUCOSE SERPL-MCNC: 110 MG/DL (ref 70–110)
HBA1C MFR BLD: 5.4 % (ref 4–5.6)
HCT VFR BLD AUTO: 33.8 % (ref 37–48.5)
HGB BLD-MCNC: 10.2 G/DL (ref 12–16)
IMM GRANULOCYTES # BLD AUTO: 0.02 K/UL (ref 0–0.04)
IMM GRANULOCYTES NFR BLD AUTO: 0.2 % (ref 0–0.5)
LYMPHOCYTES # BLD AUTO: 2.5 K/UL (ref 1–4.8)
LYMPHOCYTES NFR BLD: 29.5 % (ref 18–48)
MCH RBC QN AUTO: 23.9 PG (ref 27–31)
MCHC RBC AUTO-ENTMCNC: 30.2 G/DL (ref 32–36)
MCV RBC AUTO: 79 FL (ref 82–98)
MONOCYTES # BLD AUTO: 0.6 K/UL (ref 0.3–1)
MONOCYTES NFR BLD: 6.7 % (ref 4–15)
NEUTROPHILS # BLD AUTO: 5.1 K/UL (ref 1.8–7.7)
NEUTROPHILS NFR BLD: 60.3 % (ref 38–73)
NRBC BLD-RTO: 0 /100 WBC
PLATELET # BLD AUTO: 220 K/UL (ref 150–450)
PMV BLD AUTO: ABNORMAL FL (ref 9.2–12.9)
POTASSIUM SERPL-SCNC: 3.9 MMOL/L (ref 3.5–5.1)
PROT SERPL-MCNC: 7.6 G/DL (ref 6–8.4)
RBC # BLD AUTO: 4.26 M/UL (ref 4–5.4)
SODIUM SERPL-SCNC: 139 MMOL/L (ref 136–145)
WBC # BLD AUTO: 8.47 K/UL (ref 3.9–12.7)

## 2023-08-16 PROCEDURE — 85025 COMPLETE CBC W/AUTO DIFF WBC: CPT | Performed by: NURSE PRACTITIONER

## 2023-08-16 PROCEDURE — 83036 HEMOGLOBIN GLYCOSYLATED A1C: CPT | Performed by: NURSE PRACTITIONER

## 2023-08-16 PROCEDURE — 36415 COLL VENOUS BLD VENIPUNCTURE: CPT | Mod: PN | Performed by: NURSE PRACTITIONER

## 2023-08-16 PROCEDURE — 80053 COMPREHEN METABOLIC PANEL: CPT | Performed by: NURSE PRACTITIONER

## 2023-08-22 ENCOUNTER — OFFICE VISIT (OUTPATIENT)
Dept: CARDIOLOGY | Facility: CLINIC | Age: 53
End: 2023-08-22
Payer: COMMERCIAL

## 2023-08-22 DIAGNOSIS — I48.0 PAF (PAROXYSMAL ATRIAL FIBRILLATION): ICD-10-CM

## 2023-08-22 DIAGNOSIS — I48.3 TYPICAL ATRIAL FLUTTER: Primary | ICD-10-CM

## 2023-08-22 DIAGNOSIS — E78.5 HYPERLIPIDEMIA, UNSPECIFIED HYPERLIPIDEMIA TYPE: ICD-10-CM

## 2023-08-22 DIAGNOSIS — I48.0 PAF (PAROXYSMAL ATRIAL FIBRILLATION): Primary | ICD-10-CM

## 2023-08-22 DIAGNOSIS — I10 PRIMARY HYPERTENSION: ICD-10-CM

## 2023-08-22 PROCEDURE — 3061F NEG MICROALBUMINURIA REV: CPT | Mod: CPTII,95,, | Performed by: INTERNAL MEDICINE

## 2023-08-22 PROCEDURE — 99213 OFFICE O/P EST LOW 20 MIN: CPT | Mod: 95,,, | Performed by: INTERNAL MEDICINE

## 2023-08-22 PROCEDURE — 3066F NEPHROPATHY DOC TX: CPT | Mod: CPTII,95,, | Performed by: INTERNAL MEDICINE

## 2023-08-22 PROCEDURE — 99213 PR OFFICE/OUTPT VISIT, EST, LEVL III, 20-29 MIN: ICD-10-PCS | Mod: 95,,, | Performed by: INTERNAL MEDICINE

## 2023-08-22 PROCEDURE — 3061F PR NEG MICROALBUMINURIA RESULT DOCUMENTED/REVIEW: ICD-10-PCS | Mod: CPTII,95,, | Performed by: INTERNAL MEDICINE

## 2023-08-22 PROCEDURE — 3044F HG A1C LEVEL LT 7.0%: CPT | Mod: CPTII,95,, | Performed by: INTERNAL MEDICINE

## 2023-08-22 PROCEDURE — 4010F ACE/ARB THERAPY RXD/TAKEN: CPT | Mod: CPTII,95,, | Performed by: INTERNAL MEDICINE

## 2023-08-22 PROCEDURE — 3066F PR DOCUMENTATION OF TREATMENT FOR NEPHROPATHY: ICD-10-PCS | Mod: CPTII,95,, | Performed by: INTERNAL MEDICINE

## 2023-08-22 PROCEDURE — 4010F PR ACE/ARB THEARPY RXD/TAKEN: ICD-10-PCS | Mod: CPTII,95,, | Performed by: INTERNAL MEDICINE

## 2023-08-22 PROCEDURE — 3044F PR MOST RECENT HEMOGLOBIN A1C LEVEL <7.0%: ICD-10-PCS | Mod: CPTII,95,, | Performed by: INTERNAL MEDICINE

## 2023-08-22 NOTE — PROGRESS NOTES
Subjective:   Patient ID:  Micaela Hernandez is a 53 y.o. female who presents for follow-up of No chief complaint on file.      HPI    A-flutter/A-fib  on eliquis and multaq - A-flutter RFA 3/31/22, HTN, HLD, DM, obesity     Admitted 1/24/22  Micaela Hernandez is a 51 y.o. female who  has a past medical history of Morbid Obesity, Degenerative lumbar disc, Diabetes mellitus, Diabetes mellitus, type 2, Hyperlipidemia, and Hypertension, presented to the ED with CC of SOB. Patient states that she has been feeling this since November, and has been wheezing intermittently as well. She even went to an allergist last week and had Xray, attempted albuterol.In Afib/Aflutter in the ED. HR sustained in 150's. Has never had an ECHO, never been to cardiologist. Started on dilt gtt and sent to ICU. SED rate 68. Concern for PNA with pleural fluid and infiltrates with LN swellings and new afib/flutter, started on CAP. LN of axilla were biopsied and told they were reactive, not malignant. Normal D-dimer for age. BNP was 102 but BMI 60. Patient denies any fevers, night sweats, n/v/d/c, abd pain, dysuria, hematuria or hematochezia, cough, CP, leg swelling, HA, dizziness, weakness, hallucinations, SI, HI, or self injury at this time.     Ms. Hernandez was admitted to the ICU for atrial flutter. Cardiology was consulted, and she was started on anticoagulation, flecainide, and a diltiazem drip w/ improvement in heart rate but remained in atrial flutter. She had shortness of breath which improved with furosemide. TTE showed normal LVEF. The following day she was cardioverted with successful restoration of sinus rhythm. She was discharged on flecainide and apixaban (CV 2+) to follow up with cardiology in the outpatient setting. Return precautions given, all questions answered prior to discharge, patient in agreement with plan.      3/31/22   Successful ablation of atrial flutter (typical).  3D mapping performed with Ensite.     1/26/22 STEVEN/CV -  EF 55%, mild MR/TR. No thrombus in IDANIA. CV 200J converted A-flutter to NSR     Echo 8/10/23    Left Ventricle: The left ventricle is normal in size. Normal wall thickness. Normal wall motion. There is normal systolic function with a visually estimated ejection fraction of 55 - 60%.    Left Atrium: Left atrium is mildly dilated.    Right Ventricle: Normal right ventricular cavity size. Wall thickness is normal. Right ventricle wall motion  is normal. Systolic function is normal.    Pulmonary Artery: The estimated pulmonary artery systolic pressure is 16 mmHg.    IVC/SVC: Normal venous pressure at 3 mmHg.        EVM 5/5/22  Paroxysmal atrial fibrillation with RVR. AF burden 14%        2/3/22 Last weekend had an episode of heart racing which lasted all night and was similar to prior episode of A-flutter. Otherwise denies CP or SOB  EKG NSR QTc 493  Suspect breakthrough A-flutter. Increase coreg 6.25 bid. Will hold off on increasing flecainide with prolonged QT  Refer to EP for possible ablation  OV 1 month      Saw EP 3/8/22  Pt with recurrent atrial flutter despite Flecainide.   Recommend RFA.   Risks and benefits of RFA discussed, she would like to proceed.   In interim, increase Flecainide to 150 mg BID.   ECG in 1 week. If remains out of rhythm, consider repeat DCCV.        3/17/22 palpitations improved after taking increased dose of flecainide  EKG NSR QTc 515  Denies CP or SOB  Agree with plans for RFA of A-flutter  Continue Rx for HTN, HLD, A-flutter  OV 3 months     6/20/22 Flecainide was restarted for PAF 5/30/22 - she only took 2 doses - made her feel bad. Still with palpitations but not as severe  EKG NSR - ok  Continue Rx for HTN, HLD, A-flutter, PAF  OV 3 months     Saw Dr Kirk 9/12/22  Overall doing well from an AF burden standpoint. Suspect this is Multaq + treatment for her sleep apnea.  Discussed need for weight reduction.  Given shortness of breath >> echo.  F/u in 6 months.     10/20/22 Tolerating  multaq - denies palpitations. Mild SALGUERO. Denies CP  BP controlled  Continue Rx for HTN, HLD, A-flutter, PAF  OV 3 months with holter     Admitted 2/27/23  HPI: Patient admitted with symptomatic anemia secondary to vaginal bleeding.  Hospital Course: Patient started on IV Premarin and Eliquis was stopped.   The vaginal bleeding resolved.  She was give 2 units of packed red blood cells.  A hysteroscopy D&C was performed on 3/1/2023. Please see operative report for complete  details. Following surgery, patient was transferred to the floor for routine post operative care. She had  an uncomplicated post operative course with no acute events. Her vital signs remained stable and  afebrile throughout her hospital stay. Her urine output was adequate and her physical exam was  appropriate. She was meeting all post operative milestones upon discharge.  She was ambulating and voiding without difficulty. She was tolerating a regular diet, and her vital signs  were stable and afebrile.   EKG 2/28/23 NSR PRWP     3/3/23 Recently admitted with vaginal bleeding. Still off of eliquis  Needs clearance for hysterectomy next week  Denies CP or SOB  DBP running low  Cleared fir hysterectomy at low cardiac risk - resume eliquis when cleared by GYN  Continue Rx for HTN, HLD, A-flutter, PAF  OV 3 months      7/31/23 Denies CP or SOB. BP controlled  Rarely gets palpitations  EKG NSR PRWP   Continue Rx for HTN, HLD, A-flutter, PAF  OV 6 months with echo    8/22/23 Virtual visit. had an episode of palpitations for a few min - watch monitor showed A-fib per patient. Denies CP or SOB    Review of Systems   Constitutional: Negative for decreased appetite.   HENT:  Negative for ear discharge.    Eyes:  Negative for blurred vision.   Respiratory:  Negative for hemoptysis.    Endocrine: Negative for polyphagia.   Hematologic/Lymphatic: Negative for adenopathy.   Skin:  Negative for color change.   Musculoskeletal:  Negative for joint swelling.    Genitourinary:  Negative for bladder incontinence.   Neurological:  Negative for brief paralysis.   Psychiatric/Behavioral:  Negative for hallucinations.    Allergic/Immunologic: Negative for hives.       Objective:   Physical Exam  Constitutional:       Appearance: She is well-developed.   HENT:      Head: Normocephalic and atraumatic.   Eyes:      Conjunctiva/sclera: Conjunctivae normal.      Pupils: Pupils are equal, round, and reactive to light.   Cardiovascular:      Rate and Rhythm: Normal rate.      Pulses: Intact distal pulses.      Heart sounds: Normal heart sounds.   Pulmonary:      Effort: Pulmonary effort is normal.      Breath sounds: Normal breath sounds.   Abdominal:      General: Bowel sounds are normal.      Palpations: Abdomen is soft.   Musculoskeletal:         General: Normal range of motion.      Cervical back: Normal range of motion and neck supple.   Skin:     General: Skin is warm and dry.   Neurological:      Mental Status: She is alert and oriented to person, place, and time.         Assessment:      1. Typical atrial flutter    2. Hyperlipidemia, unspecified hyperlipidemia type    3. Primary hypertension    4. PAF (paroxysmal atrial fibrillation)        Plan:     Echo ok. Still with breakthrough PAF - will refer back to EP   Continue Rx for HTN, HLD, A-flutter, PAF  OV 6 months

## 2023-09-05 ENCOUNTER — PATIENT MESSAGE (OUTPATIENT)
Dept: FAMILY MEDICINE | Facility: CLINIC | Age: 53
End: 2023-09-05
Payer: COMMERCIAL

## 2023-09-05 DIAGNOSIS — E11.21 TYPE 2 DIABETES MELLITUS WITH DIABETIC NEPHROPATHY, WITHOUT LONG-TERM CURRENT USE OF INSULIN: ICD-10-CM

## 2023-09-05 RX ORDER — TIRZEPATIDE 12.5 MG/.5ML
12.5 INJECTION, SOLUTION SUBCUTANEOUS
Qty: 13 PEN | Refills: 1 | Status: SHIPPED | OUTPATIENT
Start: 2023-09-05 | End: 2024-04-01 | Stop reason: SDUPTHER

## 2023-09-05 NOTE — TELEPHONE ENCOUNTER
No care due was identified.  [facility] Embedded Care Due Messages. Reference number: [id number].   9/05/2023 8:38:00 AM CDT

## 2023-09-18 NOTE — PROGRESS NOTES
Subjective:   Patient ID:  Micaela Hernandez is a 53 y.o. female who presents for follow-up of No chief complaint on file.      HPI 54 yo female with atrial fibrillation, morbid obesity, DM, HTN, HLD, AFL (RFA of CTI 3/31/2022), sleep apnea (on CPAP)     The patient location is: Home  The chief complaint leading to consultation is: atrial fibrillation     Visit type: audiovisual     Face to Face time with patient: 15 minutes  25 minutes of total time spent on the encounter, which includes face to face time and non-face to face time preparing to see the patient (eg, review of tests), Obtaining and/or reviewing separately obtained history, Documenting clinical information in the electronic or other health record, Independently interpreting results (not separately reported) and communicating results to the patient/family/caregiver, or Care coordination (not separately reported).        Each patient to whom he or she provides medical services by telemedicine is:  (1) informed of the relationship between the physician and patient and the respective role of any other health care provider with respect to management of the patient; and (2) notified that he or she may decline to receive medical services by telemedicine and may withdraw from such care at any time.      Background:     11/21 presented with symptomatic atrial flutter.  STEVEN/DCCV 1/26/22      Developed symptomatic recurrence.     3/31/2022: Successful ablation of atrial flutter (typical).  Noted continued palpitations.     Bardy 5/5/2022: SR with 14% PAF with RVR  Flecainide resumed >> felt poorly.  Switched to Multaq.     Started on CPAP 5/22.     Echo 9/20/22 EF 65% normal RV size and function moderate LAE.  Admitted with vaginal bleeding. Found to have fibroids.   Laparoscopic hysterectomy 3/28/23. Eliquis 5 mg BID resumed.  No recurrence of bleeding.     Update:  Has had two episodes of lasting AF, lasting 1/2 hour. Stable in frequency. Otherwise well.  Has  lost 45 lbs (on tirzepatide)      Review of Systems   Constitutional: Negative. Negative for fever and malaise/fatigue.   HENT:  Negative for congestion and sore throat.    Cardiovascular:  Positive for palpitations. Negative for chest pain, dyspnea on exertion, irregular heartbeat, leg swelling, near-syncope, orthopnea, paroxysmal nocturnal dyspnea and syncope.   Respiratory:  Negative for cough and shortness of breath.    Gastrointestinal:  Negative for abdominal pain, constipation and diarrhea.   Neurological:  Negative for dizziness, light-headedness and weakness.   Psychiatric/Behavioral:  Negative for depression. The patient is not nervous/anxious.          Assessment:      1. PAF (paroxysmal atrial fibrillation)    2. Typical atrial flutter    3. Primary hypertension    4. Type 2 diabetes mellitus with diabetic nephropathy, without long-term current use of insulin    5. Morbid obesity    6. ARORA (nonalcoholic steatohepatitis)    7. NAPOLEON (obstructive sleep apnea)        Plan:       Discussed options at length, including 1) continue as is, as the frequency is infrequent 2) PVI. We discussed risks and benefits of PVI 3) Trial of Sotalol.  She prefers to continue current therapy.  F/u in 6 months.

## 2023-09-19 ENCOUNTER — TELEPHONE (OUTPATIENT)
Dept: ELECTROPHYSIOLOGY | Facility: CLINIC | Age: 53
End: 2023-09-19
Payer: COMMERCIAL

## 2023-09-20 ENCOUNTER — OFFICE VISIT (OUTPATIENT)
Dept: ELECTROPHYSIOLOGY | Facility: CLINIC | Age: 53
End: 2023-09-20
Payer: COMMERCIAL

## 2023-09-20 DIAGNOSIS — E66.01 MORBID OBESITY: ICD-10-CM

## 2023-09-20 DIAGNOSIS — I10 PRIMARY HYPERTENSION: ICD-10-CM

## 2023-09-20 DIAGNOSIS — E11.21 TYPE 2 DIABETES MELLITUS WITH DIABETIC NEPHROPATHY, WITHOUT LONG-TERM CURRENT USE OF INSULIN: ICD-10-CM

## 2023-09-20 DIAGNOSIS — I48.0 PAF (PAROXYSMAL ATRIAL FIBRILLATION): Primary | ICD-10-CM

## 2023-09-20 DIAGNOSIS — K75.81 NASH (NONALCOHOLIC STEATOHEPATITIS): ICD-10-CM

## 2023-09-20 DIAGNOSIS — I48.3 TYPICAL ATRIAL FLUTTER: ICD-10-CM

## 2023-09-20 DIAGNOSIS — G47.33 OSA (OBSTRUCTIVE SLEEP APNEA): ICD-10-CM

## 2023-09-20 PROCEDURE — 99215 PR OFFICE/OUTPT VISIT, EST, LEVL V, 40-54 MIN: ICD-10-PCS | Mod: 95,,, | Performed by: INTERNAL MEDICINE

## 2023-09-20 PROCEDURE — 4010F PR ACE/ARB THEARPY RXD/TAKEN: ICD-10-PCS | Mod: CPTII,95,, | Performed by: INTERNAL MEDICINE

## 2023-09-20 PROCEDURE — 3044F PR MOST RECENT HEMOGLOBIN A1C LEVEL <7.0%: ICD-10-PCS | Mod: CPTII,95,, | Performed by: INTERNAL MEDICINE

## 2023-09-20 PROCEDURE — 99215 OFFICE O/P EST HI 40 MIN: CPT | Mod: 95,,, | Performed by: INTERNAL MEDICINE

## 2023-09-20 PROCEDURE — 1159F MED LIST DOCD IN RCRD: CPT | Mod: CPTII,95,, | Performed by: INTERNAL MEDICINE

## 2023-09-20 PROCEDURE — 3061F NEG MICROALBUMINURIA REV: CPT | Mod: CPTII,95,, | Performed by: INTERNAL MEDICINE

## 2023-09-20 PROCEDURE — 3066F NEPHROPATHY DOC TX: CPT | Mod: CPTII,95,, | Performed by: INTERNAL MEDICINE

## 2023-09-20 PROCEDURE — 3061F PR NEG MICROALBUMINURIA RESULT DOCUMENTED/REVIEW: ICD-10-PCS | Mod: CPTII,95,, | Performed by: INTERNAL MEDICINE

## 2023-09-20 PROCEDURE — 3066F PR DOCUMENTATION OF TREATMENT FOR NEPHROPATHY: ICD-10-PCS | Mod: CPTII,95,, | Performed by: INTERNAL MEDICINE

## 2023-09-20 PROCEDURE — 4010F ACE/ARB THERAPY RXD/TAKEN: CPT | Mod: CPTII,95,, | Performed by: INTERNAL MEDICINE

## 2023-09-20 PROCEDURE — 1159F PR MEDICATION LIST DOCUMENTED IN MEDICAL RECORD: ICD-10-PCS | Mod: CPTII,95,, | Performed by: INTERNAL MEDICINE

## 2023-09-20 PROCEDURE — 3044F HG A1C LEVEL LT 7.0%: CPT | Mod: CPTII,95,, | Performed by: INTERNAL MEDICINE

## 2023-12-21 ENCOUNTER — OFFICE VISIT (OUTPATIENT)
Dept: OPTOMETRY | Facility: CLINIC | Age: 53
End: 2023-12-21
Payer: COMMERCIAL

## 2023-12-21 DIAGNOSIS — E11.21 TYPE 2 DIABETES MELLITUS WITH DIABETIC NEPHROPATHY, WITHOUT LONG-TERM CURRENT USE OF INSULIN: ICD-10-CM

## 2023-12-21 DIAGNOSIS — Z01.00 DIABETIC EYE EXAM: Primary | ICD-10-CM

## 2023-12-21 DIAGNOSIS — E11.9 DIABETIC EYE EXAM: Primary | ICD-10-CM

## 2023-12-21 DIAGNOSIS — H52.7 REFRACTIVE ERROR: ICD-10-CM

## 2023-12-21 PROCEDURE — 92015 PR REFRACTION: ICD-10-PCS | Mod: S$GLB,,, | Performed by: OPTOMETRIST

## 2023-12-21 PROCEDURE — 92004 COMPRE OPH EXAM NEW PT 1/>: CPT | Mod: S$GLB,,, | Performed by: OPTOMETRIST

## 2023-12-21 PROCEDURE — 99999 PR PBB SHADOW E&M-EST. PATIENT-LVL III: CPT | Mod: PBBFAC,,, | Performed by: OPTOMETRIST

## 2023-12-21 PROCEDURE — 92015 DETERMINE REFRACTIVE STATE: CPT | Mod: S$GLB,,, | Performed by: OPTOMETRIST

## 2023-12-21 PROCEDURE — 4010F PR ACE/ARB THEARPY RXD/TAKEN: ICD-10-PCS | Mod: CPTII,S$GLB,, | Performed by: OPTOMETRIST

## 2023-12-21 PROCEDURE — 1159F PR MEDICATION LIST DOCUMENTED IN MEDICAL RECORD: ICD-10-PCS | Mod: CPTII,S$GLB,, | Performed by: OPTOMETRIST

## 2023-12-21 PROCEDURE — 3044F PR MOST RECENT HEMOGLOBIN A1C LEVEL <7.0%: ICD-10-PCS | Mod: CPTII,S$GLB,, | Performed by: OPTOMETRIST

## 2023-12-21 PROCEDURE — 99999 PR PBB SHADOW E&M-EST. PATIENT-LVL III: ICD-10-PCS | Mod: PBBFAC,,, | Performed by: OPTOMETRIST

## 2023-12-21 PROCEDURE — 3066F PR DOCUMENTATION OF TREATMENT FOR NEPHROPATHY: ICD-10-PCS | Mod: CPTII,S$GLB,, | Performed by: OPTOMETRIST

## 2023-12-21 PROCEDURE — 92004 PR EYE EXAM, NEW PATIENT,COMPREHESV: ICD-10-PCS | Mod: S$GLB,,, | Performed by: OPTOMETRIST

## 2023-12-21 PROCEDURE — 1160F PR REVIEW ALL MEDS BY PRESCRIBER/CLIN PHARMACIST DOCUMENTED: ICD-10-PCS | Mod: CPTII,S$GLB,, | Performed by: OPTOMETRIST

## 2023-12-21 PROCEDURE — 2023F PR DILATED RETINAL EXAM W/O EVID OF RETINOPATHY: ICD-10-PCS | Mod: CPTII,S$GLB,, | Performed by: OPTOMETRIST

## 2023-12-21 PROCEDURE — 3061F PR NEG MICROALBUMINURIA RESULT DOCUMENTED/REVIEW: ICD-10-PCS | Mod: CPTII,S$GLB,, | Performed by: OPTOMETRIST

## 2023-12-21 NOTE — PROGRESS NOTES
Subjective:       Patient ID: Micaela Hernandez is a 53 y.o. female      Chief Complaint   Patient presents with    Concerns About Ocular Health    Diabetic Eye Exam     History of Present Illness  Dls: 2 yrs     52 y/o female presents today for diabetic eye exam.   Pt c/o blurry vision at distance and near ou. Pt wears pal's.     LBS ?     + ou off/on tearing  No itching  No burning  No pain  No ha's  No floaters  No flashes    Eye meds  None    Pohx:   None    Fohx:   Cat - mother, father     Hemoglobin A1C       Date                     Value               Ref Range             Status                08/16/2023               5.4                 4.0 - 5.6 %           Final                  02/06/2023               5.8 (H)             4.0 - 5.6 %           Final                  07/30/2022               6.0 (H)             4.0 - 5.6 %           Final                 Assessment/Plan:     1. Diabetic eye exam  Chavez vision    2. Refractive error  Educated patient on refractive error and discussed lens options. Dispensed updated spectacle Rx. Educated about adaptation period to new specs.    Eyeglass Final Rx       Eyeglass Final Rx         Sphere Cylinder Axis Add    Right -3.75 +1.00 150 +2.00    Left -3.75 +0.75 015 +2.00      Expiration Date: 12/21/2024                      3. Type 2 diabetes mellitus with diabetic nephropathy, without long-term current use of insulin  No diabetic retinopathy. Discussed with pt the effects of diabetes on vision, importance of good blood sugar control, compliance with meds, and follow up care with PCP. Return in 1 year for dilated eye exam, sooner PRN.    - Ambulatory referral/consult to Ophthalmology    Follow up in about 1 year (around 12/21/2024) for Diabetic Eye Exam.

## 2023-12-27 ENCOUNTER — HOSPITAL ENCOUNTER (OUTPATIENT)
Dept: RADIOLOGY | Facility: HOSPITAL | Age: 53
Discharge: HOME | End: 2023-12-27
Attending: NURSE PRACTITIONER
Payer: COMMERCIAL

## 2023-12-27 DIAGNOSIS — Z12.31 ENCOUNTER FOR SCREENING MAMMOGRAM FOR MALIGNANT NEOPLASM OF BREAST: ICD-10-CM

## 2023-12-27 PROCEDURE — 77063 BREAST TOMOSYNTHESIS BI: CPT | Mod: 26,,, | Performed by: RADIOLOGY

## 2023-12-27 PROCEDURE — 77067 SCR MAMMO BI INCL CAD: CPT | Mod: 26,,, | Performed by: RADIOLOGY

## 2023-12-27 PROCEDURE — 77067 MAMMO DIGITAL SCREENING BILAT WITH TOMO: ICD-10-PCS | Mod: 26,,, | Performed by: RADIOLOGY

## 2023-12-27 PROCEDURE — 77063 MAMMO DIGITAL SCREENING BILAT WITH TOMO: ICD-10-PCS | Mod: 26,,, | Performed by: RADIOLOGY

## 2023-12-27 PROCEDURE — 77067 SCR MAMMO BI INCL CAD: CPT | Mod: TC,PO

## 2023-12-28 ENCOUNTER — PATIENT OUTREACH (OUTPATIENT)
Dept: ADMINISTRATIVE | Facility: HOSPITAL | Age: 53
End: 2023-12-28
Payer: COMMERCIAL

## 2024-01-23 ENCOUNTER — PATIENT MESSAGE (OUTPATIENT)
Dept: ADMINISTRATIVE | Facility: HOSPITAL | Age: 54
End: 2024-01-23
Payer: COMMERCIAL

## 2024-02-05 DIAGNOSIS — I48.0 PAF (PAROXYSMAL ATRIAL FIBRILLATION): ICD-10-CM

## 2024-02-05 DIAGNOSIS — I48.3 TYPICAL ATRIAL FLUTTER: ICD-10-CM

## 2024-02-05 RX ORDER — APIXABAN 5 MG/1
5 TABLET, FILM COATED ORAL 2 TIMES DAILY
Qty: 180 TABLET | Refills: 0 | Status: SHIPPED | OUTPATIENT
Start: 2024-02-05 | End: 2024-05-02

## 2024-03-04 DIAGNOSIS — E11.21 TYPE 2 DIABETES MELLITUS WITH DIABETIC NEPHROPATHY, WITHOUT LONG-TERM CURRENT USE OF INSULIN: ICD-10-CM

## 2024-03-04 DIAGNOSIS — I10 PRIMARY HYPERTENSION: ICD-10-CM

## 2024-03-05 DIAGNOSIS — E11.21 TYPE 2 DIABETES MELLITUS WITH DIABETIC NEPHROPATHY, WITHOUT LONG-TERM CURRENT USE OF INSULIN: ICD-10-CM

## 2024-03-05 DIAGNOSIS — I10 PRIMARY HYPERTENSION: ICD-10-CM

## 2024-03-05 RX ORDER — METFORMIN HYDROCHLORIDE 1000 MG/1
1000 TABLET ORAL 2 TIMES DAILY WITH MEALS
Qty: 180 TABLET | Refills: 0 | OUTPATIENT
Start: 2024-03-05

## 2024-03-05 RX ORDER — AMLODIPINE BESYLATE 5 MG/1
5 TABLET ORAL NIGHTLY
Qty: 90 TABLET | Refills: 1 | Status: SHIPPED | OUTPATIENT
Start: 2024-03-05

## 2024-03-05 RX ORDER — AMLODIPINE BESYLATE 5 MG/1
5 TABLET ORAL NIGHTLY
Qty: 90 TABLET | Refills: 0 | OUTPATIENT
Start: 2024-03-05

## 2024-03-05 RX ORDER — METFORMIN HYDROCHLORIDE 1000 MG/1
1000 TABLET ORAL 2 TIMES DAILY WITH MEALS
Qty: 180 TABLET | Refills: 1 | Status: SHIPPED | OUTPATIENT
Start: 2024-03-05

## 2024-03-05 NOTE — TELEPHONE ENCOUNTER
Last Office Visit Info:   The patient's last visit with Bailey Fiore MD was on 8/12/2022.    The patient's last visit 2/20/2024 Shannon Ya MD.        Last CBC Results:   Lab Results   Component Value Date    WBC 9.0 02/20/2024    HGB 12.4 02/20/2024    HCT 38.9 02/20/2024     02/20/2024       Last CMP Results  Lab Results   Component Value Date     02/20/2024    K 4.2 02/20/2024     02/20/2024    CO2 26 02/20/2024    BUN 18 02/20/2024    CREATININE 0.66 02/20/2024    CALCIUM 9.6 02/20/2024    ALBUMIN 4.3 02/20/2024    AST 26 02/20/2024    ALT 22 02/20/2024       Last Lipids  Lab Results   Component Value Date    CHOL 174 02/20/2024    TRIG 133 02/20/2024    HDL 63 02/20/2024    LDLCALC 88 02/20/2024       Last A1C  Lab Results   Component Value Date    HGBA1C 5.8 (H) 02/20/2024       Last TSH  Lab Results   Component Value Date    TSH 1.48 02/20/2024             Current Med Refills  Medication List with Changes/Refills   Current Medications    AMLODIPINE (NORVASC) 5 MG TABLET    Take 1 tablet (5 mg total) by mouth every evening.       Start Date: 8/10/2023 End Date: --    BETAMETHASONE DIPROPIONATE (DIPROLENE) 0.05 % OINTMENT    Apply topically 2 (two) times daily.       Start Date: 2/20/2024 End Date: --    CARVEDILOL (COREG) 12.5 MG TABLET    Take 1 tablet (12.5 mg total) by mouth 2 (two) times daily with meals.       Start Date: 8/10/2023 End Date: --    CLOBETASOL (TEMOVATE) 0.05 % EXTERNAL SOLUTION    Apply topically 2 (two) times daily.       Start Date: 8/15/2023 End Date: --    ELIQUIS 5 MG TAB    Take 1 tablet by mouth twice daily       Start Date: 2/5/2024  End Date: --    FUROSEMIDE (LASIX) 20 MG TABLET    Take 1 tablet (20 mg total) by mouth once daily.       Start Date: 8/10/2023 End Date: --    LORATADINE (CLARITIN) 10 MG TABLET    Take 10 mg by mouth once daily. prn       Start Date: --        End Date: --    LOSARTAN (COZAAR) 50 MG TABLET    Take 1 tablet (50 mg total) by  mouth 2 (two) times daily.       Start Date: 8/10/2023 End Date: --    METFORMIN (GLUCOPHAGE) 1000 MG TABLET    Take 1 tablet (1,000 mg total) by mouth 2 (two) times daily with meals.       Start Date: 8/10/2023 End Date: --    MULTAQ 400 MG TAB    TAKE 1 TABLET BY MOUTH TWICE DAILY WITH MEALS       Start Date: 5/16/2023 End Date: --    SIMVASTATIN (ZOCOR) 40 MG TABLET    Take 1 tablet (40 mg total) by mouth every evening.       Start Date: 8/10/2023 End Date: --    TIRZEPATIDE (MOUNJARO) 12.5 MG/0.5 ML PNIJ    Inject 12.5 mg into the skin every 7 days.       Start Date: 9/5/2023  End Date: --       Order(s) placed per written order guidelines: none    Please advise.

## 2024-03-05 NOTE — TELEPHONE ENCOUNTER
Last Office Visit Info:   The patient's last visit with Bailey Fiore MD was on 8/12/2022.    The patient's last visit in current department was on Visit date not found.        Last CBC Results:   Lab Results   Component Value Date    WBC 9.0 02/20/2024    HGB 12.4 02/20/2024    HCT 38.9 02/20/2024     02/20/2024       Last CMP Results  Lab Results   Component Value Date     02/20/2024    K 4.2 02/20/2024     02/20/2024    CO2 26 02/20/2024    BUN 18 02/20/2024    CREATININE 0.66 02/20/2024    CALCIUM 9.6 02/20/2024    ALBUMIN 4.3 02/20/2024    AST 26 02/20/2024    ALT 22 02/20/2024       Last Lipids  Lab Results   Component Value Date    CHOL 174 02/20/2024    TRIG 133 02/20/2024    HDL 63 02/20/2024    LDLCALC 88 02/20/2024       Last A1C  Lab Results   Component Value Date    HGBA1C 5.8 (H) 02/20/2024       Last TSH  Lab Results   Component Value Date    TSH 1.48 02/20/2024             Current Med Refills  Medication List with Changes/Refills   Current Medications    AMLODIPINE (NORVASC) 5 MG TABLET    Take 1 tablet (5 mg total) by mouth every evening.       Start Date: 8/10/2023 End Date: --    BETAMETHASONE DIPROPIONATE (DIPROLENE) 0.05 % OINTMENT    Apply topically 2 (two) times daily.       Start Date: 2/20/2024 End Date: --    CARVEDILOL (COREG) 12.5 MG TABLET    Take 1 tablet (12.5 mg total) by mouth 2 (two) times daily with meals.       Start Date: 8/10/2023 End Date: --    CLOBETASOL (TEMOVATE) 0.05 % EXTERNAL SOLUTION    Apply topically 2 (two) times daily.       Start Date: 8/15/2023 End Date: --    ELIQUIS 5 MG TAB    Take 1 tablet by mouth twice daily       Start Date: 2/5/2024  End Date: --    FUROSEMIDE (LASIX) 20 MG TABLET    Take 1 tablet (20 mg total) by mouth once daily.       Start Date: 8/10/2023 End Date: --    LORATADINE (CLARITIN) 10 MG TABLET    Take 10 mg by mouth once daily. prn       Start Date: --        End Date: --    LOSARTAN (COZAAR) 50 MG TABLET    Take 1  tablet (50 mg total) by mouth 2 (two) times daily.       Start Date: 8/10/2023 End Date: --    METFORMIN (GLUCOPHAGE) 1000 MG TABLET    Take 1 tablet (1,000 mg total) by mouth 2 (two) times daily with meals.       Start Date: 8/10/2023 End Date: --    MULTAQ 400 MG TAB    TAKE 1 TABLET BY MOUTH TWICE DAILY WITH MEALS       Start Date: 5/16/2023 End Date: --    SIMVASTATIN (ZOCOR) 40 MG TABLET    Take 1 tablet (40 mg total) by mouth every evening.       Start Date: 8/10/2023 End Date: --    TIRZEPATIDE (MOUNJARO) 12.5 MG/0.5 ML PNIJ    Inject 12.5 mg into the skin every 7 days.       Start Date: 9/5/2023  End Date: --       Order(s) placed per written order guidelines: none    Please advise.

## 2024-04-01 DIAGNOSIS — E11.21 TYPE 2 DIABETES MELLITUS WITH DIABETIC NEPHROPATHY, WITHOUT LONG-TERM CURRENT USE OF INSULIN: ICD-10-CM

## 2024-04-01 DIAGNOSIS — I10 PRIMARY HYPERTENSION: ICD-10-CM

## 2024-04-01 RX ORDER — TIRZEPATIDE 12.5 MG/.5ML
12.5 INJECTION, SOLUTION SUBCUTANEOUS
Qty: 13 PEN | Refills: 1 | Status: SHIPPED | OUTPATIENT
Start: 2024-04-01

## 2024-04-01 NOTE — TELEPHONE ENCOUNTER
No care due was identified.  [facility] Embedded Care Due Messages. Reference number: [id number].   4/01/2024 10:40:54 AM CDT

## 2024-04-02 RX ORDER — CARVEDILOL 12.5 MG/1
12.5 TABLET ORAL 2 TIMES DAILY WITH MEALS
Qty: 180 TABLET | Refills: 1 | Status: SHIPPED | OUTPATIENT
Start: 2024-04-02

## 2024-04-02 RX ORDER — CARVEDILOL 12.5 MG/1
12.5 TABLET ORAL 2 TIMES DAILY WITH MEALS
Qty: 180 TABLET | Refills: 0 | OUTPATIENT
Start: 2024-04-02

## 2024-04-02 NOTE — TELEPHONE ENCOUNTER
----- Message from Marta Heck sent at 4/2/2024  9:32 AM CDT -----  Regarding: Pharmacy call  .Type:  Pharmacy Calling to Clarify an RX    Name of Caller: Dede     [address]: .  [address] [address]  Phone: [phone] Fax: [phone]    Prescription Name: carvediloL (COREG) 12.5 MG tablet    What do they need to clarify? Refill     Best Call Back Number: [phone]    Additional Information:

## 2024-04-22 DIAGNOSIS — I10 PRIMARY HYPERTENSION: ICD-10-CM

## 2024-04-22 DIAGNOSIS — E11.21 TYPE 2 DIABETES MELLITUS WITH DIABETIC NEPHROPATHY, WITHOUT LONG-TERM CURRENT USE OF INSULIN: ICD-10-CM

## 2024-04-26 RX ORDER — FUROSEMIDE 20 MG/1
20 TABLET ORAL DAILY
Qty: 90 TABLET | Refills: 1 | Status: SHIPPED | OUTPATIENT
Start: 2024-04-26

## 2024-04-26 RX ORDER — SIMVASTATIN 40 MG/1
40 TABLET, FILM COATED ORAL NIGHTLY
Qty: 90 TABLET | Refills: 1 | Status: SHIPPED | OUTPATIENT
Start: 2024-04-26

## 2024-05-01 DIAGNOSIS — I48.0 PAF (PAROXYSMAL ATRIAL FIBRILLATION): ICD-10-CM

## 2024-05-01 DIAGNOSIS — I48.3 TYPICAL ATRIAL FLUTTER: ICD-10-CM

## 2024-05-02 DIAGNOSIS — I48.0 PAF (PAROXYSMAL ATRIAL FIBRILLATION): ICD-10-CM

## 2024-05-02 RX ORDER — APIXABAN 5 MG/1
5 TABLET, FILM COATED ORAL 2 TIMES DAILY
Qty: 180 TABLET | Refills: 0 | Status: SHIPPED | OUTPATIENT
Start: 2024-05-02

## 2024-05-02 RX ORDER — DRONEDARONE 400 MG/1
TABLET, FILM COATED ORAL
Qty: 180 TABLET | Refills: 1 | Status: SHIPPED | OUTPATIENT
Start: 2024-05-02

## 2024-05-05 DIAGNOSIS — I10 PRIMARY HYPERTENSION: ICD-10-CM

## 2024-05-06 RX ORDER — LOSARTAN POTASSIUM 50 MG/1
50 TABLET ORAL 2 TIMES DAILY
Qty: 180 TABLET | Refills: 0 | Status: SHIPPED | OUTPATIENT
Start: 2024-05-06 | End: 2024-05-13

## 2024-05-06 NOTE — TELEPHONE ENCOUNTER
Refill Routing Note   Medication(s) are not appropriate for processing by [facility] for the following reason(s):        Non-participating provider    [facility] action(s):  Route               Appointments  past 12m or future 3m with PCP    Date Provider   Last Visit   2/20/2024 Shannon Ya MD   Next Visit   8/20/2024 Shannon Ya MD   ED visits in past 90 days: 0        Note composed:12:17 PM 05/06/2024

## 2024-05-12 DIAGNOSIS — I10 PRIMARY HYPERTENSION: ICD-10-CM

## 2024-05-13 RX ORDER — LOSARTAN POTASSIUM 50 MG/1
50 TABLET ORAL 2 TIMES DAILY
Qty: 180 TABLET | Refills: 0 | Status: SHIPPED | OUTPATIENT
Start: 2024-05-13

## 2024-06-05 ENCOUNTER — PATIENT MESSAGE (OUTPATIENT)
Facility: CLINIC | Age: 54
End: 2024-06-05
Payer: COMMERCIAL

## 2024-06-05 DIAGNOSIS — Z12.11 SCREENING FOR MALIGNANT NEOPLASM OF COLON: Primary | ICD-10-CM

## 2024-07-09 ENCOUNTER — PATIENT MESSAGE (OUTPATIENT)
Dept: ADMINISTRATIVE | Facility: HOSPITAL | Age: 54
End: 2024-07-09
Payer: COMMERCIAL

## 2024-07-25 DIAGNOSIS — I48.3 TYPICAL ATRIAL FLUTTER: ICD-10-CM

## 2024-07-25 DIAGNOSIS — I48.0 PAF (PAROXYSMAL ATRIAL FIBRILLATION): ICD-10-CM

## 2024-07-25 RX ORDER — APIXABAN 5 MG/1
5 TABLET, FILM COATED ORAL 2 TIMES DAILY
Qty: 180 TABLET | Refills: 0 | Status: SHIPPED | OUTPATIENT
Start: 2024-07-25

## 2024-08-20 ENCOUNTER — OFFICE VISIT (OUTPATIENT)
Facility: CLINIC | Age: 54
End: 2024-08-20
Payer: COMMERCIAL

## 2024-08-20 ENCOUNTER — LAB VISIT (OUTPATIENT)
Dept: LAB | Facility: HOSPITAL | Age: 54
End: 2024-08-20
Attending: STUDENT IN AN ORGANIZED HEALTH CARE EDUCATION/TRAINING PROGRAM
Payer: COMMERCIAL

## 2024-08-20 VITALS
TEMPERATURE: 98 F | OXYGEN SATURATION: 99 % | WEIGHT: 281.19 LBS | DIASTOLIC BLOOD PRESSURE: 70 MMHG | RESPIRATION RATE: 18 BRPM | BODY MASS INDEX: 49.82 KG/M2 | HEIGHT: 63 IN | SYSTOLIC BLOOD PRESSURE: 130 MMHG | HEART RATE: 87 BPM

## 2024-08-20 DIAGNOSIS — E66.01 MORBID OBESITY: ICD-10-CM

## 2024-08-20 DIAGNOSIS — E11.21 TYPE 2 DIABETES MELLITUS WITH DIABETIC NEPHROPATHY, WITHOUT LONG-TERM CURRENT USE OF INSULIN: Primary | ICD-10-CM

## 2024-08-20 DIAGNOSIS — E78.00 PURE HYPERCHOLESTEROLEMIA: ICD-10-CM

## 2024-08-20 DIAGNOSIS — R92.8 ABNORMAL MAMMOGRAM: ICD-10-CM

## 2024-08-20 DIAGNOSIS — I10 PRIMARY HYPERTENSION: ICD-10-CM

## 2024-08-20 DIAGNOSIS — I48.0 PAF (PAROXYSMAL ATRIAL FIBRILLATION): ICD-10-CM

## 2024-08-20 DIAGNOSIS — E11.21 TYPE 2 DIABETES MELLITUS WITH DIABETIC NEPHROPATHY, WITHOUT LONG-TERM CURRENT USE OF INSULIN: ICD-10-CM

## 2024-08-20 PROCEDURE — 3078F DIAST BP <80 MM HG: CPT | Mod: CPTII,S$GLB,, | Performed by: STUDENT IN AN ORGANIZED HEALTH CARE EDUCATION/TRAINING PROGRAM

## 2024-08-20 PROCEDURE — 3066F NEPHROPATHY DOC TX: CPT | Mod: CPTII,S$GLB,, | Performed by: STUDENT IN AN ORGANIZED HEALTH CARE EDUCATION/TRAINING PROGRAM

## 2024-08-20 PROCEDURE — 3072F LOW RISK FOR RETINOPATHY: CPT | Mod: CPTII,S$GLB,, | Performed by: STUDENT IN AN ORGANIZED HEALTH CARE EDUCATION/TRAINING PROGRAM

## 2024-08-20 PROCEDURE — 99214 OFFICE O/P EST MOD 30 MIN: CPT | Mod: S$GLB,,, | Performed by: STUDENT IN AN ORGANIZED HEALTH CARE EDUCATION/TRAINING PROGRAM

## 2024-08-20 PROCEDURE — 3008F BODY MASS INDEX DOCD: CPT | Mod: CPTII,S$GLB,, | Performed by: STUDENT IN AN ORGANIZED HEALTH CARE EDUCATION/TRAINING PROGRAM

## 2024-08-20 PROCEDURE — 4010F ACE/ARB THERAPY RXD/TAKEN: CPT | Mod: CPTII,S$GLB,, | Performed by: STUDENT IN AN ORGANIZED HEALTH CARE EDUCATION/TRAINING PROGRAM

## 2024-08-20 PROCEDURE — 36415 COLL VENOUS BLD VENIPUNCTURE: CPT | Performed by: STUDENT IN AN ORGANIZED HEALTH CARE EDUCATION/TRAINING PROGRAM

## 2024-08-20 PROCEDURE — 3061F NEG MICROALBUMINURIA REV: CPT | Mod: CPTII,S$GLB,, | Performed by: STUDENT IN AN ORGANIZED HEALTH CARE EDUCATION/TRAINING PROGRAM

## 2024-08-20 PROCEDURE — 3044F HG A1C LEVEL LT 7.0%: CPT | Mod: CPTII,S$GLB,, | Performed by: STUDENT IN AN ORGANIZED HEALTH CARE EDUCATION/TRAINING PROGRAM

## 2024-08-20 PROCEDURE — 99999 PR PBB SHADOW E&M-EST. PATIENT-LVL V: CPT | Mod: PBBFAC,,, | Performed by: STUDENT IN AN ORGANIZED HEALTH CARE EDUCATION/TRAINING PROGRAM

## 2024-08-20 PROCEDURE — 83036 HEMOGLOBIN GLYCOSYLATED A1C: CPT | Performed by: STUDENT IN AN ORGANIZED HEALTH CARE EDUCATION/TRAINING PROGRAM

## 2024-08-20 PROCEDURE — 3075F SYST BP GE 130 - 139MM HG: CPT | Mod: CPTII,S$GLB,, | Performed by: STUDENT IN AN ORGANIZED HEALTH CARE EDUCATION/TRAINING PROGRAM

## 2024-08-20 PROCEDURE — 1159F MED LIST DOCD IN RCRD: CPT | Mod: CPTII,S$GLB,, | Performed by: STUDENT IN AN ORGANIZED HEALTH CARE EDUCATION/TRAINING PROGRAM

## 2024-08-20 RX ORDER — TIRZEPATIDE 15 MG/.5ML
15 INJECTION, SOLUTION SUBCUTANEOUS
Qty: 2 ML | Refills: 11 | Status: SHIPPED | OUTPATIENT
Start: 2024-08-20 | End: 2025-08-20

## 2024-08-20 NOTE — PROGRESS NOTES
SUBJECTIVE     Chief Complaint   Patient presents with    Follow-up     Pt states she is coming in for a f/u has no pain       HPI  Micaela Hernandez is a 54 y.o. female with multiple medical diagnoses as listed in the medical history and problem list that presents for follow-up of chronic medical conditions.     Pt reports that she has been doing well since her last visit. She has a good appetite and eats a varied diet including fruit and vegetables. She does not engage in cardiovascular exercise. She is sleeping well most nights. Pt does take any OTC supplements - multivitamin and Claritin. She has been under stress recently at work and mood has been generally good. Pt is not UTD on age appropriate CA screening. She is due for her mammogram, needs a diagnostic one.     Atrial flutter/PAF: S/p RFA in 2022 but continues to experience infrequent breakthrough episodes of PAF. Stable on current med regimen. On Eliquis. Last echo one year ago was normal. Follows with cardiology. Due for follow up and repeat echo with them.   T2DM: Last HbA1c 6 months ago was 5.8. On Mounjaro and metformin. Has not had any recent dose adjustments. No reported side effects. Reports 50 lb weight loss initially upon starting Mounjaro 1.5 years ago but has not had any additional weight loss for the past year. Patient complains of sugar cravings. She would like to lose additional weight.   HTN: Stable on current med regimen. Denies adverse reactions to any of the medications.  HLD: Stable with simvastatin.  NAPOLEON: Have not been using CPAP. Reports intermittent fatigue, poor sleep.     PAST MEDICAL HISTORY:  Past Medical History:   Diagnosis Date    Allergy     Atrial flutter     Degenerative lumbar disc 04/09/2021    Diabetes mellitus     Diabetes mellitus, type 2     Hyperlipidemia     Hypertension     PAF (paroxysmal atrial fibrillation) 6/30/2022    Sleep apnea     Symptomatic anemia 2/24/2023       ALLERGIES AND MEDICATIONS: updated and  reviewed.  Review of patient's allergies indicates:  No Known Allergies  Current Outpatient Medications   Medication Sig Dispense Refill    amLODIPine (NORVASC) 5 MG tablet Take 1 tablet (5 mg total) by mouth every evening. 90 tablet 1    betamethasone dipropionate (DIPROLENE) 0.05 % ointment Apply topically 2 (two) times daily. 15 g 0    carvediloL (COREG) 12.5 MG tablet Take 1 tablet (12.5 mg total) by mouth 2 (two) times daily with meals. 180 tablet 1    clobetasoL (TEMOVATE) 0.05 % external solution Apply topically 2 (two) times daily. 50 mL 0    ELIQUIS 5 mg Tab Take 1 tablet by mouth twice daily 180 tablet 0    furosemide (LASIX) 20 MG tablet Take 1 tablet (20 mg total) by mouth once daily. 90 tablet 1    loratadine (CLARITIN) 10 mg tablet Take 10 mg by mouth once daily. prn      losartan (COZAAR) 50 MG tablet Take 1 tablet by mouth twice daily 180 tablet 0    metFORMIN (GLUCOPHAGE) 1000 MG tablet Take 1 tablet (1,000 mg total) by mouth 2 (two) times daily with meals. 180 tablet 1    MULTAQ 400 mg Tab TAKE 1 TABLET BY MOUTH TWICE DAILY WITH MEALS 180 tablet 1    simvastatin (ZOCOR) 40 MG tablet Take 1 tablet (40 mg total) by mouth every evening. 90 tablet 1    tirzepatide (MOUNJARO) 15 mg/0.5 mL PnIj Inject 15 mg into the skin every 7 days. 2 mL 11     No current facility-administered medications for this visit.       ROS  Review of Systems   Constitutional:  Negative for chills, fatigue and fever.   HENT:  Negative for rhinorrhea and sore throat.    Respiratory:  Negative for cough and shortness of breath.    Cardiovascular:  Negative for chest pain and palpitations.   Gastrointestinal:  Negative for constipation, diarrhea, nausea and vomiting.   Genitourinary:  Negative for dysuria.   Musculoskeletal:  Negative for joint swelling.   Skin:  Negative for rash and wound.   Neurological:  Negative for light-headedness and headaches.   Psychiatric/Behavioral:  Negative for dysphoric mood and sleep disturbance.  "The patient is not nervous/anxious.          OBJECTIVE     Physical Exam  Vitals:    08/20/24 0820   BP: 130/70   Pulse: 87   Resp: 18   Temp: 98 °F (36.7 °C)    Body mass index is 49.81 kg/m².  Weight: 127.6 kg (281 lb 3.2 oz)   Height: 5' 3" (160 cm)     Physical Exam  Vitals reviewed.   Constitutional:       General: She is not in acute distress.  HENT:      Right Ear: External ear normal.      Left Ear: External ear normal.      Nose: Nose normal.      Mouth/Throat:      Mouth: Mucous membranes are moist.   Eyes:      Extraocular Movements: Extraocular movements intact.      Conjunctiva/sclera: Conjunctivae normal.      Pupils: Pupils are equal, round, and reactive to light.   Pulmonary:      Effort: Pulmonary effort is normal.   Abdominal:      General: There is no distension.      Palpations: Abdomen is soft.   Musculoskeletal:         General: No swelling. Normal range of motion.      Cervical back: Normal range of motion.   Skin:     General: Skin is warm and dry.      Findings: No rash.   Neurological:      General: No focal deficit present.      Mental Status: She is alert and oriented to person, place, and time.   Psychiatric:         Mood and Affect: Mood normal.         Behavior: Behavior normal.           Health Maintenance         Date Due Completion Date    Pneumococcal Vaccines (Age 0-64) (2 of 2 - PCV) 05/24/2019 5/24/2018    COVID-19 Vaccine (3 - 2023-24 season) 09/01/2023 12/28/2021    Hemoglobin A1c 08/20/2024 2/20/2024    HIV Screening 12/28/2027 (Originally 4/26/1985) ---    Influenza Vaccine (1) 09/01/2024 12/2/2023    Eye Exam 12/21/2024 12/21/2023    Mammogram 12/27/2024 12/27/2023    Diabetes Urine Screening 02/20/2025 2/20/2024    Foot Exam 02/20/2025 2/20/2024    Lipid Panel 02/20/2025 2/20/2024    Low Dose Statin 08/20/2025 8/20/2024    TETANUS VACCINE 02/03/2026 2/3/2016    Colorectal Cancer Screening 06/19/2027 6/19/2024              ASSESSMENT     54 y.o. female with     1. Type 2 " diabetes mellitus with diabetic nephropathy, without long-term current use of insulin    2. Morbid obesity    3. PAF (paroxysmal atrial fibrillation)    4. Abnormal mammogram    5. Pure hypercholesterolemia    6. Primary hypertension        PLAN:     1. Type 2 diabetes mellitus with diabetic nephropathy, without long-term current use of insulin  -Stable, but weight loss stalled. Increase mounjaro dose. Patient is encouraged to follow a diet low in carbohydrates and simple sugars. Advised to focus on good food choices and increased physical activity and encouraged to adhere to medication regimen and/or lifestyle adjustments, and to check glucose level as recommended.  Contact office if glucose levels are not improving over time.  Will monitor HbA1c appropriately.   - tirzepatide (MOUNJARO) 15 mg/0.5 mL PnIj; Inject 15 mg into the skin every 7 days.  Dispense: 2 mL; Refill: 11  - HEMOGLOBIN A1C; Future    2. Morbid obesity  - Weight loss stalled. Increase mounjaro dose. F/u 3 months.    3. PAF (paroxysmal atrial fibrillation)  - Stable, continue current regimen. Follow up 3 months.    4. Abnormal mammogram  - Stable, continue current regimen. Follow up 3 months.  - Mammo Digital Diagnostic Bilat with Ruben; Future    5. Pure hypercholesterolemia  - Stable, continue current regimen. Follow up 3 months.    6. Primary hypertension  - Stable, continue coreg, losartan, amlodipine. Follow up 3 months.        Brian Muniz, MS4  Shannon Ya MD  08/20/2024 8:20 AM        Follow up in about 3 months (around 11/20/2024).

## 2024-08-21 LAB
ESTIMATED AVG GLUCOSE: 108 MG/DL (ref 68–131)
HBA1C MFR BLD: 5.4 % (ref 4–5.6)

## 2024-08-23 DIAGNOSIS — I10 PRIMARY HYPERTENSION: ICD-10-CM

## 2024-08-23 RX ORDER — AMLODIPINE BESYLATE 5 MG/1
5 TABLET ORAL NIGHTLY
Qty: 90 TABLET | Refills: 0 | Status: SHIPPED | OUTPATIENT
Start: 2024-08-23

## 2024-09-01 DIAGNOSIS — E11.21 TYPE 2 DIABETES MELLITUS WITH DIABETIC NEPHROPATHY, WITHOUT LONG-TERM CURRENT USE OF INSULIN: ICD-10-CM

## 2024-09-01 RX ORDER — TIRZEPATIDE 12.5 MG/.5ML
12.5 INJECTION, SOLUTION SUBCUTANEOUS WEEKLY
Qty: 12 ML | Refills: 0 | OUTPATIENT
Start: 2024-09-01

## 2024-09-02 DIAGNOSIS — E11.21 TYPE 2 DIABETES MELLITUS WITH DIABETIC NEPHROPATHY, WITHOUT LONG-TERM CURRENT USE OF INSULIN: ICD-10-CM

## 2024-09-02 NOTE — TELEPHONE ENCOUNTER
Refill Decision Note   Micaela Hernandez  is requesting a refill authorization.  Brief Assessment and Rationale for Refill:  Quick Discontinue     Medication Therapy Plan:  Increased mounjaro dose. The original prescription was discontinued on 8/20/2024 by Shannon Ya MD.      Comments:     Note composed:7:13 PM 09/01/2024

## 2024-09-03 RX ORDER — METFORMIN HYDROCHLORIDE 1000 MG/1
1000 TABLET ORAL 2 TIMES DAILY WITH MEALS
Qty: 180 TABLET | Refills: 0 | Status: SHIPPED | OUTPATIENT
Start: 2024-09-03

## 2024-09-25 DIAGNOSIS — I10 PRIMARY HYPERTENSION: ICD-10-CM

## 2024-09-25 RX ORDER — CARVEDILOL 12.5 MG/1
12.5 TABLET ORAL 2 TIMES DAILY WITH MEALS
Qty: 180 TABLET | Refills: 0 | Status: SHIPPED | OUTPATIENT
Start: 2024-09-25

## 2024-10-17 DIAGNOSIS — I10 PRIMARY HYPERTENSION: ICD-10-CM

## 2024-10-17 DIAGNOSIS — E11.21 TYPE 2 DIABETES MELLITUS WITH DIABETIC NEPHROPATHY, WITHOUT LONG-TERM CURRENT USE OF INSULIN: ICD-10-CM

## 2024-10-17 RX ORDER — SIMVASTATIN 40 MG/1
40 TABLET, FILM COATED ORAL NIGHTLY
Qty: 90 TABLET | Refills: 0 | Status: SHIPPED | OUTPATIENT
Start: 2024-10-17

## 2024-10-17 RX ORDER — FUROSEMIDE 20 MG/1
20 TABLET ORAL
Qty: 90 TABLET | Refills: 0 | Status: SHIPPED | OUTPATIENT
Start: 2024-10-17

## 2024-10-24 DIAGNOSIS — I48.3 TYPICAL ATRIAL FLUTTER: ICD-10-CM

## 2024-10-24 DIAGNOSIS — I48.0 PAF (PAROXYSMAL ATRIAL FIBRILLATION): ICD-10-CM

## 2024-10-25 DIAGNOSIS — I10 PRIMARY HYPERTENSION: ICD-10-CM

## 2024-10-25 RX ORDER — APIXABAN 5 MG/1
5 TABLET, FILM COATED ORAL 2 TIMES DAILY
Qty: 180 TABLET | Refills: 0 | Status: SHIPPED | OUTPATIENT
Start: 2024-10-25

## 2024-10-25 RX ORDER — LOSARTAN POTASSIUM 50 MG/1
50 TABLET ORAL 2 TIMES DAILY
Qty: 180 TABLET | Refills: 0 | Status: SHIPPED | OUTPATIENT
Start: 2024-10-25

## 2024-11-11 ENCOUNTER — TELEPHONE (OUTPATIENT)
Dept: ELECTROPHYSIOLOGY | Facility: CLINIC | Age: 54
End: 2024-11-11
Payer: COMMERCIAL

## 2024-11-11 NOTE — PROGRESS NOTES
Subjective   Patient ID:  Micaela Hernandez is a 54 y.o. female who presents for follow-up of No chief complaint on file.      HPI 53 yo female with atrial fibrillation, morbid obesity, DM, HTN, HLD, AFL (RFA of CTI 3/31/2022), sleep apnea (on CPAP)     The patient location is: Home  The chief complaint leading to consultation is: atrial fibrillation     Visit type: audiovisual     Face to Face time with patient: 15 minutes  25 minutes of total time spent on the encounter, which includes face to face time and non-face to face time preparing to see the patient (eg, review of tests), Obtaining and/or reviewing separately obtained history, Documenting clinical information in the electronic or other health record, Independently interpreting results (not separately reported) and communicating results to the patient/family/caregiver, or Care coordination (not separately reported).        Each patient to whom he or she provides medical services by telemedicine is:  (1) informed of the relationship between the physician and patient and the respective role of any other health care provider with respect to management of the patient; and (2) notified that he or she may decline to receive medical services by telemedicine and may withdraw from such care at any time.          Background:     11/21 presented with symptomatic atrial flutter.  STEVEN/DCCV 1/26/22      Developed symptomatic recurrence.     3/31/2022: Successful ablation of atrial flutter (typical).  Noted continued palpitations.     Bardy 5/5/2022: SR with 14% PAF with RVR  Flecainide resumed >> felt poorly.  Switched to Multaq.     Started on CPAP 5/22.     Echo 9/20/22 EF 65% normal RV size and function moderate LAE.  Admitted with vaginal bleeding. Found to have fibroids.   Laparoscopic hysterectomy 3/28/23. Eliquis 5 mg BID resumed.    No recurrence of bleeding.    Has lost 45 lbs (on tirzepatide)     Update:    Still notes paroxysmal atrial fibrillation, every 3-4  months, lasting up to 10 minutes. Stable in nature. Happy overall with her control.  Has plateau'ed in terms of weight loss.    Review of Systems   Constitutional: Negative. Negative for fever and malaise/fatigue.   HENT:  Negative for congestion and sore throat.    Cardiovascular:  Positive for palpitations. Negative for chest pain, dyspnea on exertion, irregular heartbeat, leg swelling, near-syncope, orthopnea, paroxysmal nocturnal dyspnea and syncope.   Respiratory:  Negative for cough and shortness of breath.    Gastrointestinal:  Negative for abdominal pain, constipation and diarrhea.   Neurological:  Negative for dizziness, light-headedness and weakness.   Psychiatric/Behavioral:  Negative for depression. The patient is not nervous/anxious.    All other systems reviewed and are negative.           Assessment and Plan     1. PAF (paroxysmal atrial fibrillation)    2. Typical atrial flutter    3. Primary hypertension    4. Morbid obesity    5. Type 2 diabetes mellitus with diabetic nephropathy, without long-term current use of insulin    6. ARORA (nonalcoholic steatohepatitis)    7. NAPOLEON (obstructive sleep apnea)        Plan:       1. PAF (paroxysmal atrial fibrillation) (Primary)  Continues to note atrial fibrillation, stable in frequency.  Tolerating Eliquis.  Bardy patch.  We discussed risks and benefits of PVI, she would like hold off for now.    2. Typical atrial flutter  S/p RFA without recurrence    3. Primary hypertension  Remains stable    4. Morbid obesity  Encouraged weight reduction.    5. Type 2 diabetes mellitus with diabetic nephropathy, without long-term current use of insulin  As per PCP    6. ARORA (nonalcoholic steatohepatitis)  stable    7. NAPOLEON (obstructive sleep apnea)  On CPAP.

## 2024-11-13 ENCOUNTER — OFFICE VISIT (OUTPATIENT)
Dept: ELECTROPHYSIOLOGY | Facility: CLINIC | Age: 54
End: 2024-11-13
Payer: COMMERCIAL

## 2024-11-13 ENCOUNTER — CLINICAL SUPPORT (OUTPATIENT)
Dept: CARDIOLOGY | Facility: HOSPITAL | Age: 54
End: 2024-11-13
Attending: INTERNAL MEDICINE
Payer: COMMERCIAL

## 2024-11-13 VITALS — HEIGHT: 63 IN | BODY MASS INDEX: 49.84 KG/M2 | WEIGHT: 281.31 LBS

## 2024-11-13 DIAGNOSIS — E11.21 TYPE 2 DIABETES MELLITUS WITH DIABETIC NEPHROPATHY, WITHOUT LONG-TERM CURRENT USE OF INSULIN: ICD-10-CM

## 2024-11-13 DIAGNOSIS — E66.01 MORBID OBESITY: ICD-10-CM

## 2024-11-13 DIAGNOSIS — G47.33 OSA (OBSTRUCTIVE SLEEP APNEA): ICD-10-CM

## 2024-11-13 DIAGNOSIS — I48.0 PAF (PAROXYSMAL ATRIAL FIBRILLATION): Primary | ICD-10-CM

## 2024-11-13 DIAGNOSIS — I10 PRIMARY HYPERTENSION: ICD-10-CM

## 2024-11-13 DIAGNOSIS — K75.81 NASH (NONALCOHOLIC STEATOHEPATITIS): ICD-10-CM

## 2024-11-13 DIAGNOSIS — I48.3 TYPICAL ATRIAL FLUTTER: ICD-10-CM

## 2024-11-13 PROCEDURE — 3072F LOW RISK FOR RETINOPATHY: CPT | Mod: CPTII,95,, | Performed by: INTERNAL MEDICINE

## 2024-11-13 PROCEDURE — 4010F ACE/ARB THERAPY RXD/TAKEN: CPT | Mod: CPTII,95,, | Performed by: INTERNAL MEDICINE

## 2024-11-13 PROCEDURE — 3061F NEG MICROALBUMINURIA REV: CPT | Mod: CPTII,95,, | Performed by: INTERNAL MEDICINE

## 2024-11-13 PROCEDURE — 1159F MED LIST DOCD IN RCRD: CPT | Mod: CPTII,95,, | Performed by: INTERNAL MEDICINE

## 2024-11-13 PROCEDURE — 3066F NEPHROPATHY DOC TX: CPT | Mod: CPTII,95,, | Performed by: INTERNAL MEDICINE

## 2024-11-13 PROCEDURE — 3044F HG A1C LEVEL LT 7.0%: CPT | Mod: CPTII,95,, | Performed by: INTERNAL MEDICINE

## 2024-11-13 PROCEDURE — 3008F BODY MASS INDEX DOCD: CPT | Mod: CPTII,95,, | Performed by: INTERNAL MEDICINE

## 2024-11-13 PROCEDURE — 99215 OFFICE O/P EST HI 40 MIN: CPT | Mod: 95,,, | Performed by: INTERNAL MEDICINE

## 2024-11-20 ENCOUNTER — OFFICE VISIT (OUTPATIENT)
Facility: CLINIC | Age: 54
End: 2024-11-20
Payer: COMMERCIAL

## 2024-11-20 ENCOUNTER — LAB VISIT (OUTPATIENT)
Dept: LAB | Facility: HOSPITAL | Age: 54
End: 2024-11-20
Attending: STUDENT IN AN ORGANIZED HEALTH CARE EDUCATION/TRAINING PROGRAM
Payer: COMMERCIAL

## 2024-11-20 VITALS
TEMPERATURE: 98 F | SYSTOLIC BLOOD PRESSURE: 114 MMHG | BODY MASS INDEX: 49.31 KG/M2 | DIASTOLIC BLOOD PRESSURE: 80 MMHG | WEIGHT: 278.31 LBS | RESPIRATION RATE: 18 BRPM | HEIGHT: 63 IN | OXYGEN SATURATION: 99 % | HEART RATE: 84 BPM

## 2024-11-20 DIAGNOSIS — E66.01 MORBID OBESITY: ICD-10-CM

## 2024-11-20 DIAGNOSIS — G47.33 OSA (OBSTRUCTIVE SLEEP APNEA): ICD-10-CM

## 2024-11-20 DIAGNOSIS — E11.21 TYPE 2 DIABETES MELLITUS WITH DIABETIC NEPHROPATHY, WITHOUT LONG-TERM CURRENT USE OF INSULIN: ICD-10-CM

## 2024-11-20 DIAGNOSIS — Z23 NEED FOR INFLUENZA VACCINATION: ICD-10-CM

## 2024-11-20 DIAGNOSIS — E11.21 TYPE 2 DIABETES MELLITUS WITH DIABETIC NEPHROPATHY, WITHOUT LONG-TERM CURRENT USE OF INSULIN: Primary | ICD-10-CM

## 2024-11-20 PROCEDURE — 3044F HG A1C LEVEL LT 7.0%: CPT | Mod: CPTII,S$GLB,, | Performed by: STUDENT IN AN ORGANIZED HEALTH CARE EDUCATION/TRAINING PROGRAM

## 2024-11-20 PROCEDURE — 1160F RVW MEDS BY RX/DR IN RCRD: CPT | Mod: CPTII,S$GLB,, | Performed by: STUDENT IN AN ORGANIZED HEALTH CARE EDUCATION/TRAINING PROGRAM

## 2024-11-20 PROCEDURE — 83036 HEMOGLOBIN GLYCOSYLATED A1C: CPT | Performed by: STUDENT IN AN ORGANIZED HEALTH CARE EDUCATION/TRAINING PROGRAM

## 2024-11-20 PROCEDURE — 4010F ACE/ARB THERAPY RXD/TAKEN: CPT | Mod: CPTII,S$GLB,, | Performed by: STUDENT IN AN ORGANIZED HEALTH CARE EDUCATION/TRAINING PROGRAM

## 2024-11-20 PROCEDURE — 90471 IMMUNIZATION ADMIN: CPT | Mod: S$GLB,,, | Performed by: STUDENT IN AN ORGANIZED HEALTH CARE EDUCATION/TRAINING PROGRAM

## 2024-11-20 PROCEDURE — 1159F MED LIST DOCD IN RCRD: CPT | Mod: CPTII,S$GLB,, | Performed by: STUDENT IN AN ORGANIZED HEALTH CARE EDUCATION/TRAINING PROGRAM

## 2024-11-20 PROCEDURE — 3072F LOW RISK FOR RETINOPATHY: CPT | Mod: CPTII,S$GLB,, | Performed by: STUDENT IN AN ORGANIZED HEALTH CARE EDUCATION/TRAINING PROGRAM

## 2024-11-20 PROCEDURE — 3061F NEG MICROALBUMINURIA REV: CPT | Mod: CPTII,S$GLB,, | Performed by: STUDENT IN AN ORGANIZED HEALTH CARE EDUCATION/TRAINING PROGRAM

## 2024-11-20 PROCEDURE — 3066F NEPHROPATHY DOC TX: CPT | Mod: CPTII,S$GLB,, | Performed by: STUDENT IN AN ORGANIZED HEALTH CARE EDUCATION/TRAINING PROGRAM

## 2024-11-20 PROCEDURE — 3008F BODY MASS INDEX DOCD: CPT | Mod: CPTII,S$GLB,, | Performed by: STUDENT IN AN ORGANIZED HEALTH CARE EDUCATION/TRAINING PROGRAM

## 2024-11-20 PROCEDURE — 90656 IIV3 VACC NO PRSV 0.5 ML IM: CPT | Mod: S$GLB,,, | Performed by: STUDENT IN AN ORGANIZED HEALTH CARE EDUCATION/TRAINING PROGRAM

## 2024-11-20 PROCEDURE — 3079F DIAST BP 80-89 MM HG: CPT | Mod: CPTII,S$GLB,, | Performed by: STUDENT IN AN ORGANIZED HEALTH CARE EDUCATION/TRAINING PROGRAM

## 2024-11-20 PROCEDURE — 36415 COLL VENOUS BLD VENIPUNCTURE: CPT | Performed by: STUDENT IN AN ORGANIZED HEALTH CARE EDUCATION/TRAINING PROGRAM

## 2024-11-20 PROCEDURE — 99999 PR PBB SHADOW E&M-EST. PATIENT-LVL V: CPT | Mod: PBBFAC,,, | Performed by: STUDENT IN AN ORGANIZED HEALTH CARE EDUCATION/TRAINING PROGRAM

## 2024-11-20 PROCEDURE — 3074F SYST BP LT 130 MM HG: CPT | Mod: CPTII,S$GLB,, | Performed by: STUDENT IN AN ORGANIZED HEALTH CARE EDUCATION/TRAINING PROGRAM

## 2024-11-20 PROCEDURE — 99214 OFFICE O/P EST MOD 30 MIN: CPT | Mod: 25,S$GLB,, | Performed by: STUDENT IN AN ORGANIZED HEALTH CARE EDUCATION/TRAINING PROGRAM

## 2024-11-20 NOTE — PROGRESS NOTES
"SUBJECTIVE     Chief Complaint   Patient presents with    Diabetes     Follow up       History of Present Illness    CHIEF COMPLAINT:  Patient presents today for follow up.    WEIGHT MANAGEMENT:  She reports currently taking the highest dose of Mounjaro for weight loss but expresses dissatisfaction with her progress. She has considered switching to Ozempic as an alternative.    DIABETES:  She reports adherence to her diabetes management regimen. Her glucose levels have been well-controlled, with fasting readings typically between 100-120 mg/dL. She denies experiencing any episodes of hypoglycemia or hyperglycemia. She reports following a balanced diet and engaging in regular physical activity as recommended. She denies any new symptoms or complications related to her diabetes.    CARDIOLOGY:  She reports having seen cardiology and acknowledges the need to complete a monitor test. She inquired about the location of the xiphoid process in relation to the test.    SLEEP APNEA:  She reports not using her CPAP machine due to issues with the device. Her 3-4 year old machine "spits" at her, spraying water in her face during use, which has been particularly problematic in the winter months. She attempted to mitigate this by covering the tubing, but the problem persists. As a result of not using the CPAP, she reports not sleeping well.    PREVENTIVE CARE:  She is due for a mammogram, which has been ordered. She is scheduled to receive her flu vaccine during today's visit, as she missed the opportunity to get it at her workplace.      ROS:  General: -fever, -chills, -fatigue, -weight gain, +weight loss  Eyes: -vision changes, -redness, -discharge  ENT: -ear pain, -nasal congestion, -sore throat  Cardiovascular: -chest pain, -palpitations, -lower extremity edema  Respiratory: -cough, -shortness of breath  Gastrointestinal: -abdominal pain, -nausea, -vomiting, -diarrhea, -constipation, -blood in stool  Genitourinary: -dysuria, " -hematuria, -frequency  Musculoskeletal: -joint pain, -muscle pain  Skin: -rash, -lesion  Neurological: -headache, -dizziness, -numbness, -tingling  Psychiatric: -anxiety, -depression, +sleep difficulty           PAST MEDICAL HISTORY:  Past Medical History:   Diagnosis Date    Allergy     Atrial flutter     Degenerative lumbar disc 04/09/2021    Diabetes mellitus     Diabetes mellitus, type 2     Hyperlipidemia     Hypertension     PAF (paroxysmal atrial fibrillation) 6/30/2022    Sleep apnea     Symptomatic anemia 2/24/2023       ALLERGIES AND MEDICATIONS: updated and reviewed.  Review of patient's allergies indicates:  No Known Allergies  Current Outpatient Medications   Medication Sig Dispense Refill    amLODIPine (NORVASC) 5 MG tablet TAKE 1 TABLET BY MOUTH ONCE DAILY IN THE EVENING 90 tablet 0    betamethasone dipropionate (DIPROLENE) 0.05 % ointment Apply topically 2 (two) times daily. 15 g 0    carvediloL (COREG) 12.5 MG tablet TAKE 1 TABLET BY MOUTH TWICE DAILY WITH MEALS 180 tablet 0    clobetasoL (TEMOVATE) 0.05 % external solution Apply topically 2 (two) times daily. 50 mL 0    ELIQUIS 5 mg Tab Take 1 tablet by mouth twice daily 180 tablet 0    furosemide (LASIX) 20 MG tablet Take 1 tablet by mouth once daily 90 tablet 0    loratadine (CLARITIN) 10 mg tablet Take 10 mg by mouth once daily. prn      losartan (COZAAR) 50 MG tablet Take 1 tablet by mouth twice daily 180 tablet 0    metFORMIN (GLUCOPHAGE) 1000 MG tablet TAKE 1 TABLET BY MOUTH TWICE DAILY WITH MEALS 180 tablet 0    MULTAQ 400 mg Tab TAKE 1 TABLET BY MOUTH TWICE DAILY WITH MEALS 180 tablet 1    simvastatin (ZOCOR) 40 MG tablet TAKE 1 TABLET BY MOUTH ONCE DAILY IN THE EVENING 90 tablet 0    tirzepatide (MOUNJARO) 15 mg/0.5 mL PnIj Inject 15 mg into the skin every 7 days. 2 mL 11     No current facility-administered medications for this visit.         OBJECTIVE     Physical Exam  Vitals:    11/20/24 0849   BP: 114/80   Pulse: 84   Resp: 18  "  Temp: 97.8 °F (36.6 °C)    Body mass index is 49.3 kg/m².  Weight: 126.2 kg (278 lb 5.3 oz)   Height: 5' 3" (160 cm)     Physical Exam  Vitals reviewed.   Constitutional:       General: She is not in acute distress.  HENT:      Right Ear: External ear normal.      Left Ear: External ear normal.      Nose: Nose normal.      Mouth/Throat:      Mouth: Mucous membranes are moist.   Eyes:      Extraocular Movements: Extraocular movements intact.      Conjunctiva/sclera: Conjunctivae normal.      Pupils: Pupils are equal, round, and reactive to light.   Pulmonary:      Effort: Pulmonary effort is normal.   Abdominal:      General: There is no distension.      Palpations: Abdomen is soft.   Musculoskeletal:         General: No swelling. Normal range of motion.      Cervical back: Normal range of motion.   Skin:     General: Skin is warm and dry.      Findings: No rash.   Neurological:      General: No focal deficit present.      Mental Status: She is alert and oriented to person, place, and time.   Psychiatric:         Mood and Affect: Mood normal.         Behavior: Behavior normal.           Health Maintenance         Date Due Completion Date    Pneumococcal Vaccines (Age 0-64) (2 of 2 - PCV) 05/24/2019 5/24/2018    COVID-19 Vaccine (3 - 2024-25 season) 09/01/2024 12/28/2021    Eye Exam 12/21/2024 12/21/2023    Mammogram 12/27/2024 12/27/2023    HIV Screening 12/28/2027 (Originally 4/26/1985) ---    Diabetes Urine Screening 02/20/2025 2/20/2024    Foot Exam 02/20/2025 2/20/2024    Lipid Panel 02/20/2025 2/20/2024    Hemoglobin A1c 02/20/2025 8/20/2024    Low Dose Statin 11/20/2025 11/20/2024    TETANUS VACCINE 02/03/2026 2/3/2016    Colorectal Cancer Screening 06/19/2027 6/19/2024    RSV Vaccine (Age 60+ and Pregnant patients) (1 - 1-dose 75+ series) 04/26/2045 ---              ASSESSMENT     54 y.o. female with     1. Type 2 diabetes mellitus with diabetic nephropathy, without long-term current use of insulin    2. " Morbid obesity    3. NAPOLEON (obstructive sleep apnea)    4. Need for influenza vaccination      Will maintain current Mounjaro dose due to good A1C control, despite suboptimal weight loss  Considered switch to Ozempic but determined it would likely be less effective than current Mounjaro regimen  Recommend new CPAP machine to address issues with current device, potentially improving weight loss efforts and sleep quality  PLAN:     1. Type 2 diabetes mellitus with diabetic nephropathy, without long-term current use of insulin  - Continued Mounjaro at current dose.  - A1C test ordered.  - HEMOGLOBIN A1C; Future    2. Morbid obesity  -continue mounjaro. Continue diet modification.    3. NAPOLEON (obstructive sleep apnea)  - Discussed the importance of consistent CPAP use for potential weight loss benefits.  - Patient to resume using CPAP machine.  - Referred to sleep medicine for evaluation and possible new CPAP machine.  - Ambulatory referral/consult to Sleep Disorders; Future    4. Need for influenza vaccination  - Flu shot administered in office.  - influenza (Flulaval, Fluzone, Fluarix) 45 mcg/0.5 mL IM vaccine (> or = 6 mo) 0.5 mL    PREVENTIVE CARE:  - Mammogram ordered.    FOLLOW-UP:  - Follow up in 6 months.         Shannon Ya MD  11/20/2024 9:40 AM    This note was generated with the assistance of ambient listening technology. Verbal consent was obtained by the patient and accompanying visitor(s) for the recording of patient appointment to facilitate this note. I attest to having reviewed and edited the generated note for accuracy, though some syntax or spelling errors may persist. Please contact the author of this note for any clarification.

## 2024-11-21 DIAGNOSIS — I10 PRIMARY HYPERTENSION: ICD-10-CM

## 2024-11-21 DIAGNOSIS — I48.0 PAF (PAROXYSMAL ATRIAL FIBRILLATION): ICD-10-CM

## 2024-11-21 LAB
ESTIMATED AVG GLUCOSE: 105 MG/DL (ref 68–131)
HBA1C MFR BLD: 5.3 % (ref 4–5.6)

## 2024-11-21 RX ORDER — DRONEDARONE 400 MG/1
TABLET, FILM COATED ORAL
Qty: 180 TABLET | Refills: 3 | Status: SHIPPED | OUTPATIENT
Start: 2024-11-21

## 2024-11-21 RX ORDER — AMLODIPINE BESYLATE 5 MG/1
5 TABLET ORAL NIGHTLY
Qty: 90 TABLET | Refills: 0 | Status: SHIPPED | OUTPATIENT
Start: 2024-11-21

## 2024-12-04 DIAGNOSIS — E11.21 TYPE 2 DIABETES MELLITUS WITH DIABETIC NEPHROPATHY, WITHOUT LONG-TERM CURRENT USE OF INSULIN: ICD-10-CM

## 2024-12-04 RX ORDER — METFORMIN HYDROCHLORIDE 1000 MG/1
1000 TABLET ORAL 2 TIMES DAILY WITH MEALS
Qty: 180 TABLET | Refills: 0 | Status: SHIPPED | OUTPATIENT
Start: 2024-12-04

## 2024-12-06 ENCOUNTER — TELEPHONE (OUTPATIENT)
Dept: ELECTROPHYSIOLOGY | Facility: CLINIC | Age: 54
End: 2024-12-06
Payer: COMMERCIAL

## 2024-12-06 NOTE — TELEPHONE ENCOUNTER
Spoke with patient and relayed results of cardiac monitor/recommendations, per Dr Kirk's note:    Erich Kirk MD Wilson, Elizabeth B., RN  Monitor revealed no sustained AF, occasional nonsustained AT. Plan as outlined in note. Please relay to patient.    She verbalized understanding and was very appreciative of the call.

## 2024-12-06 NOTE — TELEPHONE ENCOUNTER
----- Message from Erich Kirk MD sent at 12/6/2024 10:11 AM CST -----  Monitor revealed no sustained AF, occasional nonsustained AT. Plan as outlined in note. Please relay to patient.  ----- Message -----  From: Erich Kirk MD  Sent: 12/6/2024   9:47 AM CST  To: Erich Kirk MD   Consent (Near Eyelid Margin)/Introductory Paragraph: The rationale for Mohs was explained to the patient and consent was obtained. The risks, benefits and alternatives to therapy were discussed in detail. Specifically, the risks of ectropion or eyelid deformity, infection, scarring, bleeding, prolonged wound healing, incomplete removal, allergy to anesthesia, nerve injury and recurrence were addressed. Prior to the procedure, the treatment site was clearly identified and confirmed by the patient. All components of Universal Protocol/PAUSE Rule completed.

## 2024-12-22 DIAGNOSIS — I10 PRIMARY HYPERTENSION: ICD-10-CM

## 2024-12-23 RX ORDER — CARVEDILOL 12.5 MG/1
12.5 TABLET ORAL 2 TIMES DAILY WITH MEALS
Qty: 180 TABLET | Refills: 0 | Status: SHIPPED | OUTPATIENT
Start: 2024-12-23

## 2025-01-11 DIAGNOSIS — I10 PRIMARY HYPERTENSION: ICD-10-CM

## 2025-01-11 DIAGNOSIS — E11.21 TYPE 2 DIABETES MELLITUS WITH DIABETIC NEPHROPATHY, WITHOUT LONG-TERM CURRENT USE OF INSULIN: ICD-10-CM

## 2025-01-13 RX ORDER — SIMVASTATIN 40 MG/1
40 TABLET, FILM COATED ORAL NIGHTLY
Qty: 90 TABLET | Refills: 0 | Status: SHIPPED | OUTPATIENT
Start: 2025-01-13

## 2025-01-13 RX ORDER — FUROSEMIDE 20 MG/1
20 TABLET ORAL
Qty: 90 TABLET | Refills: 0 | Status: SHIPPED | OUTPATIENT
Start: 2025-01-13

## 2025-01-15 ENCOUNTER — HOSPITAL ENCOUNTER (OUTPATIENT)
Dept: RADIOLOGY | Facility: HOSPITAL | Age: 55
Discharge: HOME | End: 2025-01-15
Attending: STUDENT IN AN ORGANIZED HEALTH CARE EDUCATION/TRAINING PROGRAM
Payer: COMMERCIAL

## 2025-01-15 ENCOUNTER — HOSPITAL ENCOUNTER (OUTPATIENT)
Dept: RADIOLOGY | Facility: HOSPITAL | Age: 55
Discharge: HOME | End: 2025-01-15
Attending: NURSE PRACTITIONER
Payer: COMMERCIAL

## 2025-01-15 DIAGNOSIS — R92.8 ABNORMAL MAMMOGRAM: ICD-10-CM

## 2025-01-15 DIAGNOSIS — Z12.31 ENCOUNTER FOR SCREENING MAMMOGRAM FOR BREAST CANCER: ICD-10-CM

## 2025-01-15 DIAGNOSIS — R92.8 ABNORMAL MAMMOGRAM OF RIGHT BREAST: ICD-10-CM

## 2025-01-15 PROCEDURE — 77062 BREAST TOMOSYNTHESIS BI: CPT | Mod: 26,,, | Performed by: RADIOLOGY

## 2025-01-15 PROCEDURE — 76642 ULTRASOUND BREAST LIMITED: CPT | Mod: 26,RT,, | Performed by: RADIOLOGY

## 2025-01-15 PROCEDURE — 76642 ULTRASOUND BREAST LIMITED: CPT | Mod: TC,RT

## 2025-01-15 PROCEDURE — 77062 BREAST TOMOSYNTHESIS BI: CPT | Mod: TC

## 2025-01-15 PROCEDURE — 77066 DX MAMMO INCL CAD BI: CPT | Mod: 26,,, | Performed by: RADIOLOGY

## 2025-01-20 DIAGNOSIS — I48.0 PAF (PAROXYSMAL ATRIAL FIBRILLATION): ICD-10-CM

## 2025-01-20 DIAGNOSIS — I48.3 TYPICAL ATRIAL FLUTTER: ICD-10-CM

## 2025-01-21 DIAGNOSIS — I10 PRIMARY HYPERTENSION: ICD-10-CM

## 2025-01-23 RX ORDER — LOSARTAN POTASSIUM 50 MG/1
50 TABLET ORAL 2 TIMES DAILY
Qty: 180 TABLET | Refills: 0 | Status: SHIPPED | OUTPATIENT
Start: 2025-01-23

## 2025-01-23 RX ORDER — APIXABAN 5 MG/1
5 TABLET, FILM COATED ORAL 2 TIMES DAILY
Qty: 180 TABLET | Refills: 0 | Status: SHIPPED | OUTPATIENT
Start: 2025-01-23

## 2025-01-29 ENCOUNTER — HOSPITAL ENCOUNTER (OUTPATIENT)
Dept: RADIOLOGY | Facility: HOSPITAL | Age: 55
Discharge: HOME | End: 2025-01-29
Attending: STUDENT IN AN ORGANIZED HEALTH CARE EDUCATION/TRAINING PROGRAM
Payer: COMMERCIAL

## 2025-01-29 VITALS — HEIGHT: 63 IN | WEIGHT: 278 LBS | BODY MASS INDEX: 49.26 KG/M2

## 2025-01-29 DIAGNOSIS — R92.8 ABNORMAL MAMMOGRAM OF RIGHT BREAST: ICD-10-CM

## 2025-01-29 DIAGNOSIS — Z12.31 ENCOUNTER FOR SCREENING MAMMOGRAM FOR BREAST CANCER: ICD-10-CM

## 2025-01-29 PROCEDURE — 38505 NEEDLE BIOPSY LYMPH NODES: CPT | Mod: RT,,, | Performed by: RADIOLOGY

## 2025-01-29 PROCEDURE — 77065 DX MAMMO INCL CAD UNI: CPT | Mod: 26,RT,, | Performed by: RADIOLOGY

## 2025-01-29 PROCEDURE — 63600175 PHARM REV CODE 636 W HCPCS: Performed by: STUDENT IN AN ORGANIZED HEALTH CARE EDUCATION/TRAINING PROGRAM

## 2025-01-29 PROCEDURE — 77065 DX MAMMO INCL CAD UNI: CPT | Mod: TC,RT

## 2025-01-29 PROCEDURE — 19083 BX BREAST 1ST LESION US IMAG: CPT | Mod: RT,,, | Performed by: RADIOLOGY

## 2025-01-29 PROCEDURE — A4648 IMPLANTABLE TISSUE MARKER: HCPCS

## 2025-01-29 PROCEDURE — 38505 NEEDLE BIOPSY LYMPH NODES: CPT

## 2025-01-29 RX ORDER — LIDOCAINE HYDROCHLORIDE AND EPINEPHRINE 10; 20 UG/ML; MG/ML
20 INJECTION, SOLUTION INFILTRATION; PERINEURAL ONCE
Status: COMPLETED | OUTPATIENT
Start: 2025-01-29 | End: 2025-01-29

## 2025-01-29 RX ORDER — LIDOCAINE HYDROCHLORIDE 20 MG/ML
20 INJECTION, SOLUTION INFILTRATION; PERINEURAL ONCE
Status: COMPLETED | OUTPATIENT
Start: 2025-01-29 | End: 2025-01-29

## 2025-01-29 RX ADMIN — LIDOCAINE HYDROCHLORIDE 7 ML: 20 INJECTION, SOLUTION INFILTRATION; PERINEURAL at 08:01

## 2025-01-29 RX ADMIN — LIDOCAINE HYDROCHLORIDE,EPINEPHRINE BITARTRATE 15 ML: 20; .01 INJECTION, SOLUTION INFILTRATION; PERINEURAL at 08:01

## 2025-02-05 DIAGNOSIS — C50.911 INFILTRATING DUCTAL CARCINOMA OF RIGHT BREAST: Primary | ICD-10-CM

## 2025-02-06 ENCOUNTER — TELEPHONE (OUTPATIENT)
Dept: SURGERY | Facility: CLINIC | Age: 55
End: 2025-02-06
Payer: COMMERCIAL

## 2025-02-06 ENCOUNTER — TELEPHONE (OUTPATIENT)
Dept: RADIOLOGY | Facility: HOSPITAL | Age: 55
End: 2025-02-06
Payer: COMMERCIAL

## 2025-02-06 NOTE — TELEPHONE ENCOUNTER
spoke to pt about malignant breast biopsy results. Messaged [facility] to schedule surgical consult.

## 2025-02-06 NOTE — TELEPHONE ENCOUNTER
Contacted patient regarding the message below. The patient is scheduled to be seen on Monday 2/17/2025 with Dr. Ogden at [facility]. Patient voiced understanding of appointment date, time, and location.      ----- Message from Anca Hanson sent at 2/6/2025  9:19 AM CST -----  Regarding: surgical consult  Hi,    Can you please schedule pt for surgical consult?    Thanks,  Lukas

## 2025-02-17 ENCOUNTER — OFFICE VISIT (OUTPATIENT)
Dept: SURGERY | Facility: CLINIC | Age: 55
End: 2025-02-17
Payer: COMMERCIAL

## 2025-02-17 ENCOUNTER — PATIENT MESSAGE (OUTPATIENT)
Dept: SURGERY | Facility: CLINIC | Age: 55
End: 2025-02-17

## 2025-02-17 VITALS
SYSTOLIC BLOOD PRESSURE: 130 MMHG | DIASTOLIC BLOOD PRESSURE: 76 MMHG | WEIGHT: 281.31 LBS | BODY MASS INDEX: 49.84 KG/M2 | HEART RATE: 75 BPM | HEIGHT: 63 IN | TEMPERATURE: 98 F

## 2025-02-17 DIAGNOSIS — C50.911 INVASIVE DUCTAL CARCINOMA OF BREAST, FEMALE, RIGHT: Primary | ICD-10-CM

## 2025-02-17 PROCEDURE — 3075F SYST BP GE 130 - 139MM HG: CPT | Mod: CPTII,S$GLB,, | Performed by: SURGERY

## 2025-02-17 PROCEDURE — 4010F ACE/ARB THERAPY RXD/TAKEN: CPT | Mod: CPTII,S$GLB,, | Performed by: SURGERY

## 2025-02-17 PROCEDURE — 99204 OFFICE O/P NEW MOD 45 MIN: CPT | Mod: S$GLB,,, | Performed by: SURGERY

## 2025-02-17 PROCEDURE — 3008F BODY MASS INDEX DOCD: CPT | Mod: CPTII,S$GLB,, | Performed by: SURGERY

## 2025-02-17 PROCEDURE — 99999 PR PBB SHADOW E&M-EST. PATIENT-LVL III: CPT | Mod: PBBFAC,,, | Performed by: SURGERY

## 2025-02-17 PROCEDURE — 3078F DIAST BP <80 MM HG: CPT | Mod: CPTII,S$GLB,, | Performed by: SURGERY

## 2025-02-18 DIAGNOSIS — I10 PRIMARY HYPERTENSION: ICD-10-CM

## 2025-02-18 RX ORDER — AMLODIPINE BESYLATE 5 MG/1
5 TABLET ORAL NIGHTLY
Qty: 90 TABLET | Refills: 0 | Status: SHIPPED | OUTPATIENT
Start: 2025-02-18

## 2025-02-19 ENCOUNTER — LAB VISIT (OUTPATIENT)
Dept: LAB | Facility: HOSPITAL | Age: 55
End: 2025-02-19
Attending: SURGERY
Payer: COMMERCIAL

## 2025-02-19 ENCOUNTER — OFFICE VISIT (OUTPATIENT)
Dept: HEMATOLOGY/ONCOLOGY | Facility: CLINIC | Age: 55
End: 2025-02-19
Payer: COMMERCIAL

## 2025-02-19 VITALS
TEMPERATURE: 99 F | DIASTOLIC BLOOD PRESSURE: 78 MMHG | OXYGEN SATURATION: 98 % | HEART RATE: 90 BPM | WEIGHT: 280.63 LBS | SYSTOLIC BLOOD PRESSURE: 123 MMHG | HEIGHT: 63 IN | BODY MASS INDEX: 49.72 KG/M2

## 2025-02-19 DIAGNOSIS — C50.911 INVASIVE DUCTAL CARCINOMA OF BREAST, FEMALE, RIGHT: ICD-10-CM

## 2025-02-19 DIAGNOSIS — F32.A ANXIETY AND DEPRESSION: ICD-10-CM

## 2025-02-19 DIAGNOSIS — Z17.0 MALIGNANT NEOPLASM OF RIGHT BREAST IN FEMALE, ESTROGEN RECEPTOR POSITIVE, UNSPECIFIED SITE OF BREAST: ICD-10-CM

## 2025-02-19 DIAGNOSIS — C50.911 MALIGNANT NEOPLASM OF RIGHT BREAST IN FEMALE, ESTROGEN RECEPTOR POSITIVE, UNSPECIFIED SITE OF BREAST: ICD-10-CM

## 2025-02-19 DIAGNOSIS — C50.911 MALIGNANT NEOPLASM OF RIGHT BREAST IN FEMALE, ESTROGEN RECEPTOR NEGATIVE, UNSPECIFIED SITE OF BREAST: Primary | ICD-10-CM

## 2025-02-19 DIAGNOSIS — Z17.1 MALIGNANT NEOPLASM OF RIGHT BREAST IN FEMALE, ESTROGEN RECEPTOR NEGATIVE, UNSPECIFIED SITE OF BREAST: Primary | ICD-10-CM

## 2025-02-19 DIAGNOSIS — G47.9 SLEEP DISTURBANCES: ICD-10-CM

## 2025-02-19 DIAGNOSIS — F41.9 ANXIETY AND DEPRESSION: ICD-10-CM

## 2025-02-19 LAB
ALBUMIN SERPL BCP-MCNC: 4 G/DL (ref 3.5–5.2)
ALP SERPL-CCNC: 105 U/L (ref 40–150)
ALT SERPL W/O P-5'-P-CCNC: 30 U/L (ref 10–44)
ANION GAP SERPL CALC-SCNC: 14 MMOL/L (ref 8–16)
AST SERPL-CCNC: 33 U/L (ref 10–40)
BASOPHILS # BLD AUTO: 0.08 K/UL (ref 0–0.2)
BASOPHILS NFR BLD: 0.8 % (ref 0–1.9)
BILIRUB SERPL-MCNC: 0.4 MG/DL (ref 0.1–1)
BUN SERPL-MCNC: 14 MG/DL (ref 6–20)
CALCIUM SERPL-MCNC: 9.9 MG/DL (ref 8.7–10.5)
CHLORIDE SERPL-SCNC: 103 MMOL/L (ref 95–110)
CO2 SERPL-SCNC: 22 MMOL/L (ref 23–29)
CREAT SERPL-MCNC: 0.9 MG/DL (ref 0.5–1.4)
DIFFERENTIAL METHOD BLD: NORMAL
EOSINOPHIL # BLD AUTO: 0.3 K/UL (ref 0–0.5)
EOSINOPHIL NFR BLD: 2.7 % (ref 0–8)
ERYTHROCYTE [DISTWIDTH] IN BLOOD BY AUTOMATED COUNT: 13.9 % (ref 11.5–14.5)
EST. GFR  (NO RACE VARIABLE): >60 ML/MIN/1.73 M^2
GLUCOSE SERPL-MCNC: 104 MG/DL (ref 70–110)
HCT VFR BLD AUTO: 40 % (ref 37–48.5)
HGB BLD-MCNC: 13.2 G/DL (ref 12–16)
IMM GRANULOCYTES # BLD AUTO: 0.03 K/UL (ref 0–0.04)
IMM GRANULOCYTES NFR BLD AUTO: 0.3 % (ref 0–0.5)
LYMPHOCYTES # BLD AUTO: 2.8 K/UL (ref 1–4.8)
LYMPHOCYTES NFR BLD: 28.5 % (ref 18–48)
MCH RBC QN AUTO: 29.6 PG (ref 27–31)
MCHC RBC AUTO-ENTMCNC: 33 G/DL (ref 32–36)
MCV RBC AUTO: 90 FL (ref 82–98)
MONOCYTES # BLD AUTO: 0.6 K/UL (ref 0.3–1)
MONOCYTES NFR BLD: 6.3 % (ref 4–15)
NEUTROPHILS # BLD AUTO: 6 K/UL (ref 1.8–7.7)
NEUTROPHILS NFR BLD: 61.4 % (ref 38–73)
NRBC BLD-RTO: 0 /100 WBC
PLATELET # BLD AUTO: 253 K/UL (ref 150–450)
PMV BLD AUTO: 12.3 FL (ref 9.2–12.9)
POTASSIUM SERPL-SCNC: 4.1 MMOL/L (ref 3.5–5.1)
PROT SERPL-MCNC: 8.7 G/DL (ref 6–8.4)
RBC # BLD AUTO: 4.46 M/UL (ref 4–5.4)
SODIUM SERPL-SCNC: 139 MMOL/L (ref 136–145)
WBC # BLD AUTO: 9.74 K/UL (ref 3.9–12.7)

## 2025-02-19 PROCEDURE — 3008F BODY MASS INDEX DOCD: CPT | Mod: CPTII,S$GLB,, | Performed by: INTERNAL MEDICINE

## 2025-02-19 PROCEDURE — 86704 HEP B CORE ANTIBODY TOTAL: CPT | Performed by: INTERNAL MEDICINE

## 2025-02-19 PROCEDURE — 99999 PR PBB SHADOW E&M-EST. PATIENT-LVL V: CPT | Mod: PBBFAC,,, | Performed by: INTERNAL MEDICINE

## 2025-02-19 PROCEDURE — 3074F SYST BP LT 130 MM HG: CPT | Mod: CPTII,S$GLB,, | Performed by: INTERNAL MEDICINE

## 2025-02-19 PROCEDURE — 36415 COLL VENOUS BLD VENIPUNCTURE: CPT | Performed by: INTERNAL MEDICINE

## 2025-02-19 PROCEDURE — 3078F DIAST BP <80 MM HG: CPT | Mod: CPTII,S$GLB,, | Performed by: INTERNAL MEDICINE

## 2025-02-19 PROCEDURE — 87389 HIV-1 AG W/HIV-1&-2 AB AG IA: CPT | Performed by: INTERNAL MEDICINE

## 2025-02-19 PROCEDURE — 4010F ACE/ARB THERAPY RXD/TAKEN: CPT | Mod: CPTII,S$GLB,, | Performed by: INTERNAL MEDICINE

## 2025-02-19 PROCEDURE — 80053 COMPREHEN METABOLIC PANEL: CPT | Performed by: INTERNAL MEDICINE

## 2025-02-19 PROCEDURE — 99205 OFFICE O/P NEW HI 60 MIN: CPT | Mod: S$GLB,,, | Performed by: INTERNAL MEDICINE

## 2025-02-19 PROCEDURE — 85025 COMPLETE CBC W/AUTO DIFF WBC: CPT | Performed by: INTERNAL MEDICINE

## 2025-02-19 RX ORDER — TEMAZEPAM 7.5 MG/1
7.5 CAPSULE ORAL NIGHTLY PRN
Qty: 30 CAPSULE | Refills: 0 | Status: SHIPPED | OUTPATIENT
Start: 2025-02-19 | End: 2025-03-21

## 2025-02-19 RX ORDER — SERTRALINE HYDROCHLORIDE 25 MG/1
25 TABLET, FILM COATED ORAL DAILY
Qty: 30 TABLET | Refills: 11 | Status: SHIPPED | OUTPATIENT
Start: 2025-02-19 | End: 2026-02-19

## 2025-02-19 NOTE — PROGRESS NOTES
Subjective     Patient ID: Micaela Hernandez is a 54 y.o. female.    Chief Complaint: New patient  (BREAST///)  Reason For Consultation: Rt Breast CA   HPIDawtara Hernandez is a 54 y.o. 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 She was in her usual state of health. Of note, the patient had an abnormal screening mammogram 10 months ago. She recently had the diagnostic mammogram related to the abnormal screening, which led to the biopsy. Also had a right axillary biopsy which was negative. She denies HA, visual changes, cough, SOB, abdominal pain, easy bleeding, or easy bruising. She did not palpate a mass. No skin changes. No previous breast biopsies. No family history of breast cancer. Genetic screening pending.     Findings at [facility]:   . Right breast, 11:00, N + 10 cm, biopsy:  Invasive ductal carcinoma, Grade 2, (tubules=3, nuclei=2, mitoses=1)  Invasive carcinoma is present in multiple cores and measures 5 mm in greatest linear dimension within the core biopsy  No carcinoma in-situ or lymphovascular invasion is identified    Tumor biomarkers  Estrogen receptor (ER):  Negative  Progesterone receptor (PGR):  Negative  HER2 (IHC):  Negative, 0  Ki-67:  20-30%    Additional IHC:  E cadherin:  Strong, positive membranous staining, supporting ductal differentiation      2. Right axillary node, biopsy:  No metastatic carcinoma identified in lymphoid tissue      GynHx: Menarche 10 y/o   Hysterectomy 2 yrs ago has ovaries - sec to fibroids  27 y/o with first pregnancy. Breast feed x 2 wks  No HRT        Past Medical History:   Diagnosis Date    Allergy     Atrial flutter     Degenerative lumbar disc 04/09/2021    Diabetes mellitus     Diabetes mellitus, type 2     Hyperlipidemia     Hypertension     PAF (paroxysmal atrial fibrillation) 6/30/2022    Sleep apnea     Symptomatic anemia 2/24/2023     Past Surgical History:   Procedure Laterality Date    BREAST SURGERY       " SECTION      CHOLECYSTECTOMY      ENDOMETRIAL ABLATION      HERNIA REPAIR      incisional     HYSTEROSCOPY WITH DILATION AND CURETTAGE OF UTERUS N/A 3/1/2023    Procedure: HYSTEROSCOPY, WITH DILATION AND CURETTAGE OF UTERUS;  Surgeon: Lola Zhao MD;  Location: [facility] OR;  Service: OB/GYN;  Laterality: N/A;    KNEE SURGERY      LAPAROSCOPIC TOTAL HYSTERECTOMY N/A 3/28/2023    Procedure: HYSTERECTOMY, TOTAL, LAPAROSCOPIC;  Surgeon: Lola Zhao MD;  Location: [facility] OR;  Service: OB/GYN;  Laterality: N/A;  Large patient and large uterus, may be difficult case.  May convert to open.  T/S=---UPT  ON 3/27  RN PREOP 3/15/2023---BMI--52.32    LIVER LOBECTOMY Right     LYMPH NODE DISSECTION      right axillary    TRANSESOPHAGEAL ECHOCARDIOGRAPHY N/A 2022    Procedure: ECHOCARDIOGRAM, TRANSESOPHAGEAL;  Surgeon: Ji Patricio MD;  Location: [facility] CATH LAB;  Service: Cardiology;  Laterality: N/A;        Review of Systems   Constitutional:  Negative for appetite change, fatigue, fever and unexpected weight change.   HENT:  Negative for mouth sores.    Eyes:  Negative for visual disturbance.   Respiratory:  Negative for cough and shortness of breath.    Cardiovascular:  Negative for chest pain.   Gastrointestinal:  Negative for abdominal pain and diarrhea.   Genitourinary:  Negative for frequency.   Musculoskeletal:  Negative for back pain.   Integumentary:  Negative for rash.   Neurological:  Negative for headaches.   Hematological:  Negative for adenopathy.   Psychiatric/Behavioral:  Positive for sleep disturbance. The patient is nervous/anxious.           Objective   Vitals:    25 1307   BP: 123/78   BP Location: Right arm   Patient Position: Sitting   Pulse: 90   Temp: 98.9 °F (37.2 °C)   SpO2: 98%   Weight: 127.3 kg (280 lb 10.3 oz)   Height: 5' 3" (1.6 m)       Physical Exam  Constitutional:       Appearance: She is well-developed.   HENT:      Head: Normocephalic.      Right Ear: External ear " normal.      Left Ear: External ear normal.      Mouth/Throat:      Pharynx: No oropharyngeal exudate.   Eyes:      General: No scleral icterus.        Right eye: No discharge.         Left eye: No discharge.      Conjunctiva/sclera: Conjunctivae normal.   Cardiovascular:      Rate and Rhythm: Normal rate and regular rhythm.      Heart sounds: Normal heart sounds. No murmur heard.  Pulmonary:      Effort: Pulmonary effort is normal.      Breath sounds: No wheezing.   Chest:   Breasts:     Right: No mass.      Comments: Rt breast- fullness noted in upper inner quadrant, no mass palpated  Abdominal:      General: Bowel sounds are normal.      Palpations: Abdomen is soft.      Tenderness: There is no abdominal tenderness. There is no guarding.   Musculoskeletal:         General: Normal range of motion.      Cervical back: Normal range of motion and neck supple.      Right lower leg: No edema.      Left lower leg: No edema.   Skin:     Coloration: Skin is not jaundiced.      Findings: No rash.   Neurological:      General: No focal deficit present.      Mental Status: She is alert and oriented to person, place, and time.   Psychiatric:         Mood and Affect: Mood normal.         Behavior: Behavior normal.       Name: Micaela Hernandez     MRN: [id number]     Result:   Mammo Digital Diagnostic Bilat with Ruben  US Breast Right Limited     History:  Patient is 54 y.o. and is seen for abnormal mammogram.        Films Compared:  Compared to: 12/27/2023 Mammo Digital Screening Bilat w/ Ruben     Findings:  This procedure was performed using tomosynthesis. Computer-aided detection was utilized in the interpretation of this examination.  There are scattered areas of fibroglandular density.      Mammo Digital Diagnostic Bilat with Ruben  Right  Mass: There is a high density, round mass with obscured margins seen in the upper region of the right breast in the posterior depth on the MLO view.   Lymph Node: There is a lymph node seen  in the right axilla.      Left  There is no evidence of suspicious masses, calcifications, or other abnormal findings in the left breast.     US Breast Right Limited  Right  Mass: There is a 24 mm x 23 mm x 20 mm round, heterogeneous mass with indistinct margins with shadowing seen in the right breast at 11 o'clock, 10 cm from the nipple. The mass correlates with the mass seen on the mammogram.   Lymph Node: There is an indeterminate lymph node seen in the right axilla at level I. Cortical thickness measures 5 mm on the right.      Impression:  Right  Mass: Right breast 24 mm x 23 mm x 20 mm mass at 11 o'clock. Assessment: 5 - Highly suggestive of malignancy. Ultrasound-guided biopsy is recommended.   Lymph Node: Right axilla lymph node. Assessment: 4 - Suspicious finding. Ultrasound-guided biopsy is recommended.      Left  Mammo Digital Diagnostic Bilat with Ruben  There is no mammographic evidence of malignancy in the left breast.        BI-RADS Category:   Overall: 5 - Highly Suggestive of Malignancy     Recommendation:  Ultrasound-guided biopsy is recommended of right breast mass and right axillary lymph node. I discussed these findings and recommendations with the patient in detail at the time of exam.     Your estimated lifetime risk of breast cancer (to age 85) based on Tyrer-Cuzick risk assessment model is 8.96%.  According to the [employer], patients with a lifetime breast cancer risk of 20% or higher might benefit from supplemental screening tests, such as screening breast MRI.     Roula Ness MD        Assessment and Plan       1. Malignant neoplasm of right breast in female, estrogen receptor positive, unspecified site of breast (Primary)  Discussed pathology findings  Followed by breast surgery and discussing surgical intervention options  Pt considering lumpectomy followed by radiation majority of time  Pt also discussing possible mastectomy followed by reconstruction   If pt  elects lumpectomy, we discussed potential adjuvant  chemo  options  If pt elects mastectomy  followed by reconstruction we discussed potential NAC options   Plan MRI breast imaging      - MRI Breast w/wo Contrast, w/CAD, Bilateral; Future  - HIV 1/2 Ag/Ab (4th Gen); Future  - Hepatitis B Core Antibody, Total; Future  - Pharmacogenomics Panel; Future  - Ambulatory referral/consult to Hematology/Oncology/Psychology; Future  - CBC Auto Differential; Future  - Comprehensive Metabolic Panel; Future    Genetic screening pending       2. Anxiety and depression    - sertraline (ZOLOFT) 25 MG tablet; Take 1 tablet (25 mg total) by mouth once daily.  Dispense: 30 tablet; Refill: 11    3. Sleep disturbances  - temazepam (RESTORIL) 7.5 MG Cap; Take 1 capsule (7.5 mg total) by mouth nightly as needed (sleep disturbances).  Dispense: 30 capsule; Refill: 0   Pt wants to wait for genetic screening        All labs today  Follow up 2 weeks after MRI breast   Rx

## 2025-02-20 ENCOUNTER — TELEPHONE (OUTPATIENT)
Dept: HEMATOLOGY/ONCOLOGY | Facility: CLINIC | Age: 55
End: 2025-02-20
Payer: COMMERCIAL

## 2025-02-20 LAB
HBV CORE AB SERPL QL IA: NORMAL
HIV 1+2 AB+HIV1 P24 AG SERPL QL IA: NORMAL

## 2025-02-20 NOTE — TELEPHONE ENCOUNTER
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

## 2025-02-23 NOTE — PROGRESS NOTES
[facility]  Breast Surgery  History & Physical        REFERRING PROVIDER: Shannon Ya MD  [address]  [address]  [place] [address]    Chief Complaint: Breast Cancer      Subjective:      Patient ID: Micaela Hernandez is a 54 y.o. female who presents with for evaluationof newly diagnosed triple negative right breast IDC. She was in her usual state of health. Of note, the patient had an abnormal screening mammogram 10 months ago. She recently had the diagnostic mammogram related to the abnormal screening, which led to the biopsy. Also had a right axillary biopsy which was negative. She denies HA, visual changes, cough, SOB, abdominal pain, easy bleeding, or easy bruising.       Findings at [facility]:    Lab Results   Component Value Date    FPATHDX  01/29/2025     1. Right breast, 11:00, N + 10 cm, biopsy:   Invasive ductal carcinoma, Grade 2, (tubules=3, nuclei=2, mitoses=1)  Invasive carcinoma is present in multiple cores and measures 5 mm in greatest linear dimension within the core biopsy  No carcinoma in-situ or lymphovascular invasion is identified    Tumor biomarkers  Estrogen receptor (ER):  Negative  Progesterone receptor (PGR):  Negative  HER2 (IHC):  Negative, 0  Ki-67:  20-30%    Additional IHC:   E cadherin:  Strong, positive membranous staining, supporting ductal differentiation      2. Right axillary node, biopsy:   No metastatic carcinoma identified in lymphoid tissue    AE1/AE3 IHC is performed on part 2 and is negative for occult tumor cells        All immunostains were performed with appropriate controls.    This case was reviewed by Dr. RANDI Jimenez who concurs with the above diagnoses.             Past Medical History:   Diagnosis Date    Allergy     Atrial flutter     Degenerative lumbar disc 04/09/2021    Diabetes mellitus     Diabetes mellitus, type 2     Hyperlipidemia     Hypertension     PAF (paroxysmal atrial fibrillation) 6/30/2022    Sleep apnea     Symptomatic anemia  2023     Past Surgical History:   Procedure Laterality Date    BREAST SURGERY       SECTION      CHOLECYSTECTOMY      ENDOMETRIAL ABLATION      HERNIA REPAIR      incisional     HYSTEROSCOPY WITH DILATION AND CURETTAGE OF UTERUS N/A 3/1/2023    Procedure: HYSTEROSCOPY, WITH DILATION AND CURETTAGE OF UTERUS;  Surgeon: Lola Zhao MD;  Location: [facility] OR;  Service: OB/GYN;  Laterality: N/A;    KNEE SURGERY      LAPAROSCOPIC TOTAL HYSTERECTOMY N/A 3/28/2023    Procedure: HYSTERECTOMY, TOTAL, LAPAROSCOPIC;  Surgeon: Lola Zhao MD;  Location: [facility] OR;  Service: OB/GYN;  Laterality: N/A;  Large patient and large uterus, may be difficult case.  May convert to open.  T/S=---UPT  ON 3/27  RN PREOP 3/15/2023---BMI--52.32    LIVER LOBECTOMY Right     LYMPH NODE DISSECTION      right axillary    TRANSESOPHAGEAL ECHOCARDIOGRAPHY N/A 2022    Procedure: ECHOCARDIOGRAM, TRANSESOPHAGEAL;  Surgeon: Ji Patricio MD;  Location: [facility] CATH LAB;  Service: Cardiology;  Laterality: N/A;     Medications Ordered Prior to Encounter[1]  Social History[2]  Family History   Problem Relation Name Age of Onset    Hypertension Mother      Asthma Mother      COPD Mother      Hyperlipidemia Mother      Diabetes type II Mother      Cataracts Mother      Diabetes Father      Hypertension Father      Hyperlipidemia Father      Cataracts Father      Cancer Sister Swetha         type unknown, but CA in mouth    No Known Problems Brother      No Known Problems Maternal Aunt      No Known Problems Maternal Uncle      No Known Problems Paternal Aunt      No Known Problems Paternal Uncle      No Known Problems Maternal Grandmother      No Known Problems Maternal Grandfather      No Known Problems Paternal Grandmother      No Known Problems Paternal Grandfather      Breast cancer Neg Hx      Colon cancer Neg Hx      Ovarian cancer Neg Hx      Amblyopia Neg Hx      Blindness Neg Hx      Macular degeneration Neg Hx      Retinal  "detachment Neg Hx      Strabismus Neg Hx      Stroke Neg Hx      Thyroid disease Neg Hx          Review of Systems   All other systems reviewed and are negative.    Objective:   /76 (BP Location: Right arm)   Pulse 75   Temp 97.7 °F (36.5 °C)   Ht 5' 3" (1.6 m)   Wt 127.6 kg (281 lb 4.9 oz)   LMP 02/27/2023 (Approximate)   BMI 49.83 kg/m²     Physical Exam   Vitals reviewed.  Cardiovascular:  Normal rate and normal pulses.            Pulmonary/Chest: Effort normal. Right breast exhibits no inverted nipple, no nipple discharge and no skin change. Left breast exhibits no inverted nipple, no mass, no nipple discharge and no skin change.       Abdominal: Normal appearance.   Musculoskeletal: Lymphadenopathy:      Upper Body:      Right upper body: No axillary adenopathy.      Left upper body: No axillary adenopathy.     Neurological: She is alert.       Radiology review: Images personally reviewed by me in the clinic.     Mammo Digital Screening Bilat w/ Ruben  Order: [id number]   Status: Final result     Next appt: 03/03/2025 at 10:00 AM in Psychiatry (INTERN 1, [facility] PSYCHOLOG[facility])     Dx: Encounter for screening mammogram for...     Test Result Released: Yes (seen)     0 Result Notes    1  Topic  Assessment    Overall   0 - Incomplete: Needs Additional Imaging Evaluation     Breast Density     Overall   Breast Composition b - Scattered fibroglandular density     Details    Reading Physician Reading Date Result Priority   Micki Oznua MD  [phone]  [phone]  1/2/2024 Routine     Physician Responsible for MQSA Outcome Reason    Micki Ozuna MD Signed      Narrative & Impression  Result:   Mammo Digital Screening Bilat w/ Ruben     History:  Patient is 53 y.o. and is seen for a screening mammogram.     Films Compared:  Compared to: 01/13/2022 Mammo Digital Screening Bilat w/ Ruben, 01/11/2021 Mammo Digital Diagnostic Bilat with Ruben, 12/31/2020 Mammo Digital Screening Bilat w/ Ruben, and " 11/29/2019 Mammo Digital Screening Bilat w/ Ruben     Findings:  This procedure was performed using tomosynthesis. Computer-aided detection was utilized in the interpretation of this examination.  The breasts have scattered areas of fibroglandular density.      Right  There is an asymmetry seen in the outer central region of the right breast in the posterior depth on the CC view. This is a new finding.      There is an asymmetry seen in the axillary tail region of the right breast in the posterior depth on the MLO view. This is a new finding.      Left  There is no evidence of suspicious masses, calcifications, or other abnormal findings in the left breast.     Impression:  Right  Asymmetry: Right breast asymmetry at the outer central posterior position. Assessment: 0 - Incomplete. Diagnostic Mammogram and/or Ultrasound is recommended.   Asymmetry: Right breast asymmetry at the axillary tail posterior position. Assessment: 0 - Incomplete. Diagnostic Mammogram and/or Ultrasound is recommended.      Left  There is no mammographic evidence of malignancy in the left breast.     BI-RADS Category:   Overall: 0 - Incomplete: Needs Additional Imaging Evaluation        Recommendation:  Diagnostic mammogram with possible ultrasound (if indicated) is recommended.        Your estimated lifetime risk of breast cancer (to age 85) based on Tyrer-Cuzick risk assessment model is Tyrer-Cuzick: 8.96 %. According to the American Cancer Society, patients with a lifetime breast cancer risk of 20% or higher might benefit from supplemental screening tests.                          Mammo Digital Diagnostic Bilat with Ruben  Order: [id number]   Status: Final result     Next appt: 03/03/2025 at 10:00 AM in Psychiatry (INTERN 1, [facility] PSYCHOLOG[facility])     Dx: Abnormal mammogram     Linked study orders: US Breast Right Limited (Order: [id number])     Test Result Released: Yes (seen)     0 Result Notes    1  Topic  Assessment    Overall   5 - Highly  Suggestive of Malignancy     Breast Density     Overall   Breast Composition b - Scattered fibroglandular density     Details    Reading Physician Reading Date Result Priority   Roula Ness MD  [phone]  1/15/2025 Routine     Physician Responsible for MQSA Outcome Reason    Roula Ness MD Signed      Narrative & Impression  Facility:  [facility]  [address] [facility]  [place] [id number] [phone]     Name: Micaela Hernandez     MRN: [id number]     Result:   Mammo Digital Diagnostic Bilat with Ruben  US Breast Right Limited     History:  Patient is 54 y.o. and is seen for abnormal mammogram.        Films Compared:  Compared to: 12/27/2023 Mammo Digital Screening Bilat w/ Ruben     Findings:  This procedure was performed using tomosynthesis. Computer-aided detection was utilized in the interpretation of this examination.  There are scattered areas of fibroglandular density.      Mammo Digital Diagnostic Bilat with Ruben  Right  Mass: There is a high density, round mass with obscured margins seen in the upper region of the right breast in the posterior depth on the MLO view.   Lymph Node: There is a lymph node seen in the right axilla.      Left  There is no evidence of suspicious masses, calcifications, or other abnormal findings in the left breast.     US Breast Right Limited  Right  Mass: There is a 24 mm x 23 mm x 20 mm round, heterogeneous mass with indistinct margins with shadowing seen in the right breast at 11 o'clock, 10 cm from the nipple. The mass correlates with the mass seen on the mammogram.   Lymph Node: There is an indeterminate lymph node seen in the right axilla at level I. Cortical thickness measures 5 mm on the right.      Impression:  Right  Mass: Right breast 24 mm x 23 mm x 20 mm mass at 11 o'clock. Assessment: 5 - Highly suggestive of malignancy. Ultrasound-guided biopsy is recommended.   Lymph Node:  Right axilla lymph node. Assessment: 4 - Suspicious finding. Ultrasound-guided biopsy is recommended.      Left  Mammo Digital Diagnostic Bilat with Ruben  There is no mammographic evidence of malignancy in the left breast.        BI-RADS Category:   Overall: 5 - Highly Suggestive of Malignancy     Recommendation:  Ultrasound-guided biopsy is recommended of right breast mass and right axillary lymph node. I discussed these findings and recommendations with the patient in detail at the time of exam.     Your estimated lifetime risk of breast cancer (to age 85) based on Tyrer-Cuzick risk assessment model is 8.96%.  According to the American Cancer Society, patients with a lifetime breast cancer risk of 20% or higher might benefit from supplemental screening tests, such as screening breast MRI.     Roula Ness MD         Assessment:       1. Invasive ductal carcinoma of breast, female, right        Plan:     Options for management were discussed with the patient and her family. We reviewed the existing data noting the equivalency of breast conserving surgery with radiation therapy and mastectomy. We discussed the need for partial mastectomy/lumpectomy margins to be negative for carcinoma, the necessity for postoperative radiation therapy after breast conservation in most cases, the possibility of a failed or false negative sentinel lymph node biopsy and the potential need for complete lymphadenectomy for a failed or positive sentinel lymph node biopsy were fully discussed.     In the setting of mastectomy, delayed or immediate reconstruction options are available and were discussed.     In the setting of lumpectomy, radiation therapy would be recommended majority of the time.  The duration and treatment side effects were discussed with the patient.  This will coordinated with the radiation oncologist pending final pathology.    We also discussed the role of systemic therapy in the treatment of early stage  breast cancer.  We discussed that this is based on tumor biology and clinton status and will be determined based on final pathology.  We discussed that if the cancer is hormone positive, endocrine therapy would be recommended in most cases and its use can reduce the risk of recurrence as well as improve survival. Side effects of treatment were briefly discussed. We also discussed the potential role for chemotherapy based on a number of factors such as tumor phenotype (ER+ vs. triple negative vs. Ffj1syz+) and this would be determined in coordination with the medical oncologist.    The patient, in consultation with her family, has elected to proceed with right radar localized partia mastectomy and sentinel lymph node biopsy. The operative risks of bleeding, infection, recurrence, scarring, and anesthetic complications and the possibility of requiring further surgery were all noted and informed consent obtained.    Will refer to medical oncology. Given triple negative status, will need to determine if she will get upfront chemotherapy prior to surgery.    Will get genetic testing        Elvin Ogden MD, ALLYSON, FACS  Breast Surgery  [facility]    I spent a total of 45 minutes on the day of the visit.This includes face to face time and non-face to face time preparing to see the patient (eg, review of tests), obtaining and/or reviewing separately obtained history, documenting clinical information in the electronic or other health record, independently interpreting results and communicating results to the patient/family/caregiver, or care coordinator.                        [1]   Current Outpatient Medications on File Prior to Visit   Medication Sig Dispense Refill    betamethasone dipropionate (DIPROLENE) 0.05 % ointment Apply topically 2 (two) times daily. 15 g 0    carvediloL (COREG) 12.5 MG tablet TAKE 1 TABLET BY MOUTH TWICE DAILY WITH MEALS 180 tablet 0    clobetasoL (TEMOVATE) 0.05 % external  solution Apply topically 2 (two) times daily. 50 mL 0    dronedarone (MULTAQ) 400 mg Tab TAKE 1 TABLET BY MOUTH TWICE DAILY WITH MEALS 180 tablet 3    ELIQUIS 5 mg Tab Take 1 tablet by mouth twice daily 180 tablet 0    furosemide (LASIX) 20 MG tablet Take 1 tablet by mouth once daily 90 tablet 0    loratadine (CLARITIN) 10 mg tablet Take 10 mg by mouth once daily. prn      losartan (COZAAR) 50 MG tablet Take 1 tablet by mouth twice daily 180 tablet 0    metFORMIN (GLUCOPHAGE) 1000 MG tablet TAKE 1 TABLET BY MOUTH TWICE DAILY WITH MEALS 180 tablet 0    simvastatin (ZOCOR) 40 MG tablet TAKE 1 TABLET BY MOUTH ONCE DAILY IN THE EVENING 90 tablet 0    tirzepatide (MOUNJARO) 15 mg/0.5 mL PnIj Inject 15 mg into the skin every 7 days. 2 mL 11     No current facility-administered medications on file prior to visit.   [2]   Social History  Socioeconomic History    Marital status:    Tobacco Use    Smoking status: Never     Passive exposure: Never    Smokeless tobacco: Never   Substance and Sexual Activity    Alcohol use: Not Currently     Comment: occasionally    Drug use: No    Sexual activity: Not Currently     Partners: Male     Birth control/protection: None     Social Drivers of Health     Financial Resource Strain: Low Risk  (2/14/2025)    Overall Financial Resource Strain (CARDIA)     Difficulty of Paying Living Expenses: Not very hard   Food Insecurity: No Food Insecurity (2/14/2025)    Hunger Vital Sign     Worried About Running Out of Food in the Last Year: Never true     Ran Out of Food in the Last Year: Never true   Transportation Needs: No Transportation Needs (2/14/2025)    PRAPARE - Transportation     Lack of Transportation (Medical): No     Lack of Transportation (Non-Medical): No   Physical Activity: Inactive (2/14/2025)    Exercise Vital Sign     Days of Exercise per Week: 0 days     Minutes of Exercise per Session: 0 min   Stress: Stress Concern Present (2/14/2025)    [employer] [place] [place] [employer]  [employer]upational [employer] - Occupational Stress Questionnaire     Feeling of Stress : Very much   Housing Stability: Low Risk  (2/14/2025)    Housing Stability Vital Sign     Unable to Pay for Housing in the Last Year: No     Number of Times Moved in the Last Year: 0     Homeless in the Last Year: No

## 2025-02-26 DIAGNOSIS — E11.9 TYPE 2 DIABETES MELLITUS WITHOUT COMPLICATION: ICD-10-CM

## 2025-02-26 LAB
ONEOME COMMENT: NORMAL
ONEOME METHOD: NORMAL

## 2025-03-03 ENCOUNTER — PATIENT MESSAGE (OUTPATIENT)
Dept: PSYCHIATRY | Facility: CLINIC | Age: 55
End: 2025-03-03
Payer: COMMERCIAL

## 2025-03-03 ENCOUNTER — OFFICE VISIT (OUTPATIENT)
Dept: PSYCHIATRY | Facility: CLINIC | Age: 55
End: 2025-03-03
Payer: COMMERCIAL

## 2025-03-03 ENCOUNTER — TELEPHONE (OUTPATIENT)
Dept: HEMATOLOGY/ONCOLOGY | Facility: CLINIC | Age: 55
End: 2025-03-03
Payer: COMMERCIAL

## 2025-03-03 ENCOUNTER — HOSPITAL ENCOUNTER (OUTPATIENT)
Dept: RADIOLOGY | Facility: HOSPITAL | Age: 55
Discharge: HOME | End: 2025-03-03
Attending: INTERNAL MEDICINE
Payer: COMMERCIAL

## 2025-03-03 DIAGNOSIS — C50.911 MALIGNANT NEOPLASM OF RIGHT BREAST IN FEMALE, ESTROGEN RECEPTOR POSITIVE, UNSPECIFIED SITE OF BREAST: ICD-10-CM

## 2025-03-03 DIAGNOSIS — F54 PSYCH & BEHAVRL FACTORS ASSOC W DISORD OR DIS CLASSD ELSWHR: Primary | ICD-10-CM

## 2025-03-03 DIAGNOSIS — Z17.0 MALIGNANT NEOPLASM OF RIGHT BREAST IN FEMALE, ESTROGEN RECEPTOR POSITIVE, UNSPECIFIED SITE OF BREAST: ICD-10-CM

## 2025-03-03 PROCEDURE — 99999 PR PBB SHADOW E&M-EST. PATIENT-LVL I: CPT | Mod: PBBFAC,,,

## 2025-03-03 NOTE — PROGRESS NOTES
PSYCHO-ONCOLOGY INTAKE    Diagnostic Interview - CPT 83567    Date: 3/3/2025  Site: [facility]     Evaluation Length (direct face-to-face time):  1 hour     Referral Source: Jewell Davis MD   Oncologist: Jewell Davis MD   PCP: Shannon Ya MD    Clinical status of patient: Outpatient    Micaela Hernandez, a 54 y.o. female, seen for initial evaluation visit.  Met with patient.    Chief complaint/reason for encounter: adjustment to illness, depression, anxiety, and sleep  - Patient expresses daily anxiety, sadness related to recent breast cancer diagnosis   - Reports loss of interest in activities, worry about the future   - Feels her daughter is not taking diagnosis seriously   - Receives some emotional support from sister who has history of cancer treatment  - Additional concern about sleep difficulties past 2 months   - Diagnosed NAPOLEON, not using CPAP because it leaks water in cold weather   - Reports waking in middle of the night 2-3am, unable to fall back asleep due to cancer-related worries    Medical/Surgical History:    Patient Active Problem List   Diagnosis    Axillary mass    Liver mass, right lobe    Hernia, incisional    ARORA (nonalcoholic steatohepatitis)    Morbid obesity    Type 2 diabetes mellitus with diabetic nephropathy, without long-term current use of insulin    Hyperlipidemia    Hypertension    Chronic right-sided low back pain without sciatica    Chronic pain of right hip    Chronic pain of both knees    Degenerative lumbar disc    Primary osteoarthritis of both knees    Atrial flutter    Pleural effusion    NAPOLEON (obstructive sleep apnea)    LAD (linear IgA dermatosis)    Nasal congestion    PAF (paroxysmal atrial fibrillation)    S/P ablation of atrial flutter    Anemia    Status post hysteroscopy    S/P laparoscopic hysterectomy    Refractive error     Health Behaviors:       ETOH Use: No (past social)       Tobacco Use: No   Illicit Drug Use:  No     Prescription  Misuse:No   Caffeine: moderate (1 coffee, 1-2 sodas/day)   Exercise:The patient engages in little, if any physical activity.   Firearms:  No   Advanced directives:No     Family History:   Psychiatric illness: No     Alcohol/Drug Abuse: No     Suicide: No      Past Psychiatric History:   Inpatient treatment: No     Outpatient treatment: Yes     - Individual counseling 20+ years ago for interpersonal stress    - Marriage counseling prior to divorce    Prior substance abuse treatment: No     Suicide Attempts: No     Psychotropic Medications:   Current: Zoloft and Restoril - started last week     Past: none    Current medications as per below, allergies reviewed in chart.    Current Outpatient Medications   Medication    amLODIPine (NORVASC) 5 MG tablet    betamethasone dipropionate (DIPROLENE) 0.05 % ointment    carvediloL (COREG) 12.5 MG tablet    clobetasoL (TEMOVATE) 0.05 % external solution    dronedarone (MULTAQ) 400 mg Tab    ELIQUIS 5 mg Tab    furosemide (LASIX) 20 MG tablet    loratadine (CLARITIN) 10 mg tablet    losartan (COZAAR) 50 MG tablet    metFORMIN (GLUCOPHAGE) 1000 MG tablet    sertraline (ZOLOFT) 25 MG tablet    simvastatin (ZOCOR) 40 MG tablet    temazepam (RESTORIL) 7.5 MG Cap    tirzepatide (MOUNJARO) 15 mg/0.5 mL PnIj     No current facility-administered medications for this visit.     Social situation  Living/Social History  Born/raised:  [place], [place]  Current living situation: lives alone in [place], [place]  Marital status:   Family of Origin: Parents: Mom and dad, both ; Siblings: 1 sister  Children/Dependents: Daughter (age 27, lives in [place] with )  Social support: fair    Primary supports:  daughter, sister     Hobbies: Reading    Occupational History  Type of work:   Work status:  employed FT    Status: no.     Educational/ Linguistic History  Education level: high school diploma/GED  Language: English    Stressors: Current: Complicated  medical illness and Financial strain  Additional stressors: Uncertainty of cancer treatment plan  Strengths:Vocational interests, hobbies and/or talents and Interpersonal relationships and supports available - family, relatives, friends    Current Evaluation:     Mental Status Exam: Micaela Hernandez arrived promptly for the assessment session. The patient was fully cooperative throughout the interview and was an adequate historian.  Appearance: age appropriate, appropriately  dressed, adequately  groomed  Behavior/Cooperation: friendly and cooperative  Speech: normal in rate, volume, and tone  Mood: anxious  Affect: anxious and euthymic  Thought Process: goal-directed, logical  Thought Content: normal, no suicidality, no homicidality, delusions, or paranoia;did not appear to be responding to internal stimuli during the interview.   Orientation: grossly intact  Memory: Grossly intact  Attention Span/Concentration: Attends to interview without distraction; reports no difficulty  Fund of Knowledge: average  Estimate of Intelligence: average from verbal skills and history  Cognition: grossly intact  Insight: patient has awareness of illness; good insight into own behavior and behavior of others  Judgment: the patient's behavior is adequate to circumstances    Distress Score    Distress Score: (Proxy-Rptd) (P) 5        Practical Problems Physical Problems    Taking care of self  Sleep    Housing/utilities  Fatigue    Work        Finances        Treatment decisions                   Family Problems                                         Emotional Problems        Worry/anxiety        Sadness/depression        Loss of interest/enjoyment       Fear                          Spiritual/Religions Concerns               Other Problems            PHQ ANSWERS  Q1.  2  Q2.  3  Q3.  3  Q4.  2  Q5.  1  Q6.  1  Q7.  1  Q8.  1  Q9.  0   PHQ-9 Score=14 (moderate)      BERANRDINO-7       3/3/2025     1:11 PM   GAD7   1. Feeling nervous,  anxious, or on edge? 3   2. Not being able to stop or control worrying? 3   3. Worrying too much about different things? 3   4. Trouble relaxing? 3   5. Being so restless that it is hard to sit still? 1   6. Becoming easily annoyed or irritable? 1   7. Feeling afraid as if something awful might happen? 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 Davis mid-March 2025 to discuss treatment plan    Micaela Hernandez has adjusted to illness with difficulty primarily through passive coping strategies and avoidance. She has engaged in limited information gathering. The patient has fair family/friend support.  Her support system is coping adequately with the diagnosis/treatment/prognosis. Illness-related psychosocial stressors include financial strain , absence from work, and worries about unknown e.g. treatment plan, risk of recurrence . The patient has a good partnership with her [facility] oncology treatment team. The patient reports the following barriers to cancer care: taking time off work.     Patient Reported Cancer Treatment Symptoms:  N/A - Treatment not started    Behavioral Health Symptoms:   Mood: Depression: depressed mood, anhedonia, insomnia, fatigue, difficulty concentrating, hopelessness, and decreased appetite prior depression: during divorce ; no SI/HI  Tameka: Denies  Psychosis: Denies   Anxiety: Feeling nervous, anxious, or on edge, Uncontrollable worry (about cancer and treatment), Excessive worry (interfering with sleep), Difficulty relaxing, and Fear of unknown;  no prior  Generalized anxiety: Denies    Panic Disorder: 1 prior panic attack 20+ years ago; denies avoidance/fear of future attacks  Social/specific phobia:  Denies   OCD: Denies  Trauma: Denies  Sexual Dysfunction:  Denies  Substance abuse: denied  Cognitive functioning: denied  Health behaviors: noncontributory  Sleep: interrupted sleep , no sleep onset difficulty , early awakening 3-4 hours before planned wake time; (+) psychophysiological factors, (+) use of benzodiazepines (Restoril) , per patient meds help somewhat  Pain: Ms. Hernandez reports daily knee pain, denies interference with daily activity, sleeping and work; however avoids physical activity due to pain.  CAM Therapies: None     Assessment - Diagnosis - Goals:       ICD-10-CM ICD-9-CM   1. Psych & behavrl factors assoc w disord or dis classd elsr  F54 316   2. Malignant neoplasm of right breast in female, estrogen receptor positive, unspecified site of breast  C50.911 174.9    Z17.0 V86.0     Plan:  - Discussed strategies for managing stress during uncertainty while waiting for treatment plan, including distraction, scheduling enjoyable activities  - Discussed options for individual psychotherapy, psychoeducation courses  - Explained other services available for emotional/mental well-being, including Integrative Oncology and support groups  - Patient expressed interest in Sleep 101 course  - Patient declined interest in other services at this time, will reassess once she has a treatment plan    Summary and Recommendations  Micaela Hernandez is a 54 y.o. female referred by Jewell Davis MD for psychological evaluation and treatment.  Ms. Hernandez appears to be coping with difficulty with her diagnosis and proposed treatment course. Patient has fair social support but worries about being isolated during cancer treatment. She was encouraged to continue with pleasant events scheduling and to increase exercise. Patient was provided information about the Sleep 101 psychoeduational course and the [facility].  She is not currently interested in regular CBT or supportive therapy, but is  aware of available resources to address future needs..     GOALS:   Increase exercise  Pleasant events scheduling  Referral to Sleep 101 course    Next Session: N/A, schedule as needed    Elana Shields, MPH, MS  Psychology Intern  Supervisor: Justin Schulz, PhD

## 2025-03-03 NOTE — TELEPHONE ENCOUNTER
----- Message from Shiloh sent at 3/3/2025  4:02 PM CST -----  Regarding: KRISTINA ROSAS [[id number]]                                                Reschedule Appointment Patient Name:KRISTINA ROSAS [[id number]]  Original Date Of Appt:03/03/25 (arrived)   Preferred Date:none   Contact Info:[phone]  Additional Info:patient called and stated that she arrived at the [facility] as scheduled for her MRI and was told that she needed another appointment scheduled at the [place] [facility] location as she is in need of larger equipment and would like to receive a call back as soon as possible. Thanks!!

## 2025-03-06 DIAGNOSIS — E11.21 TYPE 2 DIABETES MELLITUS WITH DIABETIC NEPHROPATHY, WITHOUT LONG-TERM CURRENT USE OF INSULIN: ICD-10-CM

## 2025-03-06 RX ORDER — METFORMIN HYDROCHLORIDE 1000 MG/1
1000 TABLET ORAL 2 TIMES DAILY WITH MEALS
Qty: 180 TABLET | Refills: 0 | Status: SHIPPED | OUTPATIENT
Start: 2025-03-06

## 2025-03-14 ENCOUNTER — HOSPITAL ENCOUNTER (OUTPATIENT)
Dept: RADIOLOGY | Facility: HOSPITAL | Age: 55
Discharge: HOME | End: 2025-03-14
Attending: INTERNAL MEDICINE
Payer: COMMERCIAL

## 2025-03-14 PROCEDURE — A9577 INJ MULTIHANCE: HCPCS | Performed by: INTERNAL MEDICINE

## 2025-03-14 PROCEDURE — 77049 MRI BREAST C-+ W/CAD BI: CPT | Mod: TC

## 2025-03-14 PROCEDURE — 25500020 PHARM REV CODE 255: Performed by: INTERNAL MEDICINE

## 2025-03-14 PROCEDURE — 77049 MRI BREAST C-+ W/CAD BI: CPT | Mod: 26,,, | Performed by: RADIOLOGY

## 2025-03-14 RX ADMIN — GADOBENATE DIMEGLUMINE 20 ML: 529 INJECTION, SOLUTION INTRAVENOUS at 05:03

## 2025-03-17 ENCOUNTER — OFFICE VISIT (OUTPATIENT)
Dept: HEMATOLOGY/ONCOLOGY | Facility: CLINIC | Age: 55
End: 2025-03-17
Payer: COMMERCIAL

## 2025-03-17 ENCOUNTER — OFFICE VISIT (OUTPATIENT)
Dept: SURGERY | Facility: CLINIC | Age: 55
End: 2025-03-17
Payer: COMMERCIAL

## 2025-03-17 VITALS
BODY MASS INDEX: 49.02 KG/M2 | OXYGEN SATURATION: 96 % | DIASTOLIC BLOOD PRESSURE: 76 MMHG | TEMPERATURE: 98 F | SYSTOLIC BLOOD PRESSURE: 124 MMHG | HEIGHT: 63 IN | WEIGHT: 276.69 LBS | HEART RATE: 79 BPM

## 2025-03-17 DIAGNOSIS — C50.911 MALIGNANT NEOPLASM OF RIGHT BREAST IN FEMALE, ESTROGEN RECEPTOR NEGATIVE, UNSPECIFIED SITE OF BREAST: Primary | ICD-10-CM

## 2025-03-17 DIAGNOSIS — G47.9 SLEEP DISTURBANCES: ICD-10-CM

## 2025-03-17 DIAGNOSIS — F41.9 ANXIETY AND DEPRESSION: ICD-10-CM

## 2025-03-17 DIAGNOSIS — C50.911 INVASIVE DUCTAL CARCINOMA OF BREAST, FEMALE, RIGHT: Primary | ICD-10-CM

## 2025-03-17 DIAGNOSIS — Z17.1 MALIGNANT NEOPLASM OF RIGHT BREAST IN FEMALE, ESTROGEN RECEPTOR NEGATIVE, UNSPECIFIED SITE OF BREAST: Primary | ICD-10-CM

## 2025-03-17 DIAGNOSIS — F32.A ANXIETY AND DEPRESSION: ICD-10-CM

## 2025-03-17 PROCEDURE — 1159F MED LIST DOCD IN RCRD: CPT | Mod: CPTII,S$GLB,, | Performed by: SURGERY

## 2025-03-17 PROCEDURE — 4010F ACE/ARB THERAPY RXD/TAKEN: CPT | Mod: CPTII,S$GLB,, | Performed by: SURGERY

## 2025-03-17 PROCEDURE — 3078F DIAST BP <80 MM HG: CPT | Mod: CPTII,S$GLB,, | Performed by: INTERNAL MEDICINE

## 2025-03-17 PROCEDURE — 4010F ACE/ARB THERAPY RXD/TAKEN: CPT | Mod: CPTII,S$GLB,, | Performed by: INTERNAL MEDICINE

## 2025-03-17 PROCEDURE — 3074F SYST BP LT 130 MM HG: CPT | Mod: CPTII,S$GLB,, | Performed by: INTERNAL MEDICINE

## 2025-03-17 PROCEDURE — 99213 OFFICE O/P EST LOW 20 MIN: CPT | Mod: S$GLB,,, | Performed by: SURGERY

## 2025-03-17 PROCEDURE — 99215 OFFICE O/P EST HI 40 MIN: CPT | Mod: S$GLB,,, | Performed by: INTERNAL MEDICINE

## 2025-03-17 PROCEDURE — 99999 PR PBB SHADOW E&M-EST. PATIENT-LVL IV: CPT | Mod: PBBFAC,,, | Performed by: INTERNAL MEDICINE

## 2025-03-17 PROCEDURE — 99999 PR PBB SHADOW E&M-EST. PATIENT-LVL III: CPT | Mod: PBBFAC,,, | Performed by: SURGERY

## 2025-03-17 PROCEDURE — 3008F BODY MASS INDEX DOCD: CPT | Mod: CPTII,S$GLB,, | Performed by: INTERNAL MEDICINE

## 2025-03-17 NOTE — PROGRESS NOTES
Subjective     Patient ID: Micaela Hernandez is a 54 y.o. female.    Chief Complaint: No chief complaint on file.  Reason For Consultation: Breast CA   HPI      She did not palpate a mass. No skin changes. No previous breast biopsies.   No family history of breast cancer.     Diabetes well controlled   Genetic screening pending.     GynHx: Menarche 10 y/o   Hysterectomy 2 yrs ago has ovaries - sec to fibroids  27 y/o with first pregnancy. Breast feed x 2 wks  No HRT    .      1. Right breast, 11:00, N + 10 cm, biopsy:  Invasive ductal carcinoma, Grade 2, (tubules=3, nuclei=2, mitoses=1)  Invasive carcinoma is present in multiple cores and measures 5 mm in greatest linear dimension within the core biopsy  No carcinoma in-situ or lymphovascular invasion is identified    Tumor biomarkers  Estrogen receptor (ER):  Negative  Progesterone receptor (PGR):  Negative  HER2 (IHC):  Negative, 0  Ki-67:  20-30%    Additional IHC:  E cadherin:  Strong, positive membranous staining, supporting ductal differentiation      2. Right axillary node, biopsy:  No metastatic carcinoma identified in lymphoid tissue    AE1/AE3 IHC is performed on part 2 and is negative for occult tumor cells        All immunostains were performed with appropriate controls.    This case was reviewed by Dr. RANDI Jimenez who concurs with the above diagnoses.      Comment: Interp By Olya Ayala M.D., Signed on 2025 at           Past Medical History:   Diagnosis Date    Allergy     Atrial flutter     Degenerative lumbar disc 2021    Diabetes mellitus     Diabetes mellitus, type 2     Hyperlipidemia     Hypertension     PAF (paroxysmal atrial fibrillation) 2022    Sleep apnea     Symptomatic anemia 2023     Past Surgical History:   Procedure Laterality Date    BREAST SURGERY       SECTION      CHOLECYSTECTOMY      ENDOMETRIAL ABLATION      HERNIA REPAIR      incisional     HYSTEROSCOPY WITH DILATION AND CURETTAGE OF UTERUS N/A  3/1/2023    Procedure: HYSTEROSCOPY, WITH DILATION AND CURETTAGE OF UTERUS;  Surgeon: Lola Zhao MD;  Location: [facility] OR;  Service: OB/GYN;  Laterality: N/A;    KNEE SURGERY      LAPAROSCOPIC TOTAL HYSTERECTOMY N/A 3/28/2023    Procedure: HYSTERECTOMY, TOTAL, LAPAROSCOPIC;  Surgeon: Lola Zhao MD;  Location: [facility] OR;  Service: OB/GYN;  Laterality: N/A;  Large patient and large uterus, may be difficult case.  May convert to open.  T/S=---UPT  ON 3/27  RN PREOP 3/15/2023---BMI--52.32    LIVER LOBECTOMY Right     LYMPH NODE DISSECTION      right axillary    TRANSESOPHAGEAL ECHOCARDIOGRAPHY N/A 1/26/2022    Procedure: ECHOCARDIOGRAM, TRANSESOPHAGEAL;  Surgeon: Ji Patricio MD;  Location: [facility] CATH LAB;  Service: Cardiology;  Laterality: N/A;        Review of Systems   Constitutional:  Negative for appetite change, fatigue, fever and unexpected weight change.   HENT:  Negative for mouth sores.    Eyes:  Negative for visual disturbance.   Respiratory:  Negative for cough and shortness of breath.    Cardiovascular:  Negative for chest pain.   Gastrointestinal:  Negative for abdominal pain and diarrhea.   Genitourinary:  Negative for frequency.   Musculoskeletal:  Negative for back pain.   Integumentary:  Negative for rash.   Neurological:  Negative for headaches.   Hematological:  Negative for adenopathy.   Psychiatric/Behavioral:  The patient is not nervous/anxious.           Objective   There were no vitals filed for this visit.      Physical Exam  Constitutional:       Appearance: She is well-developed.   HENT:      Head: Normocephalic.      Right Ear: External ear normal.      Left Ear: External ear normal.      Mouth/Throat:      Pharynx: No oropharyngeal exudate.   Eyes:      General: No scleral icterus.        Right eye: No discharge.         Left eye: No discharge.      Conjunctiva/sclera: Conjunctivae normal.   Cardiovascular:      Rate and Rhythm: Normal rate and regular rhythm.      Heart sounds:  Normal heart sounds. No murmur heard.  Pulmonary:      Effort: Pulmonary effort is normal.      Breath sounds: Normal breath sounds. No wheezing or rales.   Abdominal:      General: Bowel sounds are normal.      Palpations: Abdomen is soft.      Tenderness: There is no abdominal tenderness. There is no guarding or rebound.   Musculoskeletal:         General: Normal range of motion.      Cervical back: Normal range of motion and neck supple.      Right lower leg: No edema.      Left lower leg: No edema.   Skin:     Coloration: Skin is not jaundiced.      Findings: No rash.   Neurological:      General: No focal deficit present.      Mental Status: She is alert and oriented to person, place, and time.   Psychiatric:         Mood and Affect: Mood normal.         Behavior: Behavior normal.       Name: Micaela Hernandez     MRN: [id number]     Result:   Mammo Digital Diagnostic Bilat with Ruben  US Breast Right Limited     History:  Patient is 54 y.o. and is seen for abnormal mammogram.        Films Compared:  Compared to: 12/27/2023 Mammo Digital Screening Bilat w/ Ruben     Findings:  This procedure was performed using tomosynthesis. Computer-aided detection was utilized in the interpretation of this examination.  There are scattered areas of fibroglandular density.      Mammo Digital Diagnostic Bilat with Ruben  Right  Mass: There is a high density, round mass with obscured margins seen in the upper region of the right breast in the posterior depth on the MLO view.   Lymph Node: There is a lymph node seen in the right axilla.      Left  There is no evidence of suspicious masses, calcifications, or other abnormal findings in the left breast.     US Breast Right Limited  Right  Mass: There is a 24 mm x 23 mm x 20 mm round, heterogeneous mass with indistinct margins with shadowing seen in the right breast at 11 o'clock, 10 cm from the nipple. The mass correlates with the mass seen on the mammogram.   Lymph Node: There is an  indeterminate lymph node seen in the right axilla at level I. Cortical thickness measures 5 mm on the right.      Impression:  Right  Mass: Right breast 24 mm x 23 mm x 20 mm mass at 11 o'clock. Assessment: 5 - Highly suggestive of malignancy. Ultrasound-guided biopsy is recommended.   Lymph Node: Right axilla lymph node. Assessment: 4 - Suspicious finding. Ultrasound-guided biopsy is recommended.      Left  Mammo Digital Diagnostic Bilat with Ruben  There is no mammographic evidence of malignancy in the left breast.        BI-RADS Category:   Overall: 5 - Highly Suggestive of Malignancy     Recommendation:  Ultrasound-guided biopsy is recommended of right breast mass and right axillary lymph node. I discussed these findings and recommendations with the patient in detail at the time of exam.     Your estimated lifetime risk of breast cancer (to age 85) based on Tyrer-Cuzick risk assessment model is 8.96%.  According to the American Cancer Society, patients with a lifetime breast cancer risk of 20% or higher might benefit from supplemental screening tests, such as screening breast MRI.     Roula Ness MD        Assessment and Plan     Genetic screening neg  Planned lumpectomy   Plan adjuvant chemo    Follow up 2 weeks post op to review surg path findings and discuss adjuvant chemo

## 2025-03-22 DIAGNOSIS — I10 PRIMARY HYPERTENSION: ICD-10-CM

## 2025-03-23 NOTE — H&P (VIEW-ONLY)
[facility]  Breast Surgery  History & Physical           REFERRING PROVIDER: Shannon Ya MD  [address]  [address]  [place] [address]     Chief Complaint: Breast Cancer        Subjective:      Patient ID: Micaela Hernandez is a 54 y.o. female who presents with for evaluationof newly diagnosed triple negative right breast IDC. She was in her usual state of health. Of note, the patient had an abnormal screening mammogram 10 months ago. She recently had the diagnostic mammogram related to the abnormal screening, which led to the biopsy. Also had a right axillary biopsy which was negative. She denies HA, visual changes, cough, SOB, abdominal pain, easy bleeding, or easy bruising.         Findings at [facility]:            Lab Results   Component Value Date     FPATHDX   01/29/2025       1. Right breast, 11:00, N + 10 cm, biopsy:   Invasive ductal carcinoma, Grade 2, (tubules=3, nuclei=2, mitoses=1)  Invasive carcinoma is present in multiple cores and measures 5 mm in greatest linear dimension within the core biopsy  No carcinoma in-situ or lymphovascular invasion is identified     Tumor biomarkers  Estrogen receptor (ER):  Negative  Progesterone receptor (PGR):  Negative  HER2 (IHC):  Negative, 0  Ki-67:  20-30%     Additional IHC:   E cadherin:  Strong, positive membranous staining, supporting ductal differentiation        2. Right axillary node, biopsy:   No metastatic carcinoma identified in lymphoid tissue     AE1/AE3 IHC is performed on part 2 and is negative for occult tumor cells           All immunostains were performed with appropriate controls.     This case was reviewed by Dr. RANDI Jimenez who concurs with the above diagnoses.          Interval History 3/17/2025:    No new changes. Genetics testing negative. Patient since by medical oncology and will offer chemotherapy in the adjuvant setting. MRI results pending.              Past Medical History:   Diagnosis Date    Allergy      Atrial  flutter      Degenerative lumbar disc 2021    Diabetes mellitus      Diabetes mellitus, type 2      Hyperlipidemia      Hypertension      PAF (paroxysmal atrial fibrillation) 2022    Sleep apnea      Symptomatic anemia 2023            Past Surgical History:   Procedure Laterality Date    BREAST SURGERY         SECTION        CHOLECYSTECTOMY        ENDOMETRIAL ABLATION        HERNIA REPAIR         incisional     HYSTEROSCOPY WITH DILATION AND CURETTAGE OF UTERUS N/A 3/1/2023     Procedure: HYSTEROSCOPY, WITH DILATION AND CURETTAGE OF UTERUS;  Surgeon: Lola Zhao MD;  Location: [facility] OR;  Service: OB/GYN;  Laterality: N/A;    KNEE SURGERY        LAPAROSCOPIC TOTAL HYSTERECTOMY N/A 3/28/2023     Procedure: HYSTERECTOMY, TOTAL, LAPAROSCOPIC;  Surgeon: Lola Zhao MD;  Location: [facility] OR;  Service: OB/GYN;  Laterality: N/A;  Large patient and large uterus, may be difficult case.  May convert to open.  T/S=---UPT  ON 3/27  RN PREOP 3/15/2023---BMI--52.32    LIVER LOBECTOMY Right      LYMPH NODE DISSECTION         right axillary    TRANSESOPHAGEAL ECHOCARDIOGRAPHY N/A 2022     Procedure: ECHOCARDIOGRAM, TRANSESOPHAGEAL;  Surgeon: Ji Patricio MD;  Location: [facility] CATH LAB;  Service: Cardiology;  Laterality: N/A;      [Medications Ordered Prior to Encounter]    [Medications Ordered Prior to Encounter]         Current Outpatient Medications on File Prior to Visit   Medication Sig Dispense Refill    betamethasone dipropionate (DIPROLENE) 0.05 % ointment Apply topically 2 (two) times daily. 15 g 0    carvediloL (COREG) 12.5 MG tablet TAKE 1 TABLET BY MOUTH TWICE DAILY WITH MEALS 180 tablet 0    clobetasoL (TEMOVATE) 0.05 % external solution Apply topically 2 (two) times daily. 50 mL 0    dronedarone (MULTAQ) 400 mg Tab TAKE 1 TABLET BY MOUTH TWICE DAILY WITH MEALS 180 tablet 3    ELIQUIS 5 mg Tab Take 1 tablet by mouth twice daily 180 tablet 0    furosemide (LASIX) 20 MG tablet Take 1  tablet by mouth once daily 90 tablet 0    loratadine (CLARITIN) 10 mg tablet Take 10 mg by mouth once daily. prn        losartan (COZAAR) 50 MG tablet Take 1 tablet by mouth twice daily 180 tablet 0    metFORMIN (GLUCOPHAGE) 1000 MG tablet TAKE 1 TABLET BY MOUTH TWICE DAILY WITH MEALS 180 tablet 0    simvastatin (ZOCOR) 40 MG tablet TAKE 1 TABLET BY MOUTH ONCE DAILY IN THE EVENING 90 tablet 0    tirzepatide (MOUNJARO) 15 mg/0.5 mL PnIj Inject 15 mg into the skin every 7 days. 2 mL 11      No current facility-administered medications on file prior to visit.     [Social History]    [Social History]        Socioeconomic History    Marital status:    Tobacco Use    Smoking status: Never       Passive exposure: Never    Smokeless tobacco: Never   Substance and Sexual Activity    Alcohol use: Not Currently       Comment: occasionally    Drug use: No    Sexual activity: Not Currently       Partners: Male       Birth control/protection: None      Social Drivers of Health           Financial Resource Strain: Low Risk  (2/14/2025)     Overall Financial Resource Strain (CARDIA)      Difficulty of Paying Living Expenses: Not very hard   Food Insecurity: No Food Insecurity (2/14/2025)     Hunger Vital Sign      Worried About Running Out of Food in the Last Year: Never true      Ran Out of Food in the Last Year: Never true   Transportation Needs: No Transportation Needs (2/14/2025)     PRAPARE - Transportation      Lack of Transportation (Medical): No      Lack of Transportation (Non-Medical): No   Physical Activity: Inactive (2/14/2025)     Exercise Vital Sign      Days of Exercise per Week: 0 days      Minutes of Exercise per Session: 0 min   Stress: Stress Concern Present (2/14/2025)     [employer] - Occupational Stress Questionnaire      Feeling of Stress : Very much   Housing Stability: Low Risk  (2/14/2025)     Housing Stability Vital Sign      Unable to Pay for Housing in the Last  "Year: No      Number of Times Moved in the Last Year: 0      Homeless in the Last Year: No            Family History   Problem Relation Name Age of Onset    Hypertension Mother        Asthma Mother        COPD Mother        Hyperlipidemia Mother        Diabetes type II Mother        Cataracts Mother        Diabetes Father        Hypertension Father        Hyperlipidemia Father        Cataracts Father        Cancer Sister Swetha           type unknown, but CA in mouth    No Known Problems Brother        No Known Problems Maternal Aunt        No Known Problems Maternal Uncle        No Known Problems Paternal Aunt        No Known Problems Paternal Uncle        No Known Problems Maternal Grandmother        No Known Problems Maternal Grandfather        No Known Problems Paternal Grandmother        No Known Problems Paternal Grandfather        Breast cancer Neg Hx        Colon cancer Neg Hx        Ovarian cancer Neg Hx        Amblyopia Neg Hx        Blindness Neg Hx        Macular degeneration Neg Hx        Retinal detachment Neg Hx        Strabismus Neg Hx        Stroke Neg Hx        Thyroid disease Neg Hx             Review of Systems   All other systems reviewed and are negative.     Objective:   /76 (BP Location: Right arm)   Pulse 75   Temp 97.7 °F (36.5 °C)   Ht 5' 3" (1.6 m)   Wt 127.6 kg (281 lb 4.9 oz)   LMP 02/27/2023 (Approximate)   BMI 49.83 kg/m²      Physical Exam   Vitals reviewed.  Cardiovascular:  Normal rate and normal pulses.            Pulmonary/Chest: Effort normal. Right breast exhibits no inverted nipple, no nipple discharge and no skin change. Left breast exhibits no inverted nipple, no mass, no nipple discharge and no skin change.        Abdominal: Normal appearance.   Musculoskeletal: Lymphadenopathy:      Upper Body:      Right upper body: No axillary adenopathy.      Left upper body: No axillary adenopathy.      Neurological: She is alert.         Radiology review: Images personally " reviewed by me in the clinic.      Mammo Digital Screening Bilat w/ Ruben  Order: [id number]   Status: Final result     Next appt: 03/03/2025 at 10:00 AM in Psychiatry (INTERN 1, [facility])     Dx: Encounter for screening mammogram for...     Test Result Released: Yes (seen)     0 Result Notes    1  Topic  Assessment     Overall   0 - Incomplete: Needs Additional Imaging Evaluation      Breast Density       Overall   Breast Composition b - Scattered fibroglandular density      Details     Reading Physician Reading Date Result Priority   Micki Ozuna MD  [phone]  1/2/2024 Routine      Physician Responsible for MQSA Outcome Reason     Micki Ozuna MD Signed        Narrative & Impression  Result:   Mammo Digital Screening Bilat w/ Ruben     History:  Patient is 53 y.o. and is seen for a screening mammogram.     Films Compared:  Compared to: 01/13/2022 Mammo Digital Screening Bilat w/ Ruben, 01/11/2021 Mammo Digital Diagnostic Bilat with Ruben, 12/31/2020 Mammo Digital Screening Bilat w/ Ruben, and 11/29/2019 Mammo Digital Screening Bilat w/ Ruben     Findings:  This procedure was performed using tomosynthesis. Computer-aided detection was utilized in the interpretation of this examination.  The breasts have scattered areas of fibroglandular density.      Right  There is an asymmetry seen in the outer central region of the right breast in the posterior depth on the CC view. This is a new finding.      There is an asymmetry seen in the axillary tail region of the right breast in the posterior depth on the MLO view. This is a new finding.      Left  There is no evidence of suspicious masses, calcifications, or other abnormal findings in the left breast.     Impression:  Right  Asymmetry: Right breast asymmetry at the outer central posterior position. Assessment: 0 - Incomplete. Diagnostic Mammogram and/or Ultrasound is recommended.   Asymmetry: Right breast asymmetry at the axillary tail  posterior position. Assessment: 0 - Incomplete. Diagnostic Mammogram and/or Ultrasound is recommended.      Left  There is no mammographic evidence of malignancy in the left breast.     BI-RADS Category:   Overall: 0 - Incomplete: Needs Additional Imaging Evaluation        Recommendation:  Diagnostic mammogram with possible ultrasound (if indicated) is recommended.        Your estimated lifetime risk of breast cancer (to age 85) based on Tyrer-Cuzick risk assessment model is Tyrer-Cuzick: 8.96 %. According to the American Cancer Society, patients with a lifetime breast cancer risk of 20% or higher might benefit from supplemental screening tests.                               Mammo Digital Diagnostic Bilat with Ruben  Order: [id number]   Status: Final result     Next appt: 03/03/2025 at 10:00 AM in Psychiatry (INTERN 1, [facility] PSYCHOLOG[facility])     Dx: Abnormal mammogram     Linked study orders: US Breast Right Limited (Order: [id number])     Test Result Released: Yes (seen)     0 Result Notes    1 HM Topic  Assessment     Overall   5 - Highly Suggestive of Malignancy      Breast Density       Overall   Breast Composition b - Scattered fibroglandular density      Details     Reading Physician Reading Date Result Priority   Roula Ness MD  [phone]  1/15/2025 Routine      Physician Responsible for MQSA Outcome Reason     Roula Ness MD Signed        Narrative & Impression  Facility:  [facility] [address] [facility] [place] [place] [id number] [phone]     Name: Micaela Hernandez     MRN: [id number]     Result:   Mammo Digital Diagnostic Bilat with Ruben  US Breast Right Limited     History:  Patient is 54 y.o. and is seen for abnormal mammogram.        Films Compared:  Compared to: 12/27/2023 Mammo Digital Screening Bilat w/ Ruben     Findings:  This procedure was performed using tomosynthesis. Computer-aided detection was utilized in the  interpretation of this examination.  There are scattered areas of fibroglandular density.      Mammo Digital Diagnostic Bilat with Ruben  Right  Mass: There is a high density, round mass with obscured margins seen in the upper region of the right breast in the posterior depth on the MLO view.   Lymph Node: There is a lymph node seen in the right axilla.      Left  There is no evidence of suspicious masses, calcifications, or other abnormal findings in the left breast.     US Breast Right Limited  Right  Mass: There is a 24 mm x 23 mm x 20 mm round, heterogeneous mass with indistinct margins with shadowing seen in the right breast at 11 o'clock, 10 cm from the nipple. The mass correlates with the mass seen on the mammogram.   Lymph Node: There is an indeterminate lymph node seen in the right axilla at level I. Cortical thickness measures 5 mm on the right.      Impression:  Right  Mass: Right breast 24 mm x 23 mm x 20 mm mass at 11 o'clock. Assessment: 5 - Highly suggestive of malignancy. Ultrasound-guided biopsy is recommended.   Lymph Node: Right axilla lymph node. Assessment: 4 - Suspicious finding. Ultrasound-guided biopsy is recommended.      Left  Mammo Digital Diagnostic Bilat with Ruben  There is no mammographic evidence of malignancy in the left breast.        BI-RADS Category:   Overall: 5 - Highly Suggestive of Malignancy     Recommendation:  Ultrasound-guided biopsy is recommended of right breast mass and right axillary lymph node. I discussed these findings and recommendations with the patient in detail at the time of exam.     Your estimated lifetime risk of breast cancer (to age 85) based on Tyrer-Cuzick risk assessment model is 8.96%.  According to the American Cancer Society, patients with a lifetime breast cancer risk of 20% or higher might benefit from supplemental screening tests, such as screening breast MRI.     Roula Ness MD            Assessment:      Assessment  1. Invasive ductal  carcinoma of breast, female, right          Plan:      Options for management were discussed with the patient and her family. We reviewed the existing data noting the equivalency of breast conserving surgery with radiation therapy and mastectomy. We discussed the need for partial mastectomy/lumpectomy margins to be negative for carcinoma, the necessity for postoperative radiation therapy after breast conservation in most cases, the possibility of a failed or false negative sentinel lymph node biopsy and the potential need for complete lymphadenectomy for a failed or positive sentinel lymph node biopsy were fully discussed.      In the setting of mastectomy, delayed or immediate reconstruction options are available and were discussed.      In the setting of lumpectomy, radiation therapy would be recommended majority of the time.  The duration and treatment side effects were discussed with the patient.  This will coordinated with the radiation oncologist pending final pathology.     We also discussed the role of systemic therapy in the treatment of early stage breast cancer.  We discussed that this is based on tumor biology and clinton status and will be determined based on final pathology.  We discussed that if the cancer is hormone positive, endocrine therapy would be recommended in most cases and its use can reduce the risk of recurrence as well as improve survival. Side effects of treatment were briefly discussed. We also discussed the potential role for chemotherapy based on a number of factors such as tumor phenotype (ER+ vs. triple negative vs. [id number]+) and this would be determined in coordination with the medical oncologist.     The patient, in consultation with her family, has elected to proceed with right radar localized partia mastectomy and sentinel lymph node biopsy. The operative risks of bleeding, infection, recurrence, scarring, and anesthetic complications and the possibility of requiring further  surgery were all noted and informed consent obtained.     Will proceed with above surgery pending results of MRI        Elvin Ogden MD, ALLYSON, FACS  Breast Surgery  [facility] I spent a total of 25 minutes on the day of the visit.This includes face to face time and non-face to face time preparing to see the patient (eg, review of tests), obtaining and/or reviewing separately obtained history, documenting clinical information in the electronic or other health record, independently interpreting results and communicating results to the patient/family/caregiver, or care coordinator.

## 2025-03-23 NOTE — PROGRESS NOTES
[facility]  Breast Surgery  History & Physical           REFERRING PROVIDER: Shannon Ya MD  [address]  [address]  [place] [address]     Chief Complaint: Breast Cancer        Subjective:      Patient ID: Micaela Hernandez is a 54 y.o. female who presents with for evaluationof newly diagnosed triple negative right breast IDC. She was in her usual state of health. Of note, the patient had an abnormal screening mammogram 10 months ago. She recently had the diagnostic mammogram related to the abnormal screening, which led to the biopsy. Also had a right axillary biopsy which was negative. She denies HA, visual changes, cough, SOB, abdominal pain, easy bleeding, or easy bruising.         Findings at [facility]:            Lab Results   Component Value Date     FPATHDX   01/29/2025       1. Right breast, 11:00, N + 10 cm, biopsy:   Invasive ductal carcinoma, Grade 2, (tubules=3, nuclei=2, mitoses=1)  Invasive carcinoma is present in multiple cores and measures 5 mm in greatest linear dimension within the core biopsy  No carcinoma in-situ or lymphovascular invasion is identified     Tumor biomarkers  Estrogen receptor (ER):  Negative  Progesterone receptor (PGR):  Negative  HER2 (IHC):  Negative, 0  Ki-67:  20-30%     Additional IHC:   E cadherin:  Strong, positive membranous staining, supporting ductal differentiation        2. Right axillary node, biopsy:   No metastatic carcinoma identified in lymphoid tissue     AE1/AE3 IHC is performed on part 2 and is negative for occult tumor cells           All immunostains were performed with appropriate controls.     This case was reviewed by Dr. RANDI Jimenez who concurs with the above diagnoses.          Interval History 3/17/2025:    No new changes. Genetics testing negative. Patient since by medical oncology and will offer chemotherapy in the adjuvant setting. MRI results pending.              Past Medical History:   Diagnosis Date    Allergy      Atrial  flutter      Degenerative lumbar disc 2021    Diabetes mellitus      Diabetes mellitus, type 2      Hyperlipidemia      Hypertension      PAF (paroxysmal atrial fibrillation) 2022    Sleep apnea      Symptomatic anemia 2023            Past Surgical History:   Procedure Laterality Date    BREAST SURGERY         SECTION        CHOLECYSTECTOMY        ENDOMETRIAL ABLATION        HERNIA REPAIR         incisional     HYSTEROSCOPY WITH DILATION AND CURETTAGE OF UTERUS N/A 3/1/2023     Procedure: HYSTEROSCOPY, WITH DILATION AND CURETTAGE OF UTERUS;  Surgeon: Lola Zhao MD;  Location: [facility] OR;  Service: OB/GYN;  Laterality: N/A;    KNEE SURGERY        LAPAROSCOPIC TOTAL HYSTERECTOMY N/A 3/28/2023     Procedure: HYSTERECTOMY, TOTAL, LAPAROSCOPIC;  Surgeon: Lola Zhao MD;  Location: [facility] OR;  Service: OB/GYN;  Laterality: N/A;  Large patient and large uterus, may be difficult case.  May convert to open.  T/S=---UPT  ON 3/27  RN PREOP 3/15/2023---BMI--52.32    LIVER LOBECTOMY Right      LYMPH NODE DISSECTION         right axillary    TRANSESOPHAGEAL ECHOCARDIOGRAPHY N/A 2022     Procedure: ECHOCARDIOGRAM, TRANSESOPHAGEAL;  Surgeon: Ji Patricio MD;  Location: [facility] CATH LAB;  Service: Cardiology;  Laterality: N/A;      [Medications Ordered Prior to Encounter]    [Medications Ordered Prior to Encounter]         Current Outpatient Medications on File Prior to Visit   Medication Sig Dispense Refill    betamethasone dipropionate (DIPROLENE) 0.05 % ointment Apply topically 2 (two) times daily. 15 g 0    carvediloL (COREG) 12.5 MG tablet TAKE 1 TABLET BY MOUTH TWICE DAILY WITH MEALS 180 tablet 0    clobetasoL (TEMOVATE) 0.05 % external solution Apply topically 2 (two) times daily. 50 mL 0    dronedarone (MULTAQ) 400 mg Tab TAKE 1 TABLET BY MOUTH TWICE DAILY WITH MEALS 180 tablet 3    ELIQUIS 5 mg Tab Take 1 tablet by mouth twice daily 180 tablet 0    furosemide (LASIX) 20 MG tablet Take 1  tablet by mouth once daily 90 tablet 0    loratadine (CLARITIN) 10 mg tablet Take 10 mg by mouth once daily. prn        losartan (COZAAR) 50 MG tablet Take 1 tablet by mouth twice daily 180 tablet 0    metFORMIN (GLUCOPHAGE) 1000 MG tablet TAKE 1 TABLET BY MOUTH TWICE DAILY WITH MEALS 180 tablet 0    simvastatin (ZOCOR) 40 MG tablet TAKE 1 TABLET BY MOUTH ONCE DAILY IN THE EVENING 90 tablet 0    tirzepatide (MOUNJARO) 15 mg/0.5 mL PnIj Inject 15 mg into the skin every 7 days. 2 mL 11      No current facility-administered medications on file prior to visit.     [Social History]    [Social History]        Socioeconomic History    Marital status:    Tobacco Use    Smoking status: Never       Passive exposure: Never    Smokeless tobacco: Never   Substance and Sexual Activity    Alcohol use: Not Currently       Comment: occasionally    Drug use: No    Sexual activity: Not Currently       Partners: Male       Birth control/protection: None      Social Drivers of Health           Financial Resource Strain: Low Risk  (2/14/2025)     Overall Financial Resource Strain (CARDIA)      Difficulty of Paying Living Expenses: Not very hard   Food Insecurity: No Food Insecurity (2/14/2025)     Hunger Vital Sign      Worried About Running Out of Food in the Last Year: Never true      Ran Out of Food in the Last Year: Never true   Transportation Needs: No Transportation Needs (2/14/2025)     PRAPARE - Transportation      Lack of Transportation (Medical): No      Lack of Transportation (Non-Medical): No   Physical Activity: Inactive (2/14/2025)     Exercise Vital Sign      Days of Exercise per Week: 0 days      Minutes of Exercise per Session: 0 min   Stress: Stress Concern Present (2/14/2025)     [employer] - Occupational Stress Questionnaire      Feeling of Stress : Very much   Housing Stability: Low Risk  (2/14/2025)     Housing Stability Vital Sign      Unable to Pay for Housing in the Last  "Year: No      Number of Times Moved in the Last Year: 0      Homeless in the Last Year: No            Family History   Problem Relation Name Age of Onset    Hypertension Mother        Asthma Mother        COPD Mother        Hyperlipidemia Mother        Diabetes type II Mother        Cataracts Mother        Diabetes Father        Hypertension Father        Hyperlipidemia Father        Cataracts Father        Cancer Sister Swetha           type unknown, but CA in mouth    No Known Problems Brother        No Known Problems Maternal Aunt        No Known Problems Maternal Uncle        No Known Problems Paternal Aunt        No Known Problems Paternal Uncle        No Known Problems Maternal Grandmother        No Known Problems Maternal Grandfather        No Known Problems Paternal Grandmother        No Known Problems Paternal Grandfather        Breast cancer Neg Hx        Colon cancer Neg Hx        Ovarian cancer Neg Hx        Amblyopia Neg Hx        Blindness Neg Hx        Macular degeneration Neg Hx        Retinal detachment Neg Hx        Strabismus Neg Hx        Stroke Neg Hx        Thyroid disease Neg Hx             Review of Systems   All other systems reviewed and are negative.     Objective:   /76 (BP Location: Right arm)   Pulse 75   Temp 97.7 °F (36.5 °C)   Ht 5' 3" (1.6 m)   Wt 127.6 kg (281 lb 4.9 oz)   LMP 02/27/2023 (Approximate)   BMI 49.83 kg/m²      Physical Exam   Vitals reviewed.  Cardiovascular:  Normal rate and normal pulses.            Pulmonary/Chest: Effort normal. Right breast exhibits no inverted nipple, no nipple discharge and no skin change. Left breast exhibits no inverted nipple, no mass, no nipple discharge and no skin change.        Abdominal: Normal appearance.   Musculoskeletal: Lymphadenopathy:      Upper Body:      Right upper body: No axillary adenopathy.      Left upper body: No axillary adenopathy.      Neurological: She is alert.         Radiology review: Images personally " reviewed by me in the clinic.      Mammo Digital Screening Bilat w/ Ruben  Order: [id number]   Status: Final result     Next appt: 03/03/2025 at 10:00 AM in Psychiatry (INTERN 1, [facility])     Dx: Encounter for screening mammogram for...     Test Result Released: Yes (seen)     0 Result Notes    1  Topic  Assessment     Overall   0 - Incomplete: Needs Additional Imaging Evaluation      Breast Density       Overall   Breast Composition b - Scattered fibroglandular density      Details     Reading Physician Reading Date Result Priority   Micki Ozuna MD  [phone]  1/2/2024 Routine      Physician Responsible for MQSA Outcome Reason     Micki Ozuna MD Signed        Narrative & Impression  Result:   Mammo Digital Screening Bilat w/ Ruben     History:  Patient is 53 y.o. and is seen for a screening mammogram.     Films Compared:  Compared to: 01/13/2022 Mammo Digital Screening Bilat w/ Ruben, 01/11/2021 Mammo Digital Diagnostic Bilat with Ruben, 12/31/2020 Mammo Digital Screening Bilat w/ Ruben, and 11/29/2019 Mammo Digital Screening Bilat w/ Ruben     Findings:  This procedure was performed using tomosynthesis. Computer-aided detection was utilized in the interpretation of this examination.  The breasts have scattered areas of fibroglandular density.      Right  There is an asymmetry seen in the outer central region of the right breast in the posterior depth on the CC view. This is a new finding.      There is an asymmetry seen in the axillary tail region of the right breast in the posterior depth on the MLO view. This is a new finding.      Left  There is no evidence of suspicious masses, calcifications, or other abnormal findings in the left breast.     Impression:  Right  Asymmetry: Right breast asymmetry at the outer central posterior position. Assessment: 0 - Incomplete. Diagnostic Mammogram and/or Ultrasound is recommended.   Asymmetry: Right breast asymmetry at the axillary tail  posterior position. Assessment: 0 - Incomplete. Diagnostic Mammogram and/or Ultrasound is recommended.      Left  There is no mammographic evidence of malignancy in the left breast.     BI-RADS Category:   Overall: 0 - Incomplete: Needs Additional Imaging Evaluation        Recommendation:  Diagnostic mammogram with possible ultrasound (if indicated) is recommended.        Your estimated lifetime risk of breast cancer (to age 85) based on Tyrer-Cuzick risk assessment model is Tyrer-Cuzick: 8.96 %. According to the American Cancer Society, patients with a lifetime breast cancer risk of 20% or higher might benefit from supplemental screening tests.                               Mammo Digital Diagnostic Bilat with Ruben  Order: [id number]   Status: Final result     Next appt: 03/03/2025 at 10:00 AM in Psychiatry (INTERN 1, [facility])     Dx: Abnormal mammogram     Linked study orders: US Breast Right Limited (Order: [id number])     Test Result Released: Yes (seen)     0 Result Notes    1 HM Topic  Assessment     Overall   5 - Highly Suggestive of Malignancy      Breast Density       Overall   Breast Composition b - Scattered fibroglandular density      Details     Reading Physician Reading Date Result Priority   Roula Ness MD  [phone]  1/15/2025 Routine      Physician Responsible for MQSA Outcome Reason     Roula Ness MD Signed        Narrative & Impression  Facility:  [facility] [address] [facility] [place] [id number] [phone]     Name: Micaela Hernandez     MRN: [id number]     Result:   Mammo Digital Diagnostic Bilat with Ruben  US Breast Right Limited     History:  Patient is 54 y.o. and is seen for abnormal mammogram.        Films Compared:  Compared to: 12/27/2023 Mammo Digital Screening Bilat w/ Ruben     Findings:  This procedure was performed using tomosynthesis. Computer-aided detection was utilized in the  interpretation of this examination.  There are scattered areas of fibroglandular density.      Mammo Digital Diagnostic Bilat with Ruben  Right  Mass: There is a high density, round mass with obscured margins seen in the upper region of the right breast in the posterior depth on the MLO view.   Lymph Node: There is a lymph node seen in the right axilla.      Left  There is no evidence of suspicious masses, calcifications, or other abnormal findings in the left breast.     US Breast Right Limited  Right  Mass: There is a 24 mm x 23 mm x 20 mm round, heterogeneous mass with indistinct margins with shadowing seen in the right breast at 11 o'clock, 10 cm from the nipple. The mass correlates with the mass seen on the mammogram.   Lymph Node: There is an indeterminate lymph node seen in the right axilla at level I. Cortical thickness measures 5 mm on the right.      Impression:  Right  Mass: Right breast 24 mm x 23 mm x 20 mm mass at 11 o'clock. Assessment: 5 - Highly suggestive of malignancy. Ultrasound-guided biopsy is recommended.   Lymph Node: Right axilla lymph node. Assessment: 4 - Suspicious finding. Ultrasound-guided biopsy is recommended.      Left  Mammo Digital Diagnostic Bilat with Ruben  There is no mammographic evidence of malignancy in the left breast.        BI-RADS Category:   Overall: 5 - Highly Suggestive of Malignancy     Recommendation:  Ultrasound-guided biopsy is recommended of right breast mass and right axillary lymph node. I discussed these findings and recommendations with the patient in detail at the time of exam.     Your estimated lifetime risk of breast cancer (to age 85) based on Tyrer-Cuzick risk assessment model is 8.96%.  According to the American Cancer Society, patients with a lifetime breast cancer risk of 20% or higher might benefit from supplemental screening tests, such as screening breast MRI.     Roula Ness MD            Assessment:      Assessment  1. Invasive ductal  carcinoma of breast, female, right          Plan:      Options for management were discussed with the patient and her family. We reviewed the existing data noting the equivalency of breast conserving surgery with radiation therapy and mastectomy. We discussed the need for partial mastectomy/lumpectomy margins to be negative for carcinoma, the necessity for postoperative radiation therapy after breast conservation in most cases, the possibility of a failed or false negative sentinel lymph node biopsy and the potential need for complete lymphadenectomy for a failed or positive sentinel lymph node biopsy were fully discussed.      In the setting of mastectomy, delayed or immediate reconstruction options are available and were discussed.      In the setting of lumpectomy, radiation therapy would be recommended majority of the time.  The duration and treatment side effects were discussed with the patient.  This will coordinated with the radiation oncologist pending final pathology.     We also discussed the role of systemic therapy in the treatment of early stage breast cancer.  We discussed that this is based on tumor biology and clinton status and will be determined based on final pathology.  We discussed that if the cancer is hormone positive, endocrine therapy would be recommended in most cases and its use can reduce the risk of recurrence as well as improve survival. Side effects of treatment were briefly discussed. We also discussed the potential role for chemotherapy based on a number of factors such as tumor phenotype (ER+ vs. triple negative vs. [id number]+) and this would be determined in coordination with the medical oncologist.     The patient, in consultation with her family, has elected to proceed with right radar localized partia mastectomy and sentinel lymph node biopsy. The operative risks of bleeding, infection, recurrence, scarring, and anesthetic complications and the possibility of requiring further  surgery were all noted and informed consent obtained.     Will proceed with above surgery pending results of MRI        Elvin Ogden MD, ALLYSON, FACS  Breast Surgery  [facility] I spent a total of 25 minutes on the day of the visit.This includes face to face time and non-face to face time preparing to see the patient (eg, review of tests), obtaining and/or reviewing separately obtained history, documenting clinical information in the electronic or other health record, independently interpreting results and communicating results to the patient/family/caregiver, or care coordinator.

## 2025-03-24 RX ORDER — CARVEDILOL 12.5 MG/1
12.5 TABLET ORAL 2 TIMES DAILY WITH MEALS
Qty: 180 TABLET | Refills: 0 | Status: SHIPPED | OUTPATIENT
Start: 2025-03-24

## 2025-03-24 NOTE — PROGRESS NOTES
Subjective     Patient ID: Micaela Hernandez is a 54 y.o. female.    Chief Complaint: Follow-up (Breast )  Diagnosis : Rt Breast IDC ER neg GA neg [id number]  HPIDawtara Hernandez is a 54 y.o. female who presents with for follow up triple negative right breast IDC. She was in her usual state of health. Of note, the patient had an abnormal screening mammogram 10 months ago. She recently had the diagnostic mammogram related to the abnormal screening, which led to the biopsy. Also had a right axillary biopsy which was negative. She denies HA, visual changes, cough, SOB, abdominal pain, easy bleeding, or easy bruising. She did not palpate a mass. No skin changes. No previous breast biopsies. No family history of breast cancer. Genetic screening pending.     Findings at [facility]:   . Right breast, 11:00, N + 10 cm, biopsy:  Invasive ductal carcinoma, Grade 2, (tubules=3, nuclei=2, mitoses=1)  Invasive carcinoma is present in multiple cores and measures 5 mm in greatest linear dimension within the core biopsy  No carcinoma in-situ or lymphovascular invasion is identified    Tumor biomarkers  Estrogen receptor (ER):  Negative  Progesterone receptor (PGR):  Negative  HER2 (IHC):  Negative, 0  Ki-67:  20-30%    Additional IHC:  E cadherin:  Strong, positive membranous staining, supporting ductal differentiation      2. Right axillary node, biopsy:  No metastatic carcinoma identified in lymphoid tissue      GynHx: Menarche 10 y/o   Hysterectomy 2 yrs ago has ovaries - sec to fibroids  25 y/o with first pregnancy. Breast feed x 2 wks  No HRT        Past Medical History:   Diagnosis Date    Allergy     Atrial flutter     Degenerative lumbar disc 2021    Diabetes mellitus     Diabetes mellitus, type 2     Hyperlipidemia     Hypertension     PAF (paroxysmal atrial fibrillation) 2022    Sleep apnea     Symptomatic anemia 2023     Past Surgical History:   Procedure Laterality Date    BREAST SURGERY       SECTION       "CHOLECYSTECTOMY      ENDOMETRIAL ABLATION      HERNIA REPAIR      incisional     HYSTEROSCOPY WITH DILATION AND CURETTAGE OF UTERUS N/A 3/1/2023    Procedure: HYSTEROSCOPY, WITH DILATION AND CURETTAGE OF UTERUS;  Surgeon: Lola Zhao MD;  Location: [facility] OR;  Service: OB/GYN;  Laterality: N/A;    KNEE SURGERY      LAPAROSCOPIC TOTAL HYSTERECTOMY N/A 3/28/2023    Procedure: HYSTERECTOMY, TOTAL, LAPAROSCOPIC;  Surgeon: Lola Zhao MD;  Location: [facility] OR;  Service: OB/GYN;  Laterality: N/A;  Large patient and large uterus, may be difficult case.  May convert to open.  T/S=---UPT  ON 3/27  RN PREOP 3/15/2023---BMI--52.32    LIVER LOBECTOMY Right     LYMPH NODE DISSECTION      right axillary    TRANSESOPHAGEAL ECHOCARDIOGRAPHY N/A 1/26/2022    Procedure: ECHOCARDIOGRAM, TRANSESOPHAGEAL;  Surgeon: Ji Patricio MD;  Location: [facility] CATH LAB;  Service: Cardiology;  Laterality: N/A;        Review of Systems   Constitutional:  Negative for appetite change, fatigue, fever and unexpected weight change.   HENT:  Negative for mouth sores.    Eyes:  Negative for visual disturbance.   Respiratory:  Negative for cough and shortness of breath.    Cardiovascular:  Negative for chest pain.   Gastrointestinal:  Negative for abdominal pain and diarrhea.   Genitourinary:  Negative for frequency.   Musculoskeletal:  Negative for back pain.   Integumentary:  Negative for rash.   Neurological:  Negative for headaches.   Hematological:  Negative for adenopathy.   Psychiatric/Behavioral:  Positive for sleep disturbance. The patient is nervous/anxious.           Objective   Vitals:    03/17/25 1316   BP: 124/76   BP Location: Left arm   Patient Position: Sitting   Pulse: 79   Temp: 98.2 °F (36.8 °C)   SpO2: 96%   Weight: 125.5 kg (276 lb 10.8 oz)   Height: 5' 3" (1.6 m)       Physical Exam  Constitutional:       Appearance: She is well-developed.   HENT:      Head: Normocephalic.      Right Ear: External ear normal.      Left Ear: " External ear normal.      Mouth/Throat:      Pharynx: No oropharyngeal exudate.   Eyes:      General: No scleral icterus.        Right eye: No discharge.         Left eye: No discharge.      Conjunctiva/sclera: Conjunctivae normal.   Cardiovascular:      Rate and Rhythm: Normal rate and regular rhythm.      Heart sounds: Normal heart sounds. No murmur heard.  Pulmonary:      Effort: Pulmonary effort is normal.      Breath sounds: No wheezing.   Chest:   Breasts:     Right: No mass.      Comments: Rt breast- fullness noted in upper inner quadrant, no mass palpated  Abdominal:      General: Bowel sounds are normal.      Palpations: Abdomen is soft.      Tenderness: There is no abdominal tenderness. There is no guarding.   Musculoskeletal:         General: Normal range of motion.      Cervical back: Normal range of motion and neck supple.      Right lower leg: No edema.      Left lower leg: No edema.   Skin:     Coloration: Skin is not jaundiced.      Findings: No rash.   Neurological:      General: No focal deficit present.      Mental Status: She is alert and oriented to person, place, and time.   Psychiatric:         Mood and Affect: Mood normal.         Behavior: Behavior normal.       Name: Micaela Hernandez     MRN: [id number]     Result:   Mammo Digital Diagnostic Bilat with Ruben  US Breast Right Limited     History:  Patient is 54 y.o. and is seen for abnormal mammogram.        Films Compared:  Compared to: 12/27/2023 Mammo Digital Screening Bilat w/ Ruben     Findings:  This procedure was performed using tomosynthesis. Computer-aided detection was utilized in the interpretation of this examination.  There are scattered areas of fibroglandular density.      Mammo Digital Diagnostic Bilat with Ruben  Right  Mass: There is a high density, round mass with obscured margins seen in the upper region of the right breast in the posterior depth on the MLO view.   Lymph Node: There is a lymph node seen in the right axilla.       Left  There is no evidence of suspicious masses, calcifications, or other abnormal findings in the left breast.     US Breast Right Limited  Right  Mass: There is a 24 mm x 23 mm x 20 mm round, heterogeneous mass with indistinct margins with shadowing seen in the right breast at 11 o'clock, 10 cm from the nipple. The mass correlates with the mass seen on the mammogram.   Lymph Node: There is an indeterminate lymph node seen in the right axilla at level I. Cortical thickness measures 5 mm on the right.      Impression:  Right  Mass: Right breast 24 mm x 23 mm x 20 mm mass at 11 o'clock. Assessment: 5 - Highly suggestive of malignancy. Ultrasound-guided biopsy is recommended.   Lymph Node: Right axilla lymph node. Assessment: 4 - Suspicious finding. Ultrasound-guided biopsy is recommended.      Left  Mammo Digital Diagnostic Bilat with Ruben  There is no mammographic evidence of malignancy in the left breast.        BI-RADS Category:   Overall: 5 - Highly Suggestive of Malignancy     Recommendation:  Ultrasound-guided biopsy is recommended of right breast mass and right axillary lymph node. I discussed these findings and recommendations with the patient in detail at the time of exam.     Your estimated lifetime risk of breast cancer (to age 85) based on Tyrer-Cuzick risk assessment model is 8.96%.  According to the [employer] Society, patients with a lifetime breast cancer risk of 20% or higher might benefit from supplemental screening tests, such as screening breast MRI.     Roula Ness MD        Assessment and Plan       1. Malignant neoplasm of right breast in female, estrogen receptor positive, unspecified site of breast (Primary)  Rt Breast IDC cT2N0 ER neg OR neg [id number]  Discussed pathology findings  Followed by breast surgery and discussing surgical intervention options  Pt considering lumpectomy followed by radiation majority of time  Pt also discussing possible mastectomy followed by  reconstruction   If pt elects lumpectomy, we discussed potential adjuvant  chemo  options  If pt elects mastectomy  followed by reconstruction we discussed potential NAC options   Plan MRI breast imaging      - MRI Breast w/wo Contrast, w/CAD, Bilateral; pending  -Genetic screening pending       2. Anxiety and depression    -Cont sertraline (ZOLOFT) 25 MG tablet; Take 1 tablet (25 mg total) by mouth once daily.  Dispense: 30 tablet; Refill: 11    3. Sleep disturbances  Cont temazepam (RESTORIL) 7.5 MG Cap; Take 1 capsule (7.5 mg total) by mouth nightly as needed (sleep disturbances).  Dispense: 30 capsule; Refill: 0   Pt wants to wait for genetic screening        All labs today  MRI results pending  Plan to d/w Dr. Ogden   Follow up with Dr. Ogden today

## 2025-03-31 RX ORDER — MULTIVITAMIN
1 TABLET ORAL DAILY
COMMUNITY

## 2025-04-01 ENCOUNTER — TELEPHONE (OUTPATIENT)
Dept: SURGERY | Facility: CLINIC | Age: 55
End: 2025-04-01
Payer: COMMERCIAL

## 2025-04-02 ENCOUNTER — ANESTHESIA (OUTPATIENT)
Dept: SURGERY | Facility: HOSPITAL | Age: 55
End: 2025-04-02
Payer: COMMERCIAL

## 2025-04-02 ENCOUNTER — ANESTHESIA EVENT (OUTPATIENT)
Dept: SURGERY | Facility: HOSPITAL | Age: 55
End: 2025-04-02
Payer: COMMERCIAL

## 2025-04-02 ENCOUNTER — HOSPITAL ENCOUNTER (OUTPATIENT)
Dept: RADIOLOGY | Facility: HOSPITAL | Age: 55
Discharge: HOME | End: 2025-04-02
Attending: SURGERY | Admitting: SURGERY
Payer: COMMERCIAL

## 2025-04-02 ENCOUNTER — HOSPITAL ENCOUNTER (OUTPATIENT)
Facility: HOSPITAL | Age: 55
Discharge: HOME | End: 2025-04-02
Attending: SURGERY | Admitting: SURGERY
Payer: COMMERCIAL

## 2025-04-02 VITALS
HEART RATE: 70 BPM | OXYGEN SATURATION: 99 % | TEMPERATURE: 98 F | DIASTOLIC BLOOD PRESSURE: 73 MMHG | WEIGHT: 280 LBS | RESPIRATION RATE: 16 BRPM | BODY MASS INDEX: 49.61 KG/M2 | SYSTOLIC BLOOD PRESSURE: 152 MMHG | HEIGHT: 63 IN

## 2025-04-02 DIAGNOSIS — C50.911 INVASIVE DUCTAL CARCINOMA OF BREAST, FEMALE, RIGHT: ICD-10-CM

## 2025-04-02 DIAGNOSIS — C50.911 INVASIVE DUCTAL CARCINOMA OF BREAST, RIGHT: ICD-10-CM

## 2025-04-02 DIAGNOSIS — E11.21 TYPE 2 DIABETES MELLITUS WITH DIABETIC NEPHROPATHY, WITHOUT LONG-TERM CURRENT USE OF INSULIN: ICD-10-CM

## 2025-04-02 LAB
POCT GLUCOSE: 115 MG/DL (ref 70–110)
POCT GLUCOSE: 163 MG/DL (ref 70–110)

## 2025-04-02 PROCEDURE — 25000003 PHARM REV CODE 250: Performed by: STUDENT IN AN ORGANIZED HEALTH CARE EDUCATION/TRAINING PROGRAM

## 2025-04-02 PROCEDURE — D9220A PRA ANESTHESIA: Mod: CRNA,,, | Performed by: NURSE ANESTHETIST, CERTIFIED REGISTERED

## 2025-04-02 PROCEDURE — 37000009 HC ANESTHESIA EA ADD 15 MINS: Performed by: SURGERY

## 2025-04-02 PROCEDURE — A9520 TC99 TILMANOCEPT DIAG 0.5MCI: HCPCS | Performed by: SURGERY

## 2025-04-02 PROCEDURE — 37000008 HC ANESTHESIA 1ST 15 MINUTES: Performed by: SURGERY

## 2025-04-02 PROCEDURE — 76098 X-RAY EXAM SURGICAL SPECIMEN: CPT | Mod: TC

## 2025-04-02 PROCEDURE — 63600175 PHARM REV CODE 636 W HCPCS: Performed by: STUDENT IN AN ORGANIZED HEALTH CARE EDUCATION/TRAINING PROGRAM

## 2025-04-02 PROCEDURE — 82962 GLUCOSE BLOOD TEST: CPT | Performed by: SURGERY

## 2025-04-02 PROCEDURE — 27201423 OPTIME MED/SURG SUP & DEVICES STERILE SUPPLY: Performed by: SURGERY

## 2025-04-02 PROCEDURE — 38900 IO MAP OF SENT LYMPH NODE: CPT | Mod: RT,,, | Performed by: SURGERY

## 2025-04-02 PROCEDURE — 19301 PARTIAL MASTECTOMY: CPT | Mod: RT,,, | Performed by: SURGERY

## 2025-04-02 PROCEDURE — 25000003 PHARM REV CODE 250: Performed by: NURSE ANESTHETIST, CERTIFIED REGISTERED

## 2025-04-02 PROCEDURE — 63600175 PHARM REV CODE 636 W HCPCS: Performed by: NURSE ANESTHETIST, CERTIFIED REGISTERED

## 2025-04-02 PROCEDURE — 63600175 PHARM REV CODE 636 W HCPCS: Performed by: SURGERY

## 2025-04-02 PROCEDURE — 88342 IMHCHEM/IMCYTCHM 1ST ANTB: CPT | Mod: TC | Performed by: SURGERY

## 2025-04-02 PROCEDURE — 36000706: Performed by: SURGERY

## 2025-04-02 PROCEDURE — 71000044 HC DOSC ROUTINE RECOVERY FIRST HOUR: Performed by: SURGERY

## 2025-04-02 PROCEDURE — 71000015 HC POSTOP RECOV 1ST HR: Performed by: SURGERY

## 2025-04-02 PROCEDURE — D9220A PRA ANESTHESIA: Mod: ANES,,, | Performed by: ANESTHESIOLOGY

## 2025-04-02 PROCEDURE — 36000707: Performed by: SURGERY

## 2025-04-02 PROCEDURE — 38525 BIOPSY/REMOVAL LYMPH NODES: CPT | Mod: 51,RT,, | Performed by: SURGERY

## 2025-04-02 PROCEDURE — 76098 X-RAY EXAM SURGICAL SPECIMEN: CPT | Mod: 26,RT,, | Performed by: SURGERY

## 2025-04-02 RX ORDER — METOCLOPRAMIDE HYDROCHLORIDE 5 MG/ML
10 INJECTION INTRAMUSCULAR; INTRAVENOUS EVERY 10 MIN PRN
Status: DISCONTINUED | OUTPATIENT
Start: 2025-04-02 | End: 2025-04-02 | Stop reason: HOSPADM

## 2025-04-02 RX ORDER — CEFAZOLIN 2 G/1
2 INJECTION, POWDER, FOR SOLUTION INTRAMUSCULAR; INTRAVENOUS
Status: COMPLETED | OUTPATIENT
Start: 2025-04-02 | End: 2025-04-02

## 2025-04-02 RX ORDER — FENTANYL CITRATE 50 UG/ML
INJECTION, SOLUTION INTRAMUSCULAR; INTRAVENOUS
Status: DISCONTINUED | OUTPATIENT
Start: 2025-04-02 | End: 2025-04-02

## 2025-04-02 RX ORDER — SUCCINYLCHOLINE CHLORIDE 20 MG/ML
INJECTION INTRAMUSCULAR; INTRAVENOUS
Status: DISCONTINUED | OUTPATIENT
Start: 2025-04-02 | End: 2025-04-02

## 2025-04-02 RX ORDER — SODIUM CHLORIDE 9 MG/ML
INJECTION, SOLUTION INTRAVENOUS CONTINUOUS
Status: DISCONTINUED | OUTPATIENT
Start: 2025-04-02 | End: 2025-04-02 | Stop reason: HOSPADM

## 2025-04-02 RX ORDER — HALOPERIDOL LACTATE 5 MG/ML
0.5 INJECTION, SOLUTION INTRAMUSCULAR EVERY 10 MIN PRN
Status: DISCONTINUED | OUTPATIENT
Start: 2025-04-02 | End: 2025-04-02 | Stop reason: HOSPADM

## 2025-04-02 RX ORDER — DEXMEDETOMIDINE HYDROCHLORIDE 100 UG/ML
INJECTION, SOLUTION INTRAVENOUS
Status: DISCONTINUED | OUTPATIENT
Start: 2025-04-02 | End: 2025-04-02

## 2025-04-02 RX ORDER — HEPARIN SODIUM 5000 [USP'U]/ML
5000 INJECTION, SOLUTION INTRAVENOUS; SUBCUTANEOUS ONCE
Status: COMPLETED | OUTPATIENT
Start: 2025-04-02 | End: 2025-04-02

## 2025-04-02 RX ORDER — ACETAMINOPHEN 500 MG
1000 TABLET ORAL EVERY 8 HOURS PRN
COMMUNITY
Start: 2025-04-02

## 2025-04-02 RX ORDER — ONDANSETRON HYDROCHLORIDE 2 MG/ML
INJECTION, SOLUTION INTRAVENOUS
Status: DISCONTINUED | OUTPATIENT
Start: 2025-04-02 | End: 2025-04-02

## 2025-04-02 RX ORDER — OXYCODONE HYDROCHLORIDE 5 MG/1
5 TABLET ORAL
Status: DISCONTINUED | OUTPATIENT
Start: 2025-04-02 | End: 2025-04-02 | Stop reason: HOSPADM

## 2025-04-02 RX ORDER — GLUCAGON 1 MG
1 KIT INJECTION
Status: DISCONTINUED | OUTPATIENT
Start: 2025-04-02 | End: 2025-04-02 | Stop reason: HOSPADM

## 2025-04-02 RX ORDER — BUPIVACAINE HYDROCHLORIDE 2.5 MG/ML
INJECTION, SOLUTION EPIDURAL; INFILTRATION; INTRACAUDAL; PERINEURAL
Status: DISCONTINUED | OUTPATIENT
Start: 2025-04-02 | End: 2025-04-02 | Stop reason: HOSPADM

## 2025-04-02 RX ORDER — SODIUM CHLORIDE 0.9 % (FLUSH) 0.9 %
10 SYRINGE (ML) INJECTION
Status: DISCONTINUED | OUTPATIENT
Start: 2025-04-02 | End: 2025-04-02 | Stop reason: HOSPADM

## 2025-04-02 RX ORDER — MIDAZOLAM HYDROCHLORIDE 1 MG/ML
INJECTION INTRAMUSCULAR; INTRAVENOUS
Status: DISCONTINUED | OUTPATIENT
Start: 2025-04-02 | End: 2025-04-02

## 2025-04-02 RX ORDER — PROPOFOL 10 MG/ML
VIAL (ML) INTRAVENOUS
Status: DISCONTINUED | OUTPATIENT
Start: 2025-04-02 | End: 2025-04-02

## 2025-04-02 RX ORDER — DEXAMETHASONE SODIUM PHOSPHATE 4 MG/ML
INJECTION, SOLUTION INTRA-ARTICULAR; INTRALESIONAL; INTRAMUSCULAR; INTRAVENOUS; SOFT TISSUE
Status: DISCONTINUED | OUTPATIENT
Start: 2025-04-02 | End: 2025-04-02

## 2025-04-02 RX ORDER — LIDOCAINE HYDROCHLORIDE 20 MG/ML
INJECTION INTRAVENOUS
Status: DISCONTINUED | OUTPATIENT
Start: 2025-04-02 | End: 2025-04-02

## 2025-04-02 RX ORDER — VASOPRESSIN 20 [USP'U]/ML
INJECTION, SOLUTION INTRAMUSCULAR; SUBCUTANEOUS
Status: DISCONTINUED | OUTPATIENT
Start: 2025-04-02 | End: 2025-04-02

## 2025-04-02 RX ORDER — FENTANYL CITRATE 50 UG/ML
25 INJECTION, SOLUTION INTRAMUSCULAR; INTRAVENOUS EVERY 5 MIN PRN
Status: DISCONTINUED | OUTPATIENT
Start: 2025-04-02 | End: 2025-04-02 | Stop reason: HOSPADM

## 2025-04-02 RX ORDER — OXYCODONE HYDROCHLORIDE 5 MG/1
5 TABLET ORAL EVERY 6 HOURS PRN
Qty: 15 TABLET | Refills: 0 | Status: SHIPPED | OUTPATIENT
Start: 2025-04-02

## 2025-04-02 RX ORDER — PHENYLEPHRINE HYDROCHLORIDE 10 MG/ML
INJECTION INTRAVENOUS
Status: DISCONTINUED | OUTPATIENT
Start: 2025-04-02 | End: 2025-04-02

## 2025-04-02 RX ADMIN — VASOPRESSIN 2 UNITS: 20 INJECTION INTRAVENOUS at 03:04

## 2025-04-02 RX ADMIN — SUCCINYLCHOLINE 140 MG: 20 INJECTION, SOLUTION INTRAMUSCULAR; INTRAVENOUS at 12:04

## 2025-04-02 RX ADMIN — VASOPRESSIN 1 UNITS: 20 INJECTION INTRAVENOUS at 02:04

## 2025-04-02 RX ADMIN — PHENYLEPHRINE HYDROCHLORIDE 100 MCG: 10 INJECTION INTRAVENOUS at 12:04

## 2025-04-02 RX ADMIN — PROPOFOL 150 MG: 10 INJECTION, EMULSION INTRAVENOUS at 01:04

## 2025-04-02 RX ADMIN — SODIUM CHLORIDE: 9 INJECTION, SOLUTION INTRAVENOUS at 10:04

## 2025-04-02 RX ADMIN — LIDOCAINE HYDROCHLORIDE 100 MG: 20 INJECTION INTRAVENOUS at 12:04

## 2025-04-02 RX ADMIN — VASOPRESSIN 1 UNITS: 20 INJECTION INTRAVENOUS at 03:04

## 2025-04-02 RX ADMIN — DEXMEDETOMIDINE 8 MCG: 100 INJECTION, SOLUTION, CONCENTRATE INTRAVENOUS at 03:04

## 2025-04-02 RX ADMIN — ONDANSETRON 4 MG: 2 INJECTION INTRAMUSCULAR; INTRAVENOUS at 12:04

## 2025-04-02 RX ADMIN — CEFAZOLIN 3 G: 2 INJECTION, POWDER, FOR SOLUTION INTRAMUSCULAR; INTRAVENOUS at 12:04

## 2025-04-02 RX ADMIN — VASOPRESSIN 2 UNITS: 20 INJECTION INTRAVENOUS at 02:04

## 2025-04-02 RX ADMIN — SODIUM CHLORIDE: 9 INJECTION, SOLUTION INTRAVENOUS at 02:04

## 2025-04-02 RX ADMIN — VASOPRESSIN 1 UNITS: 20 INJECTION INTRAVENOUS at 01:04

## 2025-04-02 RX ADMIN — MIDAZOLAM HYDROCHLORIDE 1 MG: 2 INJECTION, SOLUTION INTRAMUSCULAR; INTRAVENOUS at 01:04

## 2025-04-02 RX ADMIN — HEPARIN SODIUM 5000 UNITS: 5000 INJECTION INTRAVENOUS; SUBCUTANEOUS at 10:04

## 2025-04-02 RX ADMIN — TILMANOCEPT 500 MICROCURIE: KIT at 12:04

## 2025-04-02 RX ADMIN — FENTANYL CITRATE 50 MCG: 50 INJECTION, SOLUTION INTRAMUSCULAR; INTRAVENOUS at 12:04

## 2025-04-02 RX ADMIN — DEXMEDETOMIDINE 12 MCG: 100 INJECTION, SOLUTION, CONCENTRATE INTRAVENOUS at 12:04

## 2025-04-02 RX ADMIN — PROPOFOL 200 MG: 10 INJECTION, EMULSION INTRAVENOUS at 12:04

## 2025-04-02 RX ADMIN — DEXMEDETOMIDINE 8 MCG: 100 INJECTION, SOLUTION, CONCENTRATE INTRAVENOUS at 01:04

## 2025-04-02 RX ADMIN — DEXAMETHASONE SODIUM PHOSPHATE 4 MG: 4 INJECTION, SOLUTION INTRAMUSCULAR; INTRAVENOUS at 12:04

## 2025-04-02 RX ADMIN — DEXMEDETOMIDINE 12 MCG: 100 INJECTION, SOLUTION, CONCENTRATE INTRAVENOUS at 01:04

## 2025-04-02 NOTE — ANESTHESIA PREPROCEDURE EVALUATION
"                                                                                                             2025  Pre-operative evaluation for Procedure(s) (LRB):  LUMPECTOMY,BREAST,WITH RADIOLOGIC MARKER LOCALIZATION AND SENTINEL LYMPH NODE BIOPSY, Right Breast (Right)    Micaela Hernandez is a 54 y.o. female     Past Medical History:   Diagnosis Date    Allergy     Atrial flutter     Degenerative lumbar disc 2021    Diabetes mellitus     Diabetes mellitus, type 2     Hyperlipidemia     Hypertension     PAF (paroxysmal atrial fibrillation) 2022    Sleep apnea     Symptomatic anemia 2023     Problem List[1]  Review of patient's allergies indicates:   Allergen Reactions    Adhesive Blisters, Itching and Rash       Medications Ordered Prior to Encounter[2]    Past Surgical History:   Procedure Laterality Date    BREAST SURGERY       SECTION      CHOLECYSTECTOMY      ENDOMETRIAL ABLATION      HERNIA REPAIR      incisional     HYSTEROSCOPY WITH DILATION AND CURETTAGE OF UTERUS N/A 3/1/2023    Procedure: HYSTEROSCOPY, WITH DILATION AND CURETTAGE OF UTERUS;  Surgeon: Lola Zhao MD;  Location: [facility] OR;  Service: OB/GYN;  Laterality: N/A;    KNEE SURGERY      LAPAROSCOPIC TOTAL HYSTERECTOMY N/A 3/28/2023    Procedure: HYSTERECTOMY, TOTAL, LAPAROSCOPIC;  Surgeon: Lola Zhao MD;  Location: [facility] OR;  Service: OB/GYN;  Laterality: N/A;  Large patient and large uterus, may be difficult case.  May convert to open.  T/S=---UPT  ON 3/27  RN PREOP 3/15/2023---BMI--52.32    LIVER LOBECTOMY Right     LYMPH NODE DISSECTION      right axillary    TRANSESOPHAGEAL ECHOCARDIOGRAPHY N/A 2022    Procedure: ECHOCARDIOGRAM, TRANSESOPHAGEAL;  Surgeon: Ji Patricio MD;  Location: [facility] CATH LAB;  Service: Cardiology;  Laterality: N/A;       CBC: No results for input(s): "WBC", "HGB", "HCT", "PLT" in the last 72 hours.    CMP: No results for input(s): "NA", "K", "CL", "CO2", "BUN", "CREATININE", " ""GLU", "MG", "PHOS", "CALCIUM", "ALBUMIN", "PROT", "ALKPHOS", "ALT", "AST", "BILITOT" in the last 72 hours.    COAGS  No results for input(s): "PT", "INR", "PROTIME", "APTT" in the last 72 hours.    BP Readings from Last 3 Encounters:   03/17/25 124/76   02/19/25 123/78   02/17/25 130/76       Diagnostic Studies:    EKG:  Results for orders placed or performed in visit on 07/31/23   IN OFFICE EKG 12-LEAD (to [place])    Collection Time: 07/31/23  8:37 AM    Narrative    Test Reason : I48.3,E78.5,I10,Z98.890,Z86.79,I48.0,    Vent. Rate : 085 BPM     Atrial Rate : 085 BPM     P-R Int : 122 ms          QRS Dur : 086 ms      QT Int : 392 ms       P-R-T Axes : -14 035 -19 degrees     QTc Int : 466 ms    Normal sinus rhythm  Cannot rule out Anterior infarct (cited on or before 28-FEB-2023)  Abnormal ECG  When compared with ECG of 28-FEB-2023 08:19,  Inverted T waves have replaced nonspecific T wave abnormality in Inferior  leads  Confirmed by David Carroll MD ([id number]) on 9/17/2023 9:13:02 PM    Referred By:             Confirmed By:David Carroll MD       2D Echo:  Results for orders placed during the hospital encounter of 08/10/23    Echo    Interpretation Summary    Left Ventricle: The left ventricle is normal in size. Normal wall thickness. Normal wall motion. There is normal systolic function with a visually estimated ejection fraction of 55 - 60%.    Left Atrium: Left atrium is mildly dilated.    Right Ventricle: Normal right ventricular cavity size. Wall thickness is normal. Right ventricle wall motion  is normal. Systolic function is normal.    Pulmonary Artery: The estimated pulmonary artery systolic pressure is 16 mmHg.    IVC/SVC: Normal venous pressure at 3 mmHg.        Pre-op Assessment    I have reviewed the Patient Summary Reports.     I have reviewed the Nursing Notes. I have reviewed the NPO Status.      Review of Systems  Cardiovascular:     Hypertension    Dysrhythmias (hx of A flutter (NSR on last EKG))      "                                 Pulmonary:        Sleep Apnea                Hepatic/GI:      Liver Disease, (FLD) Hepatitis              Musculoskeletal:  Arthritis               Neurological:        Peripheral Neuropathy                          Endocrine:  Diabetes, type 2         Morbid Obesity / BMI > 40      Physical Exam  General: Well nourished and Cooperative    Airway:  Mallampati: III   Mouth Opening: Small, but > 3cm  TM Distance: Normal    Dental:  Intact        Anesthesia Plan  Type of Anesthesia, risks & benefits discussed:    Anesthesia Type: Gen ETT  Intra-op Monitoring Plan: Standard ASA Monitors  Post Op Pain Control Plan: multimodal analgesia and IV/PO Opioids PRN  Induction:  IV  Informed Consent: Informed consent signed with the Patient and all parties understand the risks and agree with anesthesia plan.  All questions answered.   ASA Score: 3  Day of Surgery Review of History & Physical: H&P Update referred to the surgeon/provider.    Ready For Surgery From Anesthesia Perspective.     .           [1]   Patient Active Problem List  Diagnosis    Axillary mass    Liver mass, right lobe    Hernia, incisional    ARORA (nonalcoholic steatohepatitis)    Morbid obesity    Type 2 diabetes mellitus with diabetic nephropathy, without long-term current use of insulin    Hyperlipidemia    Hypertension    Chronic right-sided low back pain without sciatica    Chronic pain of right hip    Chronic pain of both knees    Degenerative lumbar disc    Primary osteoarthritis of both knees    Atrial flutter    Pleural effusion    NAPOLEON (obstructive sleep apnea)    LAD (linear IgA dermatosis)    Nasal congestion    PAF (paroxysmal atrial fibrillation)    S/P ablation of atrial flutter    Anemia    Status post hysteroscopy    S/P laparoscopic hysterectomy    Refractive error   [2]   No current facility-administered medications on file prior to encounter.     Current Outpatient Medications on File Prior to Encounter    Medication Sig Dispense Refill    amLODIPine (NORVASC) 5 MG tablet TAKE 1 TABLET BY MOUTH ONCE DAILY IN THE EVENING 90 tablet 0    betamethasone dipropionate (DIPROLENE) 0.05 % ointment Apply topically 2 (two) times daily. 15 g 0    clobetasoL (TEMOVATE) 0.05 % external solution Apply topically 2 (two) times daily. (Patient not taking: Reported on 3/31/2025) 50 mL 0    dronedarone (MULTAQ) 400 mg Tab TAKE 1 TABLET BY MOUTH TWICE DAILY WITH MEALS 180 tablet 3    ELIQUIS 5 mg Tab Take 1 tablet by mouth twice daily 180 tablet 0    furosemide (LASIX) 20 MG tablet Take 1 tablet by mouth once daily 90 tablet 0    loratadine (CLARITIN) 10 mg tablet Take 10 mg by mouth once daily. prn      losartan (COZAAR) 50 MG tablet Take 1 tablet by mouth twice daily 180 tablet 0    metFORMIN (GLUCOPHAGE) 1000 MG tablet TAKE 1 TABLET BY MOUTH TWICE DAILY WITH MEALS (Patient taking differently: Take 1,000 mg by mouth every evening.) 180 tablet 0    multivitamin (ONE DAILY MULTIVITAMIN) per tablet Take 1 tablet by mouth once daily.      sertraline (ZOLOFT) 25 MG tablet Take 1 tablet (25 mg total) by mouth once daily. (Patient taking differently: Take 25 mg by mouth nightly.) 30 tablet 11    simvastatin (ZOCOR) 40 MG tablet TAKE 1 TABLET BY MOUTH ONCE DAILY IN THE EVENING 90 tablet 0    tirzepatide (MOUNJARO) 15 mg/0.5 mL PnIj Inject 15 mg into the skin every 7 days. 2 mL 11

## 2025-04-02 NOTE — TRANSFER OF CARE
"Anesthesia Transfer of Care Note    Patient: Micaela Hernandez    Procedure(s) Performed: Procedure(s) (LRB):  LUMPECTOMY,BREAST,WITH RADIOLOGIC MARKER LOCALIZATION AND SENTINEL LYMPH NODE BIOPSY, Right Breast (Right)    Patient location: [facility]    Anesthesia Type: general    Transport from OR: Transported from OR on 6-10 L/min O2 by face mask with adequate spontaneous ventilation    Post pain: adequate analgesia    Post assessment: no apparent anesthetic complications and tolerated procedure well    Post vital signs: stable    Level of consciousness: responds to stimulation and sedated    Nausea/Vomiting: no nausea/vomiting    Complications: none    Transfer of care protocol was followed    Last vitals: Visit Vitals  BP (!) 165/89   Pulse 69   Temp 36.5 °C (97.7 °F) (Temporal)   Resp 20   Ht 5' 3" (1.6 m)   Wt 127 kg (280 lb)   LMP 02/27/2023 (Approximate)   SpO2 100%   Breastfeeding No   BMI 49.60 kg/m²     "

## 2025-04-02 NOTE — BRIEF OP NOTE
[facility] - Surgery ([facility])  Brief Operative Note    Surgery Date: 4/2/2025     Surgeons and Role:     * Elvin Ogden MD - Primary     * Carla Salgado MD - Resident - Assisting        Pre-op Diagnosis:  Invasive ductal carcinoma of breast, female, right [C50.911]    Post-op Diagnosis:  Post-Op Diagnosis Codes:     * Invasive ductal carcinoma of breast, female, right [C50.911]    Procedure(s) (LRB):  LUMPECTOMY,BREAST,WITH RADIOLOGIC MARKER LOCALIZATION AND SENTINEL LYMPH NODE BIOPSY, Right Breast (Right)    Anesthesia: General    Operative Findings: Right breast lumpectomy and sentinel lymph node biopsy. Two nodes send to pathology. Radiologic marker evident in specimen via [employer]    Estimated Blood Loss: * No values recorded between 4/2/2025 12:15 PM and 4/2/2025  4:14 PM *         Specimens:   Specimen (24h ago, onward)       Start     Ordered    04/02/25 1409  Specimen to Pathology General Surgery  RELEASE UPON ORDERING        References:    Click here for ordering Quick Tip   Question:  Release to patient  Answer:  Immediate    04/02/25 1409                    ID Type Source Tests Collected by Time Destination   1 : 1. Right breast mass yellow: anterior, black: posterior, red: medial, orange: lateral, blue: superior, green inferior Tissue Breast, Right SPECIMEN TO PATHOLOGY Elvin Ogden MD 4/2/2025 1408    2 : [place] Lymph Node #1, Hot and Blue, 9057 Tissue Lymph Node(s) [place], Breast, Right SPECIMEN TO PATHOLOGY Elvin Ogden MD 4/2/2025 1441    3 : Right Non-[place] Node #1, Palpable Tissue Lymph Node(s), Breast SPECIMEN TO PATHOLOGY Elvin Ogden MD 4/2/2025 1515            Discharge Note    OUTCOME: Patient tolerated treatment/procedure well without complication and is now ready for discharge.    DISPOSITION: Home or Self Care    FINAL DIAGNOSIS:  <principal problem not specified>    FOLLOWUP: In clinic    DISCHARGE INSTRUCTIONS:    Discharge Procedure Orders   Diet Adult  Regular     No driving until:   Order Comments: No longer taking narcotic pain medication and can turn around safely without pain.     Shower on day dressing removed (No bath)   Order Comments: Okay to shower the day after surgery. Please do not submerge wounds underwater (no bath, no pool, no lake, etc.) for at least 2 weeks.

## 2025-04-02 NOTE — ANESTHESIA PROCEDURE NOTES
Intubation    Date/Time: 4/2/2025 12:31 PM    Performed by: Vicki Figueredo CRNA  Authorized by: Farhad Perez III, MD    Intubation:     Induction:  Intravenous    Intubated:  Postinduction    Mask Ventilation:  Easy mask    Attempts:  1    Attempted By:  CRNA    Method of Intubation:  Video laryngoscopy    Blade:  Glass 3    Laryngeal View Grade: Grade I - full view of cords      Difficult Airway Encountered?: No      Complications:  None    Airway Device:  Oral endotracheal tube    Airway Device Size:  7.5    Style/Cuff Inflation:  Cuffed (inflated to minimal occlusive pressure)    Tube secured:  21    Secured at:  The lips    Placement Verified By:  Capnometry    Complicating Factors:  None    Findings Post-Intubation:  BS equal bilateral and atraumatic/condition of teeth unchanged

## 2025-04-02 NOTE — DISCHARGE INSTRUCTIONS
POSTOPERATIVE INSTRUCTIONS FOLLOWING BIOPSY OR LUMPECTOMY    The following are post-operative instructions that will help you to recover from your surgery.  Please read over these instructions carefully and contact us if we can answer any of your questions or concerns.    Dressing/breast binder (surgi-bra)  A surgical bra may be placed around your chest after your surgery.  If you are given the bra, please wear it as close to 24 hours a day as possible until your post-operative clinic appointment.  If the elastic around the bra irritates your skin, you may wear a soft t-shirt underneath the bra.  You may go without wearing the bra long enough to shower, to launder and dry the bra.  If the bra is extremely uncomfortable, you may wear a supportive sports bra instead after 2 days.  You may shower the day after surgery.  Do not take a tub bath and do not soak the surgical site.    Activity   You should be able to return to your regular activities 2 days after your surgery.  However, do not engage in strenuous activities in which you use your upper body such as:  golf, tennis, aerobics, washing windows, raking the yard, mopping, vacuuming, heavy lifting (e.g children) until you are seen for your follow-up appointment in clinic.    Medication for pain  You may find that over the counter pain medications may be sufficient for your pain.  You will be given a prescription for pain medication for more severe pain.  You should not drive or operate machinery while taking these.  Please take narcotics with food.  Narcotics can cause, or worse, constipation.  You will need to increase your fluid intake, eat high fiber foods (such as fruits and bran) and make sure that you are up and walking. You may need to take an over the counter stool softener for constipation.    Please report the following:  Temperature greater than 101 degrees  Discharge or bad odor from the wound  Excessive bleeding, such as bloody dressing or extreme  bruising  Redness at incision and/or drain sites  Swelling or buildup of fluid around incision    Additional information  I will see you approximately 2 weeks following your surgery.  If this follow-up appointment has not been made, please call the office.    If you have any questions or problems, please call my office or my nurse.      After hours and on weekends, you may call the main [facility] line at [phone] and ask to have the general surgery resident paged or have me paged.

## 2025-04-06 NOTE — OP NOTE
DATE: 4/2/2025    PREOPERATIVE DIAGNOSIS: Invasive ductal carcinoma of breast, female, right [C50.911]     POSTOPERATIVE DIAGNOSIS: Invasive ductal carcinoma of breast, female, right [C50.911]     Procedure(s):  LUMPECTOMY,BREAST,WITH RADIOLOGIC MARKER LOCALIZATION AND SENTINEL LYMPH NODE BIOPSY, Right Breast     Surgeons and Role:     * Elvin Ogden MD - Primary     * Carla Salgado MD - Resident - Assisting    SURGEON: Surgeons and Role:     * Elvin Ogden MD - Primary     * Carla Salgado MD - Resident - Assisting    ANESTHESIA: general    PROCEDURES PERFORMED:   1. right partial mastectomy (lumpectomy)  2. right axillary sentinel lymph node biopsy   3. injection of right breast with technetium-labeled radiocolloid for sentinel lymph node identification  4. Identification of sentinel lymph node   5. Surgeon interpretation of specimen radiograph  6. right breast injection with isosulfan Lymphazurin blue dye for sentinel node identification      FINDINGS: Right breast lumpectomy and sentinel lymph node biopsy. Two nodes send to pathology. Radiologic marker evident in specimen via mozart       INDICATION FOR OPERATION: A 54 y.o. female/male presented with right breast cancer. Appropriate workup was performed and the patient presents today for surgery.    PROCEDURE IN DETAIL:   After informed and witness consent was obtained, the patient brought to the operating room in stable condition and placed in the supine position. general anesthesia was administered.    The right breast was injected in the subareolar region with the technetium-labeled radiocolloid. The right breast was further injected with the isosulfan Lymphazurin blue dye in the central subareolar region. The right breast, anterior chest, arm and axilla were then prepped and draped in a sterile fashion. A surgical timeout was then performed.    We turned our attention to the right breast itself. Using the Nessa  probe, the Nessa  reflector was identified. An incision was made in the right breast over the anticipated tract of the lesion in a rhianna-tumoral location, after the injection of local anesthetic.  The specimen was dissected circumferentially around the cancer.  We dissected down to and included the pectoralis fascia.  The lumpectomy specimen was inked on the back table according to the margin marker kit. It was then fixed with acetic acid and submitted for specimen radiograph with the Mozart, which confirmed the clip and area of interest within the specimen, and was interpreted in the operating room.Hemostasis was obtained and the incision was temporarily packed.    We then turned our attention to the sentinel node portion of the case.     Using the gamma probe, activity was noted and localized in the right axilla. Local anesthetic was injected into the skin where the incision was placed. We made a small transverse inferior axillary incision over the area of activity. We then dissected down through the clavipectoral fascia using electrocautery, identified a target node. It was dissected circumferentially with blunt dissection and the lymphatics were tied The lymph node was labeled for permanent sectioning, hot and not blue with an ex vivo count of 9057. A total of one axillary sentinel lymph nodes were removed.  The probe was placed in the cavity and the background count was less than 10% of the hottest sentinel node and there was no blue dye noted in the axilla indicating adequate and appropriate sentinel lymph node biopsy. The cavity was then palpated and 1 further palpable nodes were noted. Any additional palpable nodes were sent to pathology for permanent section.       We then achieved hemostasis and irrigation was performed.  The incision was then closed with an interuppted 3-0 vicryl deep dermal followed by a running 4-0 vicryl subcuticular.    Within the lumpectomy cavity, hemostasis was achieved with cautery. The wound was  irrigated until clear. There was no evidence of bleeding. It was closed in multiple layers with deep dermal and subcutaneous interrupted Vicryl sutures and a running 4-0 vicryl subcuticular skin closure.    Dermabond was applied to both wounds.  She tolerated the procedure well without complication and was turned over to Anesthesia for transport to the recovery area in a satisfactory condition. All specimens were sent to Pathology for permanent sectioning.    ESTIMATED BLOOD LOSS: 25 ml    SPECIMEN:   Start     Ordered      04/02/25 1409   Specimen to Pathology General Surgery  RELEASE UPON ORDERING        References:    Click here for ordering Quick Tip   Question:  Release to patient  Answer:  Immediate                             ID Type Source Tests Collected by Time Destination   1 : 1. Right breast mass yellow: anterior, black: posterior, red: medial, orange: lateral, blue: superior, green inferior Tissue Breast, Right SPECIMEN TO PATHOLOGY Elvin Ogden MD 4/2/2025 1408     2 : [place] Lymph Node #1, Hot and Blue, 9057 Tissue Lymph Node(s) [place], Breast, Right SPECIMEN TO PATHOLOGY Elvin Ogden MD 4/2/2025 1441     3 : Right Non-[place] Node #1, Palpable Tissue Lymph Node(s), Breast SPECIMEN TO PATHOLOGY Elvin Ogden MD 4/2/2025 1515          COMPLICATIONS: none    DISPOSITION:  PACU--hemodynamically stable    ATTESTATION:  I was present and scrubbed for the entire procedure.      Elvin Ogden MD, ALLYSON, FACS [facility]    [place] Node Biopsy for Breast Cancer  Operation performed with curative intent Yes   Tracer(s) used to identify sentinel nodes in the upfront surgery (non-neoadjuvant) setting Dye and Radioactive tracer   Tracer(s) used to identify sentinel nodes in the neoadjuvant setting N/A   All nodes (colored or non-colored) present at the end of a dye-filled lymphatic channel were removed Yes   All significantly radioactive nodes were removed Yes   All  palpably suspicious nodes were removed Yes   Biopsy-proven positive nodes marked with clips prior to chemotherapy were identified and removed N/A

## 2025-04-07 LAB
DHEA SERPL-MCNC: NORMAL
ESTROGEN SERPL-MCNC: NORMAL PG/ML
INSULIN SERPL-ACNC: NORMAL U[IU]/ML
LAB AP CLINICAL INFORMATION: NORMAL
LAB AP GROSS DESCRIPTION: NORMAL
LAB AP PERFORMING LOCATION(S): NORMAL
LAB AP REPORT FOOTNOTES: NORMAL
LAB AP SYNOPTIC CHECKLIST: NORMAL
T3RU NFR SERPL: NORMAL %

## 2025-04-08 NOTE — ANESTHESIA POSTPROCEDURE EVALUATION
Anesthesia Post Evaluation    Patient: Micaela Hernnadez    Procedure(s) Performed: Procedure(s) (LRB):  LUMPECTOMY,BREAST,WITH RADIOLOGIC MARKER LOCALIZATION AND SENTINEL LYMPH NODE BIOPSY, Right Breast (Right)    Final Anesthesia Type: general      Patient location during evaluation: PACU  Patient participation: Yes- Able to Participate  Level of consciousness: awake and alert and oriented  Pain management: adequate  Airway patency: patent    PONV status at discharge: No PONV  Anesthetic complications: no      Cardiovascular status: blood pressure returned to baseline and hemodynamically stable  Respiratory status: unassisted  Hydration status: euvolemic  Follow-up not needed.              Vitals Value Taken Time   /77 04/02/25 17:00   Temp 36.4 °C (97.6 °F) 04/02/25 16:15   Pulse 70 04/02/25 17:00   Resp 17 04/02/25 17:00   SpO2 96 % 04/02/25 17:00         No case tracking events are documented in the log.      Pain/Robinson Score: No data recorded

## 2025-04-10 DIAGNOSIS — I10 PRIMARY HYPERTENSION: ICD-10-CM

## 2025-04-10 DIAGNOSIS — E11.21 TYPE 2 DIABETES MELLITUS WITH DIABETIC NEPHROPATHY, WITHOUT LONG-TERM CURRENT USE OF INSULIN: ICD-10-CM

## 2025-04-10 RX ORDER — SIMVASTATIN 40 MG/1
40 TABLET, FILM COATED ORAL NIGHTLY
Qty: 90 TABLET | Refills: 0 | Status: SHIPPED | OUTPATIENT
Start: 2025-04-10

## 2025-04-10 RX ORDER — FUROSEMIDE 20 MG/1
20 TABLET ORAL
Qty: 90 TABLET | Refills: 0 | Status: SHIPPED | OUTPATIENT
Start: 2025-04-10

## 2025-04-18 DIAGNOSIS — I48.3 TYPICAL ATRIAL FLUTTER: ICD-10-CM

## 2025-04-18 DIAGNOSIS — I48.0 PAF (PAROXYSMAL ATRIAL FIBRILLATION): ICD-10-CM

## 2025-04-19 DIAGNOSIS — I10 PRIMARY HYPERTENSION: ICD-10-CM

## 2025-04-21 ENCOUNTER — OFFICE VISIT (OUTPATIENT)
Dept: SURGERY | Facility: CLINIC | Age: 55
End: 2025-04-21
Payer: COMMERCIAL

## 2025-04-21 VITALS
WEIGHT: 280.88 LBS | HEART RATE: 65 BPM | DIASTOLIC BLOOD PRESSURE: 72 MMHG | TEMPERATURE: 98 F | SYSTOLIC BLOOD PRESSURE: 135 MMHG | HEIGHT: 63 IN | BODY MASS INDEX: 49.77 KG/M2

## 2025-04-21 DIAGNOSIS — C50.911 INVASIVE DUCTAL CARCINOMA OF BREAST, FEMALE, RIGHT: Primary | ICD-10-CM

## 2025-04-21 PROCEDURE — 99999 PR PBB SHADOW E&M-EST. PATIENT-LVL IV: CPT | Mod: PBBFAC,,, | Performed by: SURGERY

## 2025-04-21 PROCEDURE — 4010F ACE/ARB THERAPY RXD/TAKEN: CPT | Mod: CPTII,S$GLB,, | Performed by: SURGERY

## 2025-04-21 PROCEDURE — 3075F SYST BP GE 130 - 139MM HG: CPT | Mod: CPTII,S$GLB,, | Performed by: SURGERY

## 2025-04-21 PROCEDURE — 3078F DIAST BP <80 MM HG: CPT | Mod: CPTII,S$GLB,, | Performed by: SURGERY

## 2025-04-21 PROCEDURE — 1159F MED LIST DOCD IN RCRD: CPT | Mod: CPTII,S$GLB,, | Performed by: SURGERY

## 2025-04-21 PROCEDURE — 99024 POSTOP FOLLOW-UP VISIT: CPT | Mod: S$GLB,,, | Performed by: SURGERY

## 2025-04-22 RX ORDER — APIXABAN 5 MG/1
5 TABLET, FILM COATED ORAL 2 TIMES DAILY
Qty: 180 TABLET | Refills: 0 | Status: SHIPPED | OUTPATIENT
Start: 2025-04-22

## 2025-04-22 RX ORDER — LOSARTAN POTASSIUM 50 MG/1
50 TABLET ORAL 2 TIMES DAILY
Qty: 180 TABLET | Refills: 0 | Status: SHIPPED | OUTPATIENT
Start: 2025-04-22

## 2025-04-28 ENCOUNTER — OFFICE VISIT (OUTPATIENT)
Dept: HEMATOLOGY/ONCOLOGY | Facility: CLINIC | Age: 55
End: 2025-04-28
Payer: COMMERCIAL

## 2025-04-28 ENCOUNTER — TELEPHONE (OUTPATIENT)
Dept: SURGERY | Facility: CLINIC | Age: 55
End: 2025-04-28
Payer: COMMERCIAL

## 2025-04-28 VITALS
WEIGHT: 280.88 LBS | HEIGHT: 63 IN | DIASTOLIC BLOOD PRESSURE: 83 MMHG | TEMPERATURE: 98 F | HEART RATE: 68 BPM | OXYGEN SATURATION: 98 % | BODY MASS INDEX: 49.77 KG/M2 | SYSTOLIC BLOOD PRESSURE: 134 MMHG

## 2025-04-28 DIAGNOSIS — F41.9 ANXIETY AND DEPRESSION: ICD-10-CM

## 2025-04-28 DIAGNOSIS — F32.A ANXIETY AND DEPRESSION: ICD-10-CM

## 2025-04-28 DIAGNOSIS — C50.619 MALIGNANT NEOPLASM OF AXILLARY TAIL OF BREAST IN FEMALE, ESTROGEN RECEPTOR NEGATIVE, UNSPECIFIED LATERALITY: Primary | ICD-10-CM

## 2025-04-28 DIAGNOSIS — Z17.1 MALIGNANT NEOPLASM OF AXILLARY TAIL OF BREAST IN FEMALE, ESTROGEN RECEPTOR NEGATIVE, UNSPECIFIED LATERALITY: Primary | ICD-10-CM

## 2025-04-28 DIAGNOSIS — G47.9 SLEEP DISTURBANCES: ICD-10-CM

## 2025-04-28 PROCEDURE — 3079F DIAST BP 80-89 MM HG: CPT | Mod: CPTII,S$GLB,, | Performed by: INTERNAL MEDICINE

## 2025-04-28 PROCEDURE — 99215 OFFICE O/P EST HI 40 MIN: CPT | Mod: S$GLB,,, | Performed by: INTERNAL MEDICINE

## 2025-04-28 PROCEDURE — 3075F SYST BP GE 130 - 139MM HG: CPT | Mod: CPTII,S$GLB,, | Performed by: INTERNAL MEDICINE

## 2025-04-28 PROCEDURE — 3008F BODY MASS INDEX DOCD: CPT | Mod: CPTII,S$GLB,, | Performed by: INTERNAL MEDICINE

## 2025-04-28 PROCEDURE — 99999 PR PBB SHADOW E&M-EST. PATIENT-LVL V: CPT | Mod: PBBFAC,,, | Performed by: INTERNAL MEDICINE

## 2025-04-28 PROCEDURE — 4010F ACE/ARB THERAPY RXD/TAKEN: CPT | Mod: CPTII,S$GLB,, | Performed by: INTERNAL MEDICINE

## 2025-04-28 RX ORDER — PROCHLORPERAZINE MALEATE 5 MG
10 TABLET ORAL EVERY 6 HOURS PRN
Qty: 20 TABLET | Refills: 11 | Status: SHIPPED | OUTPATIENT
Start: 2025-05-13

## 2025-04-28 RX ORDER — ONDANSETRON 4 MG/1
4 TABLET, FILM COATED ORAL EVERY 8 HOURS PRN
Qty: 60 TABLET | Refills: 1 | Status: SHIPPED | OUTPATIENT
Start: 2025-04-28 | End: 2025-04-28

## 2025-04-28 RX ORDER — OLANZAPINE 5 MG/1
TABLET, FILM COATED ORAL
Qty: 3 TABLET | Refills: 5 | Status: SHIPPED | OUTPATIENT
Start: 2025-05-13

## 2025-04-28 NOTE — PROGRESS NOTES
REFERRING PHYSICIAN:  No referring provider defined for this encounter.       Shannon Ya MD    DIAGNOSIS:    This is a 55 y.o. female with a stage pT2 N0(sn) M0 grade 3 ER - [place] - HER2 - Ki67 20-30% IDC of the right breast.    TREATMENT SUMMARY:  The patient is status post right partial mastectomy and sentinel node biopsy on 4/2/2025.      Final pathology showed     Final Diagnosis   1. Right breast, lumpectomy:    INVASIVE DUCTAL CARCINOMA, HIGH COMBINED HISTOLOGIC GRADE (DEJA GRADE 3; TUBULES 3, NUCLEAR PLEOMORPHISM 3, MITOTIC ACTIVITY 3), 2.5 CM IN GREATEST EXTENT.  CENTRAL NECROSIS IS PRESENT.  LYMPHOVASCULAR INVASION IS NOT IDENTIFIED.    PERINEURAL INVASION IS NOT IDENTIFIED.  RESECTION MARGIN IS FREE OF INVASIVE CARCINOMA:                INVASIVE CARCINOMA IS PRESENT 0.3 CM FROM THE SUPERIOR, LATERAL AND MEDIAL MARGINS, 0.4 CM FROM THE INFERIOR AND POSTERIOR MARGINS AND 0.5 CM FROM                THE ANTERIOR MARGIN.  BREAST RECEPTORS PERFORMED ON THE PATIENT'S PREVIOUS BIOPSY (WBS-):                ER: NEGATIVE               [place]:  NEGATIVE                HER2:  NEGATIVE (SCORE 0)               KI-67:  20-30%  RADIOLOGIC RADAR MARKER IDENTIFIED GROSSLY.  MICROCALCIFICATIONS ARE NOT IDENTIFIED.     2. Lymph node, right sentinel #1, excision:  1 benign lymph node (0/1).    Previous biopsy site changes.  See comment.     3. Lymph node, right non-sentinel #1, excision:  1 benign lymph node (0/1).       TUMOR BLOCKS FOR ADDITIONAL STUDIES IF NEEDED:  1G-1I, 1K-1P, 1R-1S, 1U-1W, 1Y         Electronically signed by Daniela Vega MD on 4/7/2025 at 10:44 AM    Comments    Cytokeratin AE1/AE3, cam 5.2 and wide-spectrum keratin immunohistochemical stains were performed on the sentinel lymph node (specimens 2).  They show no evidence of metastatic carcinoma.  The stains were examined with the appropriate controls.   Synoptic Checklist   INVASIVE CARCINOMA OF THE BREAST: Resection   8th Edition -  Protocol posted: 6/19/2024INVASIVE CARCINOMA OF THE BREAST: RESECTION - All Specimens  SPECIMEN   Procedure  Excision (less than total mastectomy)   Specimen Laterality  Right   TUMOR   Tumor Site  Clock position     11 o'clock   Histologic Type  Invasive carcinoma of no special type (ductal)   Histologic Grade ([place] Histologic Score)     Glandular (Acinar) / Tubular Differentiation  Score 3   Nuclear Pleomorphism  Score 3   Mitotic Rate  Score 3   Overall Grade  Grade 3 (scores of 8 or 9)   Tumor Size  Greatest dimension of largest invasive focus (Millimeters): 25 mm   Additional Dimension (Millimeters)  20 mm     5 mm   Tumor Focality  Single focus of invasive carcinoma   Ductal Carcinoma In Situ (DCIS)  Not identified   Lobular Carcinoma In Situ (LCIS)  Not identified   Lymphatic and / or Vascular Invasion  Not identified   Dermal Lymphatic and / or Vascular Invasion  No skin present   Microcalcifications  Not identified   Treatment Effect in the Breast  No known presurgical therapy   MARGINS   Margin Status for Invasive Carcinoma  All margins negative for invasive carcinoma   Distance from Invasive Carcinoma to Closest Margin  3 mm   Closest Margin(s) to Invasive Carcinoma  Superior     Medial     Lateral   Distance from Invasive Carcinoma to Anterior Margin  5 mm   Distance from Invasive Carcinoma to Posterior Margin  4 mm   Distance from Invasive Carcinoma to Inferior Margin  4 mm   REGIONAL LYMPH NODES   Regional Lymph Node Status  All regional lymph nodes negative for tumor   Total Number of Lymph Nodes Examined (sentinel and non-sentinel)  2   Number of [place] Nodes Examined  1   pTNM CLASSIFICATION (AJCC 8th Edition)   Reporting of pT, pN, and (when applicable) pM categories is based on information available to the pathologist at the time the report is issued. As per the AJCC (Chapter 1, 8th Ed.) it is the managing physician's responsibility to establish the final pathologic stage based upon all  pertinent information, including but potentially not limited to this pathology report.   pT Category  pT2   pN Category  pN0   SPECIAL STUDIES        Estrogen Receptor (ER) Status  Negative        Progesterone Receptor (PgR) Status  Negative        HER2 (by immunohistochemistry)  Negative (Score 0)        Ki-67 Percentage of Positive Nuclei  30 %   Testing Performed on Case Number  WBS-   .            INTERVAL HISTORY:   Micaela Hernandez comes in for a post-op check.  She denies fever, chills, chest pain or shortness of breath.  Her pain is well controlled.        PHYSICAL EXAMINATION:   General:  This is a well appearing female with appropriate speech, affect and gait.       Physical Exam   Vitals reviewed.  Pulmonary/Chest:           Axilla: incision clean, dry, and intact, no signs of infection, no seroma    IMPRESSION:   The patient has had an uneventful postoperative course.    PLAN:   -path reviewed, margins clear  -She has been instructed to meet with med onc and rad onc for discussion of adjuvant treatment recommendations  -The patient is advised in continued exam of the breast chest wall and to report to this office sooner should she note any areas of abnormality or concern.   -return for a follow up office visit and breast exam after completion of adjuvant therapies      Elvin Ogden MD, ALLYSON, FACS  Breast Surgery  [facility]

## 2025-04-28 NOTE — TELEPHONE ENCOUNTER
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

## 2025-04-28 NOTE — PROGRESS NOTES
Subjective     Patient ID: Micaela Heranndez is a 55 y.o. female.    Chief Complaint: Follow-up (Breast)  Diagnosis : Rt Breast IDC ER neg ME neg [id number]  HPIDawtara Hernandez is a 54 y.o. female who presents with for follow up triple negative right breast IDC. She was in her usual state of health. Of note, the patient had an abnormal screening mammogram 10 months ago. She recently had the diagnostic mammogram related to the abnormal screening, which led to the biopsy. Also had a right axillary biopsy which was negative. She denies HA, visual changes, cough, SOB, abdominal pain, easy bleeding, or easy bruising. She did not palpate a mass. No skin changes. No previous breast biopsies. No family history of breast cancer. Genetic screening pending.     Findings at [facility]:   . Right breast, 11:00, N + 10 cm, biopsy:  Invasive ductal carcinoma, Grade 2, (tubules=3, nuclei=2, mitoses=1)  Invasive carcinoma is present in multiple cores and measures 5 mm in greatest linear dimension within the core biopsy  No carcinoma in-situ or lymphovascular invasion is identified    Tumor biomarkers  Estrogen receptor (ER):  Negative  Progesterone receptor (PGR):  Negative  HER2 (IHC):  Negative, 0  Ki-67:  20-30%    Additional IHC:  E cadherin:  Strong, positive membranous staining, supporting ductal differentiation      2. Right axillary node, biopsy:  No metastatic carcinoma identified in lymphoid tissue      This is a 55 y.o. female with a stage pT2 N0(sn) M0 grade 3 ER - [place] - HER2 - Ki67 20-30% IDC of the right breast.     TREATMENT SUMMARY:  The patient is status post right partial mastectomy and sentinel node biopsy on 4/2/2025.       Final pathology showed         Final Diagnosis   1. Right breast, lumpectomy:    INVASIVE DUCTAL CARCINOMA, HIGH COMBINED HISTOLOGIC GRADE (DEJA GRADE 3; TUBULES 3, NUCLEAR PLEOMORPHISM 3, MITOTIC ACTIVITY 3), 2.5 CM IN GREATEST EXTENT.  CENTRAL NECROSIS IS PRESENT.  LYMPHOVASCULAR INVASION IS NOT  IDENTIFIED.    PERINEURAL INVASION IS NOT IDENTIFIED.  RESECTION MARGIN IS FREE OF INVASIVE CARCINOMA:                INVASIVE CARCINOMA IS PRESENT 0.3 CM FROM THE SUPERIOR, LATERAL AND MEDIAL MARGINS, 0.4 CM FROM THE INFERIOR AND POSTERIOR MARGINS AND 0.5 CM FROM                THE ANTERIOR MARGIN.  BREAST RECEPTORS PERFORMED ON THE PATIENT'S PREVIOUS BIOPSY (WBS-):                ER: NEGATIVE               ID:  NEGATIVE                HER2:  NEGATIVE (SCORE 0)               KI-67:  20-30%  RADIOLOGIC RADAR MARKER IDENTIFIED GROSSLY.  MICROCALCIFICATIONS ARE NOT IDENTIFIED.     2. Lymph node, right sentinel #1, excision:  1 benign lymph node (0/1).    Previous biopsy site changes.  See comment.     3. Lymph node, right non-sentinel #1, excision:  1 benign lymph node (0/1).       TUMOR BLOCKS FOR ADDITIONAL STUDIES IF NEEDED:  1G-1I, 1K-1P, 1R-1S, 1U-1W, 1Y         Electronically signed by Daniela Vega MD on 2025 at 10:44 AM             GynHx: Menarche 10 y/o   Hysterectomy 2 yrs ago has ovaries - sec to fibroids  27 y/o with first pregnancy. Breast feed x 2 wks  No HRT        Past Medical History:   Diagnosis Date    Allergy     Atrial flutter     Degenerative lumbar disc 2021    Diabetes mellitus     Diabetes mellitus, type 2     Hyperlipidemia     Hypertension     PAF (paroxysmal atrial fibrillation) 2022    Sleep apnea     Symptomatic anemia 2023     Past Surgical History:   Procedure Laterality Date    BREAST SURGERY       SECTION      CHOLECYSTECTOMY      ENDOMETRIAL ABLATION      HERNIA REPAIR      incisional     HYSTEROSCOPY WITH DILATION AND CURETTAGE OF UTERUS N/A 3/1/2023    Procedure: HYSTEROSCOPY, WITH DILATION AND CURETTAGE OF UTERUS;  Surgeon: Lola Zhao MD;  Location: [facility];  Service: OB/GYN;  Laterality: N/A;    KNEE SURGERY      LAPAROSCOPIC TOTAL HYSTERECTOMY N/A 3/28/2023    Procedure: HYSTERECTOMY, TOTAL, LAPAROSCOPIC;  Surgeon: Lola Zhao MD;   "Location: [facility] OR;  Service: OB/GYN;  Laterality: N/A;  Large patient and large uterus, may be difficult case.  May convert to open.  T/S=---UPT  ON 3/27  RN PREOP 3/15/2023---BMI--52.32    LIVER LOBECTOMY Right     LUMPECTOMY,BREAST, WITH RADIOACTIVE SEED LOCALIZATION AND SENTINEL LYMPH NODE BIOPSY Right 4/2/2025    Procedure: LUMPECTOMY,BREAST,WITH RADIOLOGIC MARKER LOCALIZATION AND SENTINEL LYMPH NODE BIOPSY, Right Breast;  Surgeon: Elvin Ogden MD;  [facility];  Service: General;  Laterality: Right;    LYMPH NODE DISSECTION      right axillary    TRANSESOPHAGEAL ECHOCARDIOGRAPHY N/A 1/26/2022    Procedure: ECHOCARDIOGRAM, TRANSESOPHAGEAL;  Surgeon: Ji Patricio MD;  Location: [facility] CATH LAB;  Service: Cardiology;  Laterality: N/A;        Review of Systems   Constitutional:  Negative for appetite change, fatigue, fever and unexpected weight change.   HENT:  Negative for mouth sores.    Eyes:  Negative for visual disturbance.   Respiratory:  Negative for cough and shortness of breath.    Cardiovascular:  Negative for chest pain.   Gastrointestinal:  Negative for abdominal pain and diarrhea.   Genitourinary:  Negative for frequency.   Musculoskeletal:  Negative for back pain.   Integumentary:  Negative for rash.   Neurological:  Negative for headaches.   Hematological:  Negative for adenopathy.   Psychiatric/Behavioral:  Positive for sleep disturbance. The patient is nervous/anxious.           Objective   Vitals:    04/28/25 1053   BP: 134/83   Pulse: 68   Temp: 97.7 °F (36.5 °C)   SpO2: 98%   Weight: 127.4 kg (280 lb 13.9 oz)   Height: 5' 3" (1.6 m)       Physical Exam  Constitutional:       Appearance: She is well-developed.   HENT:      Head: Normocephalic.      Right Ear: External ear normal.      Left Ear: External ear normal.      Mouth/Throat:      Pharynx: No oropharyngeal exudate.   Eyes:      General: No scleral icterus.        Right eye: No discharge.         Left eye: No " discharge.      Conjunctiva/sclera: Conjunctivae normal.   Cardiovascular:      Rate and Rhythm: Normal rate and regular rhythm.      Heart sounds: Normal heart sounds. No murmur heard.  Pulmonary:      Effort: Pulmonary effort is normal.      Breath sounds: No wheezing.   Chest:   Breasts:     Right: No mass.      Comments: Rt breast- fullness noted in upper inner quadrant, no mass palpated  Abdominal:      General: Bowel sounds are normal.      Palpations: Abdomen is soft.      Tenderness: There is no abdominal tenderness. There is no guarding.   Musculoskeletal:         General: Normal range of motion.      Cervical back: Normal range of motion and neck supple.      Right lower leg: No edema.      Left lower leg: No edema.   Skin:     Coloration: Skin is not jaundiced.      Findings: No rash.   Neurological:      General: No focal deficit present.      Mental Status: She is alert and oriented to person, place, and time.   Psychiatric:         Mood and Affect: Mood normal.         Behavior: Behavior normal.       Name: Micaela Hernandez     MRN: [id number]     Result:   Mammo Digital Diagnostic Bilat with Ruben  US Breast Right Limited     History:  Patient is 54 y.o. and is seen for abnormal mammogram.        Films Compared:  Compared to: 12/27/2023 Mammo Digital Screening Bilat w/ Ruben     Findings:  This procedure was performed using tomosynthesis. Computer-aided detection was utilized in the interpretation of this examination.  There are scattered areas of fibroglandular density.      Mammo Digital Diagnostic Bilat with Ruben  Right  Mass: There is a high density, round mass with obscured margins seen in the upper region of the right breast in the posterior depth on the MLO view.   Lymph Node: There is a lymph node seen in the right axilla.      Left  There is no evidence of suspicious masses, calcifications, or other abnormal findings in the left breast.     US Breast Right Limited  Right  Mass: There is a 24 mm x  23 mm x 20 mm round, heterogeneous mass with indistinct margins with shadowing seen in the right breast at 11 o'clock, 10 cm from the nipple. The mass correlates with the mass seen on the mammogram.   Lymph Node: There is an indeterminate lymph node seen in the right axilla at level I. Cortical thickness measures 5 mm on the right.      Impression:  Right  Mass: Right breast 24 mm x 23 mm x 20 mm mass at 11 o'clock. Assessment: 5 - Highly suggestive of malignancy. Ultrasound-guided biopsy is recommended.   Lymph Node: Right axilla lymph node. Assessment: 4 - Suspicious finding. Ultrasound-guided biopsy is recommended.      Left  Mammo Digital Diagnostic Bilat with Ruben  There is no mammographic evidence of malignancy in the left breast.        BI-RADS Category:   Overall: 5 - Highly Suggestive of Malignancy     Recommendation:  Ultrasound-guided biopsy is recommended of right breast mass and right axillary lymph node. I discussed these findings and recommendations with the patient in detail at the time of exam.     Your estimated lifetime risk of breast cancer (to age 85) based on Tyrer-Cuzick risk assessment model is 8.96%.  According to the American Cancer Society, patients with a lifetime breast cancer risk of 20% or higher might benefit from supplemental screening tests, such as screening breast MRI.     Roula Ness MD        Assessment and Plan       1. Malignant neoplasm of right breast in female, estrogen receptor positive, unspecified site of breast (Primary)  This is a 55 y.o. female with a stage pT2 N0(sn) M0 grade 3 ER - [place] - HER2 - Ki67 20-30% IDC of the right breast.  The patient is status post right partial mastectomy and sentinel node biopsy on 4/2/2025.       Discussed pathology findings  Plan adjuvant TC x 4  Discussed The risks of chemotherapy include but not limited to hair and skin changes, bone marrow damage (anemia, thrombocytopenia, immune suppression, neutropenia), allergic  reactions, diarrhea, constipation, mouth sores, neuropathy, secondary cancers, damage at injection sites and nearly death.     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 Anxiety and depression    -Cont sertraline (ZOLOFT) 25 MG tablet; Take 1 tablet (25 mg total) by mouth once daily.  Dispense: 30 tablet; Refill: 11    3. Sleep disturbances  Cont temazepam (RESTORIL) 7.5 MG Cap; Take 1 capsule (7.5 mg total) by mouth nightly as needed (sleep disturbances).  Dispense: 30 capsule; Refill: 0   Pt wants to wait for genetic screening    I spent a total of 40  minutes on the day of the visit.This includes face to face time and non-face to face time preparing to see the patient (eg, review of tests), obtaining and/or reviewing separately obtained history, documenting clinical information in the electronic or other health record, independently interpreting results and communicating results to the patient/family/caregiver, and coordinating care.      Follow-up following  prior to cycle 2 with labs prior to follow-up

## 2025-04-28 NOTE — PLAN OF CARE
START ON PATHWAY REGIMEN - Breast    [id number]        Cyclophosphamide       Docetaxel (Taxotere)     **Always confirm dose/schedule in your pharmacy ordering system**    Patient Characteristics:  Postoperative without Neoadjuvant Therapy, M0 (Pathologic Staging), Invasive   Disease, Adjuvant Therapy, HER2 Negative, ER Negative, Node Negative, pT1a-c,   N1mi or pT1c or Higher, pN0  Therapeutic Status: Postoperative without Neoadjuvant Therapy, M0 (Pathologic   Staging)  AJCC Grade: G3  AJCC N Category: pN0  AJCC M Category: cM0  ER Status: Negative (-)  AJCC 8 Stage Grouping: IIA  HER2 Status: Negative (-)  Oncotype Dx Recurrence Score: Not Appropriate  AJCC T Category: pT2  TX Status: Negative (-)  Intent of Therapy:  Curative Intent, Discussed with Patient

## 2025-04-28 NOTE — Clinical Note
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

## 2025-04-30 ENCOUNTER — OFFICE VISIT (OUTPATIENT)
Dept: SURGERY | Facility: CLINIC | Age: 55
End: 2025-04-30
Payer: COMMERCIAL

## 2025-04-30 ENCOUNTER — TELEPHONE (OUTPATIENT)
Dept: ELECTROPHYSIOLOGY | Facility: CLINIC | Age: 55
End: 2025-04-30
Payer: COMMERCIAL

## 2025-04-30 VITALS
HEART RATE: 75 BPM | BODY MASS INDEX: 49.64 KG/M2 | DIASTOLIC BLOOD PRESSURE: 69 MMHG | WEIGHT: 280.19 LBS | HEIGHT: 63 IN | SYSTOLIC BLOOD PRESSURE: 121 MMHG

## 2025-04-30 DIAGNOSIS — I48.3 TYPICAL ATRIAL FLUTTER: ICD-10-CM

## 2025-04-30 DIAGNOSIS — C50.619 MALIGNANT NEOPLASM OF AXILLARY TAIL OF BREAST IN FEMALE, ESTROGEN RECEPTOR NEGATIVE, UNSPECIFIED LATERALITY: Primary | ICD-10-CM

## 2025-04-30 DIAGNOSIS — I48.0 PAF (PAROXYSMAL ATRIAL FIBRILLATION): Primary | ICD-10-CM

## 2025-04-30 DIAGNOSIS — Z17.1 MALIGNANT NEOPLASM OF AXILLARY TAIL OF BREAST IN FEMALE, ESTROGEN RECEPTOR NEGATIVE, UNSPECIFIED LATERALITY: Primary | ICD-10-CM

## 2025-04-30 PROCEDURE — 99999 PR PBB SHADOW E&M-EST. PATIENT-LVL V: CPT | Mod: PBBFAC,,, | Performed by: SURGERY

## 2025-04-30 RX ORDER — SODIUM CHLORIDE 9 MG/ML
INJECTION, SOLUTION INTRAVENOUS CONTINUOUS
OUTPATIENT
Start: 2025-04-30

## 2025-04-30 RX ORDER — LIDOCAINE HYDROCHLORIDE 10 MG/ML
1 INJECTION, SOLUTION EPIDURAL; INFILTRATION; INTRACAUDAL; PERINEURAL ONCE
OUTPATIENT
Start: 2025-04-30 | End: 2025-04-30

## 2025-04-30 RX ORDER — PROPAFENONE HYDROCHLORIDE 225 MG/1
225 CAPSULE, EXTENDED RELEASE ORAL EVERY 12 HOURS
Qty: 60 CAPSULE | Refills: 11 | Status: SHIPPED | OUTPATIENT
Start: 2025-05-04 | End: 2026-05-04

## 2025-04-30 RX ORDER — CEFAZOLIN SODIUM 2 G/50ML
2 SOLUTION INTRAVENOUS
OUTPATIENT
Start: 2025-04-30

## 2025-04-30 NOTE — TELEPHONE ENCOUNTER
Spoke with patient and relayed Dr Kirk's recommendations:    Erich Kirk MD Wilson, Margaux RUCKER, RN  Thao can you discontinue Multaq, initiate Propafenone  mg BID in 3 days.  ECG one week later    She verbalizes understanding of the plan. Will take her last dose of Multaq this evening. Then, will start Propafenone SR 225mg bid on 5/4/2025. EKG to be done at B on 5/12/2025.

## 2025-04-30 NOTE — PROGRESS NOTES
History and Physical Exam    Patient ID: Micaela Hernandez is a 55 y.o. female.    Chief Complaint: Consult and port placement      HPI:  56 y/o woman with history of right breast cancer needs port.         Review of Systems   Constitutional:  Negative for chills and unexpected weight change.   HENT:  Negative for congestion, ear pain and sore throat.    Eyes:  Negative for pain and redness.   Respiratory:  Negative for cough and shortness of breath.    Cardiovascular:  Negative for chest pain and palpitations.   Gastrointestinal:  Negative for abdominal distention, abdominal pain and constipation.   Endocrine: Negative for cold intolerance and heat intolerance.   Genitourinary:  Negative for dysuria and frequency.   Musculoskeletal:  Negative for back pain and neck pain.   Skin:  Negative for pallor and rash.   Neurological:  Negative for syncope, light-headedness and headaches.   Hematological:  Negative for adenopathy. Does not bruise/bleed easily.   Psychiatric/Behavioral:  Negative for confusion and hallucinations.        Current Medications[1]    Review of patient's allergies indicates:   Allergen Reactions    Adhesive Blisters, Itching and Rash       Past Medical History:   Diagnosis Date    Allergy     Atrial flutter     Degenerative lumbar disc 2021    Diabetes mellitus     Diabetes mellitus, type 2     Hyperlipidemia     Hypertension     Malignant neoplasm of axillary tail of breast in female, estrogen receptor negative 2025    PAF (paroxysmal atrial fibrillation) 2022    Sleep apnea     Symptomatic anemia 2023       Past Surgical History:   Procedure Laterality Date    BREAST SURGERY       SECTION      CHOLECYSTECTOMY      ENDOMETRIAL ABLATION      HERNIA REPAIR      incisional     HYSTEROSCOPY WITH DILATION AND CURETTAGE OF UTERUS N/A 3/1/2023    Procedure: HYSTEROSCOPY, WITH DILATION AND CURETTAGE OF UTERUS;  Surgeon: Lola Zhao MD;  Location: [facility];  Service: OB/GYN;   Laterality: N/A;    KNEE SURGERY      LAPAROSCOPIC TOTAL HYSTERECTOMY N/A 3/28/2023    Procedure: HYSTERECTOMY, TOTAL, LAPAROSCOPIC;  Surgeon: Lola Zhao MD;  Location: [facility] OR;  Service: OB/GYN;  Laterality: N/A;  Large patient and large uterus, may be difficult case.  May convert to open.  T/S=---UPT  ON 3/27  RN PREOP 3/15/2023---BMI--52.32    LIVER LOBECTOMY Right     LUMPECTOMY,BREAST, WITH RADIOACTIVE SEED LOCALIZATION AND SENTINEL LYMPH NODE BIOPSY Right 4/2/2025    Procedure: LUMPECTOMY,BREAST,WITH RADIOLOGIC MARKER LOCALIZATION AND SENTINEL LYMPH NODE BIOPSY, Right Breast;  Surgeon: Elvin Ogden MD;  Location: [address];  Service: General;  Laterality: Right;    LYMPH NODE DISSECTION      right axillary    TRANSESOPHAGEAL ECHOCARDIOGRAPHY N/A 1/26/2022    Procedure: ECHOCARDIOGRAM, TRANSESOPHAGEAL;  Surgeon: Ji Patricio MD;  Location: [facility] CATH LAB;  Service: Cardiology;  Laterality: N/A;       Family History   Problem Relation Name Age of Onset    Hypertension Mother      Asthma Mother      COPD Mother      Hyperlipidemia Mother      Diabetes type II Mother      Cataracts Mother      Diabetes Father      Hypertension Father      Hyperlipidemia Father      Cataracts Father      Cancer Sister Swetha         type unknown, but CA in mouth    No Known Problems Brother      No Known Problems Maternal Aunt      No Known Problems Maternal Uncle      No Known Problems Paternal Aunt      No Known Problems Paternal Uncle      No Known Problems Maternal Grandmother      No Known Problems Maternal Grandfather      No Known Problems Paternal Grandmother      No Known Problems Paternal Grandfather      Breast cancer Neg Hx      Colon cancer Neg Hx      Ovarian cancer Neg Hx      Amblyopia Neg Hx      Blindness Neg Hx      Macular degeneration Neg Hx      Retinal detachment Neg Hx      Strabismus Neg Hx      Stroke Neg Hx      Thyroid disease Neg Hx         Social History[2]    Vitals:    04/30/25 1323    BP: 121/69   Pulse: 75       Physical Exam  Constitutional:       Appearance: She is well-developed.   HENT:      Head: Normocephalic and atraumatic.   Eyes:      Pupils: Pupils are equal, round, and reactive to light.   Cardiovascular:      Rate and Rhythm: Normal rate and regular rhythm.      Heart sounds: No murmur heard.  Pulmonary:      Effort: Pulmonary effort is normal.      Breath sounds: Normal breath sounds. No wheezing.   Abdominal:      General: There is no distension.      Palpations: Abdomen is soft.      Tenderness: There is no abdominal tenderness.   Musculoskeletal:         General: Normal range of motion.      Cervical back: Normal range of motion and neck supple.   Skin:     General: Skin is warm and dry.      Capillary Refill: Capillary refill takes less than 2 seconds.      Findings: No rash.   Neurological:      Mental Status: She is alert and oriented to person, place, and time.   Psychiatric:         Judgment: Judgment normal.         Assessment & Plan:   Breast cancer, plan left possible right sided port placement.     Risks, benefits, alternatives to the procedure were discussed with the patient, who appears to understand and agree with the treatment plan.         [1]   Current Outpatient Medications   Medication Sig Dispense Refill    amLODIPine (NORVASC) 5 MG tablet TAKE 1 TABLET BY MOUTH ONCE DAILY IN THE EVENING 90 tablet 0    betamethasone dipropionate (DIPROLENE) 0.05 % ointment Apply topically 2 (two) times daily. 15 g 0    carvediloL (COREG) 12.5 MG tablet TAKE 1 TABLET BY MOUTH TWICE DAILY WITH MEALS 180 tablet 0    clobetasoL (TEMOVATE) 0.05 % external solution Apply topically 2 (two) times daily. 50 mL 0    ELIQUIS 5 mg Tab Take 1 tablet by mouth twice daily 180 tablet 0    furosemide (LASIX) 20 MG tablet Take 1 tablet by mouth once daily 90 tablet 0    loratadine (CLARITIN) 10 mg tablet Take 10 mg by mouth once daily. prn      losartan (COZAAR) 50 MG tablet Take 1 tablet by  mouth twice daily 180 tablet 0    metFORMIN (GLUCOPHAGE) 1000 MG tablet TAKE 1 TABLET BY MOUTH TWICE DAILY WITH MEALS (Patient taking differently: Take 1,000 mg by mouth every evening.) 180 tablet 0    multivitamin (ONE DAILY MULTIVITAMIN) per tablet Take 1 tablet by mouth once daily.      [START ON 5/13/2025] OLANZapine (ZYPREXA) 5 MG tablet Take 1 tablet by mouth nightly on days 1-3 of each chemotherapy cycle. 3 tablet 5    [START ON 5/13/2025] prochlorperazine (COMPAZINE) 5 MG tablet Take 2 tablets (10 mg total) by mouth every 6 (six) hours as needed for Nausea. 20 tablet 11    [START ON 5/4/2025] propafenone (RYTHMOL SR) 225 MG Cp12 Take 1 capsule (225 mg total) by mouth every 12 (twelve) hours. 60 capsule 11    sertraline (ZOLOFT) 25 MG tablet Take 1 tablet (25 mg total) by mouth once daily. (Patient taking differently: Take 25 mg by mouth nightly.) 30 tablet 11    simvastatin (ZOCOR) 40 MG tablet TAKE 1 TABLET BY MOUTH ONCE DAILY IN THE EVENING 90 tablet 0    tirzepatide (MOUNJARO) 15 mg/0.5 mL PnIj Inject 15 mg into the skin every 7 days. 2 mL 11     No current facility-administered medications for this visit.   [2]   Social History  Socioeconomic History    Marital status:    Tobacco Use    Smoking status: Never     Passive exposure: Never    Smokeless tobacco: Never   Substance and Sexual Activity    Alcohol use: Not Currently     Comment: occasionally    Drug use: No    Sexual activity: Not Currently     Partners: Male     Birth control/protection: None     Social Drivers of Health     Financial Resource Strain: Low Risk  (2/14/2025)    Overall Financial Resource Strain (CARDIA)     Difficulty of Paying Living Expenses: Not very hard   Food Insecurity: No Food Insecurity (2/14/2025)    Hunger Vital Sign     Worried About Running Out of Food in the Last Year: Never true     Ran Out of Food in the Last Year: Never true   Transportation Needs: No Transportation Needs (2/14/2025)    PRAPARE -  Transportation     Lack of Transportation (Medical): No     Lack of Transportation (Non-Medical): No   Physical Activity: Inactive (2/14/2025)    Exercise Vital Sign     Days of Exercise per Week: 0 days     Minutes of Exercise per Session: 0 min   Stress: Stress Concern Present (2/14/2025)    Citizen of [place] [employer] - Occupational Stress Questionnaire     Feeling of Stress : Very much   Housing Stability: Low Risk  (2/14/2025)    Housing Stability Vital Sign     Unable to Pay for Housing in the Last Year: No     Number of Times Moved in the Last Year: 0     Homeless in the Last Year: No

## 2025-04-30 NOTE — TELEPHONE ENCOUNTER
----- Message from Erich Kirk MD sent at 4/30/2025 10:36 AM CDT -----  Thao can you discontinue Multaq, initiate Propafenone  mg BID in 3 days.ECG one week later  ----- Message -----  From: Eric Levy, PharmD  Sent: 4/30/2025   9:03 AM CDT  To: Jewell Davis MD; Erich Kirk MD; [place]#    Propfenone does not have any drug-drug interactions with docetaxel or cyclophosphamide and is an acceptable option from my perspective.ThanksEric  ----- Message -----  From: Erich Kirk MD  Sent: 4/30/2025   7:35 AM CDT  To: Jewell Davis MD; Vidal Fletcher, Pha#    We could try Propafenone if that is ok with your pharmacist. She did not tolerate a different Class IC agent (Flecainide), but she may tolerate this. After that, it would likely be amiodarone, which would it be ideal to defer given the risks associated with this long term. We could consider amiodarone until done with chemo if Propafenone is not an option or she does not tolerate. Just let me know what your pharmacist says, and we will arrange.Erich  ----- Message -----  From: Jewell Davis MD  Sent: 4/29/2025  10:49 AM CDT  To: Erich Kirk MD; Vidal Fletcher, PharmD; #    Hi Dr. Kirk,I am following Ms. Hernandez for Breast CA and planning to start chemotherapy with Cytoxan and Docetaxel. Pharmacy has advised that planned chemotherapy wit Docetaxel possible interaction with Multaq. Plan is for 4 cycles of chemo every q 3weeks. Could pt be switched to alternate antiarrhymthic prior to initiation of chemo which is planned to start in approx 2 wks?Thanks, Jewell Davis  ----- Message -----  From: Jenn Vidal MA  Sent: 4/29/2025   7:29 AM CDT  To: Jewell Davis MD    Please advise on patients medication intake.Thank You, Jenn:MA  ----- Message -----  From: Eric Levy, PharmD  Sent: 4/28/2025   9:03 PM CDT  To: Jewell Davis MD; [facility]#    Hi Dr. Davis,Patient has dronedarone listed on her home meds  which is a category D interaction with docetaxel which I have summarized below: Dronedarone may increase the serum concentration of Docetaxel. Avoid this combination if possible. If this combination must be used, consider using a reduced docetaxel dose, and/or increase monitoring for evidence of serious docetaxel toxicity (e.g., neutropenia, mucositis, hepatotoxicity etc.).If she is still taking dronedarone, I recommend cardiology evaluation prior to docetaxel start. Please let me know if I can further assist.Thanks,Eric

## 2025-05-01 ENCOUNTER — ANESTHESIA EVENT (OUTPATIENT)
Dept: SURGERY | Facility: HOSPITAL | Age: 55
End: 2025-05-01
Payer: COMMERCIAL

## 2025-05-02 ENCOUNTER — HOSPITAL ENCOUNTER (OUTPATIENT)
Dept: PREADMISSION TESTING | Facility: HOSPITAL | Age: 55
Discharge: HOME | End: 2025-05-02
Attending: STUDENT IN AN ORGANIZED HEALTH CARE EDUCATION/TRAINING PROGRAM
Payer: COMMERCIAL

## 2025-05-02 VITALS
SYSTOLIC BLOOD PRESSURE: 133 MMHG | RESPIRATION RATE: 18 BRPM | BODY MASS INDEX: 49.75 KG/M2 | OXYGEN SATURATION: 97 % | HEIGHT: 63 IN | DIASTOLIC BLOOD PRESSURE: 82 MMHG | WEIGHT: 280.75 LBS | HEART RATE: 79 BPM | TEMPERATURE: 98 F

## 2025-05-02 DIAGNOSIS — Z01.818 PREOPERATIVE TESTING: Primary | ICD-10-CM

## 2025-05-02 LAB
ABSOLUTE EOSINOPHIL (OHS): 0.54 K/UL
ABSOLUTE MONOCYTE (OHS): 0.7 K/UL (ref 0.3–1)
ABSOLUTE NEUTROPHIL COUNT (OHS): 4.82 K/UL (ref 1.8–7.7)
ALBUMIN SERPL BCP-MCNC: 3.7 G/DL (ref 3.5–5.2)
ALP SERPL-CCNC: 97 UNIT/L (ref 40–150)
ALT SERPL W/O P-5'-P-CCNC: 24 UNIT/L (ref 10–44)
ANION GAP (OHS): 8 MMOL/L (ref 8–16)
AST SERPL-CCNC: 26 UNIT/L (ref 11–45)
BASOPHILS # BLD AUTO: 0.07 K/UL
BASOPHILS NFR BLD AUTO: 0.8 %
BILIRUB SERPL-MCNC: 0.3 MG/DL (ref 0.1–1)
BUN SERPL-MCNC: 13 MG/DL (ref 6–20)
CALCIUM SERPL-MCNC: 9.7 MG/DL (ref 8.7–10.5)
CHLORIDE SERPL-SCNC: 107 MMOL/L (ref 95–110)
CO2 SERPL-SCNC: 25 MMOL/L (ref 23–29)
CREAT SERPL-MCNC: 0.8 MG/DL (ref 0.5–1.4)
ERYTHROCYTE [DISTWIDTH] IN BLOOD BY AUTOMATED COUNT: 14.1 % (ref 11.5–14.5)
GFR SERPLBLD CREATININE-BSD FMLA CKD-EPI: >60 ML/MIN/1.73/M2
GLUCOSE SERPL-MCNC: 97 MG/DL (ref 70–110)
HCT VFR BLD AUTO: 36.7 % (ref 37–48.5)
HGB BLD-MCNC: 11.8 GM/DL (ref 12–16)
IMM GRANULOCYTES # BLD AUTO: 0.02 K/UL (ref 0–0.04)
IMM GRANULOCYTES NFR BLD AUTO: 0.2 % (ref 0–0.5)
LYMPHOCYTES # BLD AUTO: 2.57 K/UL (ref 1–4.8)
MCH RBC QN AUTO: 28.6 PG (ref 27–31)
MCHC RBC AUTO-ENTMCNC: 32.2 G/DL (ref 32–36)
MCV RBC AUTO: 89 FL (ref 82–98)
NUCLEATED RBC (/100WBC) (OHS): 0 /100 WBC
PLATELET # BLD AUTO: 199 K/UL (ref 150–450)
PMV BLD AUTO: 12.7 FL (ref 9.2–12.9)
POTASSIUM SERPL-SCNC: 3.9 MMOL/L (ref 3.5–5.1)
PROT SERPL-MCNC: 8.3 GM/DL (ref 6–8.4)
RBC # BLD AUTO: 4.13 M/UL (ref 4–5.4)
RELATIVE EOSINOPHIL (OHS): 6.2 %
RELATIVE LYMPHOCYTE (OHS): 29.5 % (ref 18–48)
RELATIVE MONOCYTE (OHS): 8 % (ref 4–15)
RELATIVE NEUTROPHIL (OHS): 55.3 % (ref 38–73)
SODIUM SERPL-SCNC: 140 MMOL/L (ref 136–145)
WBC # BLD AUTO: 8.72 K/UL (ref 3.9–12.7)

## 2025-05-02 PROCEDURE — 82040 ASSAY OF SERUM ALBUMIN: CPT | Performed by: STUDENT IN AN ORGANIZED HEALTH CARE EDUCATION/TRAINING PROGRAM

## 2025-05-02 PROCEDURE — 93005 ELECTROCARDIOGRAM TRACING: CPT

## 2025-05-02 PROCEDURE — 93010 ELECTROCARDIOGRAM REPORT: CPT | Mod: ,,, | Performed by: INTERNAL MEDICINE

## 2025-05-02 PROCEDURE — 85025 COMPLETE CBC W/AUTO DIFF WBC: CPT | Performed by: STUDENT IN AN ORGANIZED HEALTH CARE EDUCATION/TRAINING PROGRAM

## 2025-05-02 RX ORDER — ONDANSETRON 4 MG/1
4 TABLET, FILM COATED ORAL EVERY 8 HOURS PRN
COMMUNITY
Start: 2025-04-28

## 2025-05-02 NOTE — DISCHARGE INSTRUCTIONS
YOUR PROCEDURE WILL BE AT [facility] at [address], [place] [address]                 Enter through the Main Entrance facing [address].      Your procedure  is scheduled for _5/6/2025_________.    Call [phone] between 2pm and 5pm on _5/5/2025______to find out your arrival time for the day of surgery.    You may have up to three visitors.  No children under 18 years old.     You will be going to the Same Day Surgery Unit on the 2nd floor of the hospital.    Report to the Same Day Surgery Registration Desk in the hallway.(Just beside the Same Day Surgery Unit)                    DO NOT PARK IN THE GARAGE.  THERE IS NO OPEN ENTRANCE TO THE HOSPITAL BUILDING AND NO ELEVATOR.      Important instructions:  Do not eat anything after midnight.  You may have plain water, non carbonated.  You may also have Gatorade or Powerade after midnight.    Stop all fluids 2 hours before your surgery.    It is okay to brush your teeth.  Do not have gum, candy or mints.    SEE MEDICATION SHEET.   TAKE MEDICATIONS AS DIRECTED.    Do not take any diabetic medication on the morning of surgery unless instructed to do so by your doctor or pre op nurse.    All GLP-1 weekly diabetic/weight loss medications must not be taken for one week before your surgery, or your surgery could be canceled.      STOP taking for 7 days before surgery:    Aspirin           Voltaren (Diclofenac)  Ibuprofen  (Advil, Motrin)                Indomethocin  Mobic (meloxicam, celebrex)      Etodolac   Aleve (naproxen)          Toradol (ketoralac)  Fish oil, Krill oil and Vitamin E  Headache Powders (BC Powder, Goody's Powder, Stanback)                           You may take Tylenol if needed which is not a blood thinner.    Please shower the night before and the morning of your surgery.                 Use Chlorhexidine soap as instructed by your pre op nurse.   Please place clean linens on your bed the night before  surgery. Please wear fresh clean clothing after each shower.    No shaving of procedural area at least 4-5 days before surgery due to increased risk of skin irritation and/or possible infection.    Contact lenses and removable denture work may not be worn during your procedure.    You may wear deodorant only. If you are having breast surgery, do not wear deodorant on the operative side.    Do not wear powder, body lotion, perfume/cologne or make-up.    Do not wear any jewelry or have any metal on your body.    You will be asked to remove any dentures or partials for the procedure.    If you are going home on the same day of surgery, you must arrange for a family member or a friend to drive you home.  Public transportation is prohibited.  You will not be able to drive home if you were given anesthesia or sedation.    Patients who want to have their Post-op prescriptions filled from our in-house [facility], bring a Credit/Debit Card or cash with you. A co-pay may be required.  The pharmacy closes at 5:30 pm.    Wear loose fitting clothes allowing for bandages.    Please leave money and valuables home.      You may bring your cell phone.    Call the doctor if fever or illness should occur before your surgery.    Call [phone] to contact us here if needed.                            CLOTHES ON DAY OF SURGERY    SHOULDER surgery:  you must have a very oversized shirt.  Very, Very large.  You will probably have a large sling on with your arm strapped to your chest.  You will not be able to put the arm of the operated shoulder into a sleeve.  You can put the arm of the un-operated shoulder into the sleeve, but the shirt will need to be draped over the operated shoulder.       ARM or HAND surgery:  make sure that your sleeves are large and loose enough to pass over large dressings or cast.      BREAST or UNDERARM surgery:  wear a loose, button down shirt so that you can dress without raising your arms over your  head.    ABDOMINAL surgery:  wear loose, comfortable clothing.  Nothing tight around the abdomen.  NO JEANS    PENIS or SCROTAL surgery:  loose comfortable clothing.  Large sweat pants, pajama pants or a robe.  ABSOLUTELY NO JEANS      LEG or FOOT surgery:  wear large loose pants that are able to pass over any large dressings or casts.  You could also wear loose shorts or a skirt.

## 2025-05-03 LAB
OHS QRS DURATION: 86 MS
OHS QTC CALCULATION: 475 MS

## 2025-05-05 ENCOUNTER — TELEPHONE (OUTPATIENT)
Dept: SURGERY | Facility: HOSPITAL | Age: 55
End: 2025-05-05
Payer: COMMERCIAL

## 2025-05-06 ENCOUNTER — HOSPITAL ENCOUNTER (OUTPATIENT)
Facility: HOSPITAL | Age: 55
Discharge: HOME | End: 2025-05-06
Attending: STUDENT IN AN ORGANIZED HEALTH CARE EDUCATION/TRAINING PROGRAM | Admitting: STUDENT IN AN ORGANIZED HEALTH CARE EDUCATION/TRAINING PROGRAM
Payer: COMMERCIAL

## 2025-05-06 ENCOUNTER — ANESTHESIA (OUTPATIENT)
Dept: SURGERY | Facility: HOSPITAL | Age: 55
End: 2025-05-06
Payer: COMMERCIAL

## 2025-05-06 DIAGNOSIS — Z17.1 MALIGNANT NEOPLASM OF AXILLARY TAIL OF BREAST IN FEMALE, ESTROGEN RECEPTOR NEGATIVE, UNSPECIFIED LATERALITY: ICD-10-CM

## 2025-05-06 DIAGNOSIS — C50.619 MALIGNANT NEOPLASM OF AXILLARY TAIL OF BREAST IN FEMALE, ESTROGEN RECEPTOR NEGATIVE, UNSPECIFIED LATERALITY: ICD-10-CM

## 2025-05-06 LAB — POCT GLUCOSE: 83 MG/DL (ref 70–110)

## 2025-05-06 PROCEDURE — 71000016 HC POSTOP RECOV ADDL HR: Performed by: STUDENT IN AN ORGANIZED HEALTH CARE EDUCATION/TRAINING PROGRAM

## 2025-05-06 PROCEDURE — 37000009 HC ANESTHESIA EA ADD 15 MINS: Performed by: STUDENT IN AN ORGANIZED HEALTH CARE EDUCATION/TRAINING PROGRAM

## 2025-05-06 PROCEDURE — 36561 INSERT TUNNELED CV CATH: CPT | Mod: 79,LT,, | Performed by: STUDENT IN AN ORGANIZED HEALTH CARE EDUCATION/TRAINING PROGRAM

## 2025-05-06 PROCEDURE — 63600175 PHARM REV CODE 636 W HCPCS: Performed by: STUDENT IN AN ORGANIZED HEALTH CARE EDUCATION/TRAINING PROGRAM

## 2025-05-06 PROCEDURE — 25000003 PHARM REV CODE 250: Performed by: ANESTHESIOLOGY

## 2025-05-06 PROCEDURE — 25000003 PHARM REV CODE 250: Performed by: NURSE ANESTHETIST, CERTIFIED REGISTERED

## 2025-05-06 PROCEDURE — 63600175 PHARM REV CODE 636 W HCPCS: Performed by: NURSE ANESTHETIST, CERTIFIED REGISTERED

## 2025-05-06 PROCEDURE — 25000003 PHARM REV CODE 250: Performed by: STUDENT IN AN ORGANIZED HEALTH CARE EDUCATION/TRAINING PROGRAM

## 2025-05-06 PROCEDURE — 82962 GLUCOSE BLOOD TEST: CPT | Performed by: STUDENT IN AN ORGANIZED HEALTH CARE EDUCATION/TRAINING PROGRAM

## 2025-05-06 PROCEDURE — 63600175 PHARM REV CODE 636 W HCPCS: Performed by: SURGERY

## 2025-05-06 PROCEDURE — C1894 INTRO/SHEATH, NON-LASER: HCPCS | Performed by: STUDENT IN AN ORGANIZED HEALTH CARE EDUCATION/TRAINING PROGRAM

## 2025-05-06 PROCEDURE — 63600175 PHARM REV CODE 636 W HCPCS: Performed by: ANESTHESIOLOGY

## 2025-05-06 PROCEDURE — 77001 FLUOROGUIDE FOR VEIN DEVICE: CPT | Mod: 26,,, | Performed by: STUDENT IN AN ORGANIZED HEALTH CARE EDUCATION/TRAINING PROGRAM

## 2025-05-06 PROCEDURE — 36000707: Performed by: STUDENT IN AN ORGANIZED HEALTH CARE EDUCATION/TRAINING PROGRAM

## 2025-05-06 PROCEDURE — 36000706: Performed by: STUDENT IN AN ORGANIZED HEALTH CARE EDUCATION/TRAINING PROGRAM

## 2025-05-06 PROCEDURE — 71000033 HC RECOVERY, INTIAL HOUR: Performed by: STUDENT IN AN ORGANIZED HEALTH CARE EDUCATION/TRAINING PROGRAM

## 2025-05-06 PROCEDURE — C1788 PORT, INDWELLING, IMP: HCPCS | Performed by: STUDENT IN AN ORGANIZED HEALTH CARE EDUCATION/TRAINING PROGRAM

## 2025-05-06 PROCEDURE — 37000008 HC ANESTHESIA 1ST 15 MINUTES: Performed by: STUDENT IN AN ORGANIZED HEALTH CARE EDUCATION/TRAINING PROGRAM

## 2025-05-06 PROCEDURE — 71000015 HC POSTOP RECOV 1ST HR: Performed by: STUDENT IN AN ORGANIZED HEALTH CARE EDUCATION/TRAINING PROGRAM

## 2025-05-06 DEVICE — POWERPORT ISP M.R.I. IMPLANTABLE PORT WITH ATTACHABLE 8F CHRONOFLEX OPEN-ENDED SINGLE-LUMEN VENOUS CATHETER INTERMEDIATE KIT (WITHOUT SUTURE-PLUGS)
Type: IMPLANTABLE DEVICE | Site: CHEST | Status: FUNCTIONAL
Brand: POWERPORT M.R.I., CHRONOFLEX

## 2025-05-06 RX ORDER — FENTANYL CITRATE 50 UG/ML
INJECTION, SOLUTION INTRAMUSCULAR; INTRAVENOUS
Status: DISCONTINUED | OUTPATIENT
Start: 2025-05-06 | End: 2025-05-06

## 2025-05-06 RX ORDER — CEFAZOLIN 2 G/1
2 INJECTION, POWDER, FOR SOLUTION INTRAMUSCULAR; INTRAVENOUS
Status: COMPLETED | OUTPATIENT
Start: 2025-05-06 | End: 2025-05-06

## 2025-05-06 RX ORDER — KETAMINE HYDROCHLORIDE 100 MG/ML
INJECTION, SOLUTION INTRAMUSCULAR; INTRAVENOUS
Status: DISCONTINUED | OUTPATIENT
Start: 2025-05-06 | End: 2025-05-06

## 2025-05-06 RX ORDER — OXYCODONE AND ACETAMINOPHEN 5; 325 MG/1; MG/1
1 TABLET ORAL ONCE
Refills: 0 | Status: COMPLETED | OUTPATIENT
Start: 2025-05-06 | End: 2025-05-06

## 2025-05-06 RX ORDER — HEPARIN SODIUM 1000 [USP'U]/ML
INJECTION, SOLUTION INTRAVENOUS; SUBCUTANEOUS
Status: DISCONTINUED | OUTPATIENT
Start: 2025-05-06 | End: 2025-05-06 | Stop reason: HOSPADM

## 2025-05-06 RX ORDER — SODIUM CHLORIDE 9 MG/ML
INJECTION, SOLUTION INTRAVENOUS CONTINUOUS
Status: DISCONTINUED | OUTPATIENT
Start: 2025-05-06 | End: 2025-05-06 | Stop reason: HOSPADM

## 2025-05-06 RX ORDER — SODIUM CHLORIDE, SODIUM LACTATE, POTASSIUM CHLORIDE, CALCIUM CHLORIDE 600; 310; 30; 20 MG/100ML; MG/100ML; MG/100ML; MG/100ML
INJECTION, SOLUTION INTRAVENOUS CONTINUOUS
Status: DISCONTINUED | OUTPATIENT
Start: 2025-05-06 | End: 2025-05-06 | Stop reason: HOSPADM

## 2025-05-06 RX ORDER — ACETAMINOPHEN 500 MG
1000 TABLET ORAL EVERY 6 HOURS PRN
COMMUNITY

## 2025-05-06 RX ORDER — ACETAMINOPHEN 500 MG
1000 TABLET ORAL
Status: COMPLETED | OUTPATIENT
Start: 2025-05-06 | End: 2025-05-06

## 2025-05-06 RX ORDER — LIDOCAINE HYDROCHLORIDE 20 MG/ML
INJECTION INTRAVENOUS
Status: DISCONTINUED | OUTPATIENT
Start: 2025-05-06 | End: 2025-05-06

## 2025-05-06 RX ORDER — PROPOFOL 10 MG/ML
VIAL (ML) INTRAVENOUS CONTINUOUS PRN
Status: DISCONTINUED | OUTPATIENT
Start: 2025-05-06 | End: 2025-05-06

## 2025-05-06 RX ORDER — LIDOCAINE HYDROCHLORIDE 10 MG/ML
1 INJECTION, SOLUTION EPIDURAL; INFILTRATION; INTRACAUDAL; PERINEURAL ONCE
Status: DISCONTINUED | OUTPATIENT
Start: 2025-05-06 | End: 2025-05-06 | Stop reason: HOSPADM

## 2025-05-06 RX ORDER — OXYCODONE AND ACETAMINOPHEN 5; 325 MG/1; MG/1
1 TABLET ORAL EVERY 4 HOURS PRN
Qty: 10 EACH | Refills: 0 | Status: SHIPPED | OUTPATIENT
Start: 2025-05-06

## 2025-05-06 RX ORDER — LIDOCAINE HYDROCHLORIDE AND EPINEPHRINE 10; 10 UG/ML; MG/ML
INJECTION, SOLUTION INFILTRATION; PERINEURAL
Status: DISCONTINUED | OUTPATIENT
Start: 2025-05-06 | End: 2025-05-06 | Stop reason: HOSPADM

## 2025-05-06 RX ORDER — MIDAZOLAM HYDROCHLORIDE 1 MG/ML
INJECTION INTRAMUSCULAR; INTRAVENOUS
Status: DISCONTINUED | OUTPATIENT
Start: 2025-05-06 | End: 2025-05-06

## 2025-05-06 RX ORDER — HYDROMORPHONE HYDROCHLORIDE 2 MG/ML
0.2 INJECTION, SOLUTION INTRAMUSCULAR; INTRAVENOUS; SUBCUTANEOUS EVERY 5 MIN PRN
Status: DISCONTINUED | OUTPATIENT
Start: 2025-05-06 | End: 2025-05-06 | Stop reason: HOSPADM

## 2025-05-06 RX ORDER — DIPHENHYDRAMINE HYDROCHLORIDE 50 MG/ML
25 INJECTION, SOLUTION INTRAMUSCULAR; INTRAVENOUS EVERY 6 HOURS PRN
Status: DISCONTINUED | OUTPATIENT
Start: 2025-05-06 | End: 2025-05-06 | Stop reason: HOSPADM

## 2025-05-06 RX ORDER — MEPERIDINE HYDROCHLORIDE 25 MG/ML
12.5 INJECTION INTRAMUSCULAR; INTRAVENOUS; SUBCUTANEOUS EVERY 10 MIN PRN
Status: DISCONTINUED | OUTPATIENT
Start: 2025-05-06 | End: 2025-05-06 | Stop reason: HOSPADM

## 2025-05-06 RX ADMIN — ACETAMINOPHEN 1000 MG: 500 TABLET ORAL at 09:05

## 2025-05-06 RX ADMIN — KETAMINE HYDROCHLORIDE 10 MG: 100 INJECTION, SOLUTION, CONCENTRATE INTRAMUSCULAR; INTRAVENOUS at 12:05

## 2025-05-06 RX ADMIN — MIDAZOLAM HYDROCHLORIDE 2 MG: 1 INJECTION INTRAMUSCULAR; INTRAVENOUS at 11:05

## 2025-05-06 RX ADMIN — OXYCODONE HYDROCHLORIDE AND ACETAMINOPHEN 1 TABLET: 5; 325 TABLET ORAL at 02:05

## 2025-05-06 RX ADMIN — LIDOCAINE HYDROCHLORIDE 100 MG: 20 INJECTION, SOLUTION INTRAVENOUS at 11:05

## 2025-05-06 RX ADMIN — PROPOFOL 50 MCG/KG/MIN: 10 INJECTION, EMULSION INTRAVENOUS at 11:05

## 2025-05-06 RX ADMIN — FENTANYL CITRATE 50 MCG: 50 INJECTION, SOLUTION INTRAMUSCULAR; INTRAVENOUS at 11:05

## 2025-05-06 RX ADMIN — SODIUM CHLORIDE, SODIUM LACTATE, POTASSIUM CHLORIDE, AND CALCIUM CHLORIDE: .6; .31; .03; .02 INJECTION, SOLUTION INTRAVENOUS at 11:05

## 2025-05-06 RX ADMIN — CEFAZOLIN 2 G: 2 INJECTION, POWDER, FOR SOLUTION INTRAMUSCULAR; INTRAVENOUS at 11:05

## 2025-05-06 RX ADMIN — KETAMINE HYDROCHLORIDE 20 MG: 100 INJECTION, SOLUTION, CONCENTRATE INTRAMUSCULAR; INTRAVENOUS at 12:05

## 2025-05-06 NOTE — TRANSFER OF CARE
Anesthesia Transfer of Care Note    Patient: Micaela Hernandez    Procedure(s) Performed: Procedure(s) (LRB):  INSERTION, VENOUS ACCESS PORT (Left)    Patient location: PACU    Anesthesia Type: MAC    Transport from OR: Transported from OR on room air with adequate spontaneous ventilation    Post pain: adequate analgesia    Post assessment: no apparent anesthetic complications and tolerated procedure well    Post vital signs: stable    Level of consciousness: awake    Complications: none    Transfer of care protocol was followed      Last vitals: Visit Vitals  BP (!) 149/77   Pulse 86   Temp 36.3 °C (97.4 °F)   Resp 20   Wt 127.1 kg (280 lb 1.9 oz)   LMP 02/27/2023 (Approximate)   SpO2 96%   Breastfeeding No   BMI 49.62 kg/m²

## 2025-05-06 NOTE — OP NOTE
[facility] - Surgery  Operative Note    SUMMARY     Surgery Date: 5/6/2025     Surgeons and Role:     * Viki Starr MD - Primary     * Nesha Patrick MD - Resident - Assisting        Pre-op Diagnosis:  Malignant neoplasm of axillary tail of breast in female, estrogen receptor negative, unspecified laterality [C50.619, Z17.1]    Post-op Diagnosis:  Post-Op Diagnosis Codes:     * Malignant neoplasm of axillary tail of breast in female, estrogen receptor negative, unspecified laterality [C50.619, Z17.1]    Procedure(s) (LRB):  INSERTION, VENOUS ACCESS PORT (Left)    Anesthesia: Choice    Indication for procedure: Micaela Hernandez is 55 y.o. female with right breast cancer. After discussion of disease process, we discussed options and have elected for operation to perform insertion of venous access port.     Description of Procedure: After consent was obtained, patient was taken to the OR. The left neck and chest was prepped and draped in a standard sterile fashion after sedation anesthesia was started. Time out was performed. Antibiotics were started with Ancef. We began the procedure by accessing the left internal jugular vein with 18 gauge needle after numbing the skin with lidocaine and Marcaine.  We had dark nonpulsatile blood pulled back and we placed the wire fluoroscopy was used to visualize the wire going down through the venous system towards the right side the vena cava.  We made a port pocket on the left infraclavicular area upper chest and we connected this with a tunneler we placed the catheter through the dilator sheath and placed it and it ended at the atriocaval junction appropriately.  We attached it to the 8 [place] port it flushed it aspirated satisfied and we saved the final picture.  We then closed in layers with 3-0 Vicryl and 4-0 Monocryl at the chest site we did tack the port to the chest wall with 3-0 Prolene.  We then closed the skin at the neck with 4-0 Monocryl as a simple interrupted  external suture we placed a bandage over this. We tested the port again and took another fluoroscopic picture. We were satisfied with the result. We placed Dermabond on the left chest.     All counts were correct x2. Patient was awakened from anesthesia and taken to the recovery room in a stable condition having suffered no issues at this time.     Description of the findings of the procedure:   Ultrasound and fluoro used to visualize jugular vein access and catheter tip at the atriocaval junction. Size 8 port, aspirates and flushes  Description of the findings of the procedure: left Port insertion    Estimated Blood Loss: 5 cc's         Specimens:   Specimen (24h ago, onward)      None            Drains/Implants: 8 Fr Port in left chest wall/IJ directed into SVC

## 2025-05-06 NOTE — ANESTHESIA PREPROCEDURE EVALUATION
2025  Pre-operative evaluation for Procedure(s) (LRB):  LUMPECTOMY,BREAST,WITH RADIOLOGIC MARKER LOCALIZATION AND SENTINEL LYMPH NODE BIOPSY, Right Breast (Right)    Micaela Hernandez is a 55 y.o. female     Past Medical History:   Diagnosis Date    Allergy     Atrial flutter     Degenerative lumbar disc 2021    Diabetes mellitus     Diabetes mellitus, type 2     Hyperlipidemia     Hypertension     Malignant neoplasm of axillary tail of breast in female, estrogen receptor negative 2025    PAF (paroxysmal atrial fibrillation) 2022    Sleep apnea     Symptomatic anemia 2023     Problem List[1]  Review of patient's allergies indicates:   Allergen Reactions    Adhesive Blisters, Itching and Rash       Medications Ordered Prior to Encounter[2]    Past Surgical History:   Procedure Laterality Date    BREAST SURGERY       SECTION      CHOLECYSTECTOMY      ENDOMETRIAL ABLATION      HERNIA REPAIR      incisional     HYSTEROSCOPY WITH DILATION AND CURETTAGE OF UTERUS N/A 3/1/2023    Procedure: HYSTEROSCOPY, WITH DILATION AND CURETTAGE OF UTERUS;  Surgeon: Lola Zhao MD;  Location: [facility] OR;  Service: OB/GYN;  Laterality: N/A;    KNEE SURGERY      LAPAROSCOPIC TOTAL HYSTERECTOMY N/A 3/28/2023    Procedure: HYSTERECTOMY, TOTAL, LAPAROSCOPIC;  Surgeon: Lola Zhao MD;  Location: [facility] OR;  Service: OB/GYN;  Laterality: N/A;  Large patient and large uterus, may be difficult case.  May convert to open.  T/S=---UPT  ON 3/27  RN PREOP 3/15/2023---BMI--52.32    LIVER LOBECTOMY Right     LUMPECTOMY,BREAST, WITH RADIOACTIVE SEED LOCALIZATION AND SENTINEL LYMPH NODE BIOPSY Right 2025    Procedure: LUMPECTOMY,BREAST,WITH RADIOLOGIC MARKER LOCALIZATION AND SENTINEL LYMPH NODE BIOPSY, Right Breast;  Surgeon: Elvin Ogden MD;  Location: [facility] OR [address] [place] [address];  Service: General;  Laterality:  "Right;    LYMPH NODE DISSECTION      right axillary    TRANSESOPHAGEAL ECHOCARDIOGRAPHY N/A 1/26/2022    Procedure: ECHOCARDIOGRAM, TRANSESOPHAGEAL;  Surgeon: Ji Patricio MD;  [facility] C[facility];  Service: Cardiology;  Laterality: N/A;       CBC: No results for input(s): "WBC", "HGB", "HCT", "PLT" in the last 72 hours.    CMP: No results for input(s): "NA", "K", "CL", "CO2", "BUN", "CREATININE", "GLU", "MG", "PHOS", "CALCIUM", "ALBUMIN", "PROT", "ALKPHOS", "ALT", "AST", "BILITOT" in the last 72 hours.    COAGS  No results for input(s): "PT", "INR", "PROTIME", "APTT" in the last 72 hours.    BP Readings from Last 3 Encounters:   05/06/25 (!) 150/81   05/02/25 133/82   04/30/25 121/69       Diagnostic Studies:    EKG:  Results for orders placed or performed during the hospital encounter of 05/02/25   EKG 12-lead    Collection Time: 05/02/25 11:32 AM   Result Value Ref Range    QRS Duration 86 ms    [facility] QTC Calculation 475 ms    Narrative    Test Reason : Z01.818,    Vent. Rate :  72 BPM     Atrial Rate :  72 BPM     P-R Int : 150 ms          QRS Dur :  86 ms      QT Int : 434 ms       P-R-T Axes : -16  25  26 degrees    QTcB Int : 475 ms    Normal sinus rhythm  Normal ECG  When compared with ECG of 31-Jul-2023 08:37,  Significant changes have occurred  Confirmed by Be Amador ([id number]) on 5/3/2025 1:56:31 PM    Referred By:            Confirmed By: Be Amador       2D Echo:  Results for orders placed during the hospital encounter of 08/10/23    Echo    Interpretation Summary    Left Ventricle: The left ventricle is normal in size. Normal wall thickness. Normal wall motion. There is normal systolic function with a visually estimated ejection fraction of 55 - 60%.    Left Atrium: Left atrium is mildly dilated.    Right Ventricle: Normal right ventricular cavity size. Wall thickness is normal. Right ventricle wall motion  is normal. Systolic function is normal.    Pulmonary Artery: The " estimated pulmonary artery systolic pressure is 16 mmHg.    IVC/SVC: Normal venous pressure at 3 mmHg.        Pre-op Assessment    I have reviewed the Patient Summary Reports.     I have reviewed the Nursing Notes. I have reviewed the NPO Status.   I have reviewed the Medications.     Review of Systems  Hematology/Oncology:                      Current/Recent Cancer.  Breast              Cardiovascular:     Hypertension    Dysrhythmias (hx of A flutter (NSR on last EKG))                                      Pulmonary:        Sleep Apnea                Hepatic/GI:      Liver Disease, (FLD) Hepatitis              Musculoskeletal:  Arthritis               Neurological:        Peripheral Neuropathy                          Endocrine:  Diabetes, type 2         Morbid Obesity / BMI > 40      Physical Exam  General: Well nourished and Cooperative    Airway:  Mallampati: III   Mouth Opening: Small, but > 3cm  TM Distance: Normal    Dental:  Intact    Chest/Lungs:  Clear to auscultation, Normal Respiratory Rate    Heart:  Rate: Normal  Rhythm: Regular Rhythm  Sounds: Normal        Anesthesia Plan  Type of Anesthesia, risks & benefits discussed:    Anesthesia Type: Gen Natural Airway, MAC  Intra-op Monitoring Plan: Standard ASA Monitors  Post Op Pain Control Plan: multimodal analgesia and IV/PO Opioids PRN  Induction:  IV  Informed Consent: Informed consent signed with the Patient and all parties understand the risks and agree with anesthesia plan.  All questions answered.   ASA Score: 3  Day of Surgery Review of History & Physical: H&P Update referred to the surgeon/provider.    Ready For Surgery From Anesthesia Perspective.     .           [1]   Patient Active Problem List  Diagnosis    Axillary mass    Liver mass, right lobe    Hernia, incisional    ARORA (nonalcoholic steatohepatitis)    Morbid obesity    Type 2 diabetes mellitus with diabetic nephropathy, without long-term current use of insulin    Hyperlipidemia     Hypertension    Chronic right-sided low back pain without sciatica    Chronic pain of right hip    Chronic pain of both knees    Degenerative lumbar disc    Primary osteoarthritis of both knees    Atrial flutter    Pleural effusion    NAPOLEON (obstructive sleep apnea)    LAD (linear IgA dermatosis)    Nasal congestion    PAF (paroxysmal atrial fibrillation)    S/P ablation of atrial flutter    Anemia    Status post hysteroscopy    S/P laparoscopic hysterectomy    Refractive error    Malignant neoplasm of axillary tail of breast in female, estrogen receptor negative   [2]   No current facility-administered medications on file prior to encounter.     Current Outpatient Medications on File Prior to Encounter   Medication Sig Dispense Refill    acetaminophen (TYLENOL) 500 MG tablet Take 1,000 mg by mouth every 6 (six) hours as needed for Pain.      amLODIPine (NORVASC) 5 MG tablet TAKE 1 TABLET BY MOUTH ONCE DAILY IN THE EVENING 90 tablet 0    betamethasone dipropionate (DIPROLENE) 0.05 % ointment Apply topically 2 (two) times daily. 15 g 0    carvediloL (COREG) 12.5 MG tablet TAKE 1 TABLET BY MOUTH TWICE DAILY WITH MEALS 180 tablet 0    clobetasoL (TEMOVATE) 0.05 % external solution Apply topically 2 (two) times daily. 50 mL 0    furosemide (LASIX) 20 MG tablet Take 1 tablet by mouth once daily 90 tablet 0    loratadine (CLARITIN) 10 mg tablet Take 10 mg by mouth once daily. prn      losartan (COZAAR) 50 MG tablet Take 1 tablet by mouth twice daily 180 tablet 0    multivitamin (ONE DAILY MULTIVITAMIN) per tablet Take 1 tablet by mouth once daily.      propafenone (RYTHMOL SR) 225 MG Cp12 Take 1 capsule (225 mg total) by mouth every 12 (twelve) hours. 60 capsule 11    sertraline (ZOLOFT) 25 MG tablet Take 1 tablet (25 mg total) by mouth once daily. (Patient taking differently: Take 25 mg by mouth nightly.) 30 tablet 11    ELIQUIS 5 mg Tab Take 1 tablet by mouth twice daily 180 tablet 0    metFORMIN (GLUCOPHAGE) 1000 MG  tablet TAKE 1 TABLET BY MOUTH TWICE DAILY WITH MEALS (Patient taking differently: Take 1,000 mg by mouth every evening.) 180 tablet 0    [START ON 5/13/2025] OLANZapine (ZYPREXA) 5 MG tablet Take 1 tablet by mouth nightly on days 1-3 of each chemotherapy cycle. 3 tablet 5    [START ON 5/13/2025] prochlorperazine (COMPAZINE) 5 MG tablet Take 2 tablets (10 mg total) by mouth every 6 (six) hours as needed for Nausea. 20 tablet 11    simvastatin (ZOCOR) 40 MG tablet TAKE 1 TABLET BY MOUTH ONCE DAILY IN THE EVENING 90 tablet 0    tirzepatide (MOUNJARO) 15 mg/0.5 mL PnIj Inject 15 mg into the skin every 7 days. 2 mL 11

## 2025-05-06 NOTE — H&P
HPI:  56 y/o woman with history of right breast cancer needs port.            Review of Systems   Constitutional:  Negative for chills and unexpected weight change.   HENT:  Negative for congestion, ear pain and sore throat.    Eyes:  Negative for pain and redness.   Respiratory:  Negative for cough and shortness of breath.    Cardiovascular:  Negative for chest pain and palpitations.   Gastrointestinal:  Negative for abdominal distention, abdominal pain and constipation.   Endocrine: Negative for cold intolerance and heat intolerance.   Genitourinary:  Negative for dysuria and frequency.   Musculoskeletal:  Negative for back pain and neck pain.   Skin:  Negative for pallor and rash.   Neurological:  Negative for syncope, light-headedness and headaches.   Hematological:  Negative for adenopathy. Does not bruise/bleed easily.   Psychiatric/Behavioral:  Negative for confusion and hallucinations.          [Current Medications]    [Current Medications]         Current Outpatient Medications   Medication Sig Dispense Refill    amLODIPine (NORVASC) 5 MG tablet TAKE 1 TABLET BY MOUTH ONCE DAILY IN THE EVENING 90 tablet 0    betamethasone dipropionate (DIPROLENE) 0.05 % ointment Apply topically 2 (two) times daily. 15 g 0    carvediloL (COREG) 12.5 MG tablet TAKE 1 TABLET BY MOUTH TWICE DAILY WITH MEALS 180 tablet 0    clobetasoL (TEMOVATE) 0.05 % external solution Apply topically 2 (two) times daily. 50 mL 0    ELIQUIS 5 mg Tab Take 1 tablet by mouth twice daily 180 tablet 0    furosemide (LASIX) 20 MG tablet Take 1 tablet by mouth once daily 90 tablet 0    loratadine (CLARITIN) 10 mg tablet Take 10 mg by mouth once daily. prn        losartan (COZAAR) 50 MG tablet Take 1 tablet by mouth twice daily 180 tablet 0    metFORMIN (GLUCOPHAGE) 1000 MG tablet TAKE 1 TABLET BY MOUTH TWICE DAILY WITH MEALS (Patient taking differently: Take 1,000 mg by mouth every evening.) 180 tablet 0    multivitamin (ONE DAILY MULTIVITAMIN) per  tablet Take 1 tablet by mouth once daily.        [START ON 2025] OLANZapine (ZYPREXA) 5 MG tablet Take 1 tablet by mouth nightly on days 1-3 of each chemotherapy cycle. 3 tablet 5    [START ON 2025] prochlorperazine (COMPAZINE) 5 MG tablet Take 2 tablets (10 mg total) by mouth every 6 (six) hours as needed for Nausea. 20 tablet 11    [START ON 2025] propafenone (RYTHMOL SR) 225 MG Cp12 Take 1 capsule (225 mg total) by mouth every 12 (twelve) hours. 60 capsule 11    sertraline (ZOLOFT) 25 MG tablet Take 1 tablet (25 mg total) by mouth once daily. (Patient taking differently: Take 25 mg by mouth nightly.) 30 tablet 11    simvastatin (ZOCOR) 40 MG tablet TAKE 1 TABLET BY MOUTH ONCE DAILY IN THE EVENING 90 tablet 0    tirzepatide (MOUNJARO) 15 mg/0.5 mL PnIj Inject 15 mg into the skin every 7 days. 2 mL 11      No current facility-administered medications for this visit.             Review of patient's allergies indicates:   Allergen Reactions    Adhesive Blisters, Itching and Rash              Past Medical History:   Diagnosis Date    Allergy      Atrial flutter      Degenerative lumbar disc 2021    Diabetes mellitus      Diabetes mellitus, type 2      Hyperlipidemia      Hypertension      Malignant neoplasm of axillary tail of breast in female, estrogen receptor negative 2025    PAF (paroxysmal atrial fibrillation) 2022    Sleep apnea      Symptomatic anemia 2023               Past Surgical History:   Procedure Laterality Date    BREAST SURGERY         SECTION        CHOLECYSTECTOMY        ENDOMETRIAL ABLATION        HERNIA REPAIR         incisional     HYSTEROSCOPY WITH DILATION AND CURETTAGE OF UTERUS N/A 3/1/2023     Procedure: HYSTEROSCOPY, WITH DILATION AND CURETTAGE OF UTERUS;  Surgeon: Lola Zhao MD;  Location: [facility];  Service: OB/GYN;  Laterality: N/A;    KNEE SURGERY        LAPAROSCOPIC TOTAL HYSTERECTOMY N/A 3/28/2023     Procedure: HYSTERECTOMY, TOTAL,  LAPAROSCOPIC;  Surgeon: Lola Zhao MD;  Location: [facility] OR;  Service: OB/GYN;  Laterality: N/A;  Large patient and large uterus, may be difficult case.  May convert to open.  T/S=---UPT  ON 3/27  RN PREOP 3/15/2023---BMI--52.32    LIVER LOBECTOMY Right      LUMPECTOMY,BREAST, WITH RADIOACTIVE SEED LOCALIZATION AND SENTINEL LYMPH NODE BIOPSY Right 4/2/2025     Procedure: LUMPECTOMY,BREAST,WITH RADIOLOGIC MARKER LOCALIZATION AND SENTINEL LYMPH NODE BIOPSY, Right Breast;  Surgeon: Elvin Ogden MD;  Location: [facility] OR [address] [place] [address];  Service: General;  Laterality: Right;    LYMPH NODE DISSECTION         right axillary    TRANSESOPHAGEAL ECHOCARDIOGRAPHY N/A 1/26/2022     Procedure: ECHOCARDIOGRAM, TRANSESOPHAGEAL;  Surgeon: Ji Patricio MD;  Location: [facility] CATH LAB;  Service: Cardiology;  Laterality: N/A;                Family History   Problem Relation Name Age of Onset    Hypertension Mother        Asthma Mother        COPD Mother        Hyperlipidemia Mother        Diabetes type II Mother        Cataracts Mother        Diabetes Father        Hypertension Father        Hyperlipidemia Father        Cataracts Father        Cancer Sister [place]           type unknown, but CA in mouth    No Known Problems Brother        No Known Problems Maternal Aunt        No Known Problems Maternal Uncle        No Known Problems Paternal Aunt        No Known Problems Paternal Uncle        No Known Problems Maternal Grandmother        No Known Problems Maternal Grandfather        No Known Problems Paternal Grandmother        No Known Problems Paternal Grandfather        Breast cancer Neg Hx        Colon cancer Neg Hx        Ovarian cancer Neg Hx        Amblyopia Neg Hx        Blindness Neg Hx        Macular degeneration Neg Hx        Retinal detachment Neg Hx        Strabismus Neg Hx        Stroke Neg Hx        Thyroid disease Neg Hx             [Social History]    [Social History]        Socioeconomic History    Marital status:     Tobacco Use    Smoking status: Never       Passive exposure: Never    Smokeless tobacco: Never   Substance and Sexual Activity    Alcohol use: Not Currently       Comment: occasionally    Drug use: No    Sexual activity: Not Currently       Partners: Male       Birth control/protection: None      Social Drivers of Health           Financial Resource Strain: Low Risk  (2/14/2025)     Overall Financial Resource Strain (CARDIA)      Difficulty of Paying Living Expenses: Not very hard   Food Insecurity: No Food Insecurity (2/14/2025)     Hunger Vital Sign      Worried About Running Out of Food in the Last Year: Never true      Ran Out of Food in the Last Year: Never true   Transportation Needs: No Transportation Needs (2/14/2025)     PRAPARE - Transportation      Lack of Transportation (Medical): No      Lack of Transportation (Non-Medical): No   Physical Activity: Inactive (2/14/2025)     Exercise Vital Sign      Days of Exercise per Week: 0 days      Minutes of Exercise per Session: 0 min   Stress: Stress Concern Present (2/14/2025)     [employer] - Occupational Stress Questionnaire      Feeling of Stress : Very much   Housing Stability: Low Risk  (2/14/2025)     Housing Stability Vital Sign      Unable to Pay for Housing in the Last Year: No      Number of Times Moved in the Last Year: 0      Homeless in the Last Year: No            Vitals:     04/30/25 1323   BP: 121/69   Pulse: 75         Physical Exam  Constitutional:       Appearance: She is well-developed.   HENT:      Head: Normocephalic and atraumatic.   Eyes:      Pupils: Pupils are equal, round, and reactive to light.   Cardiovascular:      Rate and Rhythm: Normal rate and regular rhythm.      Heart sounds: No murmur heard.  Pulmonary:      Effort: Pulmonary effort is normal.      Breath sounds: Normal breath sounds. No wheezing.   Abdominal:      General: There is no distension.      Palpations: Abdomen is soft.       Tenderness: There is no abdominal tenderness.   Musculoskeletal:         General: Normal range of motion.      Cervical back: Normal range of motion and neck supple.   Skin:     General: Skin is warm and dry.      Capillary Refill: Capillary refill takes less than 2 seconds.      Findings: No rash.   Neurological:      Mental Status: She is alert and oriented to person, place, and time.   Psychiatric:         Judgment: Judgment normal.            Assessment & Plan:  Assessment  Breast cancer, plan left possible right sided port placement.     Risks, benefits, alternatives to the procedure were discussed with the patient, who appears to understand and agree with the treatment plan

## 2025-05-06 NOTE — ANESTHESIA POSTPROCEDURE EVALUATION
Anesthesia Post Evaluation    Patient: Micaela Hernandez    Procedure(s) Performed: Procedure(s) (LRB):  INSERTION, VENOUS ACCESS PORT (Left)    Final Anesthesia Type: general      Patient location during evaluation: PACU  Patient participation: Yes- Able to Participate  Level of consciousness: awake and alert  Post-procedure vital signs: reviewed and stable  Pain management: adequate  Airway patency: patent    PONV status at discharge: No PONV  Anesthetic complications: no      Respiratory status: spontaneous ventilation and room air  Hydration status: euvolemic  Follow-up not needed.              Vitals Value Taken Time   /73 05/06/25 14:02   Temp 36.3 °C (97.4 °F) 05/06/25 13:59   Pulse 81 05/06/25 14:13   Resp 26 05/06/25 14:13   SpO2 98 % 05/06/25 14:13   Vitals shown include unfiled device data.      No case tracking events are documented in the log.      Pain/Robinson Score: Pain Rating Prior to Med Admin: 0 (5/6/2025  9:33 AM)  Robinson Score: 9 (5/6/2025  2:00 PM)

## 2025-05-06 NOTE — DISCHARGE INSTRUCTIONS
BATHING:  You may shower after the dressing is removed.  DRESSING:  Remove dressing after 2 days.        ACTIVITY LEVEL: If you have received sedation or an anesthetic, you may feel sleepy for several hours. Rest until you are more awake. Gradually resume your normal activities   No heavy lifting for 4 weeks.      DIET: You may resume your home diet. If nausea is present, increase your diet gradually with fluids and bland foods.    Medications: Pain medication should be taken only if needed and as directed. If antibiotics are prescribed, the medication should be taken until completed. You will be given an updated list of you medications.  No driving, alcoholic beverages or signing legal documents for next 24 hours if you have had sedation, or while taking pain medication    CALL THE DOCTOR:   For any obvious bleeding (some dried blood over the incision is normal).     Redness, swelling, foul smell around incision or fever over 101.  Shortness of breath.  Persistent pain or nausea not relieved by medication.      If any unusual problems or difficulties occur contact your doctor. If you cannot contact your doctor but feel your signs and symptoms warrant a physicians attention return to the emergency room.      Fall Prevention  Millions of people fall every year and injure themselves. You may have had anesthesia or sedation which may increase your risk of falling. You may have health issues that put you at an increased risk of falling.     Here are ways to reduce your risk of falling.    Make your home safe by keeping walkways clear of objects you may trip over.  Use non-slip pads under rugs. Do not use area rugs or small throw rugs.  Use non-slip mats in bathtubs and showers.  Install handrails and lights on staircases.  Do not walk in poorly lit areas.  Do not stand on chairs or wobbly ladders.  Use caution when reaching overhead or looking upward. This position can cause a loss of balance.  Be sure your shoes fit  properly, have non-slip bottoms and are in good condition.   Wear shoes both inside and out. Avoid going barefoot or wearing slippers.  Be cautious when going up and down stairs, curbs, and when walking on uneven sidewalks.  If your balance is poor, consider using a cane or walker.  If your fall was related to alcohol use, stop or limit alcohol intake.   If your fall was related to use of sleeping medicines, talk to your doctor about this. You may need to reduce your dosage at bedtime if you awaken during the night to go to the bathroom.    To reduce the need for nighttime bathroom trips:  Avoid drinking fluids for several hours before going to bed  Empty your bladder before going to bed  Men can keep a urinal at the bedside  Stay as active as you can. Balance, flexibility, strength, and endurance all come from exercise. They all play a role in preventing falls. Ask your healthcare provider which types of activity are right for you.  Get your vision checked on a regular basis.  If you have pets, know where they are before you stand up or walk so you don't trip over them.  Use night lights.

## 2025-05-07 VITALS
SYSTOLIC BLOOD PRESSURE: 155 MMHG | HEART RATE: 79 BPM | TEMPERATURE: 98 F | RESPIRATION RATE: 18 BRPM | OXYGEN SATURATION: 94 % | BODY MASS INDEX: 49.62 KG/M2 | WEIGHT: 280.13 LBS | DIASTOLIC BLOOD PRESSURE: 71 MMHG

## 2025-05-07 DIAGNOSIS — Z17.1 MALIGNANT NEOPLASM OF AXILLARY TAIL OF BREAST IN FEMALE, ESTROGEN RECEPTOR NEGATIVE, UNSPECIFIED LATERALITY: Primary | ICD-10-CM

## 2025-05-07 DIAGNOSIS — C50.619 MALIGNANT NEOPLASM OF AXILLARY TAIL OF BREAST IN FEMALE, ESTROGEN RECEPTOR NEGATIVE, UNSPECIFIED LATERALITY: Primary | ICD-10-CM

## 2025-05-07 DIAGNOSIS — C50.911 MALIGNANT NEOPLASM OF RIGHT BREAST IN FEMALE, ESTROGEN RECEPTOR NEGATIVE, UNSPECIFIED SITE OF BREAST: ICD-10-CM

## 2025-05-07 DIAGNOSIS — Z17.1 MALIGNANT NEOPLASM OF RIGHT BREAST IN FEMALE, ESTROGEN RECEPTOR NEGATIVE, UNSPECIFIED SITE OF BREAST: ICD-10-CM

## 2025-05-07 LAB — POCT GLUCOSE: 114 MG/DL (ref 70–110)

## 2025-05-08 RX ORDER — LIDOCAINE AND PRILOCAINE 25; 25 MG/G; MG/G
CREAM TOPICAL
Qty: 30 G | Refills: 0 | Status: SHIPPED | OUTPATIENT
Start: 2025-05-08

## 2025-05-09 ENCOUNTER — TELEPHONE (OUTPATIENT)
Dept: HEMATOLOGY/ONCOLOGY | Facility: CLINIC | Age: 55
End: 2025-05-09
Payer: COMMERCIAL

## 2025-05-09 NOTE — TELEPHONE ENCOUNTER
58/25 Patient and daughter presented for chemo class.. Nurse reviewed in detail the following: benefits and side effects of chemotherapy.  The following was discussed : the necessity of limiting exposure to anyone with an active illness, such as flu,virus,temperature or infectious disease such as pneumonia,meningitis ,TBC, hepatitis etc.   Explained that their immune system is compromised and it is important they avoid  contact with infectious illnesses due to the potential harm to themselves .    Proper hand washing hygiene reviewed with patient and requested they review this procedure with all coming in contact with them especially in immediate household. Reviewed neutropenic precautions with patient informing patient that their white count may decrease due to his chemotherapy and this will put them at a higher risk of infection .   Instructed patient to contact physician if they develop a temperature, uncontrolled N,V,or D  that last 24 hours or greater.    If a new pain develops or existing pain is not controlled by current  prescribed medication to contact physician for recommendation. Advised patient that all fruits and vegetables are to be washed thoroughly prior to eating. It is recommended that they not eat at buffet bars ,eat sushi,or consume oysters in any form cooked or not due to potential bacteria. Pt informed they  are allowed to eat salad at home after it is washed thoroughly.   Explained that it is important to use sunscreen as chemo causes individuals to be sun sensitive and  we want to avoid sunburns which has the potential for infection.   Instructed patient to use gloves if working in a garden due to bacteria in potting soil and dirt. Advised when participating in outdoor activities such as hunting and fishing to use precautions not to be exposed to marine bacteria such as let someone else bait fishing hook,clean fish and take fish off line.  During hunting let someone else deal with cleaning  animals.    Sexual activity information sheets given to  and reviewed with patient.   Explained that prior to any dental care physician should be notified as to what is to be performed so the physician may make recommendations .   Instructed  pt to contact  physician if mouth sores develop for advise. Recipe for baking soda and saline mouthwash reviewed with patient   Alopecia discussed with pt.  Advised patient that if their newly diagnosed disease is creating undue stress,anxiety or fears, the  nurse can facilitate locating a psychiatrist or psychologist for them to speak with.   Importance of keeping all scheduled appointments with physician and ancillary testing.    Patient presented opportunity to ask questions . All questions answered in detail and pt acknowledged  understanding of information given during presentation.  Nurse instructed pt how to contact her with any concerns, questions or needs that may arise.

## 2025-05-12 ENCOUNTER — HOSPITAL ENCOUNTER (OUTPATIENT)
Dept: CARDIOLOGY | Facility: HOSPITAL | Age: 55
Discharge: HOME | End: 2025-05-12
Attending: INTERNAL MEDICINE
Payer: COMMERCIAL

## 2025-05-12 DIAGNOSIS — I48.3 TYPICAL ATRIAL FLUTTER: ICD-10-CM

## 2025-05-12 DIAGNOSIS — I48.0 PAF (PAROXYSMAL ATRIAL FIBRILLATION): ICD-10-CM

## 2025-05-12 LAB
OHS QRS DURATION: 88 MS
OHS QTC CALCULATION: 444 MS

## 2025-05-12 PROCEDURE — 93005 ELECTROCARDIOGRAM TRACING: CPT

## 2025-05-12 PROCEDURE — 93010 ELECTROCARDIOGRAM REPORT: CPT | Mod: ,,, | Performed by: INTERNAL MEDICINE

## 2025-05-13 ENCOUNTER — INFUSION (OUTPATIENT)
Dept: INFUSION THERAPY | Facility: HOSPITAL | Age: 55
End: 2025-05-13
Attending: INTERNAL MEDICINE
Payer: COMMERCIAL

## 2025-05-13 VITALS
OXYGEN SATURATION: 95 % | RESPIRATION RATE: 16 BRPM | TEMPERATURE: 98 F | SYSTOLIC BLOOD PRESSURE: 157 MMHG | HEIGHT: 62 IN | HEART RATE: 81 BPM | BODY MASS INDEX: 51.11 KG/M2 | DIASTOLIC BLOOD PRESSURE: 71 MMHG | WEIGHT: 277.75 LBS

## 2025-05-13 DIAGNOSIS — C50.619 MALIGNANT NEOPLASM OF AXILLARY TAIL OF BREAST IN FEMALE, ESTROGEN RECEPTOR NEGATIVE, UNSPECIFIED LATERALITY: Primary | ICD-10-CM

## 2025-05-13 DIAGNOSIS — Z17.1 MALIGNANT NEOPLASM OF RIGHT BREAST IN FEMALE, ESTROGEN RECEPTOR NEGATIVE, UNSPECIFIED SITE OF BREAST: ICD-10-CM

## 2025-05-13 DIAGNOSIS — C50.911 MALIGNANT NEOPLASM OF RIGHT BREAST IN FEMALE, ESTROGEN RECEPTOR NEGATIVE, UNSPECIFIED SITE OF BREAST: ICD-10-CM

## 2025-05-13 DIAGNOSIS — Z17.1 MALIGNANT NEOPLASM OF AXILLARY TAIL OF BREAST IN FEMALE, ESTROGEN RECEPTOR NEGATIVE, UNSPECIFIED LATERALITY: Primary | ICD-10-CM

## 2025-05-13 PROCEDURE — 96413 CHEMO IV INFUSION 1 HR: CPT

## 2025-05-13 PROCEDURE — 25000003 PHARM REV CODE 250: Performed by: INTERNAL MEDICINE

## 2025-05-13 PROCEDURE — 63600175 PHARM REV CODE 636 W HCPCS: Mod: TB | Performed by: INTERNAL MEDICINE

## 2025-05-13 PROCEDURE — 96417 CHEMO IV INFUS EACH ADDL SEQ: CPT

## 2025-05-13 PROCEDURE — 96367 TX/PROPH/DG ADDL SEQ IV INF: CPT

## 2025-05-13 PROCEDURE — A4216 STERILE WATER/SALINE, 10 ML: HCPCS | Performed by: INTERNAL MEDICINE

## 2025-05-13 RX ORDER — DIPHENHYDRAMINE HYDROCHLORIDE 50 MG/ML
50 INJECTION, SOLUTION INTRAMUSCULAR; INTRAVENOUS ONCE AS NEEDED
Status: CANCELLED | OUTPATIENT
Start: 2025-05-13

## 2025-05-13 RX ORDER — SODIUM CHLORIDE 0.9 % (FLUSH) 0.9 %
10 SYRINGE (ML) INJECTION
Status: DISCONTINUED | OUTPATIENT
Start: 2025-05-13 | End: 2025-05-13 | Stop reason: HOSPADM

## 2025-05-13 RX ORDER — SODIUM CHLORIDE 0.9 % (FLUSH) 0.9 %
10 SYRINGE (ML) INJECTION
Status: CANCELLED | OUTPATIENT
Start: 2025-05-13

## 2025-05-13 RX ORDER — EPINEPHRINE 0.3 MG/.3ML
0.3 INJECTION SUBCUTANEOUS ONCE AS NEEDED
Status: CANCELLED | OUTPATIENT
Start: 2025-05-13

## 2025-05-13 RX ORDER — PROCHLORPERAZINE EDISYLATE 5 MG/ML
10 INJECTION INTRAMUSCULAR; INTRAVENOUS ONCE AS NEEDED
Status: CANCELLED | OUTPATIENT
Start: 2025-05-13

## 2025-05-13 RX ORDER — DIPHENHYDRAMINE HYDROCHLORIDE 50 MG/ML
50 INJECTION, SOLUTION INTRAMUSCULAR; INTRAVENOUS ONCE AS NEEDED
Status: DISCONTINUED | OUTPATIENT
Start: 2025-05-13 | End: 2025-05-13 | Stop reason: HOSPADM

## 2025-05-13 RX ORDER — HEPARIN 100 UNIT/ML
500 SYRINGE INTRAVENOUS
Status: CANCELLED | OUTPATIENT
Start: 2025-05-13

## 2025-05-13 RX ORDER — EPINEPHRINE 0.3 MG/.3ML
0.3 INJECTION SUBCUTANEOUS ONCE AS NEEDED
Status: DISCONTINUED | OUTPATIENT
Start: 2025-05-13 | End: 2025-05-13 | Stop reason: HOSPADM

## 2025-05-13 RX ORDER — HEPARIN 100 UNIT/ML
500 SYRINGE INTRAVENOUS
Status: DISCONTINUED | OUTPATIENT
Start: 2025-05-13 | End: 2025-05-13 | Stop reason: HOSPADM

## 2025-05-13 RX ORDER — PROCHLORPERAZINE EDISYLATE 5 MG/ML
10 INJECTION INTRAMUSCULAR; INTRAVENOUS ONCE AS NEEDED
Status: DISCONTINUED | OUTPATIENT
Start: 2025-05-13 | End: 2025-05-13 | Stop reason: HOSPADM

## 2025-05-13 RX ADMIN — Medication 10 ML: at 12:05

## 2025-05-13 RX ADMIN — DEXAMETHASONE SODIUM PHOSPHATE 0.25 MG: 10 INJECTION, SOLUTION INTRAMUSCULAR; INTRAVENOUS at 08:05

## 2025-05-13 RX ADMIN — HEPARIN 500 UNITS: 100 SYRINGE at 12:05

## 2025-05-13 RX ADMIN — DOCETAXEL 178 MG: 20 INJECTION, SOLUTION, CONCENTRATE INTRAVENOUS at 09:05

## 2025-05-13 RX ADMIN — CYCLOPHOSPHAMIDE 1420 MG: 200 INJECTION, SOLUTION INTRAVENOUS at 11:05

## 2025-05-13 NOTE — PLAN OF CARE
Oncology History   Malignant neoplasm of axillary tail of breast in female, estrogen receptor negative    Patient arrived to unit for D1C1 Taxotere/Cytoxan infusions.Oriented pt and sister to unit. Pt reports mild tenderness to new port site. Pt with chronic baseline diarrhea due to gall bladder removal 28 years ago. Pt eating and drinking with no issues at this time. Labs reviewed and pt cleared for chemo per Dr. Davis. Reviewed signs and symptoms of allergic reaction and potential side effects. Pt and daughter attended chemo school last week with Diana Perez RN. Confirmed pt has thermometer at home. Plan of care reviewed, patient agreeable to plan.Premeds of Aloxi/Dex administered prior to chemo. Taxotere administered without issue with a gradual titration with reaching max rate after 25 minutes. Patient tolerated infusion well. Cytoxan administered over 30 minutes without issue. Counseled pt on importance of drinking extra fluids and emptying bladder ASAP, not holding urine for long periods of time. Pt verbalized understanding. Printed, highlighted and reviewed calendar and AVS. Confirmed pt has Zyprexa, Zofran, and Compazine for home use. VSS. Discharge instructions reviewed, patient instructed to return 5/14 for Udenyca.Confirmed pt has Claritin at home to help prevent potential side effects of Udenyca. Patient ambulated off unit accompanied by sister. Patient in NAD at time of discharge.

## 2025-05-14 ENCOUNTER — TELEPHONE (OUTPATIENT)
Dept: HEMATOLOGY/ONCOLOGY | Facility: CLINIC | Age: 55
End: 2025-05-14
Payer: COMMERCIAL

## 2025-05-14 ENCOUNTER — INFUSION (OUTPATIENT)
Dept: INFUSION THERAPY | Facility: HOSPITAL | Age: 55
End: 2025-05-14
Attending: INTERNAL MEDICINE
Payer: COMMERCIAL

## 2025-05-14 VITALS
HEART RATE: 84 BPM | DIASTOLIC BLOOD PRESSURE: 74 MMHG | OXYGEN SATURATION: 98 % | SYSTOLIC BLOOD PRESSURE: 144 MMHG | TEMPERATURE: 98 F | BODY MASS INDEX: 50.77 KG/M2 | WEIGHT: 275.38 LBS | RESPIRATION RATE: 18 BRPM

## 2025-05-14 DIAGNOSIS — I10 PRIMARY HYPERTENSION: ICD-10-CM

## 2025-05-14 DIAGNOSIS — Z17.1 MALIGNANT NEOPLASM OF AXILLARY TAIL OF BREAST IN FEMALE, ESTROGEN RECEPTOR NEGATIVE, UNSPECIFIED LATERALITY: Primary | ICD-10-CM

## 2025-05-14 DIAGNOSIS — C50.619 MALIGNANT NEOPLASM OF AXILLARY TAIL OF BREAST IN FEMALE, ESTROGEN RECEPTOR NEGATIVE, UNSPECIFIED LATERALITY: Primary | ICD-10-CM

## 2025-05-14 PROCEDURE — 63600175 PHARM REV CODE 636 W HCPCS: Mod: JZ,TB | Performed by: INTERNAL MEDICINE

## 2025-05-14 PROCEDURE — 96372 THER/PROPH/DIAG INJ SC/IM: CPT

## 2025-05-14 RX ORDER — DEXAMETHASONE 4 MG/1
8 TABLET ORAL SEE ADMIN INSTRUCTIONS
Qty: 12 TABLET | Refills: 3 | Status: SHIPPED | OUTPATIENT
Start: 2025-05-14 | End: 2026-05-14

## 2025-05-14 RX ORDER — AMLODIPINE BESYLATE 5 MG/1
5 TABLET ORAL NIGHTLY
Qty: 90 TABLET | Refills: 0 | Status: SHIPPED | OUTPATIENT
Start: 2025-05-14

## 2025-05-14 RX ADMIN — PEGFILGRASTIM-CBQV 6 MG: 6 INJECTION, SOLUTION SUBCUTANEOUS at 02:05

## 2025-05-14 NOTE — TELEPHONE ENCOUNTER
Pt advised per Dr dukes's recommendations to start  decadron day prior to chemo and restart night of chemo and continue as prescribed Pt acknowledged understanding

## 2025-05-14 NOTE — PLAN OF CARE
"Patient presented to unit for Udenyca injection (C1D2). VSS. Complains of mild facial flushing, and facial and tongue "tingling." Has since resolved, no issues today. Udenyca administered without difficulty and tolerated well. Patient discharged ambulatory and in NAD.    Problem: Oncology Care  Goal: Effective Coping  Outcome: Progressing  Goal: Improved Activity Tolerance  Outcome: Progressing  Goal: Optimal Oral Intake  Outcome: Progressing  Goal: Improved Oral Mucous Membrane Integrity  Outcome: Progressing  Goal: Optimal Pain Control and Function  Outcome: Progressing     "

## 2025-05-21 ENCOUNTER — OFFICE VISIT (OUTPATIENT)
Facility: CLINIC | Age: 55
End: 2025-05-21
Payer: COMMERCIAL

## 2025-05-21 VITALS
RESPIRATION RATE: 18 BRPM | DIASTOLIC BLOOD PRESSURE: 72 MMHG | TEMPERATURE: 97 F | HEART RATE: 91 BPM | WEIGHT: 276.38 LBS | BODY MASS INDEX: 52.18 KG/M2 | HEIGHT: 61 IN | SYSTOLIC BLOOD PRESSURE: 122 MMHG | OXYGEN SATURATION: 95 %

## 2025-05-21 DIAGNOSIS — I48.0 PAF (PAROXYSMAL ATRIAL FIBRILLATION): ICD-10-CM

## 2025-05-21 DIAGNOSIS — E11.21 TYPE 2 DIABETES MELLITUS WITH DIABETIC NEPHROPATHY, WITHOUT LONG-TERM CURRENT USE OF INSULIN: ICD-10-CM

## 2025-05-21 DIAGNOSIS — B37.0 ORAL CANDIDA: Primary | ICD-10-CM

## 2025-05-21 DIAGNOSIS — L30.8 PSORIASIFORM DERMATITIS: ICD-10-CM

## 2025-05-21 DIAGNOSIS — E66.01 MORBID OBESITY: ICD-10-CM

## 2025-05-21 PROCEDURE — 3078F DIAST BP <80 MM HG: CPT | Mod: CPTII,S$GLB,, | Performed by: STUDENT IN AN ORGANIZED HEALTH CARE EDUCATION/TRAINING PROGRAM

## 2025-05-21 PROCEDURE — 1160F RVW MEDS BY RX/DR IN RCRD: CPT | Mod: CPTII,S$GLB,, | Performed by: STUDENT IN AN ORGANIZED HEALTH CARE EDUCATION/TRAINING PROGRAM

## 2025-05-21 PROCEDURE — 99999 PR PBB SHADOW E&M-EST. PATIENT-LVL V: CPT | Mod: PBBFAC,,, | Performed by: STUDENT IN AN ORGANIZED HEALTH CARE EDUCATION/TRAINING PROGRAM

## 2025-05-21 PROCEDURE — 82570 ASSAY OF URINE CREATININE: CPT | Performed by: STUDENT IN AN ORGANIZED HEALTH CARE EDUCATION/TRAINING PROGRAM

## 2025-05-21 PROCEDURE — 99214 OFFICE O/P EST MOD 30 MIN: CPT | Mod: S$GLB,,, | Performed by: STUDENT IN AN ORGANIZED HEALTH CARE EDUCATION/TRAINING PROGRAM

## 2025-05-21 PROCEDURE — 3008F BODY MASS INDEX DOCD: CPT | Mod: CPTII,S$GLB,, | Performed by: STUDENT IN AN ORGANIZED HEALTH CARE EDUCATION/TRAINING PROGRAM

## 2025-05-21 PROCEDURE — 1159F MED LIST DOCD IN RCRD: CPT | Mod: CPTII,S$GLB,, | Performed by: STUDENT IN AN ORGANIZED HEALTH CARE EDUCATION/TRAINING PROGRAM

## 2025-05-21 PROCEDURE — 4010F ACE/ARB THERAPY RXD/TAKEN: CPT | Mod: CPTII,S$GLB,, | Performed by: STUDENT IN AN ORGANIZED HEALTH CARE EDUCATION/TRAINING PROGRAM

## 2025-05-21 PROCEDURE — 3074F SYST BP LT 130 MM HG: CPT | Mod: CPTII,S$GLB,, | Performed by: STUDENT IN AN ORGANIZED HEALTH CARE EDUCATION/TRAINING PROGRAM

## 2025-05-21 RX ORDER — BETAMETHASONE DIPROPIONATE 0.5 MG/G
OINTMENT TOPICAL 2 TIMES DAILY
Qty: 15 G | Refills: 2 | Status: SHIPPED | OUTPATIENT
Start: 2025-05-21

## 2025-05-21 RX ORDER — NYSTATIN 100000 [USP'U]/ML
4 SUSPENSION ORAL 4 TIMES DAILY
Qty: 180 ML | Refills: 0 | Status: SHIPPED | OUTPATIENT
Start: 2025-05-21 | End: 2025-05-31

## 2025-05-21 RX ORDER — TIRZEPATIDE 15 MG/.5ML
15 INJECTION, SOLUTION SUBCUTANEOUS
Qty: 2 ML | Refills: 11 | Status: SHIPPED | OUTPATIENT
Start: 2025-05-21 | End: 2026-05-21

## 2025-05-21 NOTE — PROGRESS NOTES
SUBJECTIVE     Chief Complaint   Patient presents with    Diabetes     Pt is here to follow up on her diabetes       History of Present Illness    CHIEF COMPLAINT:  Patient presents today for follow up of port access site concerns    PORT ACCESS SITE:  She reports port access site is located in a skin crease and is healing at a different rate than expected.    CURRENT SYMPTOMS:  She reports fatigue, bloating, and oral lesions located on the tip of the tongue and posterior region. She denies nausea.    MEDICATIONS:  She is currently taking Mounjaro without side effects.    LABS AND TREATMENT:  She undergoes labs prior to each chemotherapy session, with next labs scheduled for June 1st or 2nd before chemotherapy appointment.      ROS:  General: -fever, -chills, +fatigue, -weight gain, -weight loss  Eyes: -vision changes, -redness, -discharge  ENT: -ear pain, -nasal congestion, -sore throat, +mouth lesions  Cardiovascular: -chest pain, -palpitations, -lower extremity edema  Respiratory: -cough, -shortness of breath  Gastrointestinal: -abdominal pain, -nausea, -vomiting, -diarrhea, -constipation, -blood in stool, +bloating  Genitourinary: -dysuria, -hematuria, -frequency  Musculoskeletal: -joint pain, -muscle pain  Skin: -rash, -lesion  Neurological: -headache, -dizziness, -numbness, -tingling  Psychiatric: -anxiety, -depression, -sleep difficulty           PAST MEDICAL HISTORY:  Past Medical History:   Diagnosis Date    Allergy     Atrial flutter     Degenerative lumbar disc 04/09/2021    Diabetes mellitus     Diabetes mellitus, type 2     Hyperlipidemia     Hypertension     Malignant neoplasm of axillary tail of breast in female, estrogen receptor negative 4/28/2025    PAF (paroxysmal atrial fibrillation) 6/30/2022    Sleep apnea     Symptomatic anemia 2/24/2023       ALLERGIES AND MEDICATIONS: updated and reviewed.  Review of patient's allergies indicates:   Allergen Reactions    Adhesive Blisters, Itching and Rash  "    Current Medications[1]      OBJECTIVE     Physical Exam  Vitals:    05/21/25 0815   BP: 122/72   Pulse: 91   Resp: 18   Temp: 97.2 °F (36.2 °C)    Body mass index is 52.22 kg/m².  Weight: 125.4 kg (276 lb 5.6 oz)   Height: 5' 1" (154.9 cm)     Physical Exam  Vitals reviewed.   Constitutional:       General: She is not in acute distress.  HENT:      Right Ear: External ear normal.      Left Ear: External ear normal.      Nose: Nose normal.      Mouth/Throat:      Mouth: Mucous membranes are moist.   Eyes:      Extraocular Movements: Extraocular movements intact.      Conjunctiva/sclera: Conjunctivae normal.      Pupils: Pupils are equal, round, and reactive to light.   Pulmonary:      Effort: Pulmonary effort is normal.   Abdominal:      General: There is no distension.      Palpations: Abdomen is soft.   Musculoskeletal:         General: No swelling. Normal range of motion.      Cervical back: Normal range of motion.   Skin:     General: Skin is warm and dry.      Findings: No rash.   Neurological:      General: No focal deficit present.      Mental Status: She is alert and oriented to person, place, and time.   Psychiatric:         Mood and Affect: Mood normal.         Behavior: Behavior normal.           Health Maintenance         Date Due Completion Date    Pneumococcal Vaccines (Age 50+) (2 of 2 - PCV) 05/24/2019 5/24/2018    COVID-19 Vaccine (3 - 2024-25 season) 09/01/2024 12/28/2021    Diabetic Eye Exam 12/21/2024 12/21/2023    Diabetes Urine Screening 02/20/2025 2/20/2024    Foot Exam 02/20/2025 2/20/2024    Lipid Panel 02/20/2025 2/20/2024    Hemoglobin A1c 05/20/2025 11/20/2024    Mammogram 01/29/2026 1/29/2025    TETANUS VACCINE 02/03/2026 2/3/2016    Low Dose Statin 05/21/2026 5/21/2025    Colorectal Cancer Screening 06/19/2027 6/19/2024    RSV Vaccine (Age 60+ and Pregnant patients) (1 - 1-dose 75+ series) 04/26/2045 ---              ASSESSMENT     55 y.o. female with     1. Oral candida    2. " Psoriasiform dermatitis    3. Type 2 diabetes mellitus with diabetic nephropathy, without long-term current use of insulin    4. Morbid obesity    5. PAF (paroxysmal atrial fibrillation)      E66.01 Morbid obesity  T82.338S Leakage of other vascular grafts, sequela  B37.0 Candidal stomatitis  R53.83 Other fatigue  R14.0 Abdominal distension (gaseous)    IMPRESSION:  Assessed port access site, noting slower healing due to location in skin crease causing rubbing.  Evaluated overall condition, noting fatigue but no nausea.  Maintained Mounjaro at current dose, with plans to reassess if rapid weight loss occurs.  Coordinate care plan with Dr. Davis regarding weight management during cancer treatment.  Discussed current period focused on getting through treatment rather than self-improvement.  PLAN:     1. Psoriasiform dermatitis  - Cont steroid PRN  - LIPID PANEL; Future  - HEMOGLOBIN A1C; Future  - Microalbumin/creatinine urine ratio  - betamethasone dipropionate (BETANATE) 0.05 % ointment; Apply topically 2 (two) times daily.  Dispense: 15 g; Refill: 2    2. Type 2 diabetes mellitus with diabetic nephropathy, without long-term current use of insulin  - Patient continues Mounjaro with no side effects other than tiredness (no nausea).  - Will monitor weight loss and coordinate with hem/onc regarding any changes.  - Follow-up in 3 months to assess progress.  - LIPID PANEL; Future  - HEMOGLOBIN A1C; Future  - Microalbumin/creatinine urine ratio  - tirzepatide (MOUNJARO) 15 mg/0.5 mL PnIj; Inject 15 mg into the skin every 7 days.  Dispense: 2 mL; Refill: 11    3. Morbid obesity  - Patient continues Mounjaro with no side effects other than tiredness (no nausea).  - Will monitor weight loss and coordinate with hem/onc regarding any changes.  - Follow-up in 3 months to assess progress.  - LIPID PANEL; Future  - HEMOGLOBIN A1C; Future  - Microalbumin/creatinine urine ratio    4. PAF (paroxysmal atrial fibrillation)  -  Stable, continue current regimen. Due for laboratory monitoring, ordered.   - LIPID PANEL; Future  - HEMOGLOBIN A1C; Future  - Microalbumin/creatinine urine ratio    5. Oral candida  - Patient reports sensation on the tip and underneath back of tongue.  - Recommend initial home remedy of baking soda, salt, and water rinse.  - If ineffective after a few days, prescribed swish and spit antifungal medication to be used 4 times daily for 1 week.  - LIPID PANEL; Future  - HEMOGLOBIN A1C; Future  - Microalbumin/creatinine urine ratio  - nystatin (MYCOSTATIN) 100,000 unit/mL suspension; Take 4 mLs (400,000 Units total) by mouth 4 (four) times daily. for 10 days  Dispense: 180 mL; Refill: 0    VASCULAR GRAFT LEAKAGE (PORT ACCESS):  - Evaluated port access site which is healing, but at a slower rate due to its location in the crease.  - Advised to continue current management with Neosporin application to the area.    FATIGUE:  - Informed about available support resources through cancer center, including support groups and individual counseling.    ABDOMINAL DISTENSION:  - Patient reports feeling bloated.  - Ordered urine microalbumin test.         Shannon Ya MD  05/21/2025 8:51 AM        Follow up in about 3 months (around 8/21/2025).    This note was generated with the assistance of ambient listening technology. Verbal consent was obtained by the patient and accompanying visitor(s) for the recording of patient appointment to facilitate this note. I attest to having reviewed and edited the generated note for accuracy, though some syntax or spelling errors may persist. Please contact the author of this note for any clarification.                     [1]   Current Outpatient Medications   Medication Sig Dispense Refill    acetaminophen (TYLENOL) 500 MG tablet Take 1,000 mg by mouth every 6 (six) hours as needed for Pain.      amLODIPine (NORVASC) 5 MG tablet TAKE 1 TABLET BY MOUTH ONCE DAILY IN THE EVENING 90 tablet 0     carvediloL (COREG) 12.5 MG tablet TAKE 1 TABLET BY MOUTH TWICE DAILY WITH MEALS 180 tablet 0    clobetasoL (TEMOVATE) 0.05 % external solution Apply topically 2 (two) times daily. 50 mL 0    ELIQUIS 5 mg Tab Take 1 tablet by mouth twice daily 180 tablet 0    furosemide (LASIX) 20 MG tablet Take 1 tablet by mouth once daily 90 tablet 0    loratadine (CLARITIN) 10 mg tablet Take 10 mg by mouth once daily. prn      losartan (COZAAR) 50 MG tablet Take 1 tablet by mouth twice daily 180 tablet 0    metFORMIN (GLUCOPHAGE) 1000 MG tablet TAKE 1 TABLET BY MOUTH TWICE DAILY WITH MEALS 180 tablet 0    multivitamin (ONE DAILY MULTIVITAMIN) per tablet Take 1 tablet by mouth once daily.      OLANZapine (ZYPREXA) 5 MG tablet Take 1 tablet by mouth nightly on days 1-3 of each chemotherapy cycle. 3 tablet 5    ondansetron (ZOFRAN) 4 MG tablet Take 4 mg by mouth every 8 (eight) hours as needed.      prochlorperazine (COMPAZINE) 5 MG tablet Take 2 tablets (10 mg total) by mouth every 6 (six) hours as needed for Nausea. 20 tablet 11    propafenone (RYTHMOL SR) 225 MG Cp12 Take 1 capsule (225 mg total) by mouth every 12 (twelve) hours. 60 capsule 11    sertraline (ZOLOFT) 25 MG tablet Take 1 tablet (25 mg total) by mouth once daily. 30 tablet 11    simvastatin (ZOCOR) 40 MG tablet TAKE 1 TABLET BY MOUTH ONCE DAILY IN THE EVENING 90 tablet 0    betamethasone dipropionate (BETANATE) 0.05 % ointment Apply topically 2 (two) times daily. 15 g 2    dexAMETHasone (DECADRON) 4 MG Tab Take 2 tablets (8 mg total) by mouth As instructed. (Patient not taking: Reported on 5/21/2025) 12 tablet 3    LIDOcaine-prilocaine (EMLA) cream Apply to port 30 minutes prior to chemo and over with plastic wrap (Patient not taking: Reported on 5/21/2025) 30 g 0    nystatin (MYCOSTATIN) 100,000 unit/mL suspension Take 4 mLs (400,000 Units total) by mouth 4 (four) times daily. for 10 days 180 mL 0    oxyCODONE-acetaminophen (PERCOCET) 5-325 mg per tablet Take 1  tablet by mouth every 4 (four) hours as needed for Pain. (Patient not taking: Reported on 5/21/2025) 10 each 0    tirzepatide (MOUNJARO) 15 mg/0.5 mL PnIj Inject 15 mg into the skin every 7 days. 2 mL 11     No current facility-administered medications for this visit.

## 2025-05-22 LAB
ALBUMIN/CREAT UR: 8.3 UG/MG
CREAT UR-MCNC: 84 MG/DL (ref 15–325)
MICROALBUMIN UR-MCNC: 7 UG/ML (ref ?–5000)

## 2025-05-29 DIAGNOSIS — I10 PRIMARY HYPERTENSION: ICD-10-CM

## 2025-05-29 RX ORDER — AMLODIPINE BESYLATE 5 MG/1
5 TABLET ORAL NIGHTLY
Qty: 90 TABLET | Refills: 0 | Status: SHIPPED | OUTPATIENT
Start: 2025-05-29

## 2025-06-02 ENCOUNTER — LAB VISIT (OUTPATIENT)
Dept: LAB | Facility: HOSPITAL | Age: 55
End: 2025-06-02
Attending: STUDENT IN AN ORGANIZED HEALTH CARE EDUCATION/TRAINING PROGRAM
Payer: COMMERCIAL

## 2025-06-02 DIAGNOSIS — B37.0 ORAL CANDIDA: ICD-10-CM

## 2025-06-02 DIAGNOSIS — L30.8 PSORIASIFORM DERMATITIS: ICD-10-CM

## 2025-06-02 DIAGNOSIS — E11.21 TYPE 2 DIABETES MELLITUS WITH DIABETIC NEPHROPATHY, WITHOUT LONG-TERM CURRENT USE OF INSULIN: ICD-10-CM

## 2025-06-02 DIAGNOSIS — I48.0 PAF (PAROXYSMAL ATRIAL FIBRILLATION): ICD-10-CM

## 2025-06-02 DIAGNOSIS — Z17.1 MALIGNANT NEOPLASM OF AXILLARY TAIL OF BREAST IN FEMALE, ESTROGEN RECEPTOR NEGATIVE, UNSPECIFIED LATERALITY: ICD-10-CM

## 2025-06-02 DIAGNOSIS — E66.01 MORBID OBESITY: ICD-10-CM

## 2025-06-02 DIAGNOSIS — C50.619 MALIGNANT NEOPLASM OF AXILLARY TAIL OF BREAST IN FEMALE, ESTROGEN RECEPTOR NEGATIVE, UNSPECIFIED LATERALITY: ICD-10-CM

## 2025-06-02 LAB
ABSOLUTE EOSINOPHIL (OHS): 0.01 K/UL
ABSOLUTE MONOCYTE (OHS): 0.6 K/UL (ref 0.3–1)
ABSOLUTE NEUTROPHIL COUNT (OHS): 6.53 K/UL (ref 1.8–7.7)
ALBUMIN SERPL BCP-MCNC: 3.6 G/DL (ref 3.5–5.2)
ALP SERPL-CCNC: 107 UNIT/L (ref 40–150)
ALT SERPL W/O P-5'-P-CCNC: 22 UNIT/L (ref 10–44)
ANION GAP (OHS): 7 MMOL/L (ref 8–16)
AST SERPL-CCNC: 22 UNIT/L (ref 11–45)
BASOPHILS # BLD AUTO: 0.15 K/UL
BASOPHILS NFR BLD AUTO: 1.6 %
BILIRUB SERPL-MCNC: 0.4 MG/DL (ref 0.1–1)
BUN SERPL-MCNC: 16 MG/DL (ref 6–20)
CALCIUM SERPL-MCNC: 9.2 MG/DL (ref 8.7–10.5)
CHLORIDE SERPL-SCNC: 108 MMOL/L (ref 95–110)
CHOLEST SERPL-MCNC: 165 MG/DL (ref 120–199)
CHOLEST/HDLC SERPL: 4 {RATIO} (ref 2–5)
CO2 SERPL-SCNC: 24 MMOL/L (ref 23–29)
CREAT SERPL-MCNC: 0.8 MG/DL (ref 0.5–1.4)
EAG (OHS): 114 MG/DL (ref 68–131)
ERYTHROCYTE [DISTWIDTH] IN BLOOD BY AUTOMATED COUNT: 15.1 % (ref 11.5–14.5)
GFR SERPLBLD CREATININE-BSD FMLA CKD-EPI: >60 ML/MIN/1.73/M2
GLUCOSE SERPL-MCNC: 110 MG/DL (ref 70–110)
HBA1C MFR BLD: 5.6 % (ref 4–5.6)
HCT VFR BLD AUTO: 37.9 % (ref 37–48.5)
HDLC SERPL-MCNC: 41 MG/DL (ref 40–75)
HDLC SERPL: 24.8 % (ref 20–50)
HGB BLD-MCNC: 11.7 GM/DL (ref 12–16)
IMM GRANULOCYTES # BLD AUTO: 0.07 K/UL (ref 0–0.04)
IMM GRANULOCYTES NFR BLD AUTO: 0.7 % (ref 0–0.5)
LDLC SERPL CALC-MCNC: 98.2 MG/DL (ref 63–159)
LYMPHOCYTES # BLD AUTO: 2.08 K/UL (ref 1–4.8)
MCH RBC QN AUTO: 28.3 PG (ref 27–31)
MCHC RBC AUTO-ENTMCNC: 30.9 G/DL (ref 32–36)
MCV RBC AUTO: 92 FL (ref 82–98)
NONHDLC SERPL-MCNC: 124 MG/DL
NUCLEATED RBC (/100WBC) (OHS): 0 /100 WBC
PLATELET # BLD AUTO: 185 K/UL (ref 150–450)
PMV BLD AUTO: 12.1 FL (ref 9.2–12.9)
POTASSIUM SERPL-SCNC: 3.9 MMOL/L (ref 3.5–5.1)
PROT SERPL-MCNC: 8.3 GM/DL (ref 6–8.4)
RBC # BLD AUTO: 4.13 M/UL (ref 4–5.4)
RELATIVE EOSINOPHIL (OHS): 0.1 %
RELATIVE LYMPHOCYTE (OHS): 22 % (ref 18–48)
RELATIVE MONOCYTE (OHS): 6.4 % (ref 4–15)
RELATIVE NEUTROPHIL (OHS): 69.2 % (ref 38–73)
SODIUM SERPL-SCNC: 139 MMOL/L (ref 136–145)
TRIGL SERPL-MCNC: 129 MG/DL (ref 30–150)
WBC # BLD AUTO: 9.44 K/UL (ref 3.9–12.7)

## 2025-06-02 PROCEDURE — 36415 COLL VENOUS BLD VENIPUNCTURE: CPT | Mod: PN

## 2025-06-02 PROCEDURE — 85025 COMPLETE CBC W/AUTO DIFF WBC: CPT

## 2025-06-02 PROCEDURE — 83036 HEMOGLOBIN GLYCOSYLATED A1C: CPT

## 2025-06-02 PROCEDURE — 82310 ASSAY OF CALCIUM: CPT

## 2025-06-02 PROCEDURE — 80061 LIPID PANEL: CPT

## 2025-06-03 ENCOUNTER — INFUSION (OUTPATIENT)
Dept: INFUSION THERAPY | Facility: HOSPITAL | Age: 55
End: 2025-06-03
Attending: INTERNAL MEDICINE
Payer: COMMERCIAL

## 2025-06-03 ENCOUNTER — OFFICE VISIT (OUTPATIENT)
Dept: HEMATOLOGY/ONCOLOGY | Facility: CLINIC | Age: 55
End: 2025-06-03
Payer: COMMERCIAL

## 2025-06-03 VITALS
HEIGHT: 62 IN | OXYGEN SATURATION: 99 % | DIASTOLIC BLOOD PRESSURE: 74 MMHG | WEIGHT: 275.56 LBS | TEMPERATURE: 98 F | BODY MASS INDEX: 50.71 KG/M2 | SYSTOLIC BLOOD PRESSURE: 139 MMHG | HEART RATE: 102 BPM

## 2025-06-03 VITALS
WEIGHT: 275.38 LBS | HEART RATE: 102 BPM | BODY MASS INDEX: 50.68 KG/M2 | TEMPERATURE: 98 F | OXYGEN SATURATION: 98 % | SYSTOLIC BLOOD PRESSURE: 139 MMHG | HEIGHT: 62 IN | DIASTOLIC BLOOD PRESSURE: 61 MMHG | RESPIRATION RATE: 16 BRPM

## 2025-06-03 DIAGNOSIS — F41.9 ANXIETY AND DEPRESSION: ICD-10-CM

## 2025-06-03 DIAGNOSIS — C50.619 MALIGNANT NEOPLASM OF AXILLARY TAIL OF BREAST IN FEMALE, ESTROGEN RECEPTOR NEGATIVE, UNSPECIFIED LATERALITY: Primary | ICD-10-CM

## 2025-06-03 DIAGNOSIS — F32.A ANXIETY AND DEPRESSION: ICD-10-CM

## 2025-06-03 DIAGNOSIS — Z17.1 MALIGNANT NEOPLASM OF AXILLARY TAIL OF BREAST IN FEMALE, ESTROGEN RECEPTOR NEGATIVE, UNSPECIFIED LATERALITY: Primary | ICD-10-CM

## 2025-06-03 DIAGNOSIS — Z17.1 MALIGNANT NEOPLASM OF RIGHT BREAST IN FEMALE, ESTROGEN RECEPTOR NEGATIVE, UNSPECIFIED SITE OF BREAST: Primary | ICD-10-CM

## 2025-06-03 DIAGNOSIS — C50.911 MALIGNANT NEOPLASM OF RIGHT BREAST IN FEMALE, ESTROGEN RECEPTOR NEGATIVE, UNSPECIFIED SITE OF BREAST: Primary | ICD-10-CM

## 2025-06-03 DIAGNOSIS — G47.9 SLEEP DISTURBANCES: ICD-10-CM

## 2025-06-03 PROCEDURE — 96417 CHEMO IV INFUS EACH ADDL SEQ: CPT

## 2025-06-03 PROCEDURE — 3044F HG A1C LEVEL LT 7.0%: CPT | Mod: CPTII,S$GLB,, | Performed by: INTERNAL MEDICINE

## 2025-06-03 PROCEDURE — G2211 COMPLEX E/M VISIT ADD ON: HCPCS | Mod: S$GLB,,, | Performed by: INTERNAL MEDICINE

## 2025-06-03 PROCEDURE — 3066F NEPHROPATHY DOC TX: CPT | Mod: CPTII,S$GLB,, | Performed by: INTERNAL MEDICINE

## 2025-06-03 PROCEDURE — 3075F SYST BP GE 130 - 139MM HG: CPT | Mod: CPTII,S$GLB,, | Performed by: INTERNAL MEDICINE

## 2025-06-03 PROCEDURE — 3008F BODY MASS INDEX DOCD: CPT | Mod: CPTII,S$GLB,, | Performed by: INTERNAL MEDICINE

## 2025-06-03 PROCEDURE — 3061F NEG MICROALBUMINURIA REV: CPT | Mod: CPTII,S$GLB,, | Performed by: INTERNAL MEDICINE

## 2025-06-03 PROCEDURE — 96413 CHEMO IV INFUSION 1 HR: CPT

## 2025-06-03 PROCEDURE — 96367 TX/PROPH/DG ADDL SEQ IV INF: CPT

## 2025-06-03 PROCEDURE — 4010F ACE/ARB THERAPY RXD/TAKEN: CPT | Mod: CPTII,S$GLB,, | Performed by: INTERNAL MEDICINE

## 2025-06-03 PROCEDURE — 25000003 PHARM REV CODE 250: Performed by: INTERNAL MEDICINE

## 2025-06-03 PROCEDURE — 3078F DIAST BP <80 MM HG: CPT | Mod: CPTII,S$GLB,, | Performed by: INTERNAL MEDICINE

## 2025-06-03 PROCEDURE — 99999 PR PBB SHADOW E&M-EST. PATIENT-LVL V: CPT | Mod: PBBFAC,,, | Performed by: INTERNAL MEDICINE

## 2025-06-03 PROCEDURE — 63600175 PHARM REV CODE 636 W HCPCS: Performed by: INTERNAL MEDICINE

## 2025-06-03 PROCEDURE — 99214 OFFICE O/P EST MOD 30 MIN: CPT | Mod: S$GLB,,, | Performed by: INTERNAL MEDICINE

## 2025-06-03 RX ORDER — PROCHLORPERAZINE EDISYLATE 5 MG/ML
10 INJECTION INTRAMUSCULAR; INTRAVENOUS ONCE AS NEEDED
Status: CANCELLED | OUTPATIENT
Start: 2025-06-03

## 2025-06-03 RX ORDER — EPINEPHRINE 0.3 MG/.3ML
0.3 INJECTION SUBCUTANEOUS ONCE AS NEEDED
Status: DISCONTINUED | OUTPATIENT
Start: 2025-06-03 | End: 2025-06-03 | Stop reason: HOSPADM

## 2025-06-03 RX ORDER — PROCHLORPERAZINE EDISYLATE 5 MG/ML
10 INJECTION INTRAMUSCULAR; INTRAVENOUS ONCE AS NEEDED
Status: DISCONTINUED | OUTPATIENT
Start: 2025-06-03 | End: 2025-06-03 | Stop reason: HOSPADM

## 2025-06-03 RX ORDER — HEPARIN 100 UNIT/ML
500 SYRINGE INTRAVENOUS
Status: CANCELLED | OUTPATIENT
Start: 2025-06-03

## 2025-06-03 RX ORDER — DIPHENHYDRAMINE HYDROCHLORIDE 50 MG/ML
50 INJECTION, SOLUTION INTRAMUSCULAR; INTRAVENOUS ONCE AS NEEDED
Status: DISCONTINUED | OUTPATIENT
Start: 2025-06-03 | End: 2025-06-03 | Stop reason: HOSPADM

## 2025-06-03 RX ORDER — EPINEPHRINE 0.3 MG/.3ML
0.3 INJECTION SUBCUTANEOUS ONCE AS NEEDED
Status: CANCELLED | OUTPATIENT
Start: 2025-06-03

## 2025-06-03 RX ORDER — HEPARIN 100 UNIT/ML
500 SYRINGE INTRAVENOUS
Status: DISCONTINUED | OUTPATIENT
Start: 2025-06-03 | End: 2025-06-03 | Stop reason: HOSPADM

## 2025-06-03 RX ORDER — SODIUM CHLORIDE 0.9 % (FLUSH) 0.9 %
10 SYRINGE (ML) INJECTION
Status: CANCELLED | OUTPATIENT
Start: 2025-06-03

## 2025-06-03 RX ORDER — DIPHENHYDRAMINE HYDROCHLORIDE 50 MG/ML
50 INJECTION, SOLUTION INTRAMUSCULAR; INTRAVENOUS ONCE AS NEEDED
Status: CANCELLED | OUTPATIENT
Start: 2025-06-03

## 2025-06-03 RX ADMIN — DOCETAXEL 178 MG: 20 INJECTION, SOLUTION, CONCENTRATE INTRAVENOUS at 09:06

## 2025-06-03 RX ADMIN — CYCLOPHOSPHAMIDE 1420 MG: 200 INJECTION, SOLUTION INTRAVENOUS at 10:06

## 2025-06-03 RX ADMIN — HEPARIN 500 UNITS: 100 SYRINGE at 11:06

## 2025-06-03 RX ADMIN — DEXAMETHASONE SODIUM PHOSPHATE 0.25 MG: 10 INJECTION, SOLUTION INTRAMUSCULAR; INTRAVENOUS at 09:06

## 2025-06-04 ENCOUNTER — INFUSION (OUTPATIENT)
Dept: INFUSION THERAPY | Facility: HOSPITAL | Age: 55
End: 2025-06-04
Attending: INTERNAL MEDICINE
Payer: COMMERCIAL

## 2025-06-04 VITALS
BODY MASS INDEX: 50.4 KG/M2 | WEIGHT: 275.56 LBS | HEART RATE: 90 BPM | TEMPERATURE: 98 F | DIASTOLIC BLOOD PRESSURE: 92 MMHG | RESPIRATION RATE: 18 BRPM | OXYGEN SATURATION: 98 % | SYSTOLIC BLOOD PRESSURE: 158 MMHG

## 2025-06-04 DIAGNOSIS — C50.619 MALIGNANT NEOPLASM OF AXILLARY TAIL OF BREAST IN FEMALE, ESTROGEN RECEPTOR NEGATIVE, UNSPECIFIED LATERALITY: Primary | ICD-10-CM

## 2025-06-04 DIAGNOSIS — Z17.1 MALIGNANT NEOPLASM OF AXILLARY TAIL OF BREAST IN FEMALE, ESTROGEN RECEPTOR NEGATIVE, UNSPECIFIED LATERALITY: Primary | ICD-10-CM

## 2025-06-04 DIAGNOSIS — E11.21 TYPE 2 DIABETES MELLITUS WITH DIABETIC NEPHROPATHY, WITHOUT LONG-TERM CURRENT USE OF INSULIN: ICD-10-CM

## 2025-06-04 PROCEDURE — 96372 THER/PROPH/DIAG INJ SC/IM: CPT

## 2025-06-04 PROCEDURE — 63600175 PHARM REV CODE 636 W HCPCS: Mod: JZ,TB | Performed by: INTERNAL MEDICINE

## 2025-06-04 RX ORDER — METFORMIN HYDROCHLORIDE 1000 MG/1
1000 TABLET ORAL 2 TIMES DAILY WITH MEALS
Qty: 180 TABLET | Refills: 0 | Status: SHIPPED | OUTPATIENT
Start: 2025-06-04

## 2025-06-04 RX ADMIN — PEGFILGRASTIM-CBQV 6 MG: 6 INJECTION, SOLUTION SUBCUTANEOUS at 02:06

## 2025-06-20 DIAGNOSIS — I10 PRIMARY HYPERTENSION: ICD-10-CM

## 2025-06-20 RX ORDER — CARVEDILOL 12.5 MG/1
12.5 TABLET ORAL 2 TIMES DAILY WITH MEALS
Qty: 180 TABLET | Refills: 0 | Status: SHIPPED | OUTPATIENT
Start: 2025-06-20

## 2025-06-23 ENCOUNTER — LAB VISIT (OUTPATIENT)
Dept: LAB | Facility: HOSPITAL | Age: 55
End: 2025-06-23
Attending: INTERNAL MEDICINE
Payer: COMMERCIAL

## 2025-06-23 DIAGNOSIS — C50.911 MALIGNANT NEOPLASM OF RIGHT BREAST IN FEMALE, ESTROGEN RECEPTOR NEGATIVE, UNSPECIFIED SITE OF BREAST: ICD-10-CM

## 2025-06-23 DIAGNOSIS — Z17.1 MALIGNANT NEOPLASM OF RIGHT BREAST IN FEMALE, ESTROGEN RECEPTOR NEGATIVE, UNSPECIFIED SITE OF BREAST: ICD-10-CM

## 2025-06-23 LAB
ABSOLUTE EOSINOPHIL (OHS): 0.03 K/UL
ABSOLUTE MONOCYTE (OHS): 0.87 K/UL (ref 0.3–1)
ABSOLUTE NEUTROPHIL COUNT (OHS): 10.65 K/UL (ref 1.8–7.7)
ALBUMIN SERPL BCP-MCNC: 3.5 G/DL (ref 3.5–5.2)
ALP SERPL-CCNC: 96 UNIT/L (ref 40–150)
ALT SERPL W/O P-5'-P-CCNC: 28 UNIT/L (ref 10–44)
ANION GAP (OHS): 12 MMOL/L (ref 8–16)
AST SERPL-CCNC: 23 UNIT/L (ref 11–45)
BASOPHILS # BLD AUTO: 0.16 K/UL
BASOPHILS NFR BLD AUTO: 1.1 %
BILIRUB SERPL-MCNC: 0.5 MG/DL (ref 0.1–1)
BUN SERPL-MCNC: 15 MG/DL (ref 6–20)
CALCIUM SERPL-MCNC: 9.5 MG/DL (ref 8.7–10.5)
CHLORIDE SERPL-SCNC: 102 MMOL/L (ref 95–110)
CO2 SERPL-SCNC: 23 MMOL/L (ref 23–29)
CREAT SERPL-MCNC: 0.9 MG/DL (ref 0.5–1.4)
ERYTHROCYTE [DISTWIDTH] IN BLOOD BY AUTOMATED COUNT: 16.9 % (ref 11.5–14.5)
GFR SERPLBLD CREATININE-BSD FMLA CKD-EPI: >60 ML/MIN/1.73/M2
GLUCOSE SERPL-MCNC: 159 MG/DL (ref 70–110)
HCT VFR BLD AUTO: 36 % (ref 37–48.5)
HGB BLD-MCNC: 11.2 GM/DL (ref 12–16)
IMM GRANULOCYTES # BLD AUTO: 0.15 K/UL (ref 0–0.04)
IMM GRANULOCYTES NFR BLD AUTO: 1.1 % (ref 0–0.5)
LYMPHOCYTES # BLD AUTO: 2.25 K/UL (ref 1–4.8)
MAGNESIUM SERPL-MCNC: 2 MG/DL (ref 1.6–2.6)
MCH RBC QN AUTO: 28.4 PG (ref 27–31)
MCHC RBC AUTO-ENTMCNC: 31.1 G/DL (ref 32–36)
MCV RBC AUTO: 91 FL (ref 82–98)
NUCLEATED RBC (/100WBC) (OHS): 0 /100 WBC
PLATELET # BLD AUTO: 182 K/UL (ref 150–450)
PMV BLD AUTO: 12.3 FL (ref 9.2–12.9)
POTASSIUM SERPL-SCNC: 4 MMOL/L (ref 3.5–5.1)
PROT SERPL-MCNC: 7.5 GM/DL (ref 6–8.4)
RBC # BLD AUTO: 3.95 M/UL (ref 4–5.4)
RELATIVE EOSINOPHIL (OHS): 0.2 %
RELATIVE LYMPHOCYTE (OHS): 15.9 % (ref 18–48)
RELATIVE MONOCYTE (OHS): 6.2 % (ref 4–15)
RELATIVE NEUTROPHIL (OHS): 75.5 % (ref 38–73)
SODIUM SERPL-SCNC: 137 MMOL/L (ref 136–145)
WBC # BLD AUTO: 14.11 K/UL (ref 3.9–12.7)

## 2025-06-23 PROCEDURE — 82247 BILIRUBIN TOTAL: CPT

## 2025-06-23 PROCEDURE — 85025 COMPLETE CBC W/AUTO DIFF WBC: CPT

## 2025-06-23 PROCEDURE — 83735 ASSAY OF MAGNESIUM: CPT

## 2025-06-23 PROCEDURE — 36415 COLL VENOUS BLD VENIPUNCTURE: CPT | Mod: PN

## 2025-06-24 ENCOUNTER — OFFICE VISIT (OUTPATIENT)
Dept: HEMATOLOGY/ONCOLOGY | Facility: CLINIC | Age: 55
End: 2025-06-24
Payer: COMMERCIAL

## 2025-06-24 ENCOUNTER — LAB VISIT (OUTPATIENT)
Dept: LAB | Facility: HOSPITAL | Age: 55
End: 2025-06-24
Attending: INTERNAL MEDICINE
Payer: COMMERCIAL

## 2025-06-24 ENCOUNTER — INFUSION (OUTPATIENT)
Dept: INFUSION THERAPY | Facility: HOSPITAL | Age: 55
End: 2025-06-24
Attending: INTERNAL MEDICINE
Payer: COMMERCIAL

## 2025-06-24 VITALS
DIASTOLIC BLOOD PRESSURE: 77 MMHG | HEART RATE: 101 BPM | TEMPERATURE: 98 F | BODY MASS INDEX: 52.07 KG/M2 | HEIGHT: 61 IN | OXYGEN SATURATION: 98 % | WEIGHT: 275.81 LBS | SYSTOLIC BLOOD PRESSURE: 133 MMHG

## 2025-06-24 VITALS
SYSTOLIC BLOOD PRESSURE: 115 MMHG | TEMPERATURE: 98 F | HEART RATE: 104 BPM | DIASTOLIC BLOOD PRESSURE: 61 MMHG | RESPIRATION RATE: 18 BRPM | WEIGHT: 275.81 LBS | OXYGEN SATURATION: 95 % | BODY MASS INDEX: 52.11 KG/M2

## 2025-06-24 DIAGNOSIS — T45.1X5A ANTINEOPLASTIC CHEMOTHERAPY INDUCED ANEMIA: ICD-10-CM

## 2025-06-24 DIAGNOSIS — R32 URINARY INCONTINENCE, UNSPECIFIED TYPE: ICD-10-CM

## 2025-06-24 DIAGNOSIS — Z17.1 MALIGNANT NEOPLASM OF RIGHT BREAST IN FEMALE, ESTROGEN RECEPTOR NEGATIVE, UNSPECIFIED SITE OF BREAST: Primary | ICD-10-CM

## 2025-06-24 DIAGNOSIS — C50.619 MALIGNANT NEOPLASM OF AXILLARY TAIL OF BREAST IN FEMALE, ESTROGEN RECEPTOR NEGATIVE, UNSPECIFIED LATERALITY: Primary | ICD-10-CM

## 2025-06-24 DIAGNOSIS — F32.A ANXIETY AND DEPRESSION: ICD-10-CM

## 2025-06-24 DIAGNOSIS — C50.911 MALIGNANT NEOPLASM OF RIGHT BREAST IN FEMALE, ESTROGEN RECEPTOR NEGATIVE, UNSPECIFIED SITE OF BREAST: Primary | ICD-10-CM

## 2025-06-24 DIAGNOSIS — Z17.1 MALIGNANT NEOPLASM OF AXILLARY TAIL OF BREAST IN FEMALE, ESTROGEN RECEPTOR NEGATIVE, UNSPECIFIED LATERALITY: Primary | ICD-10-CM

## 2025-06-24 DIAGNOSIS — D64.81 ANTINEOPLASTIC CHEMOTHERAPY INDUCED ANEMIA: ICD-10-CM

## 2025-06-24 DIAGNOSIS — F41.9 ANXIETY AND DEPRESSION: ICD-10-CM

## 2025-06-24 DIAGNOSIS — G47.9 SLEEP DISTURBANCES: ICD-10-CM

## 2025-06-24 LAB
BILIRUB UR QL STRIP.AUTO: NEGATIVE
CLARITY UR: CLEAR
COLOR UR AUTO: YELLOW
GLUCOSE UR QL STRIP: NEGATIVE
HGB UR QL STRIP: NEGATIVE
HOLD SPECIMEN: NORMAL
KETONES UR QL STRIP: NEGATIVE
LEUKOCYTE ESTERASE UR QL STRIP: NEGATIVE
NITRITE UR QL STRIP: NEGATIVE
PH UR STRIP: 6 [PH]
PROT UR QL STRIP: NEGATIVE
SP GR UR STRIP: 1.02
UROBILINOGEN UR STRIP-ACNC: NEGATIVE EU/DL

## 2025-06-24 PROCEDURE — G2211 COMPLEX E/M VISIT ADD ON: HCPCS | Mod: S$GLB,,, | Performed by: INTERNAL MEDICINE

## 2025-06-24 PROCEDURE — 99999 PR PBB SHADOW E&M-EST. PATIENT-LVL IV: CPT | Mod: PBBFAC,,, | Performed by: INTERNAL MEDICINE

## 2025-06-24 PROCEDURE — 81003 URINALYSIS AUTO W/O SCOPE: CPT

## 2025-06-24 PROCEDURE — 99215 OFFICE O/P EST HI 40 MIN: CPT | Mod: S$GLB,,, | Performed by: INTERNAL MEDICINE

## 2025-06-24 PROCEDURE — 96413 CHEMO IV INFUSION 1 HR: CPT

## 2025-06-24 PROCEDURE — 25000003 PHARM REV CODE 250: Performed by: INTERNAL MEDICINE

## 2025-06-24 PROCEDURE — 3066F NEPHROPATHY DOC TX: CPT | Mod: CPTII,S$GLB,, | Performed by: INTERNAL MEDICINE

## 2025-06-24 PROCEDURE — 3078F DIAST BP <80 MM HG: CPT | Mod: CPTII,S$GLB,, | Performed by: INTERNAL MEDICINE

## 2025-06-24 PROCEDURE — 3044F HG A1C LEVEL LT 7.0%: CPT | Mod: CPTII,S$GLB,, | Performed by: INTERNAL MEDICINE

## 2025-06-24 PROCEDURE — 96367 TX/PROPH/DG ADDL SEQ IV INF: CPT

## 2025-06-24 PROCEDURE — 3075F SYST BP GE 130 - 139MM HG: CPT | Mod: CPTII,S$GLB,, | Performed by: INTERNAL MEDICINE

## 2025-06-24 PROCEDURE — 63600175 PHARM REV CODE 636 W HCPCS: Performed by: INTERNAL MEDICINE

## 2025-06-24 PROCEDURE — 96417 CHEMO IV INFUS EACH ADDL SEQ: CPT

## 2025-06-24 PROCEDURE — 3061F NEG MICROALBUMINURIA REV: CPT | Mod: CPTII,S$GLB,, | Performed by: INTERNAL MEDICINE

## 2025-06-24 PROCEDURE — 4010F ACE/ARB THERAPY RXD/TAKEN: CPT | Mod: CPTII,S$GLB,, | Performed by: INTERNAL MEDICINE

## 2025-06-24 PROCEDURE — 3008F BODY MASS INDEX DOCD: CPT | Mod: CPTII,S$GLB,, | Performed by: INTERNAL MEDICINE

## 2025-06-24 PROCEDURE — 1159F MED LIST DOCD IN RCRD: CPT | Mod: CPTII,S$GLB,, | Performed by: INTERNAL MEDICINE

## 2025-06-24 RX ORDER — PROCHLORPERAZINE EDISYLATE 5 MG/ML
10 INJECTION INTRAMUSCULAR; INTRAVENOUS ONCE AS NEEDED
Status: DISCONTINUED | OUTPATIENT
Start: 2025-06-24 | End: 2025-06-24 | Stop reason: HOSPADM

## 2025-06-24 RX ORDER — HEPARIN 100 UNIT/ML
500 SYRINGE INTRAVENOUS
Status: DISCONTINUED | OUTPATIENT
Start: 2025-06-24 | End: 2025-06-24 | Stop reason: HOSPADM

## 2025-06-24 RX ORDER — SODIUM CHLORIDE 0.9 % (FLUSH) 0.9 %
10 SYRINGE (ML) INJECTION
Status: CANCELLED | OUTPATIENT
Start: 2025-06-24

## 2025-06-24 RX ORDER — HEPARIN 100 UNIT/ML
500 SYRINGE INTRAVENOUS
Status: CANCELLED | OUTPATIENT
Start: 2025-06-24

## 2025-06-24 RX ORDER — EPINEPHRINE 0.3 MG/.3ML
0.3 INJECTION SUBCUTANEOUS ONCE AS NEEDED
Status: DISCONTINUED | OUTPATIENT
Start: 2025-06-24 | End: 2025-06-24 | Stop reason: HOSPADM

## 2025-06-24 RX ORDER — EPINEPHRINE 0.3 MG/.3ML
0.3 INJECTION SUBCUTANEOUS ONCE AS NEEDED
Status: CANCELLED | OUTPATIENT
Start: 2025-06-24

## 2025-06-24 RX ORDER — PROCHLORPERAZINE EDISYLATE 5 MG/ML
10 INJECTION INTRAMUSCULAR; INTRAVENOUS ONCE AS NEEDED
Status: CANCELLED | OUTPATIENT
Start: 2025-06-24

## 2025-06-24 RX ORDER — DIPHENHYDRAMINE HYDROCHLORIDE 50 MG/ML
50 INJECTION, SOLUTION INTRAMUSCULAR; INTRAVENOUS ONCE AS NEEDED
Status: DISCONTINUED | OUTPATIENT
Start: 2025-06-24 | End: 2025-06-24 | Stop reason: HOSPADM

## 2025-06-24 RX ORDER — DIPHENHYDRAMINE HYDROCHLORIDE 50 MG/ML
50 INJECTION, SOLUTION INTRAMUSCULAR; INTRAVENOUS ONCE AS NEEDED
Status: CANCELLED | OUTPATIENT
Start: 2025-06-24

## 2025-06-24 RX ADMIN — CYCLOPHOSPHAMIDE 1420 MG: 200 INJECTION, SOLUTION INTRAVENOUS at 11:06

## 2025-06-24 RX ADMIN — DOCETAXEL 178 MG: 20 INJECTION, SOLUTION, CONCENTRATE INTRAVENOUS at 10:06

## 2025-06-24 RX ADMIN — DEXAMETHASONE SODIUM PHOSPHATE 0.25 MG: 10 INJECTION, SOLUTION INTRAMUSCULAR; INTRAVENOUS at 09:06

## 2025-06-24 NOTE — Clinical Note
U/a and urine culture Today  Chemo today  Cbc,cmp,mag , Fe studies  prior to f/u with Dr. IBRAHIM on 7/15/25 Follow up with Dr. Ibrahim as scheduled 7/15

## 2025-06-24 NOTE — PLAN OF CARE
Pt arrived to clinic by foot without difficulty, AAOx4, accompanied by family member. Denies any new or worsening of symptoms since last visit. Pt received aloxi/dex, Doxcetaxel, and Cyclophosphamide infusion via accessed port with 20g, 1 in, power ascencio needle. Dsg c/d/i; port site free of s/s of infection. Pt tolerated medication well. No S&S of an adverse reaction, VSS. Port flushed before with NS and after infusion with heparin; patent with blood return. Denies pain or discomfort. Care plan discussed with pt. Pt verbalized understanding. Pt aware of upcoming appointment via MyChart. Pt ambulatory upon discharge in [facility].

## 2025-06-24 NOTE — PROGRESS NOTES
Subjective     Patient ID: Micaela Hernandez is a 55 y.o. female.    Chief Complaint: Breast CA      Diagnosis : pT2 N0(sn) M0 grade 3 ER - [place] - HER2 - Ki67 20-30% IDC of the right breast.  Treatment: status post right partial mastectomy and sentinel node biopsy on 4/2/2025.   Adjuvant TC (docetaxel/cyclophosphamide)  5/13/25- present     HPIDahosea Hernandez is a 54 y.o. female who presents with for follow up triple negative right breast IDC. She was in her usual state of health. Of note, the patient had an abnormal screening mammogram 10 months ago. She recently had the diagnostic mammogram related to the abnormal screening, which led to the biopsy. Findings at [facility]:  Right breast, 11:00, N + 10 cm, biopsy:  Invasive ductal carcinoma, Grade 2, (tubules=3, nuclei=2, mitoses=1)Invasive carcinoma is present in multiple cores and measures 5 mm in greatest linear dimension within the core biopsy  No carcinoma in-situ or lymphovascular invasion is identified. Tumor biomarkers shows ER neg   UT  Neg HER2 (IHC): Neg (score 0 )Ki-67:  20-30%. Also had a right axillary biopsy which was negative. The patient is status post right partial mastectomy and sentinel node biopsy on 4/2/2025.  Pathology shows IDC G3 , negative margins. 2 lymph nodes removed negative for malignancy.  She denies HA, visual changes, cough, SOB, abdominal pain, easy bleeding, or easy bruising. She did not palpate a mass. No skin changes. No previous breast biopsies. No family history of breast cancer. Genetic screening negative with VUS ( variant of uncertain significance).             Interval Hx: She is nervous/anxious  S/p C2 chemo completed 6/3/25   Mild taste alterations  Mild fatigue   No n /v  No tingling/numbness in extremities   Reports urinary urgency/incontinence since last chemo       GynHx: Menarche 10 y/o   Hysterectomy 2 yrs ago has ovaries - sec to fibroids  27 y/o with first pregnancy. Breast feed x 2 wks  No HRT        Past Medical  History:   Diagnosis Date    Allergy     Atrial flutter     Degenerative lumbar disc 2021    Diabetes mellitus     Diabetes mellitus, type 2     Hyperlipidemia     Hypertension     Malignant neoplasm of axillary tail of breast in female, estrogen receptor negative 2025    PAF (paroxysmal atrial fibrillation) 2022    Sleep apnea     Symptomatic anemia 2023     Past Surgical History:   Procedure Laterality Date    BREAST SURGERY       SECTION      CHOLECYSTECTOMY      ENDOMETRIAL ABLATION      HERNIA REPAIR      incisional     HYSTEROSCOPY WITH DILATION AND CURETTAGE OF UTERUS N/A 3/1/2023    Procedure: HYSTEROSCOPY, WITH DILATION AND CURETTAGE OF UTERUS;  Surgeon: Lola Zhao MD;  Location: [facility] OR;  Service: OB/GYN;  Laterality: N/A;    INSERTION OF VENOUS ACCESS PORT Left 2025    Procedure: INSERTION, VENOUS ACCESS PORT;  Surgeon: Viki Starr MD;  Location: [facility] OR;  Service: General;  Laterality: Left;  RN PREOP 2025---BMI--49.62    KNEE SURGERY      LAPAROSCOPIC TOTAL HYSTERECTOMY N/A 3/28/2023    Procedure: HYSTERECTOMY, TOTAL, LAPAROSCOPIC;  Surgeon: Lola Zhao MD;  Location: [facility] OR;  Service: OB/GYN;  Laterality: N/A;  Large patient and large uterus, may be difficult case.  May convert to open.  T/S=---UPT  ON 3/27  RN PREOP 3/15/2023---BMI--52.32    LIVER LOBECTOMY Right     LUMPECTOMY,BREAST, WITH RADIOACTIVE SEED LOCALIZATION AND SENTINEL LYMPH NODE BIOPSY Right 2025    Procedure: LUMPECTOMY,BREAST,WITH RADIOLOGIC MARKER LOCALIZATION AND SENTINEL LYMPH NODE BIOPSY, Right Breast;  Surgeon: Elvin Ogden MD;  [place] [facility] [place] [address] [place] [address];  Service: General;  Laterality: Right;    LYMPH NODE DISSECTION      right axillary    TRANSESOPHAGEAL ECHOCARDIOGRAPHY N/A 2022    Procedure: ECHOCARDIOGRAM, TRANSESOPHAGEAL;  Surgeon: Ji Patricio MD;  Location: [facility] CATH LAB;  Service: Cardiology;  Laterality: N/A;        Review of Systems   Constitutional:  " Positive for fatigue. Negative for appetite change, fever and unexpected weight change.        Taste alterations   HENT:  Negative for mouth sores.    Eyes:  Negative for visual disturbance.   Respiratory:  Negative for cough and shortness of breath.    Cardiovascular:  Negative for chest pain.   Gastrointestinal:  Negative for abdominal pain and diarrhea.   Genitourinary:  Positive for bladder incontinence and urgency. Negative for frequency.   Musculoskeletal:  Negative for back pain.   Integumentary:  Negative for rash.   Neurological:  Negative for headaches.   Hematological:  Negative for adenopathy.   Psychiatric/Behavioral:  Positive for sleep disturbance. The patient is nervous/anxious.           Objective   Vitals:    06/24/25 0756   BP: 133/77   Pulse: 101   Temp: 98.4 °F (36.9 °C)   SpO2: 98%   Weight: 125.1 kg (275 lb 12.7 oz)   Height: 5' 1" (1.549 m)       Physical Exam  Constitutional:       Appearance: She is well-developed.   HENT:      Head: Normocephalic.      Right Ear: External ear normal.      Left Ear: External ear normal.      Mouth/Throat:      Pharynx: No oropharyngeal exudate.   Eyes:      General: No scleral icterus.        Right eye: No discharge.         Left eye: No discharge.      Conjunctiva/sclera: Conjunctivae normal.   Cardiovascular:      Rate and Rhythm: Normal rate and regular rhythm.      Heart sounds: Normal heart sounds. No murmur heard.  Pulmonary:      Effort: Pulmonary effort is normal.      Breath sounds: No wheezing.   Abdominal:      General: Bowel sounds are normal.      Palpations: Abdomen is soft.      Tenderness: There is no abdominal tenderness. There is no guarding.   Musculoskeletal:         General: Normal range of motion.      Cervical back: Normal range of motion and neck supple.      Right lower leg: No edema.      Left lower leg: No edema.   Skin:     Coloration: Skin is not jaundiced.      Findings: No rash.   Neurological:      General: No focal deficit " present.      Mental Status: She is alert and oriented to person, place, and time.   Psychiatric:         Mood and Affect: Mood normal.         Behavior: Behavior normal.       Name: Micaela Hernandez     MRN: [id number]     Result:   Mammo Digital Diagnostic Bilat with Ruben  US Breast Right Limited     History:  Patient is 54 y.o. and is seen for abnormal mammogram.        Films Compared:  Compared to: 12/27/2023 Mammo Digital Screening Bilat w/ Ruben     Findings:  This procedure was performed using tomosynthesis. Computer-aided detection was utilized in the interpretation of this examination.  There are scattered areas of fibroglandular density.      Mammo Digital Diagnostic Bilat with Ruben  Right  Mass: There is a high density, round mass with obscured margins seen in the upper region of the right breast in the posterior depth on the MLO view.   Lymph Node: There is a lymph node seen in the right axilla.      Left  There is no evidence of suspicious masses, calcifications, or other abnormal findings in the left breast.     US Breast Right Limited  Right  Mass: There is a 24 mm x 23 mm x 20 mm round, heterogeneous mass with indistinct margins with shadowing seen in the right breast at 11 o'clock, 10 cm from the nipple. The mass correlates with the mass seen on the mammogram.   Lymph Node: There is an indeterminate lymph node seen in the right axilla at level I. Cortical thickness measures 5 mm on the right.      Impression:  Right  Mass: Right breast 24 mm x 23 mm x 20 mm mass at 11 o'clock. Assessment: 5 - Highly suggestive of malignancy. Ultrasound-guided biopsy is recommended.   Lymph Node: Right axilla lymph node. Assessment: 4 - Suspicious finding. Ultrasound-guided biopsy is recommended.      Left  Mammo Digital Diagnostic Bilat with Ruben  There is no mammographic evidence of malignancy in the left breast.        BI-RADS Category:   Overall: 5 - Highly Suggestive of Malignancy      Recommendation:  Ultrasound-guided biopsy is recommended of right breast mass and right axillary lymph node. I discussed these findings and recommendations with the patient in detail at the time of exam.     Your estimated lifetime risk of breast cancer (to age 85) based on Tyrer-Cuzick risk assessment model is 8.96%.  According to the [employer], patients with a lifetime breast cancer risk of 20% or higher might benefit from supplemental screening tests, such as screening breast MRI.     Roula Ness MD       Findings at [facility]:   . Right breast, 11:00, N + 10 cm, biopsy:  Invasive ductal carcinoma, Grade 2, (tubules=3, nuclei=2, mitoses=1)  Invasive carcinoma is present in multiple cores and measures 5 mm in greatest linear dimension within the core biopsy  No carcinoma in-situ or lymphovascular invasion is identified    Tumor biomarkers  Estrogen receptor (ER):  Negative  Progesterone receptor (PGR):  Negative  HER2 (IHC):  Negative, 0  Ki-67:  20-30%    Additional IHC:  E cadherin:  Strong, positive membranous staining, supporting ductal differentiation      2. Right axillary node, biopsy:  No metastatic carcinoma identified in lymphoid tissue      Final Diagnosis   1. Right breast, lumpectomy:    INVASIVE DUCTAL CARCINOMA, HIGH COMBINED HISTOLOGIC GRADE (DEJA GRADE 3; TUBULES 3, NUCLEAR PLEOMORPHISM 3, MITOTIC ACTIVITY 3), 2.5 CM IN GREATEST EXTENT.  CENTRAL NECROSIS IS PRESENT.  LYMPHOVASCULAR INVASION IS NOT IDENTIFIED.    PERINEURAL INVASION IS NOT IDENTIFIED.  RESECTION MARGIN IS FREE OF INVASIVE CARCINOMA:                INVASIVE CARCINOMA IS PRESENT 0.3 CM FROM THE SUPERIOR, LATERAL AND MEDIAL MARGINS, 0.4 CM FROM THE INFERIOR AND POSTERIOR MARGINS AND 0.5 CM FROM                THE ANTERIOR MARGIN.  BREAST RECEPTORS PERFORMED ON THE PATIENT'S PREVIOUS BIOPSY (WBS-):                ER: NEGATIVE               OH:  NEGATIVE                HER2:  NEGATIVE (SCORE 0)                KI-67:  20-30%  RADIOLOGIC RADAR MARKER IDENTIFIED GROSSLY.  MICROCALCIFICATIONS ARE NOT IDENTIFIED.     2. Lymph node, right sentinel #1, excision:  1 benign lymph node (0/1).    Previous biopsy site changes.  See comment.     3. Lymph node, right non-sentinel #1, excision:  1 benign lymph node (0/1).       TUMOR BLOCKS FOR ADDITIONAL STUDIES IF NEEDED:  1G-1I, 1K-1P, 1R-1S, 1U-1W, 1Y                  Assessment and Plan        Malignant neoplasm of right breast in female, estrogen receptor positive, unspecified site of breast (Primary)  This is a 55 y.o. female with a stage pT2 N0(sn) M0 grade 3 ER - [place] - HER2 - Ki67 20-30% IDC of the right breast.  The patient is status post right partial mastectomy and sentinel node biopsy on 4/2/2025.       Plan adjuvant TC x 4  S/p C2 chemo completed 6/3/25   Labs reviewed   Proceed with C3  -Genetic screening neg    Urinary urgency/incontinence  U/a and Urine cx today     Antineoplastic chemotherapy induced anemia  Mild , Hb 11.2g/dL   Check Fe studies      Anxiety and depression    -Cont sertraline (ZOLOFT) 25 MG tablet; Take 1 tablet (25 mg total) by mouth once daily.  Dispense: 30 tablet; Refill: 11    Sleep disturbances  Improved   Cont temazepam (RESTORIL) 7.5 MG Cap; Take 1 capsule (7.5 mg total) by mouth nightly as needed (sleep disturbances).  Dispense: 30 capsule; Refill: 0   Pt wants to wait for genetic screening    Visit today included increased complexity associated with the care of the episodic problem anxiety and depression addressed and managing the longitudinal care of the patient due to the serious and/or complex managed problem(s) malignant neoplasm of right breast     I spent a total of 35  minutes on the day of the visit.This includes face to face time and non-face to face time preparing to see the patient (eg, review of tests), obtaining and/or reviewing separately obtained history, documenting clinical information in the electronic or other health  record, independently interpreting results and communicating results to the patient/family/caregiver, and coordinating care.      U/a and urine culture Today   Cbc,cmp,mag , Fe studies  prior to f/u with Dr. IBRAHIM on 7/15/25  Follow up with Dr. Ibrahim as scheduled 7/15

## 2025-06-25 ENCOUNTER — INFUSION (OUTPATIENT)
Dept: INFUSION THERAPY | Facility: HOSPITAL | Age: 55
End: 2025-06-25
Attending: INTERNAL MEDICINE
Payer: COMMERCIAL

## 2025-06-25 VITALS
DIASTOLIC BLOOD PRESSURE: 89 MMHG | OXYGEN SATURATION: 95 % | TEMPERATURE: 98 F | RESPIRATION RATE: 18 BRPM | HEART RATE: 73 BPM | SYSTOLIC BLOOD PRESSURE: 152 MMHG

## 2025-06-25 DIAGNOSIS — C50.619 MALIGNANT NEOPLASM OF AXILLARY TAIL OF BREAST IN FEMALE, ESTROGEN RECEPTOR NEGATIVE, UNSPECIFIED LATERALITY: Primary | ICD-10-CM

## 2025-06-25 DIAGNOSIS — Z17.1 MALIGNANT NEOPLASM OF AXILLARY TAIL OF BREAST IN FEMALE, ESTROGEN RECEPTOR NEGATIVE, UNSPECIFIED LATERALITY: Primary | ICD-10-CM

## 2025-06-25 PROCEDURE — 96372 THER/PROPH/DIAG INJ SC/IM: CPT

## 2025-06-25 PROCEDURE — 63600175 PHARM REV CODE 636 W HCPCS: Mod: JZ,TB | Performed by: INTERNAL MEDICINE

## 2025-06-25 RX ADMIN — PEGFILGRASTIM-CBQV 6 MG: 6 INJECTION, SOLUTION SUBCUTANEOUS at 02:06

## 2025-06-25 NOTE — PLAN OF CARE
Patient arrived on unit for Udenyca injection. Patient is awake, alert, and oriented x4, denies any new complaints or worsening signs and symptoms since last visit. Administered Udenyca sub Q to left arm, tolerated well with no signs or symptoms of adverse reaction. Patient denies any questions or concerns at this time. Discharged home upon completion of treatments in NAD.    Please see MAR and flowsheets for any additional information.      Problem: Adult Inpatient Plan of Care  Goal: Optimal Comfort and Wellbeing  Outcome: Met

## 2025-07-02 ENCOUNTER — RESULTS FOLLOW-UP (OUTPATIENT)
Facility: CLINIC | Age: 55
End: 2025-07-02

## 2025-07-11 DIAGNOSIS — E11.21 TYPE 2 DIABETES MELLITUS WITH DIABETIC NEPHROPATHY, WITHOUT LONG-TERM CURRENT USE OF INSULIN: ICD-10-CM

## 2025-07-11 DIAGNOSIS — I10 PRIMARY HYPERTENSION: ICD-10-CM

## 2025-07-11 RX ORDER — SIMVASTATIN 40 MG/1
40 TABLET, FILM COATED ORAL NIGHTLY
Qty: 90 TABLET | Refills: 0 | Status: SHIPPED | OUTPATIENT
Start: 2025-07-11

## 2025-07-11 RX ORDER — FUROSEMIDE 20 MG/1
20 TABLET ORAL
Qty: 90 TABLET | Refills: 0 | Status: SHIPPED | OUTPATIENT
Start: 2025-07-11

## 2025-07-14 ENCOUNTER — LAB VISIT (OUTPATIENT)
Dept: LAB | Facility: HOSPITAL | Age: 55
End: 2025-07-14
Attending: INTERNAL MEDICINE
Payer: COMMERCIAL

## 2025-07-14 DIAGNOSIS — C50.911 MALIGNANT NEOPLASM OF RIGHT BREAST IN FEMALE, ESTROGEN RECEPTOR NEGATIVE, UNSPECIFIED SITE OF BREAST: ICD-10-CM

## 2025-07-14 DIAGNOSIS — Z17.1 MALIGNANT NEOPLASM OF RIGHT BREAST IN FEMALE, ESTROGEN RECEPTOR NEGATIVE, UNSPECIFIED SITE OF BREAST: ICD-10-CM

## 2025-07-14 LAB
ABSOLUTE EOSINOPHIL (OHS): 0.03 K/UL
ABSOLUTE MONOCYTE (OHS): 0.71 K/UL (ref 0.3–1)
ABSOLUTE NEUTROPHIL COUNT (OHS): 8.48 K/UL (ref 1.8–7.7)
ALBUMIN SERPL BCP-MCNC: 3.2 G/DL (ref 3.5–5.2)
ALP SERPL-CCNC: 95 UNIT/L (ref 40–150)
ALT SERPL W/O P-5'-P-CCNC: 22 UNIT/L (ref 10–44)
ANION GAP (OHS): 11 MMOL/L (ref 8–16)
AST SERPL-CCNC: 19 UNIT/L (ref 11–45)
BASOPHILS # BLD AUTO: 0.09 K/UL
BASOPHILS NFR BLD AUTO: 0.8 %
BILIRUB SERPL-MCNC: 0.5 MG/DL (ref 0.1–1)
BUN SERPL-MCNC: 15 MG/DL (ref 6–20)
CALCIUM SERPL-MCNC: 9.3 MG/DL (ref 8.7–10.5)
CHLORIDE SERPL-SCNC: 105 MMOL/L (ref 95–110)
CO2 SERPL-SCNC: 23 MMOL/L (ref 23–29)
CREAT SERPL-MCNC: 0.8 MG/DL (ref 0.5–1.4)
ERYTHROCYTE [DISTWIDTH] IN BLOOD BY AUTOMATED COUNT: 18.9 % (ref 11.5–14.5)
GFR SERPLBLD CREATININE-BSD FMLA CKD-EPI: >60 ML/MIN/1.73/M2
GLUCOSE SERPL-MCNC: 150 MG/DL (ref 70–110)
HCT VFR BLD AUTO: 31.6 % (ref 37–48.5)
HGB BLD-MCNC: 9.8 GM/DL (ref 12–16)
IMM GRANULOCYTES # BLD AUTO: 0.1 K/UL (ref 0–0.04)
IMM GRANULOCYTES NFR BLD AUTO: 0.9 % (ref 0–0.5)
LYMPHOCYTES # BLD AUTO: 1.47 K/UL (ref 1–4.8)
MAGNESIUM SERPL-MCNC: 1.8 MG/DL (ref 1.6–2.6)
MCH RBC QN AUTO: 28.9 PG (ref 27–31)
MCHC RBC AUTO-ENTMCNC: 31 G/DL (ref 32–36)
MCV RBC AUTO: 93 FL (ref 82–98)
NUCLEATED RBC (/100WBC) (OHS): 0 /100 WBC
PLATELET # BLD AUTO: 176 K/UL (ref 150–450)
PMV BLD AUTO: 11.7 FL (ref 9.2–12.9)
POTASSIUM SERPL-SCNC: 4.4 MMOL/L (ref 3.5–5.1)
PROT SERPL-MCNC: 7.3 GM/DL (ref 6–8.4)
RBC # BLD AUTO: 3.39 M/UL (ref 4–5.4)
RELATIVE EOSINOPHIL (OHS): 0.3 %
RELATIVE LYMPHOCYTE (OHS): 13.5 % (ref 18–48)
RELATIVE MONOCYTE (OHS): 6.5 % (ref 4–15)
RELATIVE NEUTROPHIL (OHS): 78 % (ref 38–73)
SODIUM SERPL-SCNC: 139 MMOL/L (ref 136–145)
WBC # BLD AUTO: 10.88 K/UL (ref 3.9–12.7)

## 2025-07-14 PROCEDURE — 36415 COLL VENOUS BLD VENIPUNCTURE: CPT | Mod: PN

## 2025-07-14 PROCEDURE — 83735 ASSAY OF MAGNESIUM: CPT

## 2025-07-14 PROCEDURE — 85025 COMPLETE CBC W/AUTO DIFF WBC: CPT

## 2025-07-14 PROCEDURE — 80053 COMPREHEN METABOLIC PANEL: CPT

## 2025-07-15 ENCOUNTER — OFFICE VISIT (OUTPATIENT)
Dept: HEMATOLOGY/ONCOLOGY | Facility: CLINIC | Age: 55
End: 2025-07-15
Payer: COMMERCIAL

## 2025-07-15 ENCOUNTER — INFUSION (OUTPATIENT)
Dept: INFUSION THERAPY | Facility: HOSPITAL | Age: 55
End: 2025-07-15
Payer: COMMERCIAL

## 2025-07-15 VITALS
HEART RATE: 92 BPM | OXYGEN SATURATION: 97 % | DIASTOLIC BLOOD PRESSURE: 79 MMHG | RESPIRATION RATE: 18 BRPM | BODY MASS INDEX: 52.05 KG/M2 | TEMPERATURE: 98 F | WEIGHT: 280 LBS | SYSTOLIC BLOOD PRESSURE: 144 MMHG

## 2025-07-15 VITALS
HEART RATE: 109 BPM | WEIGHT: 273.38 LBS | DIASTOLIC BLOOD PRESSURE: 82 MMHG | HEIGHT: 62 IN | SYSTOLIC BLOOD PRESSURE: 151 MMHG | TEMPERATURE: 98 F | OXYGEN SATURATION: 96 % | BODY MASS INDEX: 50.31 KG/M2

## 2025-07-15 DIAGNOSIS — Z17.1 MALIGNANT NEOPLASM OF RIGHT BREAST IN FEMALE, ESTROGEN RECEPTOR NEGATIVE, UNSPECIFIED SITE OF BREAST: Primary | ICD-10-CM

## 2025-07-15 DIAGNOSIS — C50.619 MALIGNANT NEOPLASM OF AXILLARY TAIL OF BREAST IN FEMALE, ESTROGEN RECEPTOR NEGATIVE, UNSPECIFIED LATERALITY: ICD-10-CM

## 2025-07-15 DIAGNOSIS — Z17.1 MALIGNANT NEOPLASM OF AXILLARY TAIL OF BREAST IN FEMALE, ESTROGEN RECEPTOR NEGATIVE, UNSPECIFIED LATERALITY: Primary | ICD-10-CM

## 2025-07-15 DIAGNOSIS — C50.911 MALIGNANT NEOPLASM OF RIGHT BREAST IN FEMALE, ESTROGEN RECEPTOR NEGATIVE, UNSPECIFIED SITE OF BREAST: Primary | ICD-10-CM

## 2025-07-15 DIAGNOSIS — Z17.1 MALIGNANT NEOPLASM OF AXILLARY TAIL OF BREAST IN FEMALE, ESTROGEN RECEPTOR NEGATIVE, UNSPECIFIED LATERALITY: ICD-10-CM

## 2025-07-15 DIAGNOSIS — D64.81 ANEMIA DUE TO ANTINEOPLASTIC CHEMOTHERAPY: ICD-10-CM

## 2025-07-15 DIAGNOSIS — T45.1X5A ANEMIA DUE TO ANTINEOPLASTIC CHEMOTHERAPY: ICD-10-CM

## 2025-07-15 DIAGNOSIS — C50.619 MALIGNANT NEOPLASM OF AXILLARY TAIL OF BREAST IN FEMALE, ESTROGEN RECEPTOR NEGATIVE, UNSPECIFIED LATERALITY: Primary | ICD-10-CM

## 2025-07-15 PROCEDURE — 99215 OFFICE O/P EST HI 40 MIN: CPT | Mod: S$GLB,,, | Performed by: STUDENT IN AN ORGANIZED HEALTH CARE EDUCATION/TRAINING PROGRAM

## 2025-07-15 PROCEDURE — 3077F SYST BP >= 140 MM HG: CPT | Mod: CPTII,S$GLB,, | Performed by: STUDENT IN AN ORGANIZED HEALTH CARE EDUCATION/TRAINING PROGRAM

## 2025-07-15 PROCEDURE — 3079F DIAST BP 80-89 MM HG: CPT | Mod: CPTII,S$GLB,, | Performed by: STUDENT IN AN ORGANIZED HEALTH CARE EDUCATION/TRAINING PROGRAM

## 2025-07-15 PROCEDURE — 4010F ACE/ARB THERAPY RXD/TAKEN: CPT | Mod: CPTII,S$GLB,, | Performed by: STUDENT IN AN ORGANIZED HEALTH CARE EDUCATION/TRAINING PROGRAM

## 2025-07-15 PROCEDURE — 3066F NEPHROPATHY DOC TX: CPT | Mod: CPTII,S$GLB,, | Performed by: STUDENT IN AN ORGANIZED HEALTH CARE EDUCATION/TRAINING PROGRAM

## 2025-07-15 PROCEDURE — 99999 PR PBB SHADOW E&M-EST. PATIENT-LVL V: CPT | Mod: PBBFAC,,, | Performed by: STUDENT IN AN ORGANIZED HEALTH CARE EDUCATION/TRAINING PROGRAM

## 2025-07-15 PROCEDURE — 1159F MED LIST DOCD IN RCRD: CPT | Mod: CPTII,S$GLB,, | Performed by: STUDENT IN AN ORGANIZED HEALTH CARE EDUCATION/TRAINING PROGRAM

## 2025-07-15 PROCEDURE — 3061F NEG MICROALBUMINURIA REV: CPT | Mod: CPTII,S$GLB,, | Performed by: STUDENT IN AN ORGANIZED HEALTH CARE EDUCATION/TRAINING PROGRAM

## 2025-07-15 PROCEDURE — 96417 CHEMO IV INFUS EACH ADDL SEQ: CPT

## 2025-07-15 PROCEDURE — 96413 CHEMO IV INFUSION 1 HR: CPT

## 2025-07-15 PROCEDURE — 3044F HG A1C LEVEL LT 7.0%: CPT | Mod: CPTII,S$GLB,, | Performed by: STUDENT IN AN ORGANIZED HEALTH CARE EDUCATION/TRAINING PROGRAM

## 2025-07-15 PROCEDURE — G2211 COMPLEX E/M VISIT ADD ON: HCPCS | Mod: S$GLB,,, | Performed by: STUDENT IN AN ORGANIZED HEALTH CARE EDUCATION/TRAINING PROGRAM

## 2025-07-15 PROCEDURE — 3008F BODY MASS INDEX DOCD: CPT | Mod: CPTII,S$GLB,, | Performed by: STUDENT IN AN ORGANIZED HEALTH CARE EDUCATION/TRAINING PROGRAM

## 2025-07-15 PROCEDURE — 25000003 PHARM REV CODE 250: Performed by: STUDENT IN AN ORGANIZED HEALTH CARE EDUCATION/TRAINING PROGRAM

## 2025-07-15 PROCEDURE — 63600175 PHARM REV CODE 636 W HCPCS: Performed by: STUDENT IN AN ORGANIZED HEALTH CARE EDUCATION/TRAINING PROGRAM

## 2025-07-15 PROCEDURE — 96367 TX/PROPH/DG ADDL SEQ IV INF: CPT

## 2025-07-15 RX ORDER — PROCHLORPERAZINE EDISYLATE 5 MG/ML
10 INJECTION INTRAMUSCULAR; INTRAVENOUS ONCE AS NEEDED
Status: DISCONTINUED | OUTPATIENT
Start: 2025-07-15 | End: 2025-07-15 | Stop reason: HOSPADM

## 2025-07-15 RX ORDER — DIPHENHYDRAMINE HYDROCHLORIDE 50 MG/ML
50 INJECTION, SOLUTION INTRAMUSCULAR; INTRAVENOUS ONCE AS NEEDED
Status: CANCELLED | OUTPATIENT
Start: 2025-07-15

## 2025-07-15 RX ORDER — HEPARIN 100 UNIT/ML
500 SYRINGE INTRAVENOUS
Status: CANCELLED | OUTPATIENT
Start: 2025-07-15

## 2025-07-15 RX ORDER — BETAMETHASONE DIPROPIONATE 0.5 MG/G
1 OINTMENT, AUGMENTED TOPICAL 2 TIMES DAILY
COMMUNITY
Start: 2025-07-09

## 2025-07-15 RX ORDER — EPINEPHRINE 0.3 MG/.3ML
0.3 INJECTION SUBCUTANEOUS ONCE AS NEEDED
Status: CANCELLED | OUTPATIENT
Start: 2025-07-15

## 2025-07-15 RX ORDER — HEPARIN 100 UNIT/ML
500 SYRINGE INTRAVENOUS
Status: DISCONTINUED | OUTPATIENT
Start: 2025-07-15 | End: 2025-07-15 | Stop reason: HOSPADM

## 2025-07-15 RX ORDER — SODIUM CHLORIDE 0.9 % (FLUSH) 0.9 %
10 SYRINGE (ML) INJECTION
Status: CANCELLED | OUTPATIENT
Start: 2025-07-15

## 2025-07-15 RX ORDER — PROCHLORPERAZINE EDISYLATE 5 MG/ML
10 INJECTION INTRAMUSCULAR; INTRAVENOUS ONCE AS NEEDED
Status: CANCELLED | OUTPATIENT
Start: 2025-07-15

## 2025-07-15 RX ADMIN — HEPARIN 500 UNITS: 100 SYRINGE at 11:07

## 2025-07-15 RX ADMIN — CYCLOPHOSPHAMIDE 1420 MG: 200 INJECTION, SOLUTION INTRAVENOUS at 11:07

## 2025-07-15 RX ADMIN — DOCETAXEL 178 MG: 20 INJECTION, SOLUTION, CONCENTRATE INTRAVENOUS at 10:07

## 2025-07-15 RX ADMIN — DEXAMETHASONE SODIUM PHOSPHATE 0.25 MG: 10 INJECTION, SOLUTION INTRAMUSCULAR; INTRAVENOUS at 09:07

## 2025-07-15 NOTE — PROGRESS NOTES
Subjective     Patient ID: Micaela Hernandez is a 55 y.o. female.    Chief Complaint: Breast CA      Diagnosis : pT2 N0(sn) M0 grade 3 ER - [facility] - HER2 - Ki67 20-30% IDC of the right breast.  Treatment: status post right partial mastectomy and sentinel node biopsy on 4/2/2025.   Adjuvant TC (docetaxel/cyclophosphamide)  5/13/25- present     HPIDahosea Hernandez is a 54 y.o. female who presents with for follow up triple negative right breast IDC. She was in her usual state of health. Of note, the patient had an abnormal screening mammogram 10 months ago. She recently had the diagnostic mammogram related to the abnormal screening, which led to the biopsy. Findings at [facility]:  Right breast, 11:00, N + 10 cm, biopsy:  Invasive ductal carcinoma, Grade 2, (tubules=3, nuclei=2, mitoses=1)Invasive carcinoma is present in multiple cores and measures 5 mm in greatest linear dimension within the core biopsy  No carcinoma in-situ or lymphovascular invasion is identified. Tumor biomarkers shows ER neg   [facility]  Neg HER2 (IHC): Neg (score 0 )Ki-67:  20-30%. Also had a right axillary biopsy which was negative. The patient is status post right partial mastectomy and sentinel node biopsy on 4/2/2025.  Pathology shows IDC G3 , negative margins. 2 lymph nodes removed negative for malignancy.  She denies HA, visual changes, cough, SOB, abdominal pain, easy bleeding, or easy bruising. She did not palpate a mass. No skin changes. No previous breast biopsies. No family history of breast cancer. Genetic screening negative with VUS ( variant of uncertain significance).             Interval Hx: She is nervous/anxious  S/p C2 chemo completed 6/3/25   Mild taste alterations  Mild fatigue   No n /v  No tingling/numbness in extremities   Reports urinary urgency/incontinence since last chemo       GynHx: Menarche 10 y/o   Hysterectomy 2 yrs ago has ovaries - sec to fibroids  25 y/o with first pregnancy. Breast feed x 2 wks  No HRT        Past Medical  History:   Diagnosis Date    Allergy     Atrial flutter     Degenerative lumbar disc 2021    Diabetes mellitus     Diabetes mellitus, type 2     Hyperlipidemia     Hypertension     Malignant neoplasm of axillary tail of breast in female, estrogen receptor negative 2025    PAF (paroxysmal atrial fibrillation) 2022    Sleep apnea     Symptomatic anemia 2023     Past Surgical History:   Procedure Laterality Date    BREAST SURGERY       SECTION      CHOLECYSTECTOMY      ENDOMETRIAL ABLATION      HERNIA REPAIR      incisional     HYSTEROSCOPY WITH DILATION AND CURETTAGE OF UTERUS N/A 3/1/2023    Procedure: HYSTEROSCOPY, WITH DILATION AND CURETTAGE OF UTERUS;  Surgeon: Lola Zhao MD;  Location: [facility] OR;  Service: OB/GYN;  Laterality: N/A;    INSERTION OF VENOUS ACCESS PORT Left 2025    Procedure: INSERTION, VENOUS ACCESS PORT;  Surgeon: Viki Starr MD;  Location: [facility] OR;  Service: General;  Laterality: Left;  RN PREOP 2025---BMI--49.62    KNEE SURGERY      LAPAROSCOPIC TOTAL HYSTERECTOMY N/A 3/28/2023    Procedure: HYSTERECTOMY, TOTAL, LAPAROSCOPIC;  Surgeon: Lola Zhao MD;  Location: [facility] OR;  Service: OB/GYN;  Laterality: N/A;  Large patient and large uterus, may be difficult case.  May convert to open.  T/S=---UPT  ON 3/27  RN PREOP 3/15/2023---BMI--52.32    LIVER LOBECTOMY Right     LUMPECTOMY,BREAST, WITH RADIOACTIVE SEED LOCALIZATION AND SENTINEL LYMPH NODE BIOPSY Right 2025    Procedure: LUMPECTOMY,BREAST,WITH RADIOLOGIC MARKER LOCALIZATION AND SENTINEL LYMPH NODE BIOPSY, Right Breast;  Surgeon: Elvin Ogden MD;  Location: [facility] OR [address] [place] [address];  Service: General;  Laterality: Right;    LYMPH NODE DISSECTION      right axillary    TRANSESOPHAGEAL ECHOCARDIOGRAPHY N/A 2022    Procedure: ECHOCARDIOGRAM, TRANSESOPHAGEAL;  Surgeon: Ji Patricio MD;  Location: [facility] CATH LAB;  Service: Cardiology;  Laterality: N/A;        Review of Systems   Constitutional:   Positive for fatigue. Negative for appetite change, fever and unexpected weight change.        Taste alterations   HENT:  Negative for mouth sores.    Eyes:  Negative for visual disturbance.   Respiratory:  Negative for cough and shortness of breath.    Cardiovascular:  Negative for chest pain.   Gastrointestinal:  Negative for abdominal pain and diarrhea.   Genitourinary:  Positive for bladder incontinence and urgency. Negative for frequency.   Musculoskeletal:  Negative for back pain.   Integumentary:  Negative for rash.   Neurological:  Negative for headaches.   Hematological:  Negative for adenopathy.   Psychiatric/Behavioral:  Positive for sleep disturbance. The patient is nervous/anxious.           Objective   There were no vitals filed for this visit.      Physical Exam  Constitutional:       Appearance: She is well-developed.   HENT:      Head: Normocephalic.      Right Ear: External ear normal.      Left Ear: External ear normal.      Mouth/Throat:      Pharynx: No oropharyngeal exudate.   Eyes:      General: No scleral icterus.        Right eye: No discharge.         Left eye: No discharge.      Conjunctiva/sclera: Conjunctivae normal.   Cardiovascular:      Rate and Rhythm: Normal rate and regular rhythm.      Heart sounds: Normal heart sounds. No murmur heard.  Pulmonary:      Effort: Pulmonary effort is normal.      Breath sounds: No wheezing.   Abdominal:      General: Bowel sounds are normal.      Palpations: Abdomen is soft.      Tenderness: There is no abdominal tenderness. There is no guarding.   Musculoskeletal:         General: Normal range of motion.      Cervical back: Normal range of motion and neck supple.      Right lower leg: No edema.      Left lower leg: No edema.   Skin:     Coloration: Skin is not jaundiced.      Findings: No rash.   Neurological:      General: No focal deficit present.      Mental Status: She is alert and oriented to person, place, and time.   Psychiatric:          Mood and Affect: Mood normal.         Behavior: Behavior normal.       Name: Micaela Hernandez     MRN: [id number]     Result:   Mammo Digital Diagnostic Bilat with Ruben  US Breast Right Limited     History:  Patient is 54 y.o. and is seen for abnormal mammogram.        Films Compared:  Compared to: 12/27/2023 Mammo Digital Screening Bilat w/ Ruben     Findings:  This procedure was performed using tomosynthesis. Computer-aided detection was utilized in the interpretation of this examination.  There are scattered areas of fibroglandular density.      Mammo Digital Diagnostic Bilat with Ruben  Right  Mass: There is a high density, round mass with obscured margins seen in the upper region of the right breast in the posterior depth on the MLO view.   Lymph Node: There is a lymph node seen in the right axilla.      Left  There is no evidence of suspicious masses, calcifications, or other abnormal findings in the left breast.     US Breast Right Limited  Right  Mass: There is a 24 mm x 23 mm x 20 mm round, heterogeneous mass with indistinct margins with shadowing seen in the right breast at 11 o'clock, 10 cm from the nipple. The mass correlates with the mass seen on the mammogram.   Lymph Node: There is an indeterminate lymph node seen in the right axilla at level I. Cortical thickness measures 5 mm on the right.      Impression:  Right  Mass: Right breast 24 mm x 23 mm x 20 mm mass at 11 o'clock. Assessment: 5 - Highly suggestive of malignancy. Ultrasound-guided biopsy is recommended.   Lymph Node: Right axilla lymph node. Assessment: 4 - Suspicious finding. Ultrasound-guided biopsy is recommended.      Left  Mammo Digital Diagnostic Bilat with Ruben  There is no mammographic evidence of malignancy in the left breast.        BI-RADS Category:   Overall: 5 - Highly Suggestive of Malignancy     Recommendation:  Ultrasound-guided biopsy is recommended of right breast mass and right axillary lymph node. I discussed these findings  and recommendations with the patient in detail at the time of exam.     Your estimated lifetime risk of breast cancer (to age 85) based on Tyrer-Cuzick risk assessment model is 8.96%.  According to the [employer], patients with a lifetime breast cancer risk of 20% or higher might benefit from supplemental screening tests, such as screening breast MRI.     Roula Ness MD       Findings at [facility]:   . Right breast, 11:00, N + 10 cm, biopsy:  Invasive ductal carcinoma, Grade 2, (tubules=3, nuclei=2, mitoses=1)  Invasive carcinoma is present in multiple cores and measures 5 mm in greatest linear dimension within the core biopsy  No carcinoma in-situ or lymphovascular invasion is identified    Tumor biomarkers  Estrogen receptor (ER):  Negative  Progesterone receptor (PGR):  Negative  HER2 (IHC):  Negative, 0  Ki-67:  20-30%    Additional IHC:  E cadherin:  Strong, positive membranous staining, supporting ductal differentiation      2. Right axillary node, biopsy:  No metastatic carcinoma identified in lymphoid tissue      Final Diagnosis   1. Right breast, lumpectomy:    INVASIVE DUCTAL CARCINOMA, HIGH COMBINED HISTOLOGIC GRADE (DEJA GRADE 3; TUBULES 3, NUCLEAR PLEOMORPHISM 3, MITOTIC ACTIVITY 3), 2.5 CM IN GREATEST EXTENT.  CENTRAL NECROSIS IS PRESENT.  LYMPHOVASCULAR INVASION IS NOT IDENTIFIED.    PERINEURAL INVASION IS NOT IDENTIFIED.  RESECTION MARGIN IS FREE OF INVASIVE CARCINOMA:                INVASIVE CARCINOMA IS PRESENT 0.3 CM FROM THE SUPERIOR, LATERAL AND MEDIAL MARGINS, 0.4 CM FROM THE INFERIOR AND POSTERIOR MARGINS AND 0.5 CM FROM                THE ANTERIOR MARGIN.  BREAST RECEPTORS PERFORMED ON THE PATIENT'S PREVIOUS BIOPSY (WBS-):                ER: NEGATIVE               WI:  NEGATIVE                HER2:  NEGATIVE (SCORE 0)               KI-67:  20-30%  RADIOLOGIC RADAR MARKER IDENTIFIED GROSSLY.  MICROCALCIFICATIONS ARE NOT IDENTIFIED.     2. Lymph node, right sentinel  #1, excision:  1 benign lymph node (0/1).    Previous biopsy site changes.  See comment.     3. Lymph node, right non-sentinel #1, excision:  1 benign lymph node (0/1).       TUMOR BLOCKS FOR ADDITIONAL STUDIES IF NEEDED:  1G-1I, 1K-1P, 1R-1S, 1U-1W, 1Y                  Assessment and Plan        Malignant neoplasm of right breast in female, estrogen receptor positive, unspecified site of breast (Primary)  This is a 55 y.o. female with a stage pT2 N0(sn) M0 grade 3 ER - [place] - HER2 - Ki67 20-30% IDC of the right breast.  The patient is status post right partial mastectomy and sentinel node biopsy on 4/2/2025.       Plan adjuvant TC x 4  S/p C2 chemo completed 6/3/25   Labs reviewed   Proceed with C3  -Genetic screening neg    Urinary urgency/incontinence  U/a and Urine cx today     Antineoplastic chemotherapy induced anemia  Mild , Hb 11.2g/dL   Check Fe studies      Anxiety and depression    -Cont sertraline (ZOLOFT) 25 MG tablet; Take 1 tablet (25 mg total) by mouth once daily.  Dispense: 30 tablet; Refill: 11    Sleep disturbances  Improved   Cont temazepam (RESTORIL) 7.5 MG Cap; Take 1 capsule (7.5 mg total) by mouth nightly as needed (sleep disturbances).  Dispense: 30 capsule; Refill: 0   Pt wants to wait for genetic screening    Visit today included increased complexity associated with the care of the episodic problem anxiety and depression addressed and managing the longitudinal care of the patient due to the serious and/or complex managed problem(s) malignant neoplasm of right breast     I spent a total of 35  minutes on the day of the visit.This includes face to face time and non-face to face time preparing to see the patient (eg, review of tests), obtaining and/or reviewing separately obtained history, documenting clinical information in the electronic or other health record, independently interpreting results and communicating results to the patient/family/caregiver, and coordinating care.        Chemo  today  RTC in 2 weeks for MD visit with Dr. Susan Ibrahim MD  Hematology/Oncology

## 2025-07-15 NOTE — Clinical Note
-chemo today -RTC in 2 weeks for MD visit with Dr. Davis and labs day prior CMP, CBC, iron, ferritin

## 2025-07-15 NOTE — PLAN OF CARE
Patient presented to unit for docetaxel and cyclophosphamide chemo infusions (C4D1). VSS. No new or worsening complaints voiced. Pre-medication Aloxi/dex given. Docetaxel infused over 1 hour. Cyclophosphamide infused over 30 minutes. Medications tolerated well with no s/s of adverse reaction noted. Day 2 injection cancelled due to elevated ANC. Patient ambulated off unit in NAD at time of discharge.    Problem: Oncology Care  Goal: Effective Coping  Outcome: Progressing  Goal: Improved Activity Tolerance  Outcome: Progressing  Goal: Optimal Oral Intake  Outcome: Progressing  Goal: Improved Oral Mucous Membrane Integrity  Outcome: Progressing  Goal: Optimal Pain Control and Function  Outcome: Progressing

## 2025-07-20 DIAGNOSIS — I48.3 TYPICAL ATRIAL FLUTTER: ICD-10-CM

## 2025-07-20 DIAGNOSIS — I48.0 PAF (PAROXYSMAL ATRIAL FIBRILLATION): ICD-10-CM

## 2025-07-21 DIAGNOSIS — I10 PRIMARY HYPERTENSION: ICD-10-CM

## 2025-07-21 RX ORDER — LOSARTAN POTASSIUM 50 MG/1
50 TABLET ORAL 2 TIMES DAILY
Qty: 180 TABLET | Refills: 0 | Status: SHIPPED | OUTPATIENT
Start: 2025-07-21

## 2025-07-21 RX ORDER — APIXABAN 5 MG/1
5 TABLET, FILM COATED ORAL 2 TIMES DAILY
Qty: 180 TABLET | Refills: 0 | Status: SHIPPED | OUTPATIENT
Start: 2025-07-21

## 2025-08-06 ENCOUNTER — TELEPHONE (OUTPATIENT)
Dept: HEMATOLOGY/ONCOLOGY | Facility: CLINIC | Age: 55
End: 2025-08-06
Payer: COMMERCIAL

## 2025-08-06 ENCOUNTER — TELEPHONE (OUTPATIENT)
Facility: CLINIC | Age: 55
End: 2025-08-06
Payer: COMMERCIAL

## 2025-08-06 NOTE — TELEPHONE ENCOUNTER
Patient called to inform clinic she tested positive for COVID , We informed her to let her PCP know for treatment.  Thank You,   Jenn:MA

## 2025-08-06 NOTE — TELEPHONE ENCOUNTER
Pt is calling to inform provider that she test positive covid last night and wanted to let provider know due to being a high risk breast cancer pt , pt would like to get medical advice from providers on if there anything specifically she should do

## 2025-08-22 ENCOUNTER — OFFICE VISIT (OUTPATIENT)
Facility: CLINIC | Age: 55
End: 2025-08-22
Payer: COMMERCIAL

## 2025-08-22 ENCOUNTER — LAB VISIT (OUTPATIENT)
Dept: LAB | Facility: HOSPITAL | Age: 55
End: 2025-08-22
Attending: STUDENT IN AN ORGANIZED HEALTH CARE EDUCATION/TRAINING PROGRAM
Payer: COMMERCIAL

## 2025-08-22 VITALS
SYSTOLIC BLOOD PRESSURE: 102 MMHG | OXYGEN SATURATION: 98 % | TEMPERATURE: 98 F | HEIGHT: 61 IN | WEIGHT: 269.63 LBS | HEART RATE: 81 BPM | DIASTOLIC BLOOD PRESSURE: 64 MMHG | BODY MASS INDEX: 50.91 KG/M2 | RESPIRATION RATE: 18 BRPM

## 2025-08-22 DIAGNOSIS — J30.9 CHRONIC ALLERGIC RHINITIS: ICD-10-CM

## 2025-08-22 DIAGNOSIS — E11.21 TYPE 2 DIABETES MELLITUS WITH DIABETIC NEPHROPATHY, WITHOUT LONG-TERM CURRENT USE OF INSULIN: Primary | ICD-10-CM

## 2025-08-22 DIAGNOSIS — T45.1X5A ANEMIA DUE TO ANTINEOPLASTIC CHEMOTHERAPY: ICD-10-CM

## 2025-08-22 DIAGNOSIS — E78.00 PURE HYPERCHOLESTEROLEMIA: ICD-10-CM

## 2025-08-22 DIAGNOSIS — L30.8 PSORIASIFORM DERMATITIS: ICD-10-CM

## 2025-08-22 DIAGNOSIS — Z17.1 MALIGNANT NEOPLASM OF RIGHT BREAST IN FEMALE, ESTROGEN RECEPTOR NEGATIVE, UNSPECIFIED SITE OF BREAST: ICD-10-CM

## 2025-08-22 DIAGNOSIS — I10 PRIMARY HYPERTENSION: ICD-10-CM

## 2025-08-22 DIAGNOSIS — D64.81 ANEMIA DUE TO ANTINEOPLASTIC CHEMOTHERAPY: ICD-10-CM

## 2025-08-22 DIAGNOSIS — C50.911 MALIGNANT NEOPLASM OF RIGHT BREAST IN FEMALE, ESTROGEN RECEPTOR NEGATIVE, UNSPECIFIED SITE OF BREAST: ICD-10-CM

## 2025-08-22 LAB
ABSOLUTE EOSINOPHIL (OHS): 0.28 K/UL
ABSOLUTE MONOCYTE (OHS): 0.48 K/UL (ref 0.3–1)
ABSOLUTE NEUTROPHIL COUNT (OHS): 4.57 K/UL (ref 1.8–7.7)
ALBUMIN SERPL BCP-MCNC: 3.6 G/DL (ref 3.5–5.2)
ALP SERPL-CCNC: 85 UNIT/L (ref 40–150)
ALT SERPL W/O P-5'-P-CCNC: 20 UNIT/L (ref 10–44)
ANION GAP (OHS): 13 MMOL/L (ref 8–16)
AST SERPL-CCNC: 37 UNIT/L (ref 11–45)
BASOPHILS # BLD AUTO: 0.07 K/UL
BASOPHILS NFR BLD AUTO: 1 %
BILIRUB SERPL-MCNC: 0.3 MG/DL (ref 0.1–1)
BUN SERPL-MCNC: 10 MG/DL (ref 6–20)
CALCIUM SERPL-MCNC: 10 MG/DL (ref 8.7–10.5)
CHLORIDE SERPL-SCNC: 104 MMOL/L (ref 95–110)
CO2 SERPL-SCNC: 24 MMOL/L (ref 23–29)
CREAT SERPL-MCNC: 0.7 MG/DL (ref 0.5–1.4)
ERYTHROCYTE [DISTWIDTH] IN BLOOD BY AUTOMATED COUNT: 17.6 % (ref 11.5–14.5)
FERRITIN SERPL-MCNC: 85.6 NG/ML (ref 20–300)
GFR SERPLBLD CREATININE-BSD FMLA CKD-EPI: >60 ML/MIN/1.73/M2
GLUCOSE SERPL-MCNC: 104 MG/DL (ref 70–110)
HCT VFR BLD AUTO: 36.5 % (ref 37–48.5)
HGB BLD-MCNC: 10.4 GM/DL (ref 12–16)
IMM GRANULOCYTES # BLD AUTO: 0.03 K/UL (ref 0–0.04)
IMM GRANULOCYTES NFR BLD AUTO: 0.4 % (ref 0–0.5)
IRON SATN MFR SERPL: 12 % (ref 20–50)
IRON SERPL-MCNC: 49 UG/DL (ref 30–160)
LYMPHOCYTES # BLD AUTO: 1.34 K/UL (ref 1–4.8)
MCH RBC QN AUTO: 26.7 PG (ref 27–31)
MCHC RBC AUTO-ENTMCNC: 28.5 G/DL (ref 32–36)
MCV RBC AUTO: 94 FL (ref 82–98)
NUCLEATED RBC (/100WBC) (OHS): 0 /100 WBC
PLATELET # BLD AUTO: 238 K/UL (ref 150–450)
PMV BLD AUTO: 12.3 FL (ref 9.2–12.9)
POTASSIUM SERPL-SCNC: 3.8 MMOL/L (ref 3.5–5.1)
PROT SERPL-MCNC: 7.6 GM/DL (ref 6–8.4)
RBC # BLD AUTO: 3.89 M/UL (ref 4–5.4)
RELATIVE EOSINOPHIL (OHS): 4.1 %
RELATIVE LYMPHOCYTE (OHS): 19.8 % (ref 18–48)
RELATIVE MONOCYTE (OHS): 7.1 % (ref 4–15)
RELATIVE NEUTROPHIL (OHS): 67.6 % (ref 38–73)
SODIUM SERPL-SCNC: 141 MMOL/L (ref 136–145)
TIBC SERPL-MCNC: 426 UG/DL (ref 250–450)
TRANSFERRIN SERPL-MCNC: 288 MG/DL (ref 200–375)
WBC # BLD AUTO: 6.77 K/UL (ref 3.9–12.7)

## 2025-08-22 PROCEDURE — 36415 COLL VENOUS BLD VENIPUNCTURE: CPT | Mod: PN

## 2025-08-22 PROCEDURE — 82728 ASSAY OF FERRITIN: CPT

## 2025-08-22 PROCEDURE — 80053 COMPREHEN METABOLIC PANEL: CPT

## 2025-08-22 PROCEDURE — 85025 COMPLETE CBC W/AUTO DIFF WBC: CPT

## 2025-08-22 PROCEDURE — 83540 ASSAY OF IRON: CPT

## 2025-08-22 PROCEDURE — 99999 PR PBB SHADOW E&M-EST. PATIENT-LVL IV: CPT | Mod: PBBFAC,,, | Performed by: STUDENT IN AN ORGANIZED HEALTH CARE EDUCATION/TRAINING PROGRAM

## 2025-08-22 RX ORDER — CLOBETASOL PROPIONATE 0.5 MG/ML
SOLUTION TOPICAL 2 TIMES DAILY
Qty: 50 ML | Refills: 2 | Status: SHIPPED | OUTPATIENT
Start: 2025-08-22

## 2025-08-22 RX ORDER — FLUTICASONE PROPIONATE 50 MCG
1 SPRAY, SUSPENSION (ML) NASAL DAILY
Qty: 32 ML | Refills: 5 | Status: SHIPPED | OUTPATIENT
Start: 2025-08-22 | End: 2026-08-22

## 2025-08-22 RX ORDER — KETOCONAZOLE 20 MG/ML
SHAMPOO, SUSPENSION TOPICAL
Qty: 120 ML | Refills: 5 | Status: SHIPPED | OUTPATIENT
Start: 2025-08-25 | End: 2026-08-25

## 2025-08-26 ENCOUNTER — OFFICE VISIT (OUTPATIENT)
Dept: HEMATOLOGY/ONCOLOGY | Facility: CLINIC | Age: 55
End: 2025-08-26
Payer: COMMERCIAL

## 2025-08-26 DIAGNOSIS — D64.9 ANEMIA, UNSPECIFIED TYPE: ICD-10-CM

## 2025-08-26 DIAGNOSIS — F41.9 ANXIETY AND DEPRESSION: ICD-10-CM

## 2025-08-26 DIAGNOSIS — C50.911 MALIGNANT NEOPLASM OF RIGHT BREAST IN FEMALE, ESTROGEN RECEPTOR NEGATIVE, UNSPECIFIED SITE OF BREAST: Primary | ICD-10-CM

## 2025-08-26 DIAGNOSIS — F32.A ANXIETY AND DEPRESSION: ICD-10-CM

## 2025-08-26 DIAGNOSIS — Z17.1 MALIGNANT NEOPLASM OF RIGHT BREAST IN FEMALE, ESTROGEN RECEPTOR NEGATIVE, UNSPECIFIED SITE OF BREAST: Primary | ICD-10-CM

## 2025-08-26 PROCEDURE — 3066F NEPHROPATHY DOC TX: CPT | Mod: CPTII,95,, | Performed by: INTERNAL MEDICINE

## 2025-08-26 PROCEDURE — 4010F ACE/ARB THERAPY RXD/TAKEN: CPT | Mod: CPTII,95,, | Performed by: INTERNAL MEDICINE

## 2025-08-26 PROCEDURE — 3044F HG A1C LEVEL LT 7.0%: CPT | Mod: CPTII,95,, | Performed by: INTERNAL MEDICINE

## 2025-08-26 PROCEDURE — 98006 SYNCH AUDIO-VIDEO EST MOD 30: CPT | Mod: 95,,, | Performed by: INTERNAL MEDICINE

## 2025-08-26 PROCEDURE — 3061F NEG MICROALBUMINURIA REV: CPT | Mod: CPTII,95,, | Performed by: INTERNAL MEDICINE

## (undated) DEVICE — GOWN NONREINF SET-IN SLV XL

## (undated) DEVICE — SEE MEDLINE ITEM 154981

## (undated) DEVICE — SPONGE LAP 18X18 PREWASHED

## (undated) DEVICE — SOL IRR SOD CHL .9% POUR

## (undated) DEVICE — GLOVE SENSICARE PI GRN 6.5

## (undated) DEVICE — TROCAR KII FIOS 11MM X 100MM

## (undated) DEVICE — APPLICATOR CHLORAPREP ORN 26ML

## (undated) DEVICE — GAUZE FLUFF XXLG 36X36 2 PLY

## (undated) DEVICE — PAD GROUND UNIV STYLE CORD 9IN

## (undated) DEVICE — UNDERGLOVE BIOGEL PI SZ 6.5 LF

## (undated) DEVICE — ELECTRODE REM PLYHSV RETURN 9

## (undated) DEVICE — SUT MONOCRYL 4.0 PS2 CP496G

## (undated) DEVICE — DRESSING TELFA N ADH 3X8

## (undated) DEVICE — PAD SANITARY OB STERILE

## (undated) DEVICE — BLANKET WARMING LOWER BODY

## (undated) DEVICE — BOWL STERILE LARGE 32OZ

## (undated) DEVICE — GLOVE SURGICAL LATEX SZ 6.5

## (undated) DEVICE — SCISSOR CURVED ENDOPATH 5MM

## (undated) DEVICE — COVER PROBE NL STRL 3.6X96IN

## (undated) DEVICE — SET CYSTO IRRIGATION UNIV SPIK

## (undated) DEVICE — MAT QUICK 40X30 FLOOR FLUID LF

## (undated) DEVICE — INTRO 8.5FR 63CM SRO

## (undated) DEVICE — ELECTRODE BLADE INSULATED 1 IN

## (undated) DEVICE — UNDERGLOVES BIOGEL PI SZ 7 LF

## (undated) DEVICE — Device

## (undated) DEVICE — APPLIER CLIP LIAGCLIP 9.375IN

## (undated) DEVICE — SOL NACL IRR 1000ML BTL

## (undated) DEVICE — SUT MCRYL PLUS 4-0 PS2 27IN

## (undated) DEVICE — POSITIONER HEAD DONUT 9IN FOAM

## (undated) DEVICE — SYR 10CC LUER LOCK

## (undated) DEVICE — SUT VICRYL+ 27 UR-6 VIOL

## (undated) DEVICE — ADAPTER DISP HAND SWITCH

## (undated) DEVICE — POSITIONER HEAD ADULT

## (undated) DEVICE — JELLY LUBRICANT STERILE 4 OZ

## (undated) DEVICE — SYR DISP LL 5CC

## (undated) DEVICE — KIT ENSITE ELECTRODE SURFACE

## (undated) DEVICE — CANISTER SUCTION 2 LTR

## (undated) DEVICE — GOWN IMPERVIOUS BLUE XXL

## (undated) DEVICE — TROCAR KII FIOS 5MM X 100MM

## (undated) DEVICE — PAD DEFIB CADENCE ADULT R2

## (undated) DEVICE — SUPPORT ULNA NERVE PROTECTOR

## (undated) DEVICE — NDL HYPO STD REG BVL 18GX1.5IN

## (undated) DEVICE — UNDERGLOVES BIOGEL PI SZ 6 LF

## (undated) DEVICE — APPLICATOR SURGICEL ENDOSCOPIC

## (undated) DEVICE — BLANKET UPPER BODY 78.7X29.9IN

## (undated) DEVICE — TIP YANKAUERS BULB NO VENT

## (undated) DEVICE — CUP MEDICINE STERILE 2OZ

## (undated) DEVICE — PAD PREP 50/CA

## (undated) DEVICE — PAD PINK TRENDELENBURG POS XL

## (undated) DEVICE — PACK FLUENT DISPOSABLE

## (undated) DEVICE — SYR 50CC LL

## (undated) DEVICE — TOWEL OR DISP STRL BLUE 4/PK

## (undated) DEVICE — SEE MEDLINE ITEM 146292

## (undated) DEVICE — SEE MEDLINE ITEM 157181

## (undated) DEVICE — INTRODUCER HEMOSTASIS 7.5F

## (undated) DEVICE — CONTAINER SPECIMEN STRL 4OZ

## (undated) DEVICE — GLOVE SURGICAL LATEX SZ 6

## (undated) DEVICE — SUT VICRYL 3-0 27 SH

## (undated) DEVICE — DISSECTOR CURVED 5DCD

## (undated) DEVICE — IRRIGATOR ENDOSCOPY DISP.

## (undated) DEVICE — SOL CLEARIFY VISUALIZATION LAP

## (undated) DEVICE — DRAPE STERI INSTRUMENT 1018

## (undated) DEVICE — COVER OVERHEAD SURG LT BLUE

## (undated) DEVICE — ELECTRODE MEGADYNE RETURN DUAL

## (undated) DEVICE — CANISTER SUCTION RIGID 2000CC

## (undated) DEVICE — PACK ENDOSCOPY GENERAL

## (undated) DEVICE — CATH TRICUSPID HALO XP 7FRX110

## (undated) DEVICE — TRAY FOLEY 16FR INFECTION CONT

## (undated) DEVICE — DRAPE C-ARM FOR MOBILE XRAY

## (undated) DEVICE — ENDOSTITCH INSTRUMENT

## (undated) DEVICE — SUT PROLENE 3-0 PS-2 BL 18IN

## (undated) DEVICE — SEE MEDLINE ITEM 157150

## (undated) DEVICE — SHEATH HEMOSTASIS 8.5FR

## (undated) DEVICE — SYR 0.9% NACL 10ML STERILE

## (undated) DEVICE — NDL INSUF ULTRA VERESS 120MM

## (undated) DEVICE — KIT ANTIFOG

## (undated) DEVICE — SET MICROPUNCTUREECHO STIFF

## (undated) DEVICE — BLADE SURG CARBON STEEL SZ11

## (undated) DEVICE — ELECTRODE BLADE TEFLON 6

## (undated) DEVICE — SOL NACL IRR 3000ML

## (undated) DEVICE — NDL HYPO REG 25G X 1 1/2

## (undated) DEVICE — STAPLER SKIN ROTATING HEAD

## (undated) DEVICE — CATH BIDIRECTIONAL DF CRV 7FR

## (undated) DEVICE — DRAPE THYROID SOFT STERILE

## (undated) DEVICE — SOL NACL 0.9% INJ 500ML BG

## (undated) DEVICE — SYR 20ML BLUE LUER LOCK

## (undated) DEVICE — ADHESIVE DERMABOND MINI HV

## (undated) DEVICE — SUT VICRYL PLUS 0 CT1 18IN

## (undated) DEVICE — DRAPE HALF SURGICAL 40X58IN

## (undated) DEVICE — SEE MEDLINE ITEM 157110

## (undated) DEVICE — SOL NS 1000CC

## (undated) DEVICE — SEAL LENS SCOPE MYOSURE

## (undated) DEVICE — STAPLER SKIN REGULAR

## (undated) DEVICE — PACK EP DRAPE

## (undated) DEVICE — COVER PROXIMA MAYO STAND

## (undated) DEVICE — SUT ETHILON 2-0 PSLX 30IN

## (undated) DEVICE — GLOVE SIGNATURE ESSNTL LTX 7.5

## (undated) DEVICE — MANIPULATOR TIP RUMI ORANGE

## (undated) DEVICE — GLOVE SIGNATURE ESSNTL LTX 6.5

## (undated) DEVICE — ENDOSTITCH POLYSORB 0 ES-9

## (undated) DEVICE — SUT 2-0 VICRYL / SH (J417)

## (undated) DEVICE — OCCLUDER COLPO-PNEUMO STERILE

## (undated) DEVICE — PACK LAPAROSCOPY/PELVISCOPY II

## (undated) DEVICE — DRAPE STERI LONG

## (undated) DEVICE — DRESSING XEROFORM NONADH 1X8IN

## (undated) DEVICE — GLOVE SENSICARE PI GRN 7.5

## (undated) DEVICE — CATH SELF-CATH FEMALE 14FR 6IN

## (undated) DEVICE — KIT CUSTOM MINOR PROC ST

## (undated) DEVICE — SPONGE COTTON TRAY 4X4IN

## (undated) DEVICE — COVER TABLE 44X90 STERILE

## (undated) DEVICE — CATH SAFIRE BI-DIR 7FR LRG

## (undated) DEVICE — SUT VICRYL PLUS 0 CT1 36IN

## (undated) DEVICE — HOOK DISSECTING 5MM

## (undated) DEVICE — KIT PROBE COVER WITH GEL

## (undated) DEVICE — PENCIL ROCKER SWITCH 10FT CORD

## (undated) DEVICE — SYR LUER LOCK 20ML

## (undated) DEVICE — TRAY MINOR GEN SURG OMC